# Patient Record
Sex: FEMALE | Race: BLACK OR AFRICAN AMERICAN | Employment: FULL TIME | URBAN - METROPOLITAN AREA
[De-identification: names, ages, dates, MRNs, and addresses within clinical notes are randomized per-mention and may not be internally consistent; named-entity substitution may affect disease eponyms.]

---

## 2017-01-03 ENCOUNTER — PATIENT MESSAGE (OUTPATIENT)
Dept: FAMILY MEDICINE CLINIC | Age: 37
End: 2017-01-03

## 2017-01-03 RX ORDER — ALBUTEROL SULFATE 90 UG/1
2 AEROSOL, METERED RESPIRATORY (INHALATION)
Qty: 2 INHALER | Refills: 2 | Status: SHIPPED | OUTPATIENT
Start: 2017-01-03 | End: 2018-09-28

## 2017-03-30 ENCOUNTER — OFFICE VISIT (OUTPATIENT)
Dept: FAMILY MEDICINE CLINIC | Age: 37
End: 2017-03-30

## 2017-03-30 VITALS
HEIGHT: 63 IN | HEART RATE: 70 BPM | BODY MASS INDEX: 35.9 KG/M2 | SYSTOLIC BLOOD PRESSURE: 118 MMHG | OXYGEN SATURATION: 97 % | TEMPERATURE: 97 F | DIASTOLIC BLOOD PRESSURE: 71 MMHG | WEIGHT: 202.6 LBS | RESPIRATION RATE: 18 BRPM

## 2017-03-30 DIAGNOSIS — M72.2 PLANTAR FASCIITIS OF LEFT FOOT: ICD-10-CM

## 2017-03-30 DIAGNOSIS — M79.672 PAIN OF LEFT HEEL: ICD-10-CM

## 2017-03-30 DIAGNOSIS — J45.30 MILD PERSISTENT ASTHMA WITHOUT COMPLICATION: Primary | ICD-10-CM

## 2017-03-30 RX ORDER — ALBUTEROL SULFATE 5 MG/ML
2.5 SOLUTION RESPIRATORY (INHALATION)
Qty: 90 ML | Refills: 3 | OUTPATIENT
Start: 2017-03-30 | End: 2019-12-22

## 2017-03-30 RX ORDER — NEBULIZER AND COMPRESSOR
EACH MISCELLANEOUS
Qty: 1 EACH | Refills: 0 | Status: SHIPPED | OUTPATIENT
Start: 2017-03-30 | End: 2021-12-01 | Stop reason: SDUPTHER

## 2017-03-30 RX ORDER — BUDESONIDE AND FORMOTEROL FUMARATE DIHYDRATE 160; 4.5 UG/1; UG/1
2 AEROSOL RESPIRATORY (INHALATION) 2 TIMES DAILY
Qty: 1 INHALER | Refills: 3 | Status: SHIPPED | OUTPATIENT
Start: 2017-03-30 | End: 2018-05-25 | Stop reason: SDUPTHER

## 2017-03-30 RX ORDER — IBUPROFEN 800 MG/1
800 TABLET ORAL
Qty: 180 TAB | Refills: 2 | Status: SHIPPED | OUTPATIENT
Start: 2017-03-30 | End: 2017-07-19 | Stop reason: SDUPTHER

## 2017-03-30 NOTE — PROGRESS NOTES
Yoselin Greenberg is a 39 y.o. female presents to office for asthma. Pt would like to get nebulizer for home. 1. Have you been to the ER, urgent care clinic or hospitalized since your last visit? yes  2. Have you seen any other providers outside of [facility] since your last visit? yes  3. Have you had a Flu shot this year?  yes       Health Maintenance items with a due date reviewed with patient:  Health Maintenance Due   Topic Date Due    Pneumococcal 19-64 Medium Risk (1 of 1 - PPSV23) 05/02/1999    DTaP/Tdap/Td series (1 - Tdap) 05/02/2001

## 2017-04-01 NOTE — PROGRESS NOTES
Sim Reed is a 39 y.o.  female and presents with need to try a nebulizer machine since she tends to go to ER a lot sometimes. She is not having an exacerbation at this time. She also needed asthma med refills and Motrin refill for Plantar fasciitis on left heel. Chief Complaint   Patient presents with    Asthma     Subjective: Additional Concerns: none    Patient Active Problem List    Diagnosis Date Noted    Palpitations 07/26/2016    Precordial pain 07/26/2016    Mild intermittent asthma 07/07/2014    Obesity 07/07/2014     Current Outpatient Prescriptions   Medication Sig Dispense Refill    ibuprofen (MOTRIN) 800 mg tablet Take 1 Tab by mouth every eight (8) hours as needed for Pain. Take with food. 180 Tab 2    Nebulizer & Compressor machine Use as directed 1 Each 0    Nebulizer Accessories kit Use as directed 1 Kit 1    albuterol (PROVENTIL) 5 mg/mL nebulizer solution 0.5 mL by Nebulization route every four (4) hours as needed for Wheezing or Shortness of Breath. Indications: Acute Asthma Attack, BRONCHOSPASM PREVENTION, EXERCISE-INDUCED BRONCHOSPASM PREVENTION 90 mL 3    budesonide-formoterol (SYMBICORT) 160-4.5 mcg/actuation HFA inhaler Take 2 Puffs by inhalation two (2) times a day. 1 Inhaler 3    albuterol (PROVENTIL HFA, VENTOLIN HFA, PROAIR HFA) 90 mcg/actuation inhaler Take 2 Puffs by inhalation every four (4) hours as needed for Wheezing or Shortness of Breath. 2 Inhaler 2    multivitamin (ONE A DAY) tablet Take 1 Tab by mouth daily.  omega-3 fatty acids-vitamin e 1,000 mg cap Take 1 Cap by mouth daily.  CYANOCOBALAMIN, VITAMIN B-12, (VITAMIN B-12 PO) Take 1 Tab by mouth daily.  BIOTIN PO Take 1 Tab by mouth daily.        Allergies   Allergen Reactions    Azo [Phenazopyridine] Hives     Past Medical History:   Diagnosis Date    Asthma     Chronic pain 6 months    painful menses    Fibroids      Past Surgical History:   Procedure Laterality Date  HX  SECTION  8380,4902    with BTL     Family History   Problem Relation Age of Onset    Hypertension Mother     Hypertension Father     Diabetes Father     Cancer Paternal Grandmother      Social History   Substance Use Topics    Smoking status: Never Smoker    Smokeless tobacco: Not on file    Alcohol use Yes      Comment: socially, 1 beer/weekend     ROS     General: negative for - chills, fatigue, fever, weight change  Psych: negative for - anxiety, depression, irritability or mood swings  ENT: negative for - headaches, hearing change, nasal congestion, oral lesions, sneezing or sore throat  Heme/ Lymph: negative for - bleeding problems, bruising, pallor or swollen lymph nodes  Endo: negative for - hot flashes, polydipsia/polyuria or temperature intolerance  Resp: negative for - cough, shortness of breath or wheezing  CV: negative for - chest pain, edema or palpitations  GI: negative for - abdominal pain, change in bowel habits, constipation, diarrhea or nausea/vomiting  : negative for - dysuria, hematuria, incontinence, pelvic pain or vulvar/vaginal symptoms  MSK: negative for - joint pain, joint swelling or muscle pain  Neuro: negative for - confusion, headaches, seizures or weakness  Derm: negative for - dry skin, hair changes, rash or skin lesion changes    Objective:  Vitals:    17 0909   BP: 118/71   Pulse: 70   Resp: 18   Temp: 97 °F (36.1 °C)   TempSrc: Oral   SpO2: 97%   Weight: 202 lb 9.6 oz (91.9 kg)   Height: 5' 3\" (1.6 m)   PainSc:   0 - No pain     PE    Alert, well appearing, and in no distress, oriented to person, place, and time and overweight  Mental status - alert, oriented to person, place, and time, normal mood, behavior, speech, dress, motor activity, and thought processes  Chest - clear to auscultation, no wheezes, rales or rhonchi, symmetric air entry  Heart - normal rate, regular rhythm, normal S1, S2, no murmurs, rubs, clicks or gallops  Neurological - alert, oriented, normal speech, no focal findings or movement disorder noted  Musculoskeletal - no joint tenderness, deformity or swelling  Extremities - peripheral pulses normal, no pedal edema, no clubbing or cyanosis    LABS   No visits with results within 6 Month(s) from this visit. Latest known visit with results is:    Hospital Outpatient Visit on 07/08/2016   Component Date Value Ref Range Status    WBC 07/08/2016 10.8  4.6 - 13.2 K/uL Final    RBC 07/08/2016 5.29  4. 20 - 5.30 M/uL Final    HGB 07/08/2016 14.4  12.0 - 16.0 g/dL Final    HCT 07/08/2016 43.2  35.0 - 45.0 % Final    MCV 07/08/2016 81.7  74.0 - 97.0 FL Final    MCH 07/08/2016 27.2  24.0 - 34.0 PG Final    MCHC 07/08/2016 33.3  31.0 - 37.0 g/dL Final    RDW 07/08/2016 14.1  11.6 - 14.5 % Final    PLATELET 16/69/3593 542  135 - 420 K/uL Final    MPV 07/08/2016 10.4  9.2 - 11.8 FL Final    NEUTROPHILS 07/08/2016 66  40 - 73 % Final    LYMPHOCYTES 07/08/2016 26  21 - 52 % Final    MONOCYTES 07/08/2016 5  3 - 10 % Final    EOSINOPHILS 07/08/2016 3  0 - 5 % Final    BASOPHILS 07/08/2016 0  0 - 2 % Final    ABS. NEUTROPHILS 07/08/2016 7.2  1.8 - 8.0 K/UL Final    ABS. LYMPHOCYTES 07/08/2016 2.8  0.9 - 3.6 K/UL Final    ABS. MONOCYTES 07/08/2016 0.5  0.05 - 1.2 K/UL Final    ABS. EOSINOPHILS 07/08/2016 0.3  0.0 - 0.4 K/UL Final    ABS.  BASOPHILS 07/08/2016 0.0  0.0 - 0.06 K/UL Final    DF 07/08/2016 AUTOMATED    Final    TSH 07/08/2016 2.04  0.36 - 3.74 uIU/mL Final    T4, Free 07/08/2016 0.9  0.7 - 1.5 NG/DL Final    Sodium 07/08/2016 139  136 - 145 mmol/L Final    Potassium 07/08/2016 4.1  3.5 - 5.5 mmol/L Final    Chloride 07/08/2016 105  100 - 108 mmol/L Final    CO2 07/08/2016 25  21 - 32 mmol/L Final    Anion gap 07/08/2016 9  3.0 - 18 mmol/L Final    Glucose 07/08/2016 75  74 - 99 mg/dL Final    BUN 07/08/2016 10  7.0 - 18 MG/DL Final    Creatinine 07/08/2016 0.81  0.6 - 1.3 MG/DL Final    BUN/Creatinine ratio 07/08/2016 12  12 - 20   Final    GFR est AA 07/08/2016 >60  >60 ml/min/1.73m2 Final    GFR est non-AA 07/08/2016 >60  >60 ml/min/1.73m2 Final    Comment: (NOTE)  Estimated GFR is calculated using the Modification of Diet in Renal   Disease (MDRD) Study equation, reported for both  Americans   (GFRAA) and non- Americans (GFRNA), and normalized to 1.73m2   body surface area. The physician must decide which value applies to   the patient. The MDRD study equation should only be used in   individuals age 25 or older. It has not been validated for the   following: pregnant women, patients with serious comorbid conditions,   or on certain medications, or persons with extremes of body size,   muscle mass, or nutritional status.  Calcium 07/08/2016 8.9  8.5 - 10.1 MG/DL Final    Bilirubin, total 07/08/2016 0.5  0.2 - 1.0 MG/DL Final    ALT (SGPT) 07/08/2016 25  13 - 56 U/L Final    AST (SGOT) 07/08/2016 19  15 - 37 U/L Final    Alk. phosphatase 07/08/2016 81  45 - 117 U/L Final    Protein, total 07/08/2016 7.9  6.4 - 8.2 g/dL Final    Albumin 07/08/2016 4.0  3.4 - 5.0 g/dL Final    Globulin 07/08/2016 3.9  2.0 - 4.0 g/dL Final    A-G Ratio 07/08/2016 1.0  0.8 - 1.7   Final    LIPID PROFILE 07/08/2016        Final    Cholesterol, total 07/08/2016 144  <200 MG/DL Final    Triglyceride 07/08/2016 47  <150 MG/DL Final    HDL Cholesterol 07/08/2016 61* 40 - 60 MG/DL Final    LDL, calculated 07/08/2016 73.6  0 - 100 MG/DL Final    VLDL, calculated 07/08/2016 9.4  MG/DL Final    CHOL/HDL Ratio 07/08/2016 2.4  0 - 5.0   Final    Source 07/08/2016 THIS ENVIRONMENTAL CULTURING METHOD IS BASED ON AN UNVALIDATED PROCESS DEVELOPED AND RECOMMENDED BY THE [facility] FOR DISEASE CONTROL. THE SENSITIVITY, SPECIFICITY AND LIMITS ON QUANTITATION OR DETECTION ARE NOT ESTABLISHED FOR ALL ORGANISMS WITH THE SPECIFIED PROCESSING METHODS.     Final    Atopobium vaginae 07/08/2016 SEE COMMENT  Score Final Comment: (NOTE)  RESULT:High - 2      BVAB 2 07/08/2016 SEE COMMENT  Score Final    Comment: (NOTE)  RESULT:High - 2      Megasphaera 1 07/08/2016 SEE COMMENT  Score Final    Comment: (NOTE)  RESULT:High - 2  Performed At: [facility]  [facility] [address], [place] [id number]  Juana Pena MD WK:[id number]      C. albicans, JACQUELINE 07/08/2016 NEGATIVE   NEGATIVE   Final    C. glabrata, JACQUELINE 07/08/2016 NEGATIVE   NEGATIVE   Final    Comment: (NOTE)  This test was developed and its performance characteristics  determined by [facility].  It has not been cleared or approved by the  Food and Drug Administration. The FDA has determined that such  clearance or approval is not necessary. Performed At: [address] [id number]  Juana Pena MD EK:[id number]      T. vaginalis, JACQUELINE 07/08/2016 NEGATIVE   NEGATIVE   Final    C. trachomatis, JACQUELINE 07/08/2016 NEGATIVE   NEGATIVE   Final    N. gonorrhoeae, JACQUELINE 07/08/2016 NEGATIVE   NEGATIVE   Final    Comment: (NOTE)  Performed At: [address] [id number]  Juana Pena MD [phone]:[id number]      Hemoglobin A1c 07/08/2016 5.6  4.2 - 5.6 % Final    Comment: ** NEW REFERENCE RANGE ESTABLISHED FOR THIS METHOD **  (NOTE)  HbA1C Interpretive Ranges  <5.7              Normal  5.7 - 6.4         Consider Prediabetes  >6.5              Consider Diabetes      Est. average glucose 07/08/2016 114  mg/dL Final    Comment: (NOTE)  The eAG should be interpreted with patient characteristics in mind   since ethnicity, interindividual differences, red cell lifespan,   variation in rates of glycation, etc. may affect the validity of the   calculation. TESTS  Results for orders placed or performed during the hospital encounter of 07/08/16   CBC WITH AUTOMATED DIFF   Result Value Ref Range    WBC 10.8 4.6 - 13.2 K/uL    RBC 5.29 4. 20 - 5.30 M/uL    HGB 14.4 12.0 - 16.0 g/dL    HCT 43.2 35.0 - 45.0 %    MCV 81.7 74.0 - 97.0 FL    MCH 27.2 24.0 - 34.0 PG    MCHC 33.3 31.0 - 37.0 g/dL    RDW 14.1 11.6 - 14.5 %    PLATELET 465 999 - 537 K/uL    MPV 10.4 9.2 - 11.8 FL    NEUTROPHILS 66 40 - 73 %    LYMPHOCYTES 26 21 - 52 %    MONOCYTES 5 3 - 10 %    EOSINOPHILS 3 0 - 5 %    BASOPHILS 0 0 - 2 %    ABS. NEUTROPHILS 7.2 1.8 - 8.0 K/UL    ABS. LYMPHOCYTES 2.8 0.9 - 3.6 K/UL    ABS. MONOCYTES 0.5 0.05 - 1.2 K/UL    ABS. EOSINOPHILS 0.3 0.0 - 0.4 K/UL    ABS. BASOPHILS 0.0 0.0 - 0.06 K/UL    DF AUTOMATED     TSH 3RD GENERATION   Result Value Ref Range    TSH 2.04 0.36 - 3.74 uIU/mL   T4, FREE   Result Value Ref Range    T4, Free 0.9 0.7 - 1.5 NG/DL   METABOLIC PANEL, COMPREHENSIVE   Result Value Ref Range    Sodium 139 136 - 145 mmol/L    Potassium 4.1 3.5 - 5.5 mmol/L    Chloride 105 100 - 108 mmol/L    CO2 25 21 - 32 mmol/L    Anion gap 9 3.0 - 18 mmol/L    Glucose 75 74 - 99 mg/dL    BUN 10 7.0 - 18 MG/DL    Creatinine 0.81 0.6 - 1.3 MG/DL    BUN/Creatinine ratio 12 12 - 20      GFR est AA >60 >60 ml/min/1.73m2    GFR est non-AA >60 >60 ml/min/1.73m2    Calcium 8.9 8.5 - 10.1 MG/DL    Bilirubin, total 0.5 0.2 - 1.0 MG/DL    ALT (SGPT) 25 13 - 56 U/L    AST (SGOT) 19 15 - 37 U/L    Alk. phosphatase 81 45 - 117 U/L    Protein, total 7.9 6.4 - 8.2 g/dL    Albumin 4.0 3.4 - 5.0 g/dL    Globulin 3.9 2.0 - 4.0 g/dL    A-G Ratio 1.0 0.8 - 1.7     LIPID PANEL   Result Value Ref Range    LIPID PROFILE          Cholesterol, total 144 <200 MG/DL    Triglyceride 47 <150 MG/DL    HDL Cholesterol 61 (H) 40 - 60 MG/DL    LDL, calculated 73.6 0 - 100 MG/DL    VLDL, calculated 9.4 MG/DL    CHOL/HDL Ratio 2.4 0 - 5.0     NUSWAB VAGINITIS PLUS   Result Value Ref Range    Source        THIS ENVIRONMENTAL CULTURING METHOD IS BASED ON AN UNVALIDATED PROCESS DEVELOPED AND RECOMMENDED BY THE [facility] FOR DISEASE CONTROL.  THE SENSITIVITY, SPECIFICITY AND LIMITS ON QUANTITATION OR DETECTION ARE NOT ESTABLISHED FOR ALL ORGANISMS WITH THE SPECIFIED PROCESSING METHODS. Atopobium vaginae SEE COMMENT Score    BVAB 2 SEE COMMENT Score    Megasphaera 1 SEE COMMENT Score    C. albicans, JACQUELINE NEGATIVE  NEGATIVE      C. glabrata, JACQUELINE NEGATIVE  NEGATIVE      T. vaginalis, JACQUELINE NEGATIVE  NEGATIVE      C. trachomatis, JACQUELINE NEGATIVE  NEGATIVE      N. gonorrhoeae, JACQUELINE NEGATIVE  NEGATIVE     HEMOGLOBIN A1C WITH EAG   Result Value Ref Range    Hemoglobin A1c 5.6 4.2 - 5.6 %    Est. average glucose 114 mg/dL     Assessment/Plan:      1. Plantar fasciitis of left foot   - ibuprofen (MOTRIN) 800 mg tablet; Take 1 Tab by mouth every eight (8) hours as needed for Pain. Take with food. Dispense: 180 Tab; Refill: 2    2. Mild persistent asthma without complication - stable   - Nebulizer & Compressor machine; Use as directed  Dispense: 1 Each; Refill: 0  - Nebulizer Accessories kit; Use as directed  Dispense: 1 Kit; Refill: 1  - albuterol (PROVENTIL) 5 mg/mL nebulizer solution; 0.5 mL by Nebulization route every four (4) hours as needed for Wheezing or Shortness of Breath. Indications: Acute Asthma Attack, BRONCHOSPASM PREVENTION, EXERCISE-INDUCED BRONCHOSPASM PREVENTION  Dispense: 90 mL; Refill: 3  - budesonide-formoterol (SYMBICORT) 160-4.5 mcg/actuation HFA inhaler; Take 2 Puffs by inhalation two (2) times a day. Dispense: 1 Inhaler; Refill: 3    Lab review: no lab studies available for review at time of visit    I have discussed the diagnosis with the patient and the intended plan as seen in the above orders. The patient has received an after-visit summary and questions were answered concerning future plans. I have discussed medication side effects and warnings with the patient as well. I have reviewed the plan of care with the patient, accepted their input and they are in agreement with the treatment goals.      F/U as needed    Bibi Orantes MD

## 2017-04-01 NOTE — PATIENT INSTRUCTIONS

## 2017-05-17 NOTE — TELEPHONE ENCOUNTER
Spoke to pharmacist regarding pt's symbicort. Per pharmacist, pt needs to try breo elipta/ advair/ [employer] first. Please advise.

## 2017-06-29 NOTE — TELEPHONE ENCOUNTER
From: Glorya Gaucher  To: Khoi Best MD  Sent: 6/29/2017 2:11 AM EDT  Subject: Medication Renewal Request    Original authorizing provider: Lorean Hence, MD Glorya Gaucher would like a refill of the following medications:  mometasone-formoterol (DULERA) 200-5 mcg/actuation HFA inhaler Khoi Best MD]    Preferred pharmacy: [address], [place]:

## 2017-07-19 ENCOUNTER — OFFICE VISIT (OUTPATIENT)
Dept: FAMILY MEDICINE CLINIC | Age: 37
End: 2017-07-19

## 2017-07-19 VITALS
SYSTOLIC BLOOD PRESSURE: 126 MMHG | HEIGHT: 63 IN | WEIGHT: 195 LBS | HEART RATE: 70 BPM | OXYGEN SATURATION: 98 % | TEMPERATURE: 98 F | RESPIRATION RATE: 18 BRPM | DIASTOLIC BLOOD PRESSURE: 86 MMHG | BODY MASS INDEX: 34.55 KG/M2

## 2017-07-19 DIAGNOSIS — M72.2 PLANTAR FASCIITIS OF LEFT FOOT: ICD-10-CM

## 2017-07-19 DIAGNOSIS — M79.672 PAIN OF LEFT HEEL: ICD-10-CM

## 2017-07-19 DIAGNOSIS — M25.561 ACUTE PAIN OF RIGHT KNEE: Primary | ICD-10-CM

## 2017-07-19 RX ORDER — IBUPROFEN 800 MG/1
800 TABLET ORAL
Qty: 180 TAB | Refills: 2 | Status: SHIPPED | OUTPATIENT
Start: 2017-07-19 | End: 2017-12-21 | Stop reason: SDUPTHER

## 2017-07-19 NOTE — PROGRESS NOTES
Skyla Hall is a 40 y.o. female presents to office for right knee swelling x 1 week. Pt states that she does not recall any injury. Pt states that she stands up at work a majority of her shift. 1. Have you been to the ER, urgent care clinic or hospitalized since your last visit? no  2. Have you seen any other providers outside of [facility] since your last visit? no  3. Have you had a Flu shot this year?  yes      Health Maintenance items with a due date reviewed with patient:  Health Maintenance Due   Topic Date Due    Pneumococcal 19-64 Medium Risk (1 of 1 - PPSV23) 05/02/1999

## 2017-07-19 NOTE — PATIENT INSTRUCTIONS
Knee Pain or Injury: Care Instructions  Your Care Instructions    Injuries are a common cause of knee problems. Sudden (acute) injuries may be caused by a direct blow to the knee. They can also be caused by abnormal twisting, bending, or falling on the knee. Pain, bruising, or swelling may be severe, and may start within minutes of the injury. Overuse is another cause of knee pain. Other causes are climbing stairs, kneeling, and other activities that use the knee. Everyday wear and tear, especially as you get older, also can cause knee pain. Rest, along with home treatment, often relieves pain and allows your knee to heal. If you have a serious knee injury, you may need tests and treatment. Follow-up care is a key part of your treatment and safety. Be sure to make and go to all appointments, and call your doctor if you are having problems. It's also a good idea to know your test results and keep a list of the medicines you take. How can you care for yourself at home? · Be safe with medicines. Read and follow all instructions on the label. ¨ If the doctor gave you a prescription medicine for pain, take it as prescribed. ¨ If you are not taking a prescription pain medicine, ask your doctor if you can take an over-the-counter medicine. · Rest and protect your knee. Take a break from any activity that may cause pain. · Put ice or a cold pack on your knee for 10 to 20 minutes at a time. Put a thin cloth between the ice and your skin. · Prop up a sore knee on a pillow when you ice it or anytime you sit or lie down for the next 3 days. Try to keep it above the level of your heart. This will help reduce swelling. · If your knee is not swollen, you can put moist heat, a heating pad, or a warm cloth on your knee. · If your doctor recommends an elastic bandage, sleeve, or other type of support for your knee, wear it as directed.   · Follow your doctor's instructions about how much weight you can put on your leg. Use a cane, crutches, or a walker as instructed. · Follow your doctor's instructions about activity during your healing process. If you can do mild exercise, slowly increase your activity. · Reach and stay at a healthy weight. Extra weight can strain the joints, especially the knees and hips, and make the pain worse. Losing even a few pounds may help. When should you call for help? Call 911 anytime you think you may need emergency care. For example, call if:  · You have symptoms of a blood clot in your lung (called a pulmonary embolism). These may include:  ¨ Sudden chest pain. ¨ Trouble breathing. ¨ Coughing up blood. Call your doctor now or seek immediate medical care if:  · You have severe or increasing pain. · Your leg or foot turns cold or changes color. · You cannot stand or put weight on your knee. · Your knee looks twisted or bent out of shape. · You cannot move your knee. · You have signs of infection, such as:  ¨ Increased pain, swelling, warmth, or redness. ¨ Red streaks leading from the knee. ¨ Pus draining from a place on your knee. ¨ A fever. · You have signs of a blood clot in your leg (called a deep vein thrombosis), such as:  ¨ Pain in your calf, back of the knee, thigh, or groin. ¨ Redness and swelling in your leg or groin. Watch closely for changes in your health, and be sure to contact your doctor if:  · You have tingling, weakness, or numbness in your knee. · You have any new symptoms, such as swelling. · You have bruises from a knee injury that last longer than 2 weeks. · You do not get better as expected. Where can you learn more? Go to http://lorie-ede.info/. Enter K195 in the search box to learn more about \"Knee Pain or Injury: Care Instructions. \"  Current as of: March 20, 2017  Content Version: 11.3  © 2363-3339 [employer].  Care instructions adapted under license by [employer] (which disclaims liability or warranty for this information). If you have questions about a medical condition or this instruction, always ask your healthcare professional. [place] [address] any warranty or liability for your use of this information.

## 2017-07-19 NOTE — LETTER
NOTIFICATION RETURN TO WORK  
 
7/19/2017 3:15 PM 
 
Ms. Brianna Cash [address] [id number] To Whom It May Concern: 
 
Brianna Cash is currently under the care of [address] [place] [address]. Please excuse Ms. Bernabe Harris from work 7/19/17-7/20/17. She may return to work on 7/23/17. If there are questions or concerns please have the patient contact our office. Sincerely, Thais Roth MD

## 2017-07-20 NOTE — PROGRESS NOTES
Suzanna Serna is a 40 y.o.  female and presents with acute right knee edema and pain. No trauma or injury but had some kneeling and   Standing at work that is prolonged. No shooting pain or numbness down her leg. Chief Complaint   Patient presents with    Knee Swelling     Subjective: Additional Concerns: none    Patient Active Problem List    Diagnosis Date Noted    Palpitations 07/26/2016    Precordial pain 07/26/2016    Mild intermittent asthma 07/07/2014    Obesity 07/07/2014     Current Outpatient Prescriptions   Medication Sig Dispense Refill    ibuprofen (MOTRIN) 800 mg tablet Take 1 Tab by mouth every eight (8) hours as needed for Pain. Take with food. 180 Tab 2    mometasone-formoterol (DULERA) 200-5 mcg/actuation HFA inhaler Take 2 Puffs by inhalation two (2) times a day. 1 Inhaler 2    Nebulizer & Compressor machine Use as directed 1 Each 0    Nebulizer Accessories kit Use as directed 1 Kit 1    albuterol (PROVENTIL) 5 mg/mL nebulizer solution 0.5 mL by Nebulization route every four (4) hours as needed for Wheezing or Shortness of Breath. Indications: Acute Asthma Attack, BRONCHOSPASM PREVENTION, EXERCISE-INDUCED BRONCHOSPASM PREVENTION 90 mL 3    budesonide-formoterol (SYMBICORT) 160-4.5 mcg/actuation HFA inhaler Take 2 Puffs by inhalation two (2) times a day. 1 Inhaler 3    albuterol (PROVENTIL HFA, VENTOLIN HFA, PROAIR HFA) 90 mcg/actuation inhaler Take 2 Puffs by inhalation every four (4) hours as needed for Wheezing or Shortness of Breath. 2 Inhaler 2    multivitamin (ONE A DAY) tablet Take 1 Tab by mouth daily.  omega-3 fatty acids-vitamin e 1,000 mg cap Take 1 Cap by mouth daily.  CYANOCOBALAMIN, VITAMIN B-12, (VITAMIN B-12 PO) Take 1 Tab by mouth daily.  BIOTIN PO Take 1 Tab by mouth daily.        Allergies   Allergen Reactions    Azo [Phenazopyridine] Hives     Past Medical History:   Diagnosis Date    Asthma     Chronic pain 6 months painful menses    Fibroids      Past Surgical History:   Procedure Laterality Date    HX  SECTION  4924,4481    with BTL     Family History   Problem Relation Age of Onset    Hypertension Mother     Hypertension Father     Diabetes Father     Cancer Paternal Grandmother      Social History   Substance Use Topics    Smoking status: Never Smoker    Smokeless tobacco: Not on file    Alcohol use Yes      Comment: socially, 1 beer/weekend     ROS     General: negative for - chills, fatigue, fever, weight change  Psych: negative for - anxiety, depression, irritability or mood swings  ENT: negative for - headaches, hearing change, nasal congestion, oral lesions, sneezing or sore throat  Heme/ Lymph: negative for - bleeding problems, bruising, pallor or swollen lymph nodes  Endo: negative for - hot flashes, polydipsia/polyuria or temperature intolerance  Resp: negative for - cough, shortness of breath or wheezing  CV: negative for - chest pain, edema or palpitations  GI: negative for - abdominal pain, change in bowel habits, constipation, diarrhea or nausea/vomiting  : negative for - dysuria, hematuria, incontinence, pelvic pain or vulvar/vaginal symptoms  MSK: negative for - joint pain, joint swelling or muscle pain  Neuro: negative for - confusion, headaches, seizures or weakness  Derm: negative for - dry skin, hair changes, rash or skin lesion changes    Objective:  Vitals:    17 1456   BP: 126/86   Pulse: 70   Resp: 18   Temp: 98 °F (36.7 °C)   TempSrc: Oral   SpO2: 98%   Weight: 195 lb (88.5 kg)   Height: 5' 3\" (1.6 m)   PainSc:   7   PainLoc: Knee     PE    alert, well appearing, and in no distress, oriented to person, place, and time and overweight  Mental status - alert, oriented to person, place, and time, normal mood, behavior, speech, dress, motor activity, and thought processes  Neurological - alert, oriented, normal speech, no focal findings or movement disorder noted  Musculoskeletal - abnormal exam of right knee tender on palpation, pain on weight bearing and flexion and extension limited by pain. Visible edema especially lateral lower quadriceps. No redness or warmth. Extremities - peripheral pulses normal, no pedal edema, no clubbing or cyanosis    LABS   No visits with results within 6 Month(s) from this visit. Latest known visit with results is:    Hospital Outpatient Visit on 07/08/2016   Component Date Value Ref Range Status    WBC 07/08/2016 10.8  4.6 - 13.2 K/uL Final    RBC 07/08/2016 5.29  4. 20 - 5.30 M/uL Final    HGB 07/08/2016 14.4  12.0 - 16.0 g/dL Final    HCT 07/08/2016 43.2  35.0 - 45.0 % Final    MCV 07/08/2016 81.7  74.0 - 97.0 FL Final    MCH 07/08/2016 27.2  24.0 - 34.0 PG Final    MCHC 07/08/2016 33.3  31.0 - 37.0 g/dL Final    RDW 07/08/2016 14.1  11.6 - 14.5 % Final    PLATELET 74/17/0495 482  135 - 420 K/uL Final    MPV 07/08/2016 10.4  9.2 - 11.8 FL Final    NEUTROPHILS 07/08/2016 66  40 - 73 % Final    LYMPHOCYTES 07/08/2016 26  21 - 52 % Final    MONOCYTES 07/08/2016 5  3 - 10 % Final    EOSINOPHILS 07/08/2016 3  0 - 5 % Final    BASOPHILS 07/08/2016 0  0 - 2 % Final    ABS. NEUTROPHILS 07/08/2016 7.2  1.8 - 8.0 K/UL Final    ABS. LYMPHOCYTES 07/08/2016 2.8  0.9 - 3.6 K/UL Final    ABS. MONOCYTES 07/08/2016 0.5  0.05 - 1.2 K/UL Final    ABS. EOSINOPHILS 07/08/2016 0.3  0.0 - 0.4 K/UL Final    ABS.  BASOPHILS 07/08/2016 0.0  0.0 - 0.06 K/UL Final    DF 07/08/2016 AUTOMATED    Final    TSH 07/08/2016 2.04  0.36 - 3.74 uIU/mL Final    T4, Free 07/08/2016 0.9  0.7 - 1.5 NG/DL Final    Sodium 07/08/2016 139  136 - 145 mmol/L Final    Potassium 07/08/2016 4.1  3.5 - 5.5 mmol/L Final    Chloride 07/08/2016 105  100 - 108 mmol/L Final    CO2 07/08/2016 25  21 - 32 mmol/L Final    Anion gap 07/08/2016 9  3.0 - 18 mmol/L Final    Glucose 07/08/2016 75  74 - 99 mg/dL Final    BUN 07/08/2016 10  7.0 - 18 MG/DL Final    Creatinine 07/08/2016 0.81 0.6 - 1.3 MG/DL Final    BUN/Creatinine ratio 07/08/2016 12  12 - 20   Final    GFR est AA 07/08/2016 >60  >60 ml/min/1.73m2 Final    GFR est non-AA 07/08/2016 >60  >60 ml/min/1.73m2 Final    Comment: (NOTE)  Estimated GFR is calculated using the Modification of Diet in Renal   Disease (MDRD) Study equation, reported for both  Americans   (GFRAA) and non- Americans (GFRNA), and normalized to 1.73m2   body surface area. The physician must decide which value applies to   the patient. The MDRD study equation should only be used in   individuals age 25 or older. It has not been validated for the   following: pregnant women, patients with serious comorbid conditions,   or on certain medications, or persons with extremes of body size,   muscle mass, or nutritional status.  Calcium 07/08/2016 8.9  8.5 - 10.1 MG/DL Final    Bilirubin, total 07/08/2016 0.5  0.2 - 1.0 MG/DL Final    ALT (SGPT) 07/08/2016 25  13 - 56 U/L Final    AST (SGOT) 07/08/2016 19  15 - 37 U/L Final    Alk. phosphatase 07/08/2016 81  45 - 117 U/L Final    Protein, total 07/08/2016 7.9  6.4 - 8.2 g/dL Final    Albumin 07/08/2016 4.0  3.4 - 5.0 g/dL Final    Globulin 07/08/2016 3.9  2.0 - 4.0 g/dL Final    A-G Ratio 07/08/2016 1.0  0.8 - 1.7   Final    LIPID PROFILE 07/08/2016        Final    Cholesterol, total 07/08/2016 144  <200 MG/DL Final    Triglyceride 07/08/2016 47  <150 MG/DL Final    HDL Cholesterol 07/08/2016 61* 40 - 60 MG/DL Final    LDL, calculated 07/08/2016 73.6  0 - 100 MG/DL Final    VLDL, calculated 07/08/2016 9.4  MG/DL Final    CHOL/HDL Ratio 07/08/2016 2.4  0 - 5.0   Final    Source 07/08/2016 THIS ENVIRONMENTAL CULTURING METHOD IS BASED ON AN UNVALIDATED PROCESS DEVELOPED AND RECOMMENDED BY THE [facility] FOR DISEASE CONTROL. THE SENSITIVITY, SPECIFICITY AND LIMITS ON QUANTITATION OR DETECTION ARE NOT ESTABLISHED FOR ALL ORGANISMS WITH THE SPECIFIED PROCESSING METHODS.     Final    Atopobium vaginae 07/08/2016 SEE COMMENT  Score Final    Comment: (NOTE)  RESULT:High - 2      BVAB 2 07/08/2016 SEE COMMENT  Score Final    Comment: (NOTE)  RESULT:High - 2      Megasphaera 1 07/08/2016 SEE COMMENT  Score Final    Comment: (NOTE)  RESULT:High - 2  Performed At: [address] [id number]  Lainey Gibson MD FQ:[id number]      C. albicans, JACQUELINE 07/08/2016 NEGATIVE   NEGATIVE   Final    C. glabrata, JACQUELINE 07/08/2016 NEGATIVE   NEGATIVE   Final    Comment: (NOTE)  This test was developed and its performance characteristics  determined by [facility].  It has not been cleared or approved by the  Food and Drug Administration. The FDA has determined that such  clearance or approval is not necessary. Performed At: [address] [id number]  Lainey Gibson MD PY:[id number]      T. vaginalis, JACQUELINE 07/08/2016 NEGATIVE   NEGATIVE   Final    C. trachomatis, JACQUELINE 07/08/2016 NEGATIVE   NEGATIVE   Final    N. gonorrhoeae, JACQUELINE 07/08/2016 NEGATIVE   NEGATIVE   Final    Comment: (NOTE)  Performed At: [address] [id number]  Lainey Gibson MD OL:[id number]      Hemoglobin A1c 07/08/2016 5.6  4.2 - 5.6 % Final    Comment: ** NEW REFERENCE RANGE ESTABLISHED FOR THIS METHOD **  (NOTE)  HbA1C Interpretive Ranges  <5.7              Normal  5.7 - 6.4         Consider Prediabetes  >6.5              Consider Diabetes      Est. average glucose 07/08/2016 114  mg/dL Final    Comment: (NOTE)  The eAG should be interpreted with patient characteristics in mind   since ethnicity, interindividual differences, red cell lifespan,   variation in rates of glycation, etc. may affect the validity of the   calculation. TESTS  Results for orders placed or performed during the hospital encounter of 07/08/16   CBC WITH AUTOMATED DIFF   Result Value Ref Range    WBC 10.8 4.6 - 13.2 K/uL    RBC 5.29 4. 20 - 5.30 M/uL    HGB 14.4 12.0 - 16.0 g/dL    HCT 43.2 35.0 - 45.0 %    MCV 81.7 74.0 - 97.0 FL    MCH 27.2 24.0 - 34.0 PG    MCHC 33.3 31.0 - 37.0 g/dL    RDW 14.1 11.6 - 14.5 %    PLATELET 548 413 - 084 K/uL    MPV 10.4 9.2 - 11.8 FL    NEUTROPHILS 66 40 - 73 %    LYMPHOCYTES 26 21 - 52 %    MONOCYTES 5 3 - 10 %    EOSINOPHILS 3 0 - 5 %    BASOPHILS 0 0 - 2 %    ABS. NEUTROPHILS 7.2 1.8 - 8.0 K/UL    ABS. LYMPHOCYTES 2.8 0.9 - 3.6 K/UL    ABS. MONOCYTES 0.5 0.05 - 1.2 K/UL    ABS. EOSINOPHILS 0.3 0.0 - 0.4 K/UL    ABS. BASOPHILS 0.0 0.0 - 0.06 K/UL    DF AUTOMATED     TSH 3RD GENERATION   Result Value Ref Range    TSH 2.04 0.36 - 3.74 uIU/mL   T4, FREE   Result Value Ref Range    T4, Free 0.9 0.7 - 1.5 NG/DL   METABOLIC PANEL, COMPREHENSIVE   Result Value Ref Range    Sodium 139 136 - 145 mmol/L    Potassium 4.1 3.5 - 5.5 mmol/L    Chloride 105 100 - 108 mmol/L    CO2 25 21 - 32 mmol/L    Anion gap 9 3.0 - 18 mmol/L    Glucose 75 74 - 99 mg/dL    BUN 10 7.0 - 18 MG/DL    Creatinine 0.81 0.6 - 1.3 MG/DL    BUN/Creatinine ratio 12 12 - 20      GFR est AA >60 >60 ml/min/1.73m2    GFR est non-AA >60 >60 ml/min/1.73m2    Calcium 8.9 8.5 - 10.1 MG/DL    Bilirubin, total 0.5 0.2 - 1.0 MG/DL    ALT (SGPT) 25 13 - 56 U/L    AST (SGOT) 19 15 - 37 U/L    Alk. phosphatase 81 45 - 117 U/L    Protein, total 7.9 6.4 - 8.2 g/dL    Albumin 4.0 3.4 - 5.0 g/dL    Globulin 3.9 2.0 - 4.0 g/dL    A-G Ratio 1.0 0.8 - 1.7     LIPID PANEL   Result Value Ref Range    LIPID PROFILE          Cholesterol, total 144 <200 MG/DL    Triglyceride 47 <150 MG/DL    HDL Cholesterol 61 (H) 40 - 60 MG/DL    LDL, calculated 73.6 0 - 100 MG/DL    VLDL, calculated 9.4 MG/DL    CHOL/HDL Ratio 2.4 0 - 5.0     NUSWAB VAGINITIS PLUS   Result Value Ref Range    Source        THIS ENVIRONMENTAL CULTURING METHOD IS BASED ON AN UNVALIDATED PROCESS DEVELOPED AND RECOMMENDED BY THE [facility] FOR DISEASE CONTROL.  THE SENSITIVITY, SPECIFICITY AND LIMITS ON QUANTITATION OR DETECTION ARE NOT ESTABLISHED FOR ALL ORGANISMS WITH THE SPECIFIED PROCESSING METHODS. Atopobium vaginae SEE COMMENT Score    BVAB 2 SEE COMMENT Score    Megasphaera 1 SEE COMMENT Score    C. albicans, JACQUELINE NEGATIVE  NEGATIVE      C. glabrata, JACQUELINE NEGATIVE  NEGATIVE      T. vaginalis, JACQUELINE NEGATIVE  NEGATIVE      C. trachomatis, JACQUELINE NEGATIVE  NEGATIVE      N. gonorrhoeae, JACQUELINE NEGATIVE  NEGATIVE     HEMOGLOBIN A1C WITH EAG   Result Value Ref Range    Hemoglobin A1c 5.6 4.2 - 5.6 %    Est. average glucose 114 mg/dL     Assessment/Plan:      1. Pain of left heel  - ibuprofen (MOTRIN) 800 mg tablet; Take 1 Tab by mouth every eight (8) hours as needed for Pain. Take with food. Dispense: 180 Tab; Refill: 2    2. Plantar fasciitis of left foot  - ibuprofen (MOTRIN) 800 mg tablet; Take 1 Tab by mouth every eight (8) hours as needed for Pain. Take with food. Dispense: 180 Tab; Refill: 2    3. Acute pain of right knee  - XR KNEE RT MAX 2 VWS; Future    Lab review: no lab studies available for review at time of visit. We will call patient for results and further plan. I have discussed the diagnosis with the patient and the intended plan as seen in the above orders. The patient has received an after-visit summary and questions were answered concerning future plans. I have discussed medication side effects and warnings with the patient as well. I have reviewed the plan of care with the patient, accepted their input and they are in agreement with the treatment goals. Follow-up Disposition:  Return if symptoms worsen or fail to improve.     Sukhjinder Escalante MD

## 2017-07-25 ENCOUNTER — TELEPHONE (OUTPATIENT)
Dept: FAMILY MEDICINE CLINIC | Age: 37
End: 2017-07-25

## 2017-07-25 NOTE — TELEPHONE ENCOUNTER
----- Message from Madelin Case MD sent at 7/21/2017  9:30 AM EDT -----  Pls report normal knee XR. Rest with no weight bearing for one to two weeks. Referral to PT if not getting better then. . Pls pend if patient agrees. If still too painful for PT then referral to Ortho.

## 2017-10-25 RX ORDER — MOMETASONE FUROATE AND FORMOTEROL FUMARATE DIHYDRATE 200; 5 UG/1; UG/1
AEROSOL RESPIRATORY (INHALATION)
Qty: 13 INHALER | Refills: 0 | Status: SHIPPED | OUTPATIENT
Start: 2017-10-25 | End: 2017-11-26 | Stop reason: SDUPTHER

## 2017-11-28 RX ORDER — MOMETASONE FUROATE AND FORMOTEROL FUMARATE DIHYDRATE 200; 5 UG/1; UG/1
AEROSOL RESPIRATORY (INHALATION)
Qty: 13 INHALER | Refills: 0 | Status: SHIPPED | OUTPATIENT
Start: 2017-11-28 | End: 2017-12-27 | Stop reason: SDUPTHER

## 2017-12-21 ENCOUNTER — OFFICE VISIT (OUTPATIENT)
Dept: FAMILY MEDICINE CLINIC | Age: 37
End: 2017-12-21

## 2017-12-21 VITALS
SYSTOLIC BLOOD PRESSURE: 137 MMHG | HEIGHT: 63 IN | TEMPERATURE: 98.2 F | BODY MASS INDEX: 34.45 KG/M2 | DIASTOLIC BLOOD PRESSURE: 83 MMHG | HEART RATE: 73 BPM | WEIGHT: 194.4 LBS | RESPIRATION RATE: 16 BRPM | OXYGEN SATURATION: 98 %

## 2017-12-21 DIAGNOSIS — R19.7 DIARRHEA, UNSPECIFIED TYPE: Primary | ICD-10-CM

## 2017-12-21 DIAGNOSIS — G89.29 CHRONIC PAIN OF RIGHT KNEE: ICD-10-CM

## 2017-12-21 DIAGNOSIS — M79.672 PAIN OF LEFT HEEL: ICD-10-CM

## 2017-12-21 DIAGNOSIS — M72.2 PLANTAR FASCIITIS OF LEFT FOOT: ICD-10-CM

## 2017-12-21 DIAGNOSIS — M25.561 CHRONIC PAIN OF RIGHT KNEE: ICD-10-CM

## 2017-12-21 RX ORDER — IBUPROFEN 800 MG/1
800 TABLET ORAL
Qty: 180 TAB | Refills: 2 | Status: SHIPPED | OUTPATIENT
Start: 2017-12-21 | End: 2018-04-09 | Stop reason: SDUPTHER

## 2017-12-21 RX ORDER — DICYCLOMINE HYDROCHLORIDE 10 MG/1
10 CAPSULE ORAL 4 TIMES DAILY
Qty: 30 CAP | Refills: 1 | Status: SHIPPED | OUTPATIENT
Start: 2017-12-21 | End: 2018-09-28

## 2017-12-21 NOTE — PROGRESS NOTES
menses    Fibroids      Past Surgical History:   Procedure Laterality Date    HX  SECTION  8798,4346    with BTL     Family History   Problem Relation Age of Onset    Hypertension Mother     Hypertension Father     Diabetes Father     Cancer Paternal Grandmother      Social History   Substance Use Topics    Smoking status: Never Smoker    Smokeless tobacco: Never Used    Alcohol use Yes      Comment: socially, 1 beer/weekend     ROS     General: negative for - chills, fatigue, fever, weight change  ENT: negative for - headaches, hearing change, nasal congestion, oral lesions, sneezing or sore throat  Endo: negative for - hot flashes, polydipsia/polyuria or temperature intolerance  Resp: negative for - cough, shortness of breath or wheezing  CV: negative for - chest pain, edema or palpitations  GI: positive for - abdominal pain with cramps, change in bowel habits,no  Constipation, positive diarrhea, no nausea/vomiting    Objective:  Vitals:    17 0859   BP: 137/83   Pulse: 73   Resp: 16   Temp: 98.2 °F (36.8 °C)   TempSrc: Oral   SpO2: 98%   Weight: 194 lb 6.4 oz (88.2 kg)   Height: 5' 3\" (1.6 m)   PainSc:   7     PE    Alert, well appearing, and in no distress, oriented to person, place, and time and overweight  General appearance - alert, well appearing, and in no distress and oriented to person, place, and time  Mental status - alert, oriented to person, place, and time, normal mood, behavior, speech, dress, motor activity, and thought processes  Chest - clear to auscultation, no wheezes, rales or rhonchi, symmetric air entry  Heart - normal rate, regular rhythm, normal S1, S2, no murmurs, rubs, clicks or gallops  Extremities -  Left foot, knee pain on palpation, flexion and extension, peripheral pulses normal, no pedal edema, no clubbing or cyanosis    LABS   No visits with results within 6 Month(s) from this visit.   Latest known visit with results is:    Hospital Outpatient Visit on 07/08/2016   Component Date Value Ref Range Status    WBC 07/08/2016 10.8  4.6 - 13.2 K/uL Final    RBC 07/08/2016 5.29  4. 20 - 5.30 M/uL Final    HGB 07/08/2016 14.4  12.0 - 16.0 g/dL Final    HCT 07/08/2016 43.2  35.0 - 45.0 % Final    MCV 07/08/2016 81.7  74.0 - 97.0 FL Final    MCH 07/08/2016 27.2  24.0 - 34.0 PG Final    MCHC 07/08/2016 33.3  31.0 - 37.0 g/dL Final    RDW 07/08/2016 14.1  11.6 - 14.5 % Final    PLATELET 72/91/6633 707  135 - 420 K/uL Final    MPV 07/08/2016 10.4  9.2 - 11.8 FL Final    NEUTROPHILS 07/08/2016 66  40 - 73 % Final    LYMPHOCYTES 07/08/2016 26  21 - 52 % Final    MONOCYTES 07/08/2016 5  3 - 10 % Final    EOSINOPHILS 07/08/2016 3  0 - 5 % Final    BASOPHILS 07/08/2016 0  0 - 2 % Final    ABS. NEUTROPHILS 07/08/2016 7.2  1.8 - 8.0 K/UL Final    ABS. LYMPHOCYTES 07/08/2016 2.8  0.9 - 3.6 K/UL Final    ABS. MONOCYTES 07/08/2016 0.5  0.05 - 1.2 K/UL Final    ABS. EOSINOPHILS 07/08/2016 0.3  0.0 - 0.4 K/UL Final    ABS.  BASOPHILS 07/08/2016 0.0  0.0 - 0.06 K/UL Final    DF 07/08/2016 AUTOMATED    Final    TSH 07/08/2016 2.04  0.36 - 3.74 uIU/mL Final    T4, Free 07/08/2016 0.9  0.7 - 1.5 NG/DL Final    Sodium 07/08/2016 139  136 - 145 mmol/L Final    Potassium 07/08/2016 4.1  3.5 - 5.5 mmol/L Final    Chloride 07/08/2016 105  100 - 108 mmol/L Final    CO2 07/08/2016 25  21 - 32 mmol/L Final    Anion gap 07/08/2016 9  3.0 - 18 mmol/L Final    Glucose 07/08/2016 75  74 - 99 mg/dL Final    BUN 07/08/2016 10  7.0 - 18 MG/DL Final    Creatinine 07/08/2016 0.81  0.6 - 1.3 MG/DL Final    BUN/Creatinine ratio 07/08/2016 12  12 - 20   Final    GFR est AA 07/08/2016 >60  >60 ml/min/1.73m2 Final    GFR est non-AA 07/08/2016 >60  >60 ml/min/1.73m2 Final    Comment: (NOTE)  Estimated GFR is calculated using the Modification of Diet in Renal   Disease (MDRD) Study equation, reported for both  Americans   (GFRAA) and non- Americans (GFRNA), and normalized to 1.73m2   body surface area. The physician must decide which value applies to   the patient. The MDRD study equation should only be used in   individuals age 25 or older. It has not been validated for the   following: pregnant women, patients with serious comorbid conditions,   or on certain medications, or persons with extremes of body size,   muscle mass, or nutritional status.  Calcium 07/08/2016 8.9  8.5 - 10.1 MG/DL Final    Bilirubin, total 07/08/2016 0.5  0.2 - 1.0 MG/DL Final    ALT (SGPT) 07/08/2016 25  13 - 56 U/L Final    AST (SGOT) 07/08/2016 19  15 - 37 U/L Final    Alk. phosphatase 07/08/2016 81  45 - 117 U/L Final    Protein, total 07/08/2016 7.9  6.4 - 8.2 g/dL Final    Albumin 07/08/2016 4.0  3.4 - 5.0 g/dL Final    Globulin 07/08/2016 3.9  2.0 - 4.0 g/dL Final    A-G Ratio 07/08/2016 1.0  0.8 - 1.7   Final    LIPID PROFILE 07/08/2016        Final    Cholesterol, total 07/08/2016 144  <200 MG/DL Final    Triglyceride 07/08/2016 47  <150 MG/DL Final    HDL Cholesterol 07/08/2016 61* 40 - 60 MG/DL Final    LDL, calculated 07/08/2016 73.6  0 - 100 MG/DL Final    VLDL, calculated 07/08/2016 9.4  MG/DL Final    CHOL/HDL Ratio 07/08/2016 2.4  0 - 5.0   Final    Source 07/08/2016 THIS ENVIRONMENTAL CULTURING METHOD IS BASED ON AN UNVALIDATED PROCESS DEVELOPED AND RECOMMENDED BY THE [facility] FOR DISEASE CONTROL. THE SENSITIVITY, SPECIFICITY AND LIMITS ON QUANTITATION OR DETECTION ARE NOT ESTABLISHED FOR ALL ORGANISMS WITH THE SPECIFIED PROCESSING METHODS.     Final    Atopobium vaginae 07/08/2016 SEE COMMENT  Score Final    Comment: (NOTE)  RESULT:High - 2      BVAB 2 07/08/2016 SEE COMMENT  Score Final    Comment: (NOTE)  RESULT:High - 2      Megasphaera 1 07/08/2016 SEE COMMENT  Score Final    Comment: (NOTE)  RESULT:High - 2  Performed At: [address] [id number]  Cha Araujo MD [id number]      C. albicans, JACQUELINE 07/08/2016 NEGATIVE   NEGATIVE   Final    C. glabrata, JACQUELINE 07/08/2016 NEGATIVE   NEGATIVE   Final    Comment: (NOTE)  This test was developed and its performance characteristics  determined by [facility].  It has not been cleared or approved by the  Food and Drug Administration. The FDA has determined that such  clearance or approval is not necessary. Performed At: [facility]  [employer] [address] [id number]  Rhoda Herrera MD YA:[id number]      T. vaginalis, JACQUELINE 07/08/2016 NEGATIVE   NEGATIVE   Final    C. trachomatis, JACQUELINE 07/08/2016 NEGATIVE   NEGATIVE   Final    N. gonorrhoeae, JACQUELINE 07/08/2016 NEGATIVE   NEGATIVE   Final    Comment: (NOTE)  Performed At: [facility]  [employer] [address] [id number]  Rhoda Herrera MD [id number]      Hemoglobin A1c 07/08/2016 5.6  4.2 - 5.6 % Final    Comment: ** NEW REFERENCE RANGE ESTABLISHED FOR THIS METHOD **  (NOTE)  HbA1C Interpretive Ranges  <5.7              Normal  5.7 - 6.4         Consider Prediabetes  >6.5              Consider Diabetes      Est. average glucose 07/08/2016 114  mg/dL Final    Comment: (NOTE)  The eAG should be interpreted with patient characteristics in mind   since ethnicity, interindividual differences, red cell lifespan,   variation in rates of glycation, etc. may affect the validity of the   calculation. TESTS  Results for orders placed or performed during the hospital encounter of 07/08/16   CBC WITH AUTOMATED DIFF   Result Value Ref Range    WBC 10.8 4.6 - 13.2 K/uL    RBC 5.29 4. 20 - 5.30 M/uL    HGB 14.4 12.0 - 16.0 g/dL    HCT 43.2 35.0 - 45.0 %    MCV 81.7 74.0 - 97.0 FL    MCH 27.2 24.0 - 34.0 PG    MCHC 33.3 31.0 - 37.0 g/dL    RDW 14.1 11.6 - 14.5 %    PLATELET 257 948 - 767 K/uL    MPV 10.4 9.2 - 11.8 FL    NEUTROPHILS 66 40 - 73 %    LYMPHOCYTES 26 21 - 52 %    MONOCYTES 5 3 - 10 %    EOSINOPHILS 3 0 - 5 %    BASOPHILS 0 0 - 2 %    ABS. NEUTROPHILS 7.2 1.8 - 8.0 K/UL    ABS. LYMPHOCYTES 2.8 0.9 - 3.6 K/UL    ABS. MONOCYTES 0.5 0.05 - 1.2 K/UL    ABS. EOSINOPHILS 0.3 0.0 - 0.4 K/UL    ABS. BASOPHILS 0.0 0.0 - 0.06 K/UL    DF AUTOMATED     TSH 3RD GENERATION   Result Value Ref Range    TSH 2.04 0.36 - 3.74 uIU/mL   T4, FREE   Result Value Ref Range    T4, Free 0.9 0.7 - 1.5 NG/DL   METABOLIC PANEL, COMPREHENSIVE   Result Value Ref Range    Sodium 139 136 - 145 mmol/L    Potassium 4.1 3.5 - 5.5 mmol/L    Chloride 105 100 - 108 mmol/L    CO2 25 21 - 32 mmol/L    Anion gap 9 3.0 - 18 mmol/L    Glucose 75 74 - 99 mg/dL    BUN 10 7.0 - 18 MG/DL    Creatinine 0.81 0.6 - 1.3 MG/DL    BUN/Creatinine ratio 12 12 - 20      GFR est AA >60 >60 ml/min/1.73m2    GFR est non-AA >60 >60 ml/min/1.73m2    Calcium 8.9 8.5 - 10.1 MG/DL    Bilirubin, total 0.5 0.2 - 1.0 MG/DL    ALT (SGPT) 25 13 - 56 U/L    AST (SGOT) 19 15 - 37 U/L    Alk. phosphatase 81 45 - 117 U/L    Protein, total 7.9 6.4 - 8.2 g/dL    Albumin 4.0 3.4 - 5.0 g/dL    Globulin 3.9 2.0 - 4.0 g/dL    A-G Ratio 1.0 0.8 - 1.7     LIPID PANEL   Result Value Ref Range    LIPID PROFILE          Cholesterol, total 144 <200 MG/DL    Triglyceride 47 <150 MG/DL    HDL Cholesterol 61 (H) 40 - 60 MG/DL    LDL, calculated 73.6 0 - 100 MG/DL    VLDL, calculated 9.4 MG/DL    CHOL/HDL Ratio 2.4 0 - 5.0     NUSWAB VAGINITIS PLUS   Result Value Ref Range    Source        THIS ENVIRONMENTAL CULTURING METHOD IS BASED ON AN UNVALIDATED PROCESS DEVELOPED AND RECOMMENDED BY THE [facility] FOR DISEASE CONTROL. THE SENSITIVITY, SPECIFICITY AND LIMITS ON QUANTITATION OR DETECTION ARE NOT ESTABLISHED FOR ALL ORGANISMS WITH THE SPECIFIED PROCESSING METHODS.     Atopobium vaginae SEE COMMENT Score    BVAB 2 SEE COMMENT Score    Megasphaera 1 SEE COMMENT Score    C. albicans, JACQUELINE NEGATIVE  NEGATIVE      C. glabrata, JACQUELINE NEGATIVE  NEGATIVE      T. vaginalis, JACQUELINE NEGATIVE  NEGATIVE      C. trachomatis, JACQUELINE NEGATIVE  NEGATIVE      N. gonorrhoeae, JACQUELINE NEGATIVE NEGATIVE     HEMOGLOBIN A1C WITH EAG   Result Value Ref Range    Hemoglobin A1c 5.6 4.2 - 5.6 %    Est. average glucose 114 mg/dL     Assessment/Plan:      1. Pain of left heel  - ibuprofen (MOTRIN) 800 mg tablet; Take 1 Tab by mouth every eight (8) hours as needed for Pain. Take with food. Dispense: 180 Tab; Refill: 2    2. Plantar fasciitis of left foot  - ibuprofen (MOTRIN) 800 mg tablet; Take 1 Tab by mouth every eight (8) hours as needed for Pain. Take with food. Dispense: 180 Tab; Refill: 2    3. Diarrhea, unspecified type  - C DIFFICILE TOXIN GENE, JACQUELINE ([facility] Only); Future  - dicyclomine (BENTYL) 10 mg capsule; Take 1 Cap by mouth four (4) times daily. Dispense: 30 Cap; Refill: 1    4. Chronic pain of right knee  - REFERRAL TO PHYSICAL THERAPY    Lab review: no lab studies available for review at time of visit. We will call patient for results and further plan. I have discussed the diagnosis with the patient and the intended plan as seen in the above orders. The patient has received an after-visit summary and questions were answered concerning future plans. I have discussed medication side effects and warnings with the patient as well. I have reviewed the plan of care with the patient, accepted their input and they are in agreement with the treatment goals. Follow-up Disposition:  Return in about 1 month (around 1/21/2018), or if symptoms worsen or fail to improve.     Elina Butler MD

## 2017-12-21 NOTE — LETTER
NOTIFICATION RETURN TO WORK 
 
12/21/2017 9:55 AM 
 
Ms. eKtan Malcolm [address] [address] To Whom It May Concern: 
 
Ketan Malcolm is currently under the care of [address] [place] [address]. Please excuse Ms. Melisa Zacarias from work Thursday 12/21/2017. She will return to workat her next scheduled work day. If there are questions or concerns please have the patient contact our office. Sincerely, Viraj Caldwell MD

## 2017-12-21 NOTE — PROGRESS NOTES
Rafale Starr is a 40 y.o. female presents to office for pain and swelling in right knee. Diarrhea for weeks  for 2 weeks and mucous.       1. Have you been to the ER, urgent care clinic or hospitalized since your last visit? no          Health Maintenance items with a due date reviewed with patient:  Health Maintenance Due   Topic Date Due    Pneumococcal 19-64 Medium Risk (1 of 1 - PPSV23) 05/02/1999    Influenza Age 9 to Adult  08/01/2017

## 2017-12-21 NOTE — PATIENT INSTRUCTIONS
Diarrhea: Care Instructions  Your Care Instructions    Diarrhea is loose, watery stools (bowel movements). The exact cause is often hard to find. Sometimes diarrhea is your body's way of getting rid of what caused an upset stomach. Viruses, food poisoning, and many medicines can cause diarrhea. Some people get diarrhea in response to emotional stress, anxiety, or certain foods. Almost everyone has diarrhea now and then. It usually isn't serious, and your stools will return to normal soon. The important thing to do is replace the fluids you have lost, so you can prevent dehydration. The doctor has checked you carefully, but problems can develop later. If you notice any problems or new symptoms, get medical treatment right away. Follow-up care is a key part of your treatment and safety. Be sure to make and go to all appointments, and call your doctor if you are having problems. It's also a good idea to know your test results and keep a list of the medicines you take. How can you care for yourself at home? · Watch for signs of dehydration, which means your body has lost too much water. Dehydration is a serious condition and should be treated right away. Signs of dehydration are:  ¨ Increasing thirst and dry eyes and mouth. ¨ Feeling faint or lightheaded. ¨ Darker urine, and a smaller amount of urine than normal.  · To prevent dehydration, drink plenty of fluids, enough so that your urine is light yellow or clear like water. Choose water and other caffeine-free clear liquids until you feel better. If you have kidney, heart, or liver disease and have to limit fluids, talk with your doctor before you increase the amount of fluids you drink. · Begin eating small amounts of mild foods the next day, if you feel like it. ¨ Try yogurt that has live cultures of Lactobacillus. (Check the label.)  ¨ Avoid spicy foods, fruits, alcohol, and caffeine until 48 hours after all symptoms are gone.   ¨ Avoid chewing gum that contains sorbitol. ¨ Avoid dairy products (except for yogurt with Lactobacillus) while you have diarrhea and for 3 days after symptoms are gone. · The doctor may recommend that you take over-the-counter medicine, such as loperamide (Imodium), if you still have diarrhea after 6 hours. Read and follow all instructions on the label. Do not use this medicine if you have bloody diarrhea, a high fever, or other signs of serious illness. Call your doctor if you think you are having a problem with your medicine. When should you call for help? Call 911 anytime you think you may need emergency care. For example, call if:  ? · You passed out (lost consciousness). ? · Your stools are maroon or very bloody. ?Call your doctor now or seek immediate medical care if:  ? · You are dizzy or lightheaded, or you feel like you may faint. ? · Your stools are black and look like tar, or they have streaks of blood. ? · You have new or worse belly pain. ? · You have symptoms of dehydration, such as:  ¨ Dry eyes and a dry mouth. ¨ Passing only a little dark urine. ¨ Feeling thirstier than usual.   ? · You have a new or higher fever. ? Watch closely for changes in your health, and be sure to contact your doctor if:  ? · Your diarrhea is getting worse. ? · You see pus in the diarrhea. ? · You are not getting better after 2 days (48 hours). Where can you learn more? Go to http://lorie-ede.info/. Enter K603 in the search box to learn more about \"Diarrhea: Care Instructions. \"  Current as of: March 20, 2017  Content Version: 11.4  © 1813-4422 [employer]. Care instructions adapted under license by [employer] (which disclaims liability or warranty for this information). If you have questions about a medical condition or this instruction, always ask your healthcare professional. [address] any warranty or liability for your use of this information. Knee Pain or Injury: Care Instructions  Your Care Instructions    Injuries are a common cause of knee problems. Sudden (acute) injuries may be caused by a direct blow to the knee. They can also be caused by abnormal twisting, bending, or falling on the knee. Pain, bruising, or swelling may be severe, and may start within minutes of the injury. Overuse is another cause of knee pain. Other causes are climbing stairs, kneeling, and other activities that use the knee. Everyday wear and tear, especially as you get older, also can cause knee pain. Rest, along with home treatment, often relieves pain and allows your knee to heal. If you have a serious knee injury, you may need tests and treatment. Follow-up care is a key part of your treatment and safety. Be sure to make and go to all appointments, and call your doctor if you are having problems. It's also a good idea to know your test results and keep a list of the medicines you take. How can you care for yourself at home? · Be safe with medicines. Read and follow all instructions on the label. ¨ If the doctor gave you a prescription medicine for pain, take it as prescribed. ¨ If you are not taking a prescription pain medicine, ask your doctor if you can take an over-the-counter medicine. · Rest and protect your knee. Take a break from any activity that may cause pain. · Put ice or a cold pack on your knee for 10 to 20 minutes at a time. Put a thin cloth between the ice and your skin. · Prop up a sore knee on a pillow when you ice it or anytime you sit or lie down for the next 3 days. Try to keep it above the level of your heart. This will help reduce swelling. · If your knee is not swollen, you can put moist heat, a heating pad, or a warm cloth on your knee. · If your doctor recommends an elastic bandage, sleeve, or other type of support for your knee, wear it as directed. · Follow your doctor's instructions about how much weight you can put on your leg. Use a cane, crutches, or a walker as instructed. · Follow your doctor's instructions about activity during your healing process. If you can do mild exercise, slowly increase your activity. · Reach and stay at a healthy weight. Extra weight can strain the joints, especially the knees and hips, and make the pain worse. Losing even a few pounds may help. When should you call for help? Call 911 anytime you think you may need emergency care. For example, call if:  ? · You have symptoms of a blood clot in your lung (called a pulmonary embolism). These may include:  ¨ Sudden chest pain. ¨ Trouble breathing. ¨ Coughing up blood. ?Call your doctor now or seek immediate medical care if:  ? · You have severe or increasing pain. ? · Your leg or foot turns cold or changes color. ? · You cannot stand or put weight on your knee. ? · Your knee looks twisted or bent out of shape. ? · You cannot move your knee. ? · You have signs of infection, such as:  ¨ Increased pain, swelling, warmth, or redness. ¨ Red streaks leading from the knee. ¨ Pus draining from a place on your knee. ¨ A fever. ? · You have signs of a blood clot in your leg (called a deep vein thrombosis), such as:  ¨ Pain in your calf, back of the knee, thigh, or groin. ¨ Redness and swelling in your leg or groin. ? Watch closely for changes in your health, and be sure to contact your doctor if:  ? · You have tingling, weakness, or numbness in your knee. ? · You have any new symptoms, such as swelling. ? · You have bruises from a knee injury that last longer than 2 weeks. ? · You do not get better as expected. Where can you learn more? Go to http://lorie-ede.info/. Enter K195 in the search box to learn more about \"Knee Pain or Injury: Care Instructions. \"  Current as of: March 20, 2017  Content Version: 11.4  © 1159-9650 [employer].  Care instructions adapted under license by [employer] (which disclaims liability or warranty for this information). If you have questions about a medical condition or this instruction, always ask your healthcare professional. [address] any warranty or liability for your use of this information.

## 2017-12-21 NOTE — MR AVS SNAPSHOT
Visit Information Date & Time Provider Department Dept. Phone Encounter #  
 12/21/2017  9:00 AM Jeni Rodriguez MD [facility] [phone] [id number] Follow-up Instructions Return in about 1 month (around 1/21/2018), or if symptoms worsen or fail to improve. Your Appointments 1/19/2018  9:00 AM  
Follow Up with Jeni Rodriguez MD  
[facility]) Appt Note: 1 month f/u  
 [address] [place] [address]  
[phone]  
  
   
 [address] [address] Upcoming Health Maintenance Date Due Pneumococcal 19-64 Medium Risk (1 of 1 - PPSV23) 5/2/1999 Influenza Age 5 to Adult 8/1/2017 PAP AKA CERVICAL CYTOLOGY 11/25/2018 DTaP/Tdap/Td series (2 - Td) 4/29/2025 Allergies as of 12/21/2017  Review Complete On: 7/19/2017 By: Neda Husbands, LPN Severity Noted Reaction Type Reactions Azo [Phenazopyridine] Medium 04/07/2014   Intolerance Hives Current Immunizations  Never Reviewed Name Date Influenza Vaccine 10/15/2014 Influenza Vaccine (Quad) PF 9/23/2016 Tdap 4/29/2015 12:00 AM  
  
 Not reviewed this visit You Were Diagnosed With   
  
 Codes Comments Diarrhea, unspecified type    -  Primary ICD-10-CM: R19.7 ICD-9-CM: 787.91 Pain of left heel     ICD-10-CM: S50.884 ICD-9-CM: 729.5 Plantar fasciitis of left foot     ICD-10-CM: M72.2 ICD-9-CM: 728.71 Chronic pain of right knee     ICD-10-CM: M25.561, G89.29 ICD-9-CM: 719.46, 338.29 Vitals BP Pulse Temp Resp Height(growth percentile) Weight(growth percentile) 137/83 73 98.2 °F (36.8 °C) (Oral) 16 5' 3\" (1.6 m) 194 lb 6.4 oz (88.2 kg) SpO2 BMI OB Status Smoking Status 98% 34.44 kg/m2 Ablation Never Smoker Vitals History BMI and BSA Data Body Mass Index Body Surface Area 34.44 kg/m 2 1.98 m 2 Preferred Pharmacy Pharmacy Name Phone [id number]  [phone] Your Updated Medication List  
  
   
This list is accurate as of: 12/21/17  9:55 AM.  Always use your most recent med list.  
  
  
  
  
 * albuterol 90 mcg/actuation inhaler Commonly known as:  PROVENTIL HFA, VENTOLIN HFA, PROAIR HFA Take 2 Puffs by inhalation every four (4) hours as needed for Wheezing or Shortness of Breath. * albuterol 5 mg/mL nebulizer solution Commonly known as:  PROVENTIL  
0.5 mL by Nebulization route every four (4) hours as needed for Wheezing or Shortness of Breath. Indications: Acute Asthma Attack, BRONCHOSPASM PREVENTION, EXERCISE-INDUCED BRONCHOSPASM PREVENTION  
  
 BIOTIN PO Take 1 Tab by mouth daily. budesonide-formoterol 160-4.5 mcg/actuation Hfaa Commonly known as:  SYMBICORT Take 2 Puffs by inhalation two (2) times a day. dicyclomine 10 mg capsule Commonly known as:  BENTYL Take 1 Cap by mouth four (4) times daily. DULERA 200-5 mcg/actuation HFA inhaler Generic drug:  mometasone-formoterol INHALE 2 PUFFS BY MOUTH 2 TIMES A DAY  
  
 ibuprofen 800 mg tablet Commonly known as:  MOTRIN Take 1 Tab by mouth every eight (8) hours as needed for Pain. Take with food. multivitamin tablet Commonly known as:  ONE A DAY Take 1 Tab by mouth daily. Nebulizer & Compressor machine Use as directed Nebulizer Accessories Kit Use as directed  
  
 omega-3 fatty acids-vitamin e 1,000 mg Cap Take 1 Cap by mouth daily. VITAMIN B-12 PO Take 1 Tab by mouth daily. * Notice: This list has 2 medication(s) that are the same as other medications prescribed for you. Read the directions carefully, and ask your doctor or other care provider to review them with you. Prescriptions Sent to Pharmacy  Refills  
 ibuprofen (MOTRIN) 800 mg tablet 2  
 Sig: Take 1 Tab by mouth every eight (8) hours as needed for Pain. Take with food. Class: Normal  
 Pharmacy: [address] [place] [address] #: [phone] Route: Oral  
 dicyclomine (BENTYL) 10 mg capsule 1 Sig: Take 1 Cap by mouth four (4) times daily. Class: Normal  
 Pharmacy: [address] [place] [address] Ph #: [phone] Route: Oral  
  
We Performed the Following C DIFFICILE TOXIN GENE, JACQUELINE [[id number] Custom] REFERRAL TO PHYSICAL THERAPY [WIC35 Custom] Comments:  
 Please evaluate patient for chronic right knee, leg and foot pain. XR of knee normal 6 mo ago. No new injury or trauma. Follow-up Instructions Return in about 1 month (around 1/21/2018), or if symptoms worsen or fail to improve. To-Do List   
 12/21/2017 Lab:  C DIFFICILE TOXIN GENE, JACQUELINE Referral Information Referral ID Referred By Referred To  
  
 [id number] [facility] Not Available Visits Status Start Date End Date 1 New Request 12/21/17 12/21/18 If your referral has a status of pending review or denied, additional information will be sent to support the outcome of this decision. Patient Instructions Diarrhea: Care Instructions Your Care Instructions Diarrhea is loose, watery stools (bowel movements). The exact cause is often hard to find. Sometimes diarrhea is your body's way of getting rid of what caused an upset stomach. Viruses, food poisoning, and many medicines can cause diarrhea. Some people get diarrhea in response to emotional stress, anxiety, or certain foods. Almost everyone has diarrhea now and then. It usually isn't serious, and your stools will return to normal soon. The important thing to do is replace the fluids you have lost, so you can prevent dehydration. The doctor has checked you carefully, but problems can develop later.  If you notice any problems or new symptoms, get medical treatment right away. Follow-up care is a key part of your treatment and safety. Be sure to make and go to all appointments, and call your doctor if you are having problems. It's also a good idea to know your test results and keep a list of the medicines you take. How can you care for yourself at home? · Watch for signs of dehydration, which means your body has lost too much water. Dehydration is a serious condition and should be treated right away. Signs of dehydration are: 
¨ Increasing thirst and dry eyes and mouth. ¨ Feeling faint or lightheaded. ¨ Darker urine, and a smaller amount of urine than normal. 
· To prevent dehydration, drink plenty of fluids, enough so that your urine is light yellow or clear like water. Choose water and other caffeine-free clear liquids until you feel better. If you have kidney, heart, or liver disease and have to limit fluids, talk with your doctor before you increase the amount of fluids you drink. · Begin eating small amounts of mild foods the next day, if you feel like it. ¨ Try yogurt that has live cultures of Lactobacillus. (Check the label.) ¨ Avoid spicy foods, fruits, alcohol, and caffeine until 48 hours after all symptoms are gone. ¨ Avoid chewing gum that contains sorbitol. ¨ Avoid dairy products (except for yogurt with Lactobacillus) while you have diarrhea and for 3 days after symptoms are gone. · The doctor may recommend that you take over-the-counter medicine, such as loperamide (Imodium), if you still have diarrhea after 6 hours. Read and follow all instructions on the label. Do not use this medicine if you have bloody diarrhea, a high fever, or other signs of serious illness. Call your doctor if you think you are having a problem with your medicine. When should you call for help? Call 911 anytime you think you may need emergency care. For example, call if: 
? · You passed out (lost consciousness). ? · Your stools are maroon or very bloody. ?Call your doctor now or seek immediate medical care if: 
? · You are dizzy or lightheaded, or you feel like you may faint. ? · Your stools are black and look like tar, or they have streaks of blood. ? · You have new or worse belly pain. ? · You have symptoms of dehydration, such as: ¨ Dry eyes and a dry mouth. ¨ Passing only a little dark urine. ¨ Feeling thirstier than usual.  
? · You have a new or higher fever. ? Watch closely for changes in your health, and be sure to contact your doctor if: 
? · Your diarrhea is getting worse. ? · You see pus in the diarrhea. ? · You are not getting better after 2 days (48 hours). Where can you learn more? Go to http://lorie-ede.info/. Enter O689 in the search box to learn more about \"Diarrhea: Care Instructions. \" Current as of: March 20, 2017 Content Version: 11.4 © 0233-9359 [employer]. Care instructions adapted under license by [employer] (which disclaims liability or warranty for this information). If you have questions about a medical condition or this instruction, always ask your healthcare professional. [place] [address] any warranty or liability for your use of this information. Knee Pain or Injury: Care Instructions Your Care Instructions Injuries are a common cause of knee problems. Sudden (acute) injuries may be caused by a direct blow to the knee. They can also be caused by abnormal twisting, bending, or falling on the knee. Pain, bruising, or swelling may be severe, and may start within minutes of the injury. Overuse is another cause of knee pain. Other causes are climbing stairs, kneeling, and other activities that use the knee. Everyday wear and tear, especially as you get older, also can cause knee pain.  
Rest, along with home treatment, often relieves pain and allows your knee to heal. If you have a serious knee injury, you may need tests and treatment. Follow-up care is a key part of your treatment and safety. Be sure to make and go to all appointments, and call your doctor if you are having problems. It's also a good idea to know your test results and keep a list of the medicines you take. How can you care for yourself at home? · Be safe with medicines. Read and follow all instructions on the label. ¨ If the doctor gave you a prescription medicine for pain, take it as prescribed. ¨ If you are not taking a prescription pain medicine, ask your doctor if you can take an over-the-counter medicine. · Rest and protect your knee. Take a break from any activity that may cause pain. · Put ice or a cold pack on your knee for 10 to 20 minutes at a time. Put a thin cloth between the ice and your skin. · Prop up a sore knee on a pillow when you ice it or anytime you sit or lie down for the next 3 days. Try to keep it above the level of your heart. This will help reduce swelling. · If your knee is not swollen, you can put moist heat, a heating pad, or a warm cloth on your knee. · If your doctor recommends an elastic bandage, sleeve, or other type of support for your knee, wear it as directed. · Follow your doctor's instructions about how much weight you can put on your leg. Use a cane, crutches, or a walker as instructed. · Follow your doctor's instructions about activity during your healing process. If you can do mild exercise, slowly increase your activity. · Reach and stay at a healthy weight. Extra weight can strain the joints, especially the knees and hips, and make the pain worse. Losing even a few pounds may help. When should you call for help? Call 911 anytime you think you may need emergency care. For example, call if: 
? · You have symptoms of a blood clot in your lung (called a pulmonary embolism). These may include: 
¨ Sudden chest pain. ¨ Trouble breathing. ¨ Coughing up blood. ?Call your doctor now or seek immediate medical care if: 
? · You have severe or increasing pain. ? · Your leg or foot turns cold or changes color. ? · You cannot stand or put weight on your knee. ? · Your knee looks twisted or bent out of shape. ? · You cannot move your knee. ? · You have signs of infection, such as: 
¨ Increased pain, swelling, warmth, or redness. ¨ Red streaks leading from the knee. ¨ Pus draining from a place on your knee. ¨ A fever. ? · You have signs of a blood clot in your leg (called a deep vein thrombosis), such as: 
¨ Pain in your calf, back of the knee, thigh, or groin. ¨ Redness and swelling in your leg or groin. ? Watch closely for changes in your health, and be sure to contact your doctor if: 
? · You have tingling, weakness, or numbness in your knee. ? · You have any new symptoms, such as swelling. ? · You have bruises from a knee injury that last longer than 2 weeks. ? · You do not get better as expected. Where can you learn more? Go to http://lorie-ede.info/. Enter K195 in the search box to learn more about \"Knee Pain or Injury: Care Instructions. \" Current as of: March 20, 2017 Content Version: 11.4 © 6484-3934 [employer]. Care instructions adapted under license by [employer] (which disclaims liability or warranty for this information). If you have questions about a medical condition or this instruction, always ask your healthcare professional. [place] [address] any warranty or liability for your use of this information. Introducing [facility]! Dear Ryder Nguyen: Thank you for requesting a [employer] account. Our records indicate that you already have an active [employer] account. You can access your account anytime at [email or web address] Did you know that you can access your hospital and ER discharge instructions at any time in [employer]? You can also review all of your test results from your hospital stay or ER visit. Additional Information If you have questions, please visit the Frequently Asked Questions section of the [employer] website at [email or web address]. [employer]/The Filtert/. Remember, [employer] is NOT to be used for urgent needs. For medical emergencies, dial 911. Now available from your iPhone and Android! Please provide this summary of care documentation to your next provider. Your primary care clinician is listed as Lalitha Krishnan. If you have any questions after today's visit, please call [phone].

## 2017-12-27 RX ORDER — MOMETASONE FUROATE AND FORMOTEROL FUMARATE DIHYDRATE 200; 5 UG/1; UG/1
AEROSOL RESPIRATORY (INHALATION)
Qty: 13 INHALER | Refills: 0 | Status: SHIPPED | OUTPATIENT
Start: 2017-12-27 | End: 2018-02-06 | Stop reason: SDUPTHER

## 2018-01-21 LAB
C DIFF TOX GENS STL QL NAA+PROBE: NEGATIVE
PLEASE NOTE:, 188601: NORMAL

## 2018-01-23 ENCOUNTER — TELEPHONE (OUTPATIENT)
Dept: FAMILY MEDICINE CLINIC | Age: 38
End: 2018-01-23

## 2018-01-23 NOTE — TELEPHONE ENCOUNTER
----- Message from Jada Ta MD sent at 1/23/2018  7:07 AM EST -----  Pls report negative c diff testing

## 2018-01-23 NOTE — LETTER
2/7/2018 5:48 PM 
 
Ms. Rajesh Cheung [address] [facility] [address] Dear Ms. Díaz Kathi: We have been trying to reach you by telephone regarding your results. Please call the office back at [phone] at your earliest convenience. Thank you. Sincerely, Rita Klein MD

## 2018-02-06 RX ORDER — MOMETASONE FUROATE AND FORMOTEROL FUMARATE DIHYDRATE 200; 5 UG/1; UG/1
AEROSOL RESPIRATORY (INHALATION)
Qty: 13 INHALER | Refills: 0 | Status: SHIPPED | OUTPATIENT
Start: 2018-02-06 | End: 2018-09-28

## 2018-02-13 ENCOUNTER — TELEPHONE (OUTPATIENT)
Dept: FAMILY MEDICINE CLINIC | Age: 38
End: 2018-02-13

## 2018-02-13 NOTE — TELEPHONE ENCOUNTER
Pt is calling to let us know that she ran out of her inhaler and her insurance requires Prior Auth on [place]. Pt is asking if another inhaler can be sent to the pharmacy while waiting on the PA. Please advise.

## 2018-02-16 NOTE — TELEPHONE ENCOUNTER
Per  [facility], patient does not have insurance coverage through the medical plan. Called and spoke to pharmacist Yolis at [facility]. She states that the medication was run through patient's [facility] [employer] rx plan. [phone] Member ID [id number]. Resubmitted prior auth through [employer].

## 2018-02-20 RX ORDER — FLUTICASONE FUROATE AND VILANTEROL 100; 25 UG/1; UG/1
1 POWDER RESPIRATORY (INHALATION) DAILY
Qty: 1 INHALER | Refills: 5 | Status: SHIPPED | OUTPATIENT
Start: 2018-02-20 | End: 2018-03-14 | Stop reason: SDUPTHER

## 2018-02-20 NOTE — TELEPHONE ENCOUNTER
Received denial for [facility] citing that the patient has never tried [employer] American. Spoke with Dr. Debbi Simpson and will change to [employer]. Patient made aware and verbalized understanding.

## 2018-02-20 NOTE — TELEPHONE ENCOUNTER
Called and processed over the phone prior auth. Patient has not tried [employer] American. Request forwarded to the plan pharmacists for final determination on coverage or denial of drug.

## 2018-02-21 ENCOUNTER — TELEPHONE (OUTPATIENT)
Dept: FAMILY MEDICINE CLINIC | Age: 38
End: 2018-02-21

## 2018-02-21 NOTE — TELEPHONE ENCOUNTER
Patient calling in response to letter about results. I told her the results as stated by provider. Patient states she had seen the results on My Chart. She understood the neg result. Thanks.

## 2018-03-14 RX ORDER — FLUTICASONE FUROATE AND VILANTEROL 100; 25 UG/1; UG/1
1 POWDER RESPIRATORY (INHALATION) DAILY
Qty: 1 INHALER | Refills: 5 | Status: SHIPPED | OUTPATIENT
Start: 2018-03-14 | End: 2018-04-09 | Stop reason: SDUPTHER

## 2018-03-14 NOTE — TELEPHONE ENCOUNTER
From: Ciro Francis  To: Nichol Vences MD  Sent: 3/14/2018 2:58 PM EDT  Subject: Medication Renewal Request    Original authorizing provider: MD Ciro Szymanski would like a refill of the following medications:  fluticasone-vilanterol (BREO ELLIPTA) 100-25 mcg/dose inhaler Nichol Vences MD]    Preferred pharmacy: [address] [place] [address],[address], [address] [place] [address] RD    Comment:

## 2018-03-14 NOTE — TELEPHONE ENCOUNTER
Dr. Jameel Oden, please see refill request for patient. Patients last appointment 02/21/18 and I do not see scheduled follow up. Please advise, thank you! Requested Prescriptions     Pending Prescriptions Disp Refills    fluticasone-vilanterol (BREO ELLIPTA) 100-25 mcg/dose inhaler 1 Inhaler 5     Sig: Take 1 Puff by inhalation daily.

## 2018-04-06 ENCOUNTER — OFFICE VISIT (OUTPATIENT)
Dept: OBGYN CLINIC | Age: 38
End: 2018-04-06

## 2018-04-06 ENCOUNTER — HOSPITAL ENCOUNTER (OUTPATIENT)
Dept: LAB | Age: 38
Discharge: HOME | End: 2018-04-06
Payer: COMMERCIAL

## 2018-04-06 VITALS
HEIGHT: 63 IN | WEIGHT: 189.4 LBS | SYSTOLIC BLOOD PRESSURE: 125 MMHG | OXYGEN SATURATION: 98 % | BODY MASS INDEX: 33.56 KG/M2 | TEMPERATURE: 98.5 F | HEART RATE: 65 BPM | DIASTOLIC BLOOD PRESSURE: 85 MMHG | RESPIRATION RATE: 16 BRPM

## 2018-04-06 DIAGNOSIS — Z01.419 WELL WOMAN EXAM WITH ROUTINE GYNECOLOGICAL EXAM: Primary | ICD-10-CM

## 2018-04-06 DIAGNOSIS — Z01.419 WELL WOMAN EXAM WITH ROUTINE GYNECOLOGICAL EXAM: ICD-10-CM

## 2018-04-06 PROCEDURE — 87624 HPV HI-RISK TYP POOLED RSLT: CPT | Performed by: OBSTETRICS & GYNECOLOGY

## 2018-04-06 PROCEDURE — 87491 CHLMYD TRACH DNA AMP PROBE: CPT | Performed by: OBSTETRICS & GYNECOLOGY

## 2018-04-06 PROCEDURE — 88175 CYTOPATH C/V AUTO FLUID REDO: CPT | Performed by: OBSTETRICS & GYNECOLOGY

## 2018-04-06 NOTE — PROGRESS NOTES
Subjective:   40 y.o. female for Well Woman Check. No LMP recorded. Patient has had an ablation. Social History: single partner, contraception - tubal ligation. Pertinent past medical hstory: no history of HTN, DVT, CAD, DM, liver disease, migraines or smoking. ROS:  Feeling well. No dyspnea or chest pain on exertion. No abdominal pain, change in bowel habits, black or bloody stools. No urinary tract symptoms. GYN ROS: no breast pain or new or enlarging lumps on self exam, no vaginal bleeding, no discharge or pelvic pain, no hot flashes. No neurological complaints. Objective:     Visit Vitals    /85 (BP 1 Location: Left arm, BP Patient Position: Sitting)    Pulse 65    Temp 98.5 °F (36.9 °C) (Oral)    Resp 16    Ht 5' 3\" (1.6 m)    Wt 189 lb 6.4 oz (85.9 kg)    SpO2 98%    BMI 33.55 kg/m2     The patient appears well, alert, oriented x 3, in no distress. ENT normal.  Neck supple. No adenopathy or thyromegaly. DAYDAY. Lungs are clear, good air entry, no wheezes, rhonchi or rales. S1 and S2 normal, no murmurs, regular rate and rhythm. Abdomen soft without tenderness, guarding, mass or organomegaly. Extremities show no edema, normal peripheral pulses. Neurological is normal, no focal findings. BREAST EXAM: breasts appear normal, no suspicious masses, no skin or nipple changes or axillary nodes    PELVIC EXAM: normal external genitalia, vulva, vagina, cervix, uterus and adnexa    Assessment/Plan:   well woman  pap smear  return annually or prn    ICD-10-CM ICD-9-CM    1. Well woman exam with routine gynecological exam Z01.419 V72.31 PAP IG, CT-NG-TV, APTIMA HPV AND [address] 09/20,94([id number])   2. Well woman exam with routine gynecological exam Z01.419 V72.31 PAP IG, CT-NG-TV, APTIMA HPV AND [address] 22/61,46([id number])    [V72.31]   . Subjective:   40 y.o. female for Well Woman Check. No LMP recorded. Patient has had an ablation.     Social History: single partner, contraception - tubal ligation. Pertinent past medical history: hypertension, no history of  DVT, CAD, DM, liver disease, migraines or smoking. Patient Active Problem List   Diagnosis Code    Mild intermittent asthma J45.20    Obesity E66.9    Palpitations R00.2    Precordial pain R07.2        ROS:  Feeling well. No dyspnea or chest pain on exertion. No abdominal pain, change in bowel habits, black or bloody stools. No urinary tract symptoms. GYN ROS: no breast pain or new or enlarging lumps on self exam. No neurological complaints. Objective:     Visit Vitals    /85 (BP 1 Location: Left arm, BP Patient Position: Sitting)    Pulse 65    Temp 98.5 °F (36.9 °C) (Oral)    Resp 16    Ht 5' 3\" (1.6 m)    Wt 189 lb 6.4 oz (85.9 kg)    SpO2 98%    BMI 33.55 kg/m2     The patient appears well, alert, oriented x 3, in no distress. ENT normal.  Neck supple. No adenopathy or thyromegaly. DAYDAY. Lungs are clear, good air entry, no wheezes, rhonchi or rales. S1 and S2 normal, no murmurs, regular rate and rhythm. Abdomen soft without tenderness, guarding, mass or organomegaly. Extremities show no edema, normal peripheral pulses. Neurological is normal, no focal findings. BREAST EXAM: breasts appear normal, no suspicious masses, no skin or nipple changes or axillary nodes    PELVIC EXAM: normal external genitalia, vulva, vagina, cervix, uterus and adnexa    Assessment/Plan:   well woman  pap smear  return annually or prn    ICD-10-CM ICD-9-CM    1. Well woman exam with routine gynecological exam Z01.419 V72.31 PAP IG, CT-NG-TV, APTIMA HPV AND [address] 68/34,42([id number])   2. Well woman exam with routine gynecological exam Z01.419 V72.31 PAP IG, CT-NG-TV, APTIMA HPV AND [address] 41/00,73([id number])    [V72.31]   .

## 2018-04-06 NOTE — PATIENT INSTRUCTIONS

## 2018-04-06 NOTE — MR AVS SNAPSHOT
[facility] [address], [address] [address] [place] [id number] [phone] Patient: Dora Armstrong MRN: [id number] [facility] [id number] Visit Information Date & Time Provider Department Dept. Phone Encounter #  
 2018  3:00 PM Perry Youngblood [phone] [id number] Follow-up Instructions Return in about 1 year (around 2019). Upcoming Health Maintenance Date Due Influenza Age 5 to Adult 2017 PAP AKA CERVICAL CYTOLOGY 2018 Allergies as of 2018  Review Complete On: 2018 By: Ronnie Butler MD  
  
 Severity Noted Reaction Type Reactions Azo [Phenazopyridine] Medium 2014   Intolerance Hives Current Immunizations  Never Reviewed Name Date Influenza Vaccine 10/15/2014 Influenza Vaccine (Quad) PF 2016 Tdap 2015 12:00 AM  
  
 Not reviewed this visit You Were Diagnosed With   
  
 Codes Comments Well woman exam with routine gynecological exam    -  Primary ICD-10-CM: L04.016 ICD-9-CM: V72.31 Well woman exam with routine gynecological exam     ICD-10-CM: U11.035 ICD-9-CM: V72.31 [V72.31] Vitals BP Pulse Temp Resp Height(growth percentile) Weight(growth percentile) 125/85 (BP 1 Location: Left arm, BP Patient Position: Sitting) 65 98.5 °F (36.9 °C) (Oral) 16 5' 3\" (1.6 m) 189 lb 6.4 oz (85.9 kg) SpO2 BMI OB Status Smoking Status 98% 33.55 kg/m2 Ablation Never Smoker Vitals History BMI and BSA Data Body Mass Index Body Surface Area  
 33.55 kg/m 2 1.95 m 2 Preferred Pharmacy Pharmacy Name Phone [id number] [place] [address] [phone] Your Updated Medication List  
  
   
This list is accurate as of 18  4:02 PM.  Always use your most recent med list.  
  
  
  
  
 * albuterol 90 mcg/actuation inhaler Commonly known as:  PROVENTIL HFA, VENTOLIN HFA, PROAIR HFA Take 2 Puffs by inhalation every four (4) hours as needed for Wheezing or Shortness of Breath. * albuterol 5 mg/mL nebulizer solution Commonly known as:  PROVENTIL  
0.5 mL by Nebulization route every four (4) hours as needed for Wheezing or Shortness of Breath. Indications: Acute Asthma Attack, BRONCHOSPASM PREVENTION, EXERCISE-INDUCED BRONCHOSPASM PREVENTION  
  
 BIOTIN PO Take 1 Tab by mouth daily. budesonide-formoterol 160-4.5 mcg/actuation Hfaa Commonly known as:  SYMBICORT Take 2 Puffs by inhalation two (2) times a day. dicyclomine 10 mg capsule Commonly known as:  BENTYL Take 1 Cap by mouth four (4) times daily. DULERA 200-5 mcg/actuation HFA inhaler Generic drug:  mometasone-formoterol INHALE 2 PUFFS BY MOUTH 2 TIMES A DAY  
  
 fluticasone-vilanterol 100-25 mcg/dose inhaler Commonly known as:  BREO ELLIPTA Take 1 Puff by inhalation daily. ibuprofen 800 mg tablet Commonly known as:  MOTRIN Take 1 Tab by mouth every eight (8) hours as needed for Pain. Take with food. multivitamin tablet Commonly known as:  ONE A DAY Take 1 Tab by mouth daily. Nebulizer & Compressor machine Use as directed Nebulizer Accessories Kit Use as directed  
  
 omega-3 fatty acids-vitamin e 1,000 mg Cap Take 1 Cap by mouth daily. VITAMIN B-12 PO Take 1 Tab by mouth daily. * Notice: This list has 2 medication(s) that are the same as other medications prescribed for you. Read the directions carefully, and ask your doctor or other care provider to review them with you. Follow-up Instructions Return in about 1 year (around 4/6/2019). Patient Instructions Well Visit, Ages 25 to 48: Care Instructions Your Care Instructions Physical exams can help you stay healthy.  Your doctor has checked your overall health and may have suggested ways to take good care of yourself. He or she also may have recommended tests. At home, you can help prevent illness with healthy eating, regular exercise, and other steps. Follow-up care is a key part of your treatment and safety. Be sure to make and go to all appointments, and call your doctor if you are having problems. It's also a good idea to know your test results and keep a list of the medicines you take. How can you care for yourself at home? · Reach and stay at a healthy weight. This will lower your risk for many problems, such as obesity, diabetes, heart disease, and high blood pressure. · Get at least 30 minutes of physical activity on most days of the week. Walking is a good choice. You also may want to do other activities, such as running, swimming, cycling, or playing tennis or team sports. Discuss any changes in your exercise program with your doctor. · Do not smoke or allow others to smoke around you. If you need help quitting, talk to your doctor about stop-smoking programs and medicines. These can increase your chances of quitting for good. · Talk to your doctor about whether you have any risk factors for sexually transmitted infections (STIs). Having one sex partner (who does not have STIs and does not have sex with anyone else) is a good way to avoid these infections. · Use birth control if you do not want to have children at this time. Talk with your doctor about the choices available and what might be best for you. · Protect your skin from too much sun. When you're outdoors from 10 a.m. to 4 p.m., stay in the shade or cover up with clothing and a hat with a wide brim. Wear sunglasses that block UV rays. Even when it's cloudy, put broad-spectrum sunscreen (SPF 30 or higher) on any exposed skin. · See a dentist one or two times a year for checkups and to have your teeth cleaned. · Wear a seat belt in the car. · Drink alcohol in moderation, if at all. That means no more than 2 drinks a day for men and 1 drink a day for women. Follow your doctor's advice about when to have certain tests. These tests can spot problems early. For everyone · Cholesterol. Have the fat (cholesterol) in your blood tested after age 21. Your doctor will tell you how often to have this done based on your age, family history, or other things that can increase your risk for heart disease. · Blood pressure. Have your blood pressure checked during a routine doctor visit. Your doctor will tell you how often to check your blood pressure based on your age, your blood pressure results, and other factors. · Vision. Talk with your doctor about how often to have a glaucoma test. 
· Diabetes. Ask your doctor whether you should have tests for diabetes. · Colon cancer. Have a test for colon cancer at age 48. You may have one of several tests. If you are younger than 48, you may need a test earlier if you have any risk factors. Risk factors include whether you already had a precancerous polyp removed from your colon or whether your parent, brother, sister, or child has had colon cancer. For women · Breast exam and mammogram. Talk to your doctor about when you should have a clinical breast exam and a mammogram. Medical experts differ on whether and how often women under 50 should have these tests. Your doctor can help you decide what is right for you. · Pap test and pelvic exam. Begin Pap tests at age 24. A Pap test is the best way to find cervical cancer. The test often is part of a pelvic exam. Ask how often to have this test. 
· Tests for sexually transmitted infections (STIs). Ask whether you should have tests for STIs. You may be at risk if you have sex with more than one person, especially if your partners do not wear condoms. For men · Tests for sexually transmitted infections (STIs).  Ask whether you should have tests for STIs. You may be at risk if you have sex with more than one person, especially if you do not wear a condom. · Testicular cancer exam. Ask your doctor whether you should check your testicles regularly. · Prostate exam. Talk to your doctor about whether you should have a blood test (called a PSA test) for prostate cancer. Experts differ on whether and when men should have this test. Some experts suggest it if you are older than 39 and are -American or have a father or brother who got prostate cancer when he was younger than 72. When should you call for help? Watch closely for changes in your health, and be sure to contact your doctor if you have any problems or symptoms that concern you. Where can you learn more? Go to http://lorie-ede.info/. Enter P072 in the search box to learn more about \"Well Visit, Ages 25 to 48: Care Instructions. \" Current as of: May 12, 2017 Content Version: 11.4 © 2088-0322 [employer]. Care instructions adapted under license by [employer] (which disclaims liability or warranty for this information). If you have questions about a medical condition or this instruction, always ask your healthcare professional. [place] [address] any warranty or liability for your use of this information. Introducing \[facility]\"" & HEALTH SERVICES! Dear Terence Hung: Thank you for requesting a Draker account. Our records indicate that you already have an active Draker account. You can access your account anytime at [email or web address] Did you know that you can access your hospital and ER discharge instructions at any time in Draker? You can also review all of your test results from your hospital stay or ER visit. Additional Information If you have questions, please visit the Frequently Asked Questions section of the Draker website at [email or web address] Remember, MyChart is NOT to be used for urgent needs. For medical emergencies, dial 911. Now available from your iPhone and Android! Please provide this summary of care documentation to your next provider. Your primary care clinician is listed as Salma Raza. If you have any questions after today's visit, please call [phone].

## 2018-04-09 DIAGNOSIS — M79.672 PAIN OF LEFT HEEL: ICD-10-CM

## 2018-04-09 DIAGNOSIS — M72.2 PLANTAR FASCIITIS OF LEFT FOOT: ICD-10-CM

## 2018-04-09 LAB
C TRACH RRNA SPEC QL NAA+PROBE: NEGATIVE
N GONORRHOEA RRNA SPEC QL NAA+PROBE: NEGATIVE
SPECIMEN SOURCE: NORMAL
T VAGINALIS RRNA SPEC QL NAA+PROBE: NEGATIVE

## 2018-04-09 NOTE — TELEPHONE ENCOUNTER
From: Fabian Arce  To: Alize Riley MD  Sent: 4/9/2018 1:59 AM EDT  Subject: Medication Renewal Request    Original authorizing provider: MD Fabian Ruiz would like a refill of the following medications:  ibuprofen (MOTRIN) 800 mg tablet Alize Riley MD]  fluticasone-vilanterol (BREO ELLIPTA) 100-25 mcg/dose inhaler Alize Riley MD]    Preferred pharmacy: [address] [place] [address],[address], [address] [facility] [address] [facility] [address]    Comment:

## 2018-04-11 RX ORDER — IBUPROFEN 800 MG/1
800 TABLET ORAL
Qty: 180 TAB | Refills: 2 | Status: SHIPPED | OUTPATIENT
Start: 2018-04-11 | End: 2018-11-12 | Stop reason: SDUPTHER

## 2018-04-11 RX ORDER — FLUTICASONE FUROATE AND VILANTEROL 100; 25 UG/1; UG/1
1 POWDER RESPIRATORY (INHALATION) DAILY
Qty: 1 INHALER | Refills: 5 | Status: SHIPPED | OUTPATIENT
Start: 2018-04-11 | End: 2018-05-13 | Stop reason: SDUPTHER

## 2018-04-12 DIAGNOSIS — B96.89 BV (BACTERIAL VAGINOSIS): Primary | ICD-10-CM

## 2018-04-12 DIAGNOSIS — N76.0 BV (BACTERIAL VAGINOSIS): Primary | ICD-10-CM

## 2018-04-12 RX ORDER — METRONIDAZOLE 7.5 MG/G
1 GEL VAGINAL 2 TIMES DAILY
Qty: 1 TUBE | Refills: 5 | Status: SHIPPED | OUTPATIENT
Start: 2018-04-12 | End: 2018-08-13 | Stop reason: SDUPTHER

## 2018-04-17 ENCOUNTER — TELEPHONE (OUTPATIENT)
Dept: OBGYN CLINIC | Age: 38
End: 2018-04-17

## 2018-04-17 NOTE — TELEPHONE ENCOUNTER
----- Message from Jazzy Nguyen MD sent at 4/12/2018 11:18 AM EDT -----  Normal pap but with BV. Rx for metrogel sent to the pharmacy.

## 2018-04-17 NOTE — TELEPHONE ENCOUNTER
Call made to patient in regards to her Pap results. Patient was not available to answer my call at the time. Left a detailed message requesting a return call.

## 2018-05-14 ENCOUNTER — TELEPHONE (OUTPATIENT)
Dept: FAMILY MEDICINE CLINIC | Age: 38
End: 2018-05-14

## 2018-05-14 NOTE — TELEPHONE ENCOUNTER
From: Maribeth Ibanez  To: Troy Cooper MD  Sent: 5/13/2018 3:42 AM EDT  Subject: Medication Renewal Request    Original authorizing provider: MD Maribeth Rey would like a refill of the following medications:  fluticasone-vilanterol (BREO ELLIPTA) 100-25 mcg/dose inhaler Troy Cooper MD]    Preferred pharmacy: [address] [place] [address],[address], [address] [place] [address]    Comment:

## 2018-05-14 NOTE — TELEPHONE ENCOUNTER
Patient is requesting referral for counseling. I gave patient information for Ranjeet Amado and Dr. Alejandra Gooden. Patient will call back with appointment info so we can address the referral properly. Patient denies considering harm to herself.

## 2018-05-15 NOTE — TELEPHONE ENCOUNTER
Patient has not been in 6 months, and there is no diagnosis of depression for her in the file. Patient need to come to the office for a visit and evaluation for depression and then she can be safely referred for psychology or psychiatry.

## 2018-05-18 RX ORDER — FLUTICASONE FUROATE AND VILANTEROL 100; 25 UG/1; UG/1
1 POWDER RESPIRATORY (INHALATION) DAILY
Qty: 1 INHALER | Refills: 5 | Status: SHIPPED | OUTPATIENT
Start: 2018-05-18 | End: 2018-09-28

## 2018-05-25 ENCOUNTER — OFFICE VISIT (OUTPATIENT)
Dept: FAMILY MEDICINE CLINIC | Age: 38
End: 2018-05-25

## 2018-05-25 VITALS
HEART RATE: 70 BPM | OXYGEN SATURATION: 98 % | WEIGHT: 187.4 LBS | RESPIRATION RATE: 17 BRPM | HEIGHT: 63 IN | BODY MASS INDEX: 33.2 KG/M2 | TEMPERATURE: 96.8 F | DIASTOLIC BLOOD PRESSURE: 76 MMHG | SYSTOLIC BLOOD PRESSURE: 107 MMHG

## 2018-05-25 DIAGNOSIS — J45.30 MILD PERSISTENT ASTHMA WITHOUT COMPLICATION: ICD-10-CM

## 2018-05-25 RX ORDER — OMEPRAZOLE 20 MG/1
CAPSULE, DELAYED RELEASE ORAL
Qty: 30 CAP | Refills: 2 | Status: SHIPPED | OUTPATIENT
Start: 2018-05-25 | End: 2019-09-17 | Stop reason: SDUPTHER

## 2018-05-25 RX ORDER — BUDESONIDE AND FORMOTEROL FUMARATE DIHYDRATE 160; 4.5 UG/1; UG/1
2 AEROSOL RESPIRATORY (INHALATION) 2 TIMES DAILY
Qty: 1 INHALER | Refills: 3 | Status: SHIPPED | OUTPATIENT
Start: 2018-05-25 | End: 2018-10-20 | Stop reason: SDUPTHER

## 2018-05-25 RX ORDER — SERTRALINE HYDROCHLORIDE 50 MG/1
50 TABLET, FILM COATED ORAL DAILY
Qty: 30 TAB | Refills: 2 | Status: SHIPPED | OUTPATIENT
Start: 2018-05-25 | End: 2018-09-28

## 2018-05-25 NOTE — PATIENT INSTRUCTIONS
Learning About Depression Screening  What is depression screening? Depression screening is a way to see if you have depression symptoms. It may be done by a doctor or counselor. This screening is often part of a routine checkup. That's because your mental health is just as important as your physical health. Depression is a medical illness that affects how you feel, think, and act. You may:  · Have less energy. · Lose interest in your daily activities. · Feel sad and grouchy for a long time. Depression is very common. It affects men and women of all ages. Many things can trigger depression. Some people become depressed after they have a stroke or find out they have a major illness like cancer or heart disease. The death of a loved one, a breakup, or changes in the natural brain chemicals may lead to depression. It can run in families. Most experts believe that a combination of family history (a person's genes) and stressful life events can cause depression. What happens during screening? Your doctor may ask about your feelings, any changes in eating habits, your energy level, and your interest in your daily tasks. He or she may ask other questions, such as how well you are sleeping and if you can focus on the things you do. This may be an informal talk between the two of you. Or your doctor may ask you to fill out a quick form and then talk about your answers. Some diseases or changes in your body can cause symptoms that look like depression. So your doctor may do blood tests to help rule out other problems, such as hormone changes, a low thyroid level, or anemia. What happens after screening? If you have signs of depression, your doctor will talk to you about your options. Doctors usually treat depression with medicines or counseling. Often, combining the two works best. Many people don't get help because they think that they'll get over the depression on their own.  But people with depression may not get better unless they get treatment. Many people feel embarrassed or ashamed about having depression. But it isn't a sign of personal weakness. It's not a character flaw. A person who is depressed is not \"crazy. \" Depression is caused by changes in the brain. A serious symptom of depression is thinking about death or suicide. If you or someone you care about talks about this or about feeling hopeless, get help right away. It's important to know that depression can be treated. The first step toward feeling better is often just seeing that the problem exists. Where can you learn more? Go to http://lorie-ede.info/. Enter T185 in the search box to learn more about \"Learning About Depression Screening. \"  Current as of: May 12, 2017  Content Version: 11.4  © 6249-2954 [employer]. Care instructions adapted under license by [employer] (which disclaims liability or warranty for this information). If you have questions about a medical condition or this instruction, always ask your healthcare professional. [address] any warranty or liability for your use of this information.

## 2018-05-25 NOTE — PROGRESS NOTES
Skyla Hall is a 45 y.o. female presents to office for depression        Health Maintenance items with a due date reviewed with patient:  Health Maintenance Due   Topic Date Due    Pneumococcal 19-64 Medium Risk (1 of 1 - PPSV23) 05/02/1999

## 2018-05-28 NOTE — PROGRESS NOTES
Alma Delia Chang is a 45 y.o.  female and presents with F/U for GERD, asthma and depression which has not been   Treated yet. Chief Complaint   Patient presents with    Depression    GERD     Subjective: Additional Concerns: none     Patient Active Problem List    Diagnosis Date Noted    Palpitations 07/26/2016    Precordial pain 07/26/2016    Mild intermittent asthma 07/07/2014    Obesity 07/07/2014     Current Outpatient Prescriptions   Medication Sig Dispense Refill    sertraline (ZOLOFT) 50 mg tablet Take 1 Tab by mouth daily. Indications: ANXIETY WITH DEPRESSION 30 Tab 2    omeprazole (PRILOSEC) 20 mg capsule Take one tab po daily  Indications: gastroesophageal reflux disease 30 Cap 2    budesonide-formoterol (SYMBICORT) 160-4.5 mcg/actuation HFAA Take 2 Puffs by inhalation two (2) times a day. Indications: MAINTENANCE THERAPY FOR ASTHMA 1 Inhaler 3    fluticasone-vilanterol (BREO ELLIPTA) 100-25 mcg/dose inhaler Take 1 Puff by inhalation daily. 1 Inhaler 5    metroNIDAZOLE (METROGEL) 0.75 % vaginal gel Insert 1 Applicator into vagina two (2) times a day. 1 Tube 5    ibuprofen (MOTRIN) 800 mg tablet Take 1 Tab by mouth every eight (8) hours as needed for Pain. Take with food. 180 Tab 2    Nebulizer & Compressor machine Use as directed 1 Each 0    Nebulizer Accessories kit Use as directed 1 Kit 1    albuterol (PROVENTIL) 5 mg/mL nebulizer solution 0.5 mL by Nebulization route every four (4) hours as needed for Wheezing or Shortness of Breath. Indications: Acute Asthma Attack, BRONCHOSPASM PREVENTION, EXERCISE-INDUCED BRONCHOSPASM PREVENTION 90 mL 3    DULERA 200-5 mcg/actuation HFA inhaler INHALE 2 PUFFS BY MOUTH 2 TIMES A DAY 13 Inhaler 0    dicyclomine (BENTYL) 10 mg capsule Take 1 Cap by mouth four (4) times daily.  30 Cap 1    albuterol (PROVENTIL HFA, VENTOLIN HFA, PROAIR HFA) 90 mcg/actuation inhaler Take 2 Puffs by inhalation every four (4) hours as needed for Wheezing or Shortness of Breath. 2 Inhaler 2    multivitamin (ONE A DAY) tablet Take 1 Tab by mouth daily.  omega-3 fatty acids-vitamin e 1,000 mg cap Take 1 Cap by mouth daily.  CYANOCOBALAMIN, VITAMIN B-12, (VITAMIN B-12 PO) Take 1 Tab by mouth daily.  BIOTIN PO Take 1 Tab by mouth daily.        Allergies   Allergen Reactions    Azo [Phenazopyridine] Hives     Past Medical History:   Diagnosis Date    Asthma     Chronic pain 6 months    painful menses    Fibroids      Past Surgical History:   Procedure Laterality Date    HX  SECTION  [id number]    with BTL     Family History   Problem Relation Age of Onset    Hypertension Mother     Hypertension Father     Diabetes Father     Cancer Paternal Grandmother      Social History   Substance Use Topics    Smoking status: Never Smoker    Smokeless tobacco: Never Used    Alcohol use Yes      Comment: socially, 1 beer/weekend     ROS     General: negative for - chills, fatigue, fever, weight change  Psych: negative for - anxiety, positive depression, no irritability or mood swings  Resp: negative for - cough, shortness of breath or wheezing  CV: negative for - chest pain, edema or palpitations  GI: negative for - abdominal pain, change in bowel habits, constipation, diarrhea or nausea/vomiting    Objective:  Vitals:    18 1048   BP: 107/76   Pulse: 70   Resp: 17   Temp: 96.8 °F (36 °C)   TempSrc: Oral   SpO2: 98%   Weight: 187 lb 6.4 oz (85 kg)   Height: 5' 3\" (1.6 m)   PainSc:   0 - No pain     PE    Alert, well appearing, and in no distress, oriented to person, place, and time and overweight  General appearance - alert, well appearing, and in no distress, oriented to person, place, and time and overweight  Mental status - alert, oriented to person, place, and time, normal mood, behavior, speech, dress, motor activity, and thought processes  Chest - clear to auscultation, no wheezes, rales or rhonchi, symmetric air entry  Heart - normal rate, regular rhythm, normal S1, S2, no murmurs, rubs, clicks or gallops  Extremities - peripheral pulses normal, no pedal edema, no clubbing or cyanosis    [facility] Outpatient Visit on 04/06/2018   Component Date Value Ref Range Status    Chlamydia amplification 04/06/2018 NEGATIVE   NEG   Final    N. gonorrhoeae amplification 04/06/2018 NEGATIVE   NEG   Final    Trichomonas amplification 04/06/2018 NEGATIVE   NEG   Final    Specimen Source 04/06/2018 CERVICAL    Final   Office Visit on 12/21/2017   Component Date Value Ref Range Status    C. difficile toxin gene, JACQUELINE 01/19/2018 Negative  Negative Final    Please note 01/19/2018 Comment   Final    Comment: The date and/or time of collection was not indicated on the  requisition as required by state and federal law. The date of  receipt of the specimen was used as the collection date if not  supplied. TESTS  Results for orders placed or performed during the hospital encounter of 04/06/18   CHLAMYDIA/NEISSERIA/TRICHOMONAS AMP   Result Value Ref Range    Chlamydia amplification NEGATIVE  NEG      N. gonorrhoeae amplification NEGATIVE  NEG      Trichomonas amplification NEGATIVE  NEG      Specimen Source CERVICAL       Assessment/Plan:      1. Mild persistent asthma without complication  - budesonide-formoterol (SYMBICORT) 160-4.5 mcg/actuation HFAA; Take 2 Puffs by inhalation two (2) times a day. Indications: MAINTENANCE THERAPY FOR ASTHMA  Dispense: 1 Inhaler; Refill: 3    2. GERD stable - refilled Prilosec. 3. Depression - Trial of Zoloft 50 mg po daily with option to increase in 1-2 weeks if needed. F/U in one month. Lab review: orders written for new lab studies as appropriate; see orders. I have discussed the diagnosis with the patient and the intended plan as seen in the above orders. The patient has received an after-visit summary and questions were answered concerning future plans.   I have discussed medication side effects and warnings with the patient as well. I have reviewed the plan of care with the patient, accepted their input and they are in agreement with the treatment goals. Follow-up Disposition:  Return in about 1 month (around 6/25/2018), or if symptoms worsen or fail to improve.     Abbe Garsia MD

## 2018-07-03 ENCOUNTER — OFFICE VISIT (OUTPATIENT)
Dept: FAMILY MEDICINE CLINIC | Age: 38
End: 2018-07-03

## 2018-07-03 VITALS
RESPIRATION RATE: 16 BRPM | SYSTOLIC BLOOD PRESSURE: 125 MMHG | BODY MASS INDEX: 33.66 KG/M2 | OXYGEN SATURATION: 98 % | HEART RATE: 63 BPM | HEIGHT: 63 IN | DIASTOLIC BLOOD PRESSURE: 80 MMHG | TEMPERATURE: 98.6 F | WEIGHT: 190 LBS

## 2018-07-03 DIAGNOSIS — R52 ACUTE PAIN: Primary | ICD-10-CM

## 2018-07-03 NOTE — PROGRESS NOTES
Pdamini Vazquez is a 45 y.o. female presents in office to be seen for hand swelling right side. Health Maintenance Due   Topic Date Due    Pneumococcal 19-64 Medium Risk (1 of 1 - PPSV23) 05/02/1999       1. Have you been to the ER, urgent care clinic since your last visit? Hospitalized since your last visit?no    2. Have you seen or consulted any other health care providers outside of the [facility] since your last visit? Include any pap smears or colon screening.  no

## 2018-07-04 NOTE — PATIENT INSTRUCTIONS
Hand Pain: Care Instructions  Your Care Instructions    Common causes of hand pain are overuse and injuries, such as might happen during sports or home repair projects. Everyday wear and tear, especially as you get older, also can cause hand pain. Most minor hand injuries will heal on their own, and home treatment is usually all you need to do. If you have sudden and severe pain, you may need tests and treatment. Follow-up care is a key part of your treatment and safety. Be sure to make and go to all appointments, and call your doctor if you are having problems. It's also a good idea to know your test results and keep a list of the medicines you take. How can you care for yourself at home? · Take pain medicines exactly as directed. ¨ If the doctor gave you a prescription medicine for pain, take it as prescribed. ¨ If you are not taking a prescription pain medicine, ask your doctor if you can take an over-the-counter medicine. · Rest and protect your hand. Take a break from any activity that may cause pain. · Put ice or a cold pack on your hand for 10 to 20 minutes at a time. Put a thin cloth between the ice and your skin. · Prop up the sore hand on a pillow when you ice it or anytime you sit or lie down during the next 3 days. Try to keep it above the level of your heart. This will help reduce swelling. · If your doctor recommends a sling, splint, or elastic bandage to support your hand, wear it as directed. When should you call for help? Call 911 anytime you think you may need emergency care. For example, call if:  ? · Your hand turns cool or pale or changes color. ?Call your doctor now or seek immediate medical care if:  ? · You cannot move your hand. ? · Your hand pops, moves out of its normal position, and then returns to its normal position. ? · You have signs of infection, such as:  ¨ Increased pain, swelling, warmth, or redness. ¨ Red streaks leading from the sore area.   ¨ Pus draining from a place on your hand. ¨ A fever. ? · Your hand feels numb or tingly. ? Watch closely for changes in your health, and be sure to contact your doctor if:  ? · Your hand feels unstable when you try to use it. ? · You do not get better as expected. ? · You have any new symptoms, such as swelling. ? · Bruises from an injury to your hand last longer than 2 weeks. Where can you learn more? Go to http://lorie-ede.info/. Enter R273 in the search box to learn more about \"Hand Pain: Care Instructions. \"  Current as of: March 20, 2017  Content Version: 11.4  © 4175-0834 [employer]. Care instructions adapted under license by [employer] (which disclaims liability or warranty for this information). If you have questions about a medical condition or this instruction, always ask your healthcare professional. [address] any warranty or liability for your use of this information.

## 2018-07-04 NOTE — PROGRESS NOTES
Adrian Rollins is a 45 y.o.  female and presents with acute right hand edema and pain with minor trauma. Patient not getting better over one week. No neuropathy or radiculopathy. Chief Complaint   Patient presents with    Hand Swelling     right     Subjective: Additional Concerns: none     Patient Active Problem List    Diagnosis Date Noted    Palpitations 07/26/2016    Precordial pain 07/26/2016    Mild intermittent asthma 07/07/2014    Obesity 07/07/2014     Current Outpatient Prescriptions   Medication Sig Dispense Refill    sertraline (ZOLOFT) 50 mg tablet Take 1 Tab by mouth daily. Indications: ANXIETY WITH DEPRESSION 30 Tab 2    omeprazole (PRILOSEC) 20 mg capsule Take one tab po daily  Indications: gastroesophageal reflux disease 30 Cap 2    budesonide-formoterol (SYMBICORT) 160-4.5 mcg/actuation HFAA Take 2 Puffs by inhalation two (2) times a day. Indications: MAINTENANCE THERAPY FOR ASTHMA 1 Inhaler 3    ibuprofen (MOTRIN) 800 mg tablet Take 1 Tab by mouth every eight (8) hours as needed for Pain. Take with food. 180 Tab 2    dicyclomine (BENTYL) 10 mg capsule Take 1 Cap by mouth four (4) times daily. 30 Cap 1    Nebulizer & Compressor machine Use as directed 1 Each 0    Nebulizer Accessories kit Use as directed 1 Kit 1    albuterol (PROVENTIL) 5 mg/mL nebulizer solution 0.5 mL by Nebulization route every four (4) hours as needed for Wheezing or Shortness of Breath. Indications: Acute Asthma Attack, BRONCHOSPASM PREVENTION, EXERCISE-INDUCED BRONCHOSPASM PREVENTION 90 mL 3    fluticasone-vilanterol (BREO ELLIPTA) 100-25 mcg/dose inhaler Take 1 Puff by inhalation daily. 1 Inhaler 5    metroNIDAZOLE (METROGEL) 0.75 % vaginal gel Insert 1 Applicator into vagina two (2) times a day.  1 Tube 5    DULERA 200-5 mcg/actuation HFA inhaler INHALE 2 PUFFS BY MOUTH 2 TIMES A DAY 13 Inhaler 0    albuterol (PROVENTIL HFA, VENTOLIN HFA, PROAIR HFA) 90 mcg/actuation inhaler Take 2 Puffs by inhalation every four (4) hours as needed for Wheezing or Shortness of Breath. 2 Inhaler 2    multivitamin (ONE A DAY) tablet Take 1 Tab by mouth daily.  omega-3 fatty acids-vitamin e 1,000 mg cap Take 1 Cap by mouth daily.  CYANOCOBALAMIN, VITAMIN B-12, (VITAMIN B-12 PO) Take 1 Tab by mouth daily.  BIOTIN PO Take 1 Tab by mouth daily.        Allergies   Allergen Reactions    Azo [Phenazopyridine] Hives     Past Medical History:   Diagnosis Date    Asthma     Chronic pain 6 months    painful menses    Fibroids      Past Surgical History:   Procedure Laterality Date    HX  SECTION  0142,6240    with BTL     Family History   Problem Relation Age of Onset    Hypertension Mother     Hypertension Father     Diabetes Father     Cancer Paternal Grandmother      Social History   Substance Use Topics    Smoking status: Never Smoker    Smokeless tobacco: Never Used    Alcohol use Yes      Comment: socially, 1 beer/weekend     ROS     General: negative for - chills, fatigue, fever, weight change  MSK: positive for - right hand joint pain, joint swelling and muscle pain  Neuro: negative for - confusion, headaches, seizures or weakness, numbness     Objective:  Vitals:    18 1006   BP: 125/80   Pulse: 63   Resp: 16   Temp: 98.6 °F (37 °C)   TempSrc: Oral   SpO2: 98%   Weight: 190 lb (86.2 kg)   Height: 5' 2.99\" (1.6 m)   PainSc:   7   PainLoc: Hand     PE    alert, well appearing, and in no distress, oriented to person, place, and time and normal appearing weight  General appearance - alert, well appearing, and in no distress, oriented to person, place, and time and overweight  Mental status - alert, oriented to person, place, and time, normal mood, behavior, speech, dress, motor activity, and thought processes  Chest - clear to auscultation, no wheezes, rales or rhonchi, symmetric air entry  Heart - normal rate, regular rhythm, normal S1, S2, no murmurs, rubs, clicks or gallops  Extremities - peripheral pulses normal, no pedal edema, no clubbing or cyanosis    [facility] Outpatient Visit on 04/06/2018   Component Date Value Ref Range Status    Chlamydia amplification 04/06/2018 NEGATIVE   NEG   Final    N. gonorrhoeae amplification 04/06/2018 NEGATIVE   NEG   Final    Trichomonas amplification 04/06/2018 NEGATIVE   NEG   Final    Specimen Source 04/06/2018 CERVICAL    Final       TESTS  Results for orders placed or performed during the hospital encounter of 04/06/18   CHLAMYDIA/NEISSERIA/TRICHOMONAS AMP   Result Value Ref Range    Chlamydia amplification NEGATIVE  NEG      N. gonorrhoeae amplification NEGATIVE  NEG      Trichomonas amplification NEGATIVE  NEG      Specimen Source CERVICAL         Assessment/Plan:       Acute pain right hand - OTC NSAIDs for stanley for pain control PRN. ACE wrap given and hand wrapped   - XR HAND RT AP/LAT; Future    Lab review: orders written for new lab studies as appropriate; see orders. We will call for results and further plan. I have discussed the diagnosis with the patient and the intended plan as seen in the above orders. The patient has received an after-visit summary and questions were answered concerning future plans. I have discussed medication side effects and warnings with the patient as well. I have reviewed the plan of care with the patient, accepted their input and they are in agreement with the treatment goals. F/U as needed.      Jose Goemz MD

## 2018-08-13 ENCOUNTER — OFFICE VISIT (OUTPATIENT)
Dept: FAMILY MEDICINE CLINIC | Age: 38
End: 2018-08-13

## 2018-08-13 VITALS
HEIGHT: 63 IN | WEIGHT: 190 LBS | SYSTOLIC BLOOD PRESSURE: 110 MMHG | BODY MASS INDEX: 33.66 KG/M2 | DIASTOLIC BLOOD PRESSURE: 88 MMHG | TEMPERATURE: 97 F | HEART RATE: 65 BPM | RESPIRATION RATE: 18 BRPM

## 2018-08-13 DIAGNOSIS — M54.2 NECK PAIN: Primary | ICD-10-CM

## 2018-08-13 DIAGNOSIS — N89.8 VAGINAL DISCHARGE: ICD-10-CM

## 2018-08-13 DIAGNOSIS — B96.89 BV (BACTERIAL VAGINOSIS): ICD-10-CM

## 2018-08-13 DIAGNOSIS — M62.838 MUSCLE SPASM: ICD-10-CM

## 2018-08-13 DIAGNOSIS — N76.0 BV (BACTERIAL VAGINOSIS): ICD-10-CM

## 2018-08-13 RX ORDER — METRONIDAZOLE 7.5 MG/G
1 GEL VAGINAL 2 TIMES DAILY
Qty: 1 TUBE | Refills: 2 | Status: SHIPPED | OUTPATIENT
Start: 2018-08-13 | End: 2018-09-28

## 2018-08-13 RX ORDER — CYCLOBENZAPRINE HCL 5 MG
5 TABLET ORAL 2 TIMES DAILY
Qty: 20 TAB | Refills: 0 | Status: SHIPPED | OUTPATIENT
Start: 2018-08-13 | End: 2018-09-28

## 2018-08-13 NOTE — PROGRESS NOTES
Precious Downs     Chief Complaint   Patient presents with    Neck Pain    Vaginal Discharge     Vitals:    18 1022   BP: 110/88   Pulse: 65   Resp: 18   Temp: 97 °F (36.1 °C)   Weight: 190 lb (86.2 kg)   Height: 5' 2.99\" (1.6 m)   PainSc:   8   PainLoc: Neck         HPI:she is here for left sided pain and spasm for days, it happened after lifting  Weight for exercising . she took ibuprofen and it helped slightly,pain increases with movement. She also complaining about vaginal discharge with odor it feels like her recurrent bacterial vaginosis. and she requesting refill for metronidazole gel. Past Medical History:   Diagnosis Date    Asthma     Chronic pain 6 months    painful menses    Fibroids      Past Surgical History:   Procedure Laterality Date    HX  SECTION  6999,633    with BTL     Social History   Substance Use Topics    Smoking status: Never Smoker    Smokeless tobacco: Never Used    Alcohol use Yes      Comment: socially, 1 beer/weekend       Family History   Problem Relation Age of Onset    Hypertension Mother     Hypertension Father     Diabetes Father     Cancer Paternal Grandmother        Review of Systems   Constitutional: Negative for chills, fever, malaise/fatigue and weight loss. HENT: Negative for congestion, ear discharge, ear pain, hearing loss, nosebleeds, sinus pain and sore throat. Eyes: Negative for blurred vision, double vision and discharge. Respiratory: Negative for cough, hemoptysis, sputum production, shortness of breath and wheezing. Cardiovascular: Negative for chest pain, palpitations, claudication and leg swelling. Gastrointestinal: Negative for abdominal pain, constipation, diarrhea, nausea and vomiting. Genitourinary: Negative for dysuria, frequency and urgency. Musculoskeletal: Positive for myalgias and neck pain. Negative for back pain and joint pain. Skin: Negative for itching and rash.    Neurological: Negative for dizziness, tingling, sensory change, speech change, focal weakness, seizures, weakness and headaches. Psychiatric/Behavioral: Negative for depression, hallucinations, substance abuse and suicidal ideas. The patient is not nervous/anxious and does not have insomnia. Physical Exam   Constitutional: She is oriented to person, place, and time. She appears well-developed and well-nourished. No distress. HENT:   Head: Normocephalic and atraumatic. Mouth/Throat: Oropharynx is clear and moist. No oropharyngeal exudate. Eyes: Conjunctivae are normal. Pupils are equal, round, and reactive to light. Right eye exhibits no discharge. Left eye exhibits no discharge. No scleral icterus. Neck: Normal range of motion. Neck supple. No thyromegaly present. Cardiovascular: Normal rate, regular rhythm and normal heart sounds. No murmur heard. Pulmonary/Chest: Effort normal and breath sounds normal. No respiratory distress. She has no wheezes. She has no rales. She exhibits no tenderness. Abdominal: Soft. Bowel sounds are normal. She exhibits no distension. There is no tenderness. There is no rebound. Musculoskeletal: Normal range of motion. She exhibits tenderness. She exhibits no edema or deformity. Left sided neck tenderness and pain    Lymphadenopathy:     She has no cervical adenopathy. Neurological: She is alert and oriented to person, place, and time. No cranial nerve deficit. Coordination normal.   Skin: Skin is warm and dry. No rash noted. She is not diaphoretic. No erythema. No pallor. Psychiatric: She has a normal mood and affect. Her behavior is normal. Judgment and thought content normal.        Assessment and plan     1. Vaginal discharge  Patient has a history of recurrent bacterial vaginosis documented on her Pap smear last April, will treat empirically with diclofenac gel, and if there is no improvement she will come back in the vagina culture. - metroNIDAZOLE (METROGEL) 0.75 % vaginal gel;  Insert 1 Applicator into vagina two (2) times a day. Dispense: 1 Tube; Refill: 2    2. Neck pain  Muscle spasm continue with ibuprofen as needed for pain and Flexeril for the muscle spasm take it at night, cause drowsiness and patient should not be driving while taking the medication  - cyclobenzaprine (FLEXERIL) 5 mg tablet; Take 1 Tab by mouth two (2) times a day. Dispense: 20 Tab; Refill: 0    3. Muscle spasm    - cyclobenzaprine (FLEXERIL) 5 mg tablet; Take 1 Tab by mouth two (2) times a day. Dispense: 20 Tab; Refill: 0    4. BV (bacterial vaginosis)    - metroNIDAZOLE (METROGEL) 0.75 % vaginal gel; Insert 1 Applicator into vagina two (2) times a day. Dispense: 1 Tube; Refill: 2    Current Outpatient Prescriptions   Medication Sig Dispense Refill    metroNIDAZOLE (METROGEL) 0.75 % vaginal gel Insert 1 Applicator into vagina two (2) times a day. 1 Tube 2    cyclobenzaprine (FLEXERIL) 5 mg tablet Take 1 Tab by mouth two (2) times a day. 20 Tab 0    sertraline (ZOLOFT) 50 mg tablet Take 1 Tab by mouth daily. Indications: ANXIETY WITH DEPRESSION 30 Tab 2    omeprazole (PRILOSEC) 20 mg capsule Take one tab po daily  Indications: gastroesophageal reflux disease 30 Cap 2    budesonide-formoterol (SYMBICORT) 160-4.5 mcg/actuation HFAA Take 2 Puffs by inhalation two (2) times a day. Indications: MAINTENANCE THERAPY FOR ASTHMA 1 Inhaler 3    fluticasone-vilanterol (BREO ELLIPTA) 100-25 mcg/dose inhaler Take 1 Puff by inhalation daily. 1 Inhaler 5    ibuprofen (MOTRIN) 800 mg tablet Take 1 Tab by mouth every eight (8) hours as needed for Pain. Take with food. 180 Tab 2    DULERA 200-5 mcg/actuation HFA inhaler INHALE 2 PUFFS BY MOUTH 2 TIMES A DAY 13 Inhaler 0    dicyclomine (BENTYL) 10 mg capsule Take 1 Cap by mouth four (4) times daily.  30 Cap 1    Nebulizer & Compressor machine Use as directed 1 Each 0    Nebulizer Accessories kit Use as directed 1 Kit 1    albuterol (PROVENTIL) 5 mg/mL nebulizer solution 0.5 mL by Nebulization route every four (4) hours as needed for Wheezing or Shortness of Breath. Indications: Acute Asthma Attack, BRONCHOSPASM PREVENTION, EXERCISE-INDUCED BRONCHOSPASM PREVENTION 90 mL 3    albuterol (PROVENTIL HFA, VENTOLIN HFA, PROAIR HFA) 90 mcg/actuation inhaler Take 2 Puffs by inhalation every four (4) hours as needed for Wheezing or Shortness of Breath. 2 Inhaler 2    multivitamin (ONE A DAY) tablet Take 1 Tab by mouth daily.  omega-3 fatty acids-vitamin e 1,000 mg cap Take 1 Cap by mouth daily.  CYANOCOBALAMIN, VITAMIN B-12, (VITAMIN B-12 PO) Take 1 Tab by mouth daily.  BIOTIN PO Take 1 Tab by mouth daily. Patient Active Problem List    Diagnosis Date Noted    Palpitations 07/26/2016    Precordial pain 07/26/2016    Mild intermittent asthma 07/07/2014    Obesity 07/07/2014     Results for orders placed or performed during the hospital encounter of 04/06/18   CHLAMYDIA/NEISSERIA/TRICHOMONAS AMP   Result Value Ref Range    Chlamydia amplification NEGATIVE  NEG      N. gonorrhoeae amplification NEGATIVE  NEG      Trichomonas amplification NEGATIVE  NEG      Specimen Source CERVICAL       No visits with results within 3 Month(s) from this visit. Latest known visit with results is:    Hospital Outpatient Visit on 04/06/2018   Component Date Value Ref Range Status    Chlamydia amplification 04/06/2018 NEGATIVE   NEG   Final    N. gonorrhoeae amplification 04/06/2018 NEGATIVE   NEG   Final    Trichomonas amplification 04/06/2018 NEGATIVE   NEG   Final    Specimen Source 04/06/2018 CERVICAL    Final          Follow-up Disposition:  Return if symptoms worsen or fail to improve.

## 2018-08-13 NOTE — PROGRESS NOTES
Sabino Heller is a 45 y.o. female (: 1980) presenting to address:    Chief Complaint   Patient presents with    Neck Pain    Vaginal Discharge       Vitals:    18 1022   BP: 110/88   Pulse: 65   Resp: 18   Temp: 97 °F (36.1 °C)   Weight: 190 lb (86.2 kg)   Height: 5' 2.99\" (1.6 m)   PainSc:   8   PainLoc: Neck       Hearing/Vision:   No exam data present    Learning Assessment:     Learning Assessment 2015   PRIMARY LEARNER Patient   HIGHEST LEVEL OF EDUCATION - PRIMARY LEARNER  2 YEARS OF COLLEGE   BARRIERS PRIMARY LEARNER NONE   PRIMARY LANGUAGE ENGLISH   LEARNER PREFERENCE PRIMARY READING     VIDEOS     -   ANSWERED BY Patient   RELATIONSHIP SELF     Depression Screening:     PHQ over the last two weeks 2018   Little interest or pleasure in doing things Not at all   Feeling down, depressed, irritable, or hopeless Not at all   Total Score PHQ 2 0     Fall Risk Assessment:     Fall Risk Assessment, last 12 mths 2018   Able to walk? Yes   Fall in past 12 months? No     Abuse Screening:     Abuse Screening Questionnaire 2018   Do you ever feel afraid of your partner? N   Are you in a relationship with someone who physically or mentally threatens you? N   Is it safe for you to go home? Y     Coordination of Care Questionaire:   1. Have you been to the ER, urgent care clinic since your last visit? Hospitalized since your last visit? NO    2. Have you seen or consulted any other health care providers outside of the [address] [place] [address] since your last visit? Include any pap smears or colon screening.  NO

## 2018-09-24 ENCOUNTER — HOSPITAL ENCOUNTER (EMERGENCY)
Age: 38
Discharge: HOME | End: 2018-09-24
Attending: EMERGENCY MEDICINE
Payer: COMMERCIAL

## 2018-09-24 ENCOUNTER — APPOINTMENT (OUTPATIENT)
Dept: CT IMAGING | Age: 38
End: 2018-09-24
Attending: PHYSICIAN ASSISTANT
Payer: COMMERCIAL

## 2018-09-24 ENCOUNTER — APPOINTMENT (OUTPATIENT)
Dept: GENERAL RADIOLOGY | Age: 38
End: 2018-09-24
Attending: PHYSICIAN ASSISTANT
Payer: COMMERCIAL

## 2018-09-24 VITALS
WEIGHT: 190 LBS | RESPIRATION RATE: 24 BRPM | HEIGHT: 63 IN | SYSTOLIC BLOOD PRESSURE: 110 MMHG | OXYGEN SATURATION: 100 % | DIASTOLIC BLOOD PRESSURE: 64 MMHG | TEMPERATURE: 97.4 F | HEART RATE: 69 BPM | BODY MASS INDEX: 33.66 KG/M2

## 2018-09-24 DIAGNOSIS — T50.995A ALLERGIC REACTION TO DYE, INITIAL ENCOUNTER: ICD-10-CM

## 2018-09-24 DIAGNOSIS — R00.2 PALPITATIONS: ICD-10-CM

## 2018-09-24 DIAGNOSIS — R07.9 CHEST PAIN, UNSPECIFIED TYPE: Primary | ICD-10-CM

## 2018-09-24 LAB
ALBUMIN SERPL-MCNC: 3.3 G/DL (ref 3.4–5)
ALBUMIN/GLOB SERPL: 0.8 {RATIO} (ref 0.8–1.7)
ALP SERPL-CCNC: 92 U/L (ref 45–117)
ALT SERPL-CCNC: 22 U/L (ref 13–56)
ANION GAP SERPL CALC-SCNC: 4 MMOL/L (ref 3–18)
APPEARANCE UR: CLEAR
AST SERPL-CCNC: 20 U/L (ref 15–37)
ATRIAL RATE: 68 BPM
BASOPHILS # BLD: 0 K/UL (ref 0–0.1)
BASOPHILS NFR BLD: 0 % (ref 0–2)
BILIRUB SERPL-MCNC: 0.2 MG/DL (ref 0.2–1)
BILIRUB UR QL: NEGATIVE
BUN SERPL-MCNC: 12 MG/DL (ref 7–18)
BUN/CREAT SERPL: 13 (ref 12–20)
CALCIUM SERPL-MCNC: 8.5 MG/DL (ref 8.5–10.1)
CALCULATED P AXIS, ECG09: 71 DEGREES
CALCULATED R AXIS, ECG10: 53 DEGREES
CALCULATED T AXIS, ECG11: 51 DEGREES
CHLORIDE SERPL-SCNC: 104 MMOL/L (ref 100–108)
CK MB CFR SERPL CALC: 0.8 % (ref 0–4)
CK MB CFR SERPL CALC: 0.8 % (ref 0–4)
CK MB SERPL-MCNC: 1 NG/ML (ref 5–25)
CK MB SERPL-MCNC: 1 NG/ML (ref 5–25)
CK SERPL-CCNC: 119 U/L (ref 26–192)
CK SERPL-CCNC: 125 U/L (ref 26–192)
CO2 SERPL-SCNC: 32 MMOL/L (ref 21–32)
COLOR UR: YELLOW
CREAT SERPL-MCNC: 0.94 MG/DL (ref 0.6–1.3)
D DIMER PPP FEU-MCNC: 0.38 UG/ML(FEU)
DIAGNOSIS, 93000: NORMAL
DIFFERENTIAL METHOD BLD: NORMAL
EOSINOPHIL # BLD: 0.3 K/UL (ref 0–0.4)
EOSINOPHIL NFR BLD: 4 % (ref 0–5)
EPITH CASTS URNS QL MICRO: NORMAL /LPF (ref 0–5)
ERYTHROCYTE [DISTWIDTH] IN BLOOD BY AUTOMATED COUNT: 14.4 % (ref 11.6–14.5)
GLOBULIN SER CALC-MCNC: 3.9 G/DL (ref 2–4)
GLUCOSE SERPL-MCNC: 77 MG/DL (ref 74–99)
GLUCOSE UR STRIP.AUTO-MCNC: NEGATIVE MG/DL
HCG UR QL: NEGATIVE
HCT VFR BLD AUTO: 41.3 % (ref 35–45)
HGB BLD-MCNC: 13.4 G/DL (ref 12–16)
HGB UR QL STRIP: NEGATIVE
KETONES UR QL STRIP.AUTO: NEGATIVE MG/DL
LEUKOCYTE ESTERASE UR QL STRIP.AUTO: ABNORMAL
LYMPHOCYTES # BLD: 2.8 K/UL (ref 0.9–3.6)
LYMPHOCYTES NFR BLD: 31 % (ref 21–52)
MAGNESIUM SERPL-MCNC: 1.7 MG/DL (ref 1.6–2.6)
MCH RBC QN AUTO: 27.1 PG (ref 24–34)
MCHC RBC AUTO-ENTMCNC: 32.4 G/DL (ref 31–37)
MCV RBC AUTO: 83.4 FL (ref 74–97)
MONOCYTES # BLD: 0.6 K/UL (ref 0.05–1.2)
MONOCYTES NFR BLD: 7 % (ref 3–10)
NEUTS SEG # BLD: 5.2 K/UL (ref 1.8–8)
NEUTS SEG NFR BLD: 58 % (ref 40–73)
NITRITE UR QL STRIP.AUTO: NEGATIVE
P-R INTERVAL, ECG05: 146 MS
PH UR STRIP: 6.5 [PH] (ref 5–8)
PLATELET # BLD AUTO: 256 K/UL (ref 135–420)
PMV BLD AUTO: 10.2 FL (ref 9.2–11.8)
POTASSIUM SERPL-SCNC: 3.6 MMOL/L (ref 3.5–5.5)
PROT SERPL-MCNC: 7.2 G/DL (ref 6.4–8.2)
PROT UR STRIP-MCNC: NEGATIVE MG/DL
Q-T INTERVAL, ECG07: 360 MS
QRS DURATION, ECG06: 78 MS
QTC CALCULATION (BEZET), ECG08: 382 MS
RBC # BLD AUTO: 4.95 M/UL (ref 4.2–5.3)
RBC #/AREA URNS HPF: NORMAL /HPF (ref 0–5)
SODIUM SERPL-SCNC: 140 MMOL/L (ref 136–145)
SP GR UR REFRACTOMETRY: 1.02 (ref 1–1.03)
TROPONIN I SERPL-MCNC: <0.02 NG/ML (ref 0–0.06)
TROPONIN I SERPL-MCNC: <0.02 NG/ML (ref 0–0.06)
TSH SERPL DL<=0.05 MIU/L-ACNC: 1.24 UIU/ML (ref 0.36–3.74)
UROBILINOGEN UR QL STRIP.AUTO: 0.2 EU/DL (ref 0.2–1)
VENTRICULAR RATE, ECG03: 68 BPM
WBC # BLD AUTO: 9 K/UL (ref 4.6–13.2)
WBC URNS QL MICRO: NORMAL /HPF (ref 0–4)

## 2018-09-24 PROCEDURE — 71260 CT THORAX DX C+: CPT

## 2018-09-24 PROCEDURE — 74011250636 HC RX REV CODE- 250/636

## 2018-09-24 PROCEDURE — 80053 COMPREHEN METABOLIC PANEL: CPT | Performed by: PHYSICIAN ASSISTANT

## 2018-09-24 PROCEDURE — 85025 COMPLETE CBC W/AUTO DIFF WBC: CPT | Performed by: PHYSICIAN ASSISTANT

## 2018-09-24 PROCEDURE — 82550 ASSAY OF CK (CPK): CPT | Performed by: PHYSICIAN ASSISTANT

## 2018-09-24 PROCEDURE — 81025 URINE PREGNANCY TEST: CPT | Performed by: PHYSICIAN ASSISTANT

## 2018-09-24 PROCEDURE — 94640 AIRWAY INHALATION TREATMENT: CPT

## 2018-09-24 PROCEDURE — 74011636320 HC RX REV CODE- 636/320: Performed by: EMERGENCY MEDICINE

## 2018-09-24 PROCEDURE — 83735 ASSAY OF MAGNESIUM: CPT | Performed by: PHYSICIAN ASSISTANT

## 2018-09-24 PROCEDURE — 85379 FIBRIN DEGRADATION QUANT: CPT | Performed by: PHYSICIAN ASSISTANT

## 2018-09-24 PROCEDURE — 74011000250 HC RX REV CODE- 250: Performed by: EMERGENCY MEDICINE

## 2018-09-24 PROCEDURE — 74011250636 HC RX REV CODE- 250/636: Performed by: PHYSICIAN ASSISTANT

## 2018-09-24 PROCEDURE — 77030029684 HC NEB SM VOL KT MONA -A

## 2018-09-24 PROCEDURE — 93005 ELECTROCARDIOGRAM TRACING: CPT

## 2018-09-24 PROCEDURE — 96374 THER/PROPH/DIAG INJ IV PUSH: CPT

## 2018-09-24 PROCEDURE — 71046 X-RAY EXAM CHEST 2 VIEWS: CPT

## 2018-09-24 PROCEDURE — 81001 URINALYSIS AUTO W/SCOPE: CPT | Performed by: PHYSICIAN ASSISTANT

## 2018-09-24 PROCEDURE — 84443 ASSAY THYROID STIM HORMONE: CPT | Performed by: PHYSICIAN ASSISTANT

## 2018-09-24 PROCEDURE — 99285 EMERGENCY DEPT VISIT HI MDM: CPT

## 2018-09-24 PROCEDURE — 96361 HYDRATE IV INFUSION ADD-ON: CPT

## 2018-09-24 PROCEDURE — 74011250637 HC RX REV CODE- 250/637: Performed by: PHYSICIAN ASSISTANT

## 2018-09-24 RX ORDER — ASPIRIN 325 MG
325 TABLET ORAL
Status: COMPLETED | OUTPATIENT
Start: 2018-09-24 | End: 2018-09-24

## 2018-09-24 RX ORDER — ALBUTEROL SULFATE 0.83 MG/ML
2.5 SOLUTION RESPIRATORY (INHALATION)
Status: COMPLETED | OUTPATIENT
Start: 2018-09-24 | End: 2018-09-24

## 2018-09-24 RX ORDER — DIPHENHYDRAMINE HYDROCHLORIDE 50 MG/ML
INJECTION, SOLUTION INTRAMUSCULAR; INTRAVENOUS
Status: COMPLETED
Start: 2018-09-24 | End: 2018-09-24

## 2018-09-24 RX ORDER — HYDROCODONE BITARTRATE AND ACETAMINOPHEN 5; 325 MG/1; MG/1
1 TABLET ORAL
Status: COMPLETED | OUTPATIENT
Start: 2018-09-24 | End: 2018-09-24

## 2018-09-24 RX ORDER — DIPHENHYDRAMINE HYDROCHLORIDE 50 MG/ML
25 INJECTION, SOLUTION INTRAMUSCULAR; INTRAVENOUS ONCE
Status: COMPLETED | OUTPATIENT
Start: 2018-09-24 | End: 2018-09-24

## 2018-09-24 RX ORDER — METHYLPREDNISOLONE 4 MG/1
TABLET ORAL
Qty: 1 DOSE PACK | Refills: 0 | Status: SHIPPED | OUTPATIENT
Start: 2018-09-24 | End: 2018-10-10 | Stop reason: ALTCHOICE

## 2018-09-24 RX ADMIN — ALBUTEROL SULFATE 2.5 MG: 2.5 SOLUTION RESPIRATORY (INHALATION) at 14:47

## 2018-09-24 RX ADMIN — DIPHENHYDRAMINE HYDROCHLORIDE 25 MG: 50 INJECTION, SOLUTION INTRAMUSCULAR; INTRAVENOUS at 14:47

## 2018-09-24 RX ADMIN — HYDROCODONE BITARTRATE AND ACETAMINOPHEN 1 TABLET: 5; 325 TABLET ORAL at 13:24

## 2018-09-24 RX ADMIN — ASPIRIN 325 MG ORAL TABLET 325 MG: 325 PILL ORAL at 11:56

## 2018-09-24 RX ADMIN — IOPAMIDOL 80 ML: 612 INJECTION, SOLUTION INTRAVENOUS at 13:53

## 2018-09-24 RX ADMIN — SODIUM CHLORIDE 1000 ML: 900 INJECTION, SOLUTION INTRAVENOUS at 13:25

## 2018-09-24 NOTE — ED NOTES
Patient armband removed and shreddedI have reviewed discharge instructions with the patient and spouse. The patient and spouse verbalized understanding. Work note given

## 2018-09-24 NOTE — ED NOTES
Pt c/o shortness of breath, wheezing after CT scan on way back c/o sob to CT tech and nausea during study; orders received by MD for benadryl and neb tx, see MAR.  Pt in nad, able to speak but voice slightly hoarse, vss.

## 2018-09-24 NOTE — DISCHARGE INSTRUCTIONS
Chest Pain: Care Instructions  Your Care Instructions    There are many things that can cause chest pain. Some are not serious and will get better on their own in a few days. But some kinds of chest pain need more testing and treatment. Your doctor may have recommended a follow-up visit in the next 8 to 12 hours. If you are not getting better, you may need more tests or treatment. Even though your doctor has released you, you still need to watch for any problems. The doctor carefully checked you, but sometimes problems can develop later. If you have new symptoms or if your symptoms do not get better, get medical care right away. If you have worse or different chest pain or pressure that lasts more than 5 minutes or you passed out (lost consciousness), call 911 or seek other emergency help right away. A medical visit is only one step in your treatment. Even if you feel better, you still need to do what your doctor recommends, such as going to all suggested follow-up appointments and taking medicines exactly as directed. This will help you recover and help prevent future problems. How can you care for yourself at home? · Rest until you feel better. · Take your medicine exactly as prescribed. Call your doctor if you think you are having a problem with your medicine. · Do not drive after taking a prescription pain medicine. When should you call for help? Call 911 if:    · You passed out (lost consciousness).     · You have severe difficulty breathing.     · You have symptoms of a heart attack. These may include:  ¨ Chest pain or pressure, or a strange feeling in your chest.  ¨ Sweating. ¨ Shortness of breath. ¨ Nausea or vomiting. ¨ Pain, pressure, or a strange feeling in your back, neck, jaw, or upper belly or in one or both shoulders or arms. ¨ Lightheadedness or sudden weakness. ¨ A fast or irregular heartbeat.   After you call 911, the  may tell you to chew 1 adult-strength or 2 to 4 low-dose aspirin. Wait for an ambulance. Do not try to drive yourself.    Call your doctor today if:    · You have any trouble breathing.     · Your chest pain gets worse.     · You are dizzy or lightheaded, or you feel like you may faint.     · You are not getting better as expected.     · You are having new or different chest pain. Where can you learn more? Go to http://lorie-ede.info/. Enter A120 in the search box to learn more about \"Chest Pain: Care Instructions. \"  Current as of: November 20, 2017  Content Version: 11.7  © 4337-1790 [employer]. Care instructions adapted under license by [employer] (which disclaims liability or warranty for this information). If you have questions about a medical condition or this instruction, always ask your healthcare professional. [address] any warranty or liability for your use of this information. Palpitations: Care Instructions  Your Care Instructions    Heart palpitations are the uncomfortable sensation that your heart is beating fast or irregularly. You might feel pounding or fluttering in your chest. It might feel like your heart is skipping a beat. Although palpitations may be caused by a heart problem, they also occur because of stress, fatigue, or use of alcohol, caffeine, or nicotine. Many medicines, including diet pills, antihistamines, decongestants, and some herbal products, can cause heart palpitations. Nearly everyone has palpitations from time to time. Depending on your symptoms, your doctor may need to do more tests to try to find the cause of your palpitations. Follow-up care is a key part of your treatment and safety. Be sure to make and go to all appointments, and call your doctor if you are having problems. It's also a good idea to know your test results and keep a list of the medicines you take. How can you care for yourself at home?   · Avoid caffeine, nicotine, and excess alcohol. · Do not take illegal drugs, such as methamphetamines and cocaine. · Do not take weight loss or diet medicines unless you talk with your doctor first.  · Get plenty of sleep. · Do not overeat. · If you have palpitations again, take deep breaths and try to relax. This may slow a racing heart. · If you start to feel lightheaded, lie down to avoid injuries that might result if you pass out and fall down. · Keep a record of your palpitations and bring it to your next doctor's appointment. Write down:  ¨ The date and time. ¨ Your pulse. (If your heart is beating fast, it may be hard to count your pulse.)  ¨ What you were doing when the palpitations started. ¨ How long the palpitations lasted. ¨ Any other symptoms. · If an activity causes palpitations, slow down or stop. Talk to your doctor before you do that activity again. · Take your medicines exactly as prescribed. Call your doctor if you think you are having a problem with your medicine. When should you call for help? Call 911 anytime you think you may need emergency care. For example, call if:    · You passed out (lost consciousness).     · You have symptoms of a heart attack. These may include:  ¨ Chest pain or pressure, or a strange feeling in the chest.  ¨ Sweating. ¨ Shortness of breath. ¨ Pain, pressure, or a strange feeling in the back, neck, jaw, or upper belly or in one or both shoulders or arms. ¨ Lightheadedness or sudden weakness. ¨ A fast or irregular heartbeat. After you call 911, the  may tell you to chew 1 adult-strength or 2 to 4 low-dose aspirin. Wait for an ambulance. Do not try to drive yourself.     · You have symptoms of a stroke. These may include:  ¨ Sudden numbness, tingling, weakness, or loss of movement in your face, arm, or leg, especially on only one side of your body. ¨ Sudden vision changes. ¨ Sudden trouble speaking. ¨ Sudden confusion or trouble understanding simple statements.   ¨ Sudden problems with walking or balance. ¨ A sudden, severe headache that is different from past headaches.    Call your doctor now or seek immediate medical care if:    · You have heart palpitations and:  ¨ Are dizzy or lightheaded, or you feel like you may faint. ¨ Have new or increased shortness of breath.    Watch closely for changes in your health, and be sure to contact your doctor if:    · You continue to have heart palpitations. Where can you learn more? Go to http://lorie-ede.info/. Enter R508 in the search box to learn more about \"Palpitations: Care Instructions. \"  Current as of: December 6, 2017  Content Version: 11.7  © 0252-8287 [employer]. Care instructions adapted under license by [employer] (which disclaims liability or warranty for this information). If you have questions about a medical condition or this instruction, always ask your healthcare professional. [address] any warranty or liability for your use of this information.

## 2018-09-24 NOTE — ED PROVIDER NOTES
EMERGENCY DEPARTMENT HISTORY AND PHYSICAL EXAM 
 
Date: 9/24/2018 Patient Name: Sylwia Dunn History of Presenting Illness Chief Complaint Patient presents with  Chest Pain History Provided By: Patient Chief Complaint: chest pain Duration: off and on for the past two days -- started again this morning just PTA Timing:  Acute Location: midsternal 
Quality: Aching Severity: Moderate Modifying Factors: has not taken anything for the pain,  Has had palpitations before and had a monitor but never had further work up Associated Symptoms: + associated palpitations and slight SOB, no fevers, no chills, no NVD, no diaphoresis, no arm pain, no jaw pain, Additional History (Context): Sylwia Dunn is a 45 y.o. female with asthma, chronic pain, fibroids who presents with C/O mid sternal non radiating chest pain since yesterday. The pain comes and goes and this morning it got worse. It does not seem to get worse with movement, lasts for a few minutes and then will stop. This morning it never went away. Also reports associated palpitations -- where she feels like her heart is racing and skipping a beat. Has had palpitations before and worn a Holter for 48 hours but nothing was found. Denies hx of DM, HTN, smoking, recent travel, OCP use, leg swelling, leg pain, HLD. No FHX of MI.  
 
PCP: Jeanine Blount MD 
 
Current Outpatient Prescriptions Medication Sig Dispense Refill  methylPREDNISolone (MEDROL, JER,) 4 mg tablet Take as directed 1 Dose Pack 0  
 metroNIDAZOLE (METROGEL) 0.75 % vaginal gel Insert 1 Applicator into vagina two (2) times a day. 1 Tube 2  
 budesonide-formoterol (SYMBICORT) 160-4.5 mcg/actuation HFAA Take 2 Puffs by inhalation two (2) times a day. Indications: MAINTENANCE THERAPY FOR ASTHMA 1 Inhaler 3  cyclobenzaprine (FLEXERIL) 5 mg tablet Take 1 Tab by mouth two (2) times a day.  20 Tab 0  
  sertraline (ZOLOFT) 50 mg tablet Take 1 Tab by mouth daily. Indications: ANXIETY WITH DEPRESSION 30 Tab 2  
 omeprazole (PRILOSEC) 20 mg capsule Take one tab po daily  Indications: gastroesophageal reflux disease 30 Cap 2  
 fluticasone-vilanterol (BREO ELLIPTA) 100-25 mcg/dose inhaler Take 1 Puff by inhalation daily. 1 Inhaler 5  ibuprofen (MOTRIN) 800 mg tablet Take 1 Tab by mouth every eight (8) hours as needed for Pain. Take with food. 180 Tab 2  
 DULERA 200-5 mcg/actuation HFA inhaler INHALE 2 PUFFS BY MOUTH 2 TIMES A DAY 13 Inhaler 0  
 dicyclomine (BENTYL) 10 mg capsule Take 1 Cap by mouth four (4) times daily. 30 Cap 1  
 Nebulizer & Compressor machine Use as directed 1 Each 0  
 Nebulizer Accessories kit Use as directed 1 Kit 1  
 albuterol (PROVENTIL) 5 mg/mL nebulizer solution 0.5 mL by Nebulization route every four (4) hours as needed for Wheezing or Shortness of Breath. Indications: Acute Asthma Attack, BRONCHOSPASM PREVENTION, EXERCISE-INDUCED BRONCHOSPASM PREVENTION 90 mL 3  
 albuterol (PROVENTIL HFA, VENTOLIN HFA, PROAIR HFA) 90 mcg/actuation inhaler Take 2 Puffs by inhalation every four (4) hours as needed for Wheezing or Shortness of Breath. 2 Inhaler 2  
 multivitamin (ONE A DAY) tablet Take 1 Tab by mouth daily.  omega-3 fatty acids-vitamin e 1,000 mg cap Take 1 Cap by mouth daily.  CYANOCOBALAMIN, VITAMIN B-12, (VITAMIN B-12 PO) Take 1 Tab by mouth daily.  BIOTIN PO Take 1 Tab by mouth daily. Past History Past Medical History: 
Past Medical History:  
Diagnosis Date  Asthma  Chronic pain 6 months  
 painful menses  Fibroids Past Surgical History: 
Past Surgical History:  
Procedure Laterality Date  HX  SECTION  [id number]  
 with BTL Family History: 
Family History Problem Relation Age of Onset  Hypertension Mother  Hypertension Father  Diabetes Father  Cancer Paternal Grandmother Social History: Social History Substance Use Topics  Smoking status: Never Smoker  Smokeless tobacco: Never Used  Alcohol use Yes Comment: socially, 1 beer/weekend Allergies: Allergies Allergen Reactions  Azo [Phenazopyridine] Hives  Iodinated Contrast- Oral And Iv Dye Shortness of Breath Review of Systems Review of Systems Constitutional: Negative for chills and fever. Respiratory: Negative for cough, chest tightness, shortness of breath and wheezing. Cardiovascular: Positive for chest pain and palpitations. Negative for leg swelling. Gastrointestinal: Negative for abdominal pain, diarrhea, nausea and vomiting. Genitourinary: Negative. Musculoskeletal: Negative. Neurological: Negative. All other systems reviewed and are negative. Physical Exam  
 
Vitals:  
 09/24/18 1513 09/24/18 1526 09/24/18 1530 09/24/18 1600 BP:   108/71 110/64 Pulse: 76 77 70 69 Resp: 25 25 15 24 Temp:      
SpO2: 96% 99% 100% 100% Weight:      
Height:      
 
Physical Exam  
Constitutional: She is oriented to person, place, and time. She appears well-developed and well-nourished. No distress. HENT:  
Head: Normocephalic and atraumatic. Eyes: EOM are normal. Pupils are equal, round, and reactive to light. Neck: Neck supple. Cardiovascular: Normal rate and regular rhythm. Exam reveals no gallop and no friction rub. No murmur heard. No pedal edema Pulmonary/Chest: Effort normal and breath sounds normal. No respiratory distress. She has no wheezes. She has no rales. She exhibits no tenderness. Abdominal: Soft. Bowel sounds are normal. She exhibits no distension and no mass. There is no tenderness. There is no rebound and no guarding. Musculoskeletal: Normal range of motion. She exhibits no tenderness or deformity. Lymphadenopathy:  
  She has no cervical adenopathy. Neurological: She is alert and oriented to person, place, and time. No cranial nerve deficit. Skin: Skin is warm and dry. No rash noted. She is not diaphoretic. Psychiatric: She has a normal mood and affect. Her behavior is normal.  
Nursing note and vitals reviewed. Diagnostic Study Results Labs - Recent Results (from the past 12 hour(s)) EKG, 12 LEAD, INITIAL Collection Time: 09/24/18 11:02 AM  
Result Value Ref Range Ventricular Rate 68 BPM  
 Atrial Rate 68 BPM  
 P-R Interval 146 ms  
 QRS Duration 78 ms Q-T Interval 360 ms QTC Calculation (Bezet) 382 ms Calculated P Axis 71 degrees Calculated R Axis 53 degrees Calculated T Axis 51 degrees Diagnosis Normal sinus rhythm Possible Anterior infarct , age undetermined Abnormal ECG No previous ECGs available Confirmed by Elvira Carrel MD, --- ([id number]) on 9/24/2018 2:26:48 PM 
  
CBC WITH AUTOMATED DIFF Collection Time: 09/24/18 11:13 AM  
Result Value Ref Range WBC 9.0 4.6 - 13.2 K/uL  
 RBC 4.95 4.20 - 5.30 M/uL  
 HGB 13.4 12.0 - 16.0 g/dL HCT 41.3 35.0 - 45.0 % MCV 83.4 74.0 - 97.0 FL  
 MCH 27.1 24.0 - 34.0 PG  
 MCHC 32.4 31.0 - 37.0 g/dL  
 RDW 14.4 11.6 - 14.5 % PLATELET 688 185 - 199 K/uL MPV 10.2 9.2 - 11.8 FL  
 NEUTROPHILS 58 40 - 73 % LYMPHOCYTES 31 21 - 52 % MONOCYTES 7 3 - 10 % EOSINOPHILS 4 0 - 5 % BASOPHILS 0 0 - 2 %  
 ABS. NEUTROPHILS 5.2 1.8 - 8.0 K/UL  
 ABS. LYMPHOCYTES 2.8 0.9 - 3.6 K/UL  
 ABS. MONOCYTES 0.6 0.05 - 1.2 K/UL  
 ABS. EOSINOPHILS 0.3 0.0 - 0.4 K/UL  
 ABS. BASOPHILS 0.0 0.0 - 0.1 K/UL  
 DF AUTOMATED METABOLIC PANEL, COMPREHENSIVE Collection Time: 09/24/18 11:13 AM  
Result Value Ref Range Sodium 140 136 - 145 mmol/L Potassium 3.6 3.5 - 5.5 mmol/L Chloride 104 100 - 108 mmol/L  
 CO2 32 21 - 32 mmol/L Anion gap 4 3.0 - 18 mmol/L Glucose 77 74 - 99 mg/dL BUN 12 7.0 - 18 MG/DL Creatinine 0.94 0.6 - 1.3 MG/DL  
 BUN/Creatinine ratio 13 12 - 20 GFR est AA >60 >60 ml/min/1.73m2 GFR est non-AA >60 >60 ml/min/1.73m2 Calcium 8.5 8.5 - 10.1 MG/DL Bilirubin, total 0.2 0.2 - 1.0 MG/DL  
 ALT (SGPT) 22 13 - 56 U/L  
 AST (SGOT) 20 15 - 37 U/L Alk. phosphatase 92 45 - 117 U/L Protein, total 7.2 6.4 - 8.2 g/dL Albumin 3.3 (L) 3.4 - 5.0 g/dL Globulin 3.9 2.0 - 4.0 g/dL A-G Ratio 0.8 0.8 - 1.7 TSH 3RD GENERATION Collection Time: 09/24/18 11:13 AM  
Result Value Ref Range TSH 1.24 0.36 - 3.74 uIU/mL CARDIAC PANEL,(CK, CKMB & TROPONIN) Collection Time: 09/24/18 11:13 AM  
Result Value Ref Range  26 - 192 U/L  
 CK - MB 1.0 <3.6 ng/ml CK-MB Index 0.8 0.0 - 4.0 % Troponin-I, Qt. <0.02 0.00 - 0.06 NG/ML  
D DIMER Collection Time: 09/24/18 11:13 AM  
Result Value Ref Range D DIMER 0.38 <0.46 ug/ml(FEU) MAGNESIUM Collection Time: 09/24/18 11:13 AM  
Result Value Ref Range Magnesium 1.7 1.6 - 2.6 mg/dL URINALYSIS W/ RFLX MICROSCOPIC Collection Time: 09/24/18 11:45 AM  
Result Value Ref Range Color YELLOW Appearance CLEAR Specific gravity 1.016 1.005 - 1.030    
 pH (UA) 6.5 5.0 - 8.0 Protein NEGATIVE  NEG mg/dL Glucose NEGATIVE  NEG mg/dL Ketone NEGATIVE  NEG mg/dL Bilirubin NEGATIVE  NEG Blood NEGATIVE  NEG Urobilinogen 0.2 0.2 - 1.0 EU/dL Nitrites NEGATIVE  NEG Leukocyte Esterase MODERATE (A) NEG    
HCG URINE, QL Collection Time: 09/24/18 11:45 AM  
Result Value Ref Range HCG urine, QL NEGATIVE  NEG    
URINE MICROSCOPIC ONLY Collection Time: 09/24/18 11:45 AM  
Result Value Ref Range WBC 4 to 10 0 - 4 /hpf  
 RBC NONE 0 - 5 /hpf Epithelial cells 3+ 0 - 5 /lpf CARDIAC PANEL,(CK, CKMB & TROPONIN) Collection Time: 09/24/18  2:30 PM  
Result Value Ref Range  26 - 192 U/L  
 CK - MB 1.0 <3.6 ng/ml CK-MB Index 0.8 0.0 - 4.0 % Troponin-I, Qt. <0.02 0.00 - 0.06 NG/ML Radiologic Studies -  
CT CHEST W CONT Final Result XR CHEST PA LAT Final Result CT Results  (Last 48 hours) 09/24/18 1417  CT CHEST W CONT Final result Impression:  IMPRESSION:  
   
1. No pneumothorax, pneumonia or pleural effusions. No enhancing mass lesion  
or pathologic adenopathy. 2.   Nonobstructing nephrolithiasis left posterior interpolar calyx. Limited  
evaluation. Routine follow-up with retroperitoneal ultrasound is recommended. Narrative:     
INDICATION: Left upper lobe airspace opacity follow-up. Chest pain. EXAM: CT thorax. TECHNIQUE: Spiral images of the chest were performed according to routine  
protocol after administration of  80 cc of Isovue contrast media intravenously. Coronal and sagittal reconstructions were also provided for evaluation. COMPARISON: 9/24/2018. All CT scans at this facility are performed using dose optimization technique as  
appropriate to a performed exam, to include automated exposure control,  
adjustment of the mA and/or kV according to patient size (including appropriate  
matching for site-specific examinations), or use of iterative reconstruction  
technique. CHEST FINDINGS:  
   
Lymph nodes: No pathologic adenopathy. Nonspecific lymph nodes are noted in  
bilateral axillary regions. [place] Mcdonald Thyroid: Visualized portions are normal.  
   
Mediastinum: No pericardial effusion. Heart and great vessels enhance normally  
without filling defects. The esophagus is normal. Small hiatal hernia is noted. Lungs:   
   
Right Lung: No mass or infiltrate. Minimal scarring medial segment of the right  
middle lobe. Left Lung: No mass or infiltrate. No pneumothorax pneumonia or pleural effusions. No enhancing mass lesion or  
pathologic adenopathy. Bones: No lytic or blastic lesions. Nonobstructing left posterior calyceal interpolar region renal stone is seen on  
series 3 image 58 measuring approximately 3 mm. CXR Results  (Last 48 hours) 09/24/18 1123  XR CHEST PA LAT Final result Impression:  IMPRESSION:  
   
Left upper lung zone focal ill-defined opacity. Recommend CT for further  
delineation or followup chest x-ray after appropriate empirical treatment in 1-2  
months. Narrative:  EXAM:  Chest Frontal and Lateral.  
   
INDICATION:  Chest pain, heart flutter. COMPARISON:  None. TECHNIQUE:  Frontal (PA) and lateral chest study obtained in upright position. FINDINGS:   
   
- Unexplained subtle new focal left upper lung zone 1.3 cm opacity, overlying  
the left 5th rib posterior segment. - The remainder of the lungs is clear.  
- No pleural effusion or pneumothorax is detected. - The cardiac silhouette, mediastinum and hilar regions are unremarkable. Medical Decision Making I am the first provider for this patient. I reviewed the vital signs, available nursing notes, past medical history, past surgical history, family history and social history. Vital Signs-Reviewed the patient's vital signs. EKG: Interpreted by the EP and PA [facility] Time Interpreted:  11:02 Rate:  68 Rhythm: normal sinus rhythm Interpretation: no STEMI, normal axis Records Reviewed: Nursing Notes and Old Medical Records Procedures: 
Procedures Provider Notes (Medical Decision Making):  
Progress:  Noted CXR -- will obtain CT of the chest for better evaluation. Progress:  After patient returned from getting IV Dye Chest CT, patient complained of nausea, SOB. Dr. Zo Ferrell went and saw patient, found her to have a slight wheeze. Gave her 25 mg of Benadryl and breathing treatment. Patient improved shortly thereafter. No chest pain Progress: Patient ambulated about the Er without difficulty. She feels better. No current chest pain. No SOB, not wheezing on re-exam.  Trending cardiac panel is negative. Will discharge home.   Farhat Mandujano 
 
 Impression:  Chest pain with two negative sets of troponins, CXR concerning for opacity, which on further CT exam was negative. Pt did have a presumed reaction to the NIKKIE dye -- was given benadryl and breath treatment and her symptoms resolved. Will have her follow with cards for further evaluation of palpitations. Encouraged to return if worse. Discussed patient with Dr. Adenike Dai, will send home with [facility]. She agrees with the plan. MISSY Hernandez 
 
MED RECONCILIATION: 
No current facility-administered medications for this encounter. Current Outpatient Prescriptions Medication Sig  
 methylPREDNISolone (MEDROL, JER,) 4 mg tablet Take as directed  metroNIDAZOLE (METROGEL) 0.75 % vaginal gel Insert 1 Applicator into vagina two (2) times a day.  budesonide-formoterol (SYMBICORT) 160-4.5 mcg/actuation HFAA Take 2 Puffs by inhalation two (2) times a day. Indications: MAINTENANCE THERAPY FOR ASTHMA  cyclobenzaprine (FLEXERIL) 5 mg tablet Take 1 Tab by mouth two (2) times a day.  sertraline (ZOLOFT) 50 mg tablet Take 1 Tab by mouth daily. Indications: ANXIETY WITH DEPRESSION  
 omeprazole (PRILOSEC) 20 mg capsule Take one tab po daily  Indications: gastroesophageal reflux disease  fluticasone-vilanterol (BREO ELLIPTA) 100-25 mcg/dose inhaler Take 1 Puff by inhalation daily.  ibuprofen (MOTRIN) 800 mg tablet Take 1 Tab by mouth every eight (8) hours as needed for Pain. Take with food.  DULERA 200-5 mcg/actuation HFA inhaler INHALE 2 PUFFS BY MOUTH 2 TIMES A DAY  dicyclomine (BENTYL) 10 mg capsule Take 1 Cap by mouth four (4) times daily.  Nebulizer & Compressor machine Use as directed  Nebulizer Accessories kit Use as directed  albuterol (PROVENTIL) 5 mg/mL nebulizer solution 0.5 mL by Nebulization route every four (4) hours as needed for Wheezing or Shortness of Breath.  Indications: Acute Asthma Attack, BRONCHOSPASM PREVENTION, EXERCISE-INDUCED BRONCHOSPASM PREVENTION  
 albuterol (PROVENTIL HFA, VENTOLIN HFA, PROAIR HFA) 90 mcg/actuation inhaler Take 2 Puffs by inhalation every four (4) hours as needed for Wheezing or Shortness of Breath.  multivitamin (ONE A DAY) tablet Take 1 Tab by mouth daily.  omega-3 fatty acids-vitamin e 1,000 mg cap Take 1 Cap by mouth daily.  CYANOCOBALAMIN, VITAMIN B-12, (VITAMIN B-12 PO) Take 1 Tab by mouth daily.  BIOTIN PO Take 1 Tab by mouth daily. Disposition: 
discharged DISCHARGE NOTE:  
 
Pt has been reexamined. Patient feeling better, not SOB and no CP. Patient has no new complaints, changes, or physical findings. Care plan outlined and precautions discussed. Results of labs and CT were reviewed with the patient. All medications were reviewed with the patient; will d/c home. All of pt's questions and concerns were addressed. Patient was instructed and agrees to follow up with cardiology, as well as to return to the ED upon further deterioration. Patient is ready to go home. Follow-up Information Follow up With Details Comments Contact Info [id number] [facility] EMERGENCY DEPT  If symptoms worsen [address] [address] [phone] Gemma Vaz MD In 1 week  [place] [address] [address] [facility] [address] [place] [address] 
[phone] Diagnosis Clinical Impression: 1. Chest pain, unspecified type 2. Palpitations 3. Allergic reaction to dye, initial encounter

## 2018-09-24 NOTE — LETTER
[address] [place] [address] Dr [facility] EMERGENCY DEPT 
[address] [facility] [id number] [phone] Work/School Note Date: 9/24/2018 To Whom It May concern: 
 
Sharee Shi was seen and treated today in the emergency room by the following provider(s): 
Attending Provider: Jamel Banerjee MD 
Physician Assistant: MISSY Hartley. Sharee Shi may return to work 9/26/18. Sincerely, Farhat Hartley

## 2018-09-26 ENCOUNTER — PATIENT OUTREACH (OUTPATIENT)
Dept: OTHER | Age: 38
End: 2018-09-26

## 2018-09-26 NOTE — PROGRESS NOTES
Patient eligible for ECM follow up. Left discreet message on voicemail with this CM contact information. Will attempt to contact again to offer [address] [place] [address] services and perform post ED assessment to evaluate needs.

## 2018-09-28 ENCOUNTER — OFFICE VISIT (OUTPATIENT)
Dept: OBGYN CLINIC | Age: 38
End: 2018-09-28

## 2018-09-28 VITALS
SYSTOLIC BLOOD PRESSURE: 126 MMHG | HEIGHT: 63 IN | WEIGHT: 196.6 LBS | DIASTOLIC BLOOD PRESSURE: 72 MMHG | OXYGEN SATURATION: 100 % | TEMPERATURE: 97.4 F | HEART RATE: 68 BPM | BODY MASS INDEX: 34.84 KG/M2

## 2018-09-28 DIAGNOSIS — N89.8 VAGINAL DISCHARGE: Primary | ICD-10-CM

## 2018-09-28 DIAGNOSIS — R00.2 PALPITATIONS: ICD-10-CM

## 2018-09-28 NOTE — PROGRESS NOTES
Name: Jaylin Cerda MRN: [id number] No obstetric history on file. YOB: 1980  Age: 45 y.o. Sex: female        Chief Complaint   Patient presents with    Vaginal Discharge     white with odor       HPI:     45yo  with a two week history of a white discharge with some malodorous smell, started after having intercourse with her . No itching, no vaginal bleeding. LMP was , before her endometrial ablation for heavy menses. She had a BTL for birth control. She has frequent vaginal infections, last one was was one month ago, she was given Metrogel by her PCM for it. No fevers/chills/nausea/vomiting. No dysuria/frequency/urgency/hematuria. OB History                PGyn    History   Sexual Activity    Sexual activity: Yes    Partners: Male    Birth control/ protection: Surgical         Past Medical History:   Diagnosis Date    Asthma     Chronic pain 6 months    painful menses    Fibroids        Past Surgical History:   Procedure Laterality Date    HX  SECTION  0606,7855    with BTL    HX HYSTEROSCOPY WITH ENDOMETRIAL ABLATION      No period since       Allergies   Allergen Reactions    Azo [Phenazopyridine] Hives    Iodinated Contrast- Oral And Iv Dye Shortness of Breath       Current Outpatient Prescriptions on File Prior to Visit   Medication Sig Dispense Refill    omeprazole (PRILOSEC) 20 mg capsule Take one tab po daily  Indications: gastroesophageal reflux disease 30 Cap 2    budesonide-formoterol (SYMBICORT) 160-4.5 mcg/actuation HFAA Take 2 Puffs by inhalation two (2) times a day. Indications: MAINTENANCE THERAPY FOR ASTHMA 1 Inhaler 3    ibuprofen (MOTRIN) 800 mg tablet Take 1 Tab by mouth every eight (8) hours as needed for Pain. Take with food.  180 Tab 2    Nebulizer & Compressor machine Use as directed 1 Each 0    Nebulizer Accessories kit Use as directed 1 Kit 1    albuterol (PROVENTIL) 5 mg/mL nebulizer solution 0.5 mL by Nebulization route every four (4) hours as needed for Wheezing or Shortness of Breath. Indications: Acute Asthma Attack, BRONCHOSPASM PREVENTION, EXERCISE-INDUCED BRONCHOSPASM PREVENTION 90 mL 3    methylPREDNISolone (MEDROL, JER,) 4 mg tablet Take as directed 1 Dose Pack 0     No current facility-administered medications on file prior to visit. Review of Systems   Constitutional: Negative for chills, fever, malaise/fatigue and weight loss. Respiratory: Negative for cough, shortness of breath and wheezing. Cardiovascular: Positive for palpitations. Negative for chest pain and leg swelling. Gastrointestinal: Negative for abdominal pain, diarrhea, nausea and vomiting. Genitourinary: Negative for dysuria, flank pain, frequency, hematuria and urgency. Skin: Negative for itching and rash. Neurological: Negative for dizziness and headaches. Psychiatric/Behavioral: Negative for depression, substance abuse and suicidal ideas. Visit Vitals    /72    Pulse 68    Temp 97.4 °F (36.3 °C) (Oral)    Ht 5' 3\" (1.6 m)    Wt 89.2 kg (196 lb 9.6 oz)    SpO2 100%    BMI 34.83 kg/m2       GENERAL:  Well developed, well nourished, in no distress  PELVIC EXAM:  LABIA MAJORA: no masses, tenderness or lesions  LABIA MINORA: no masses, tenderness or lesions  CLITORIS: no masses, tenderness or lesions  URETHRA: normal appearing, no masses or tenderness  BLADDER: no fullness or tenderness  VAGINA: pink appearing vagina with physiologic discharge, no lesions   PERINEUM: no masses, tenderness or lesions  CERVIX: No CMT or lesions  UTERUS: small, mobile, nontender  ADNEXA: nontender and no masses    Wet prep neg    1. Vaginal discharge  - Normal Exam, no malodorous smell  - No new sex partners or new partners for her , no indication for STI testing at this time.   - Negative KOH and Wet Prep  - Normal amount and consistency of discharge.  No indication of bacterial vaginosis at this time.   - Education provided on normal vaginal discharge and cyclical changes. 2. Palpitations  - Following up with cardiology next week        F/U as needed. Gladys Rojas D.O. [facility], PGY-1    MsBoo Adrian  was seen and examined with Dr. Azul Meneses and I agree with the assessment and plan.

## 2018-10-01 ENCOUNTER — PATIENT OUTREACH (OUTPATIENT)
Dept: OTHER | Age: 38
End: 2018-10-01

## 2018-10-01 NOTE — PROGRESS NOTES
Patient identified as eligible for [address] [place] [address] services. Second telephone outreach attempted for post hospital assessment. Letter sent with [facility] benefits and contact information. Left discreet voicemail with this  confidential contact information.

## 2018-10-01 NOTE — LETTER
10/1/2018 1:19 PM 
 
Ms. Isamar Tamez [address] [address] Dear Leon Swanson, My name is Darin Wong , Employee Care Manager for [facility], and I have been trying to reach you. The Employee Care  is a free-of-charge, confidential service provided to our employees and their family members covered by the [employer]. Part of my job is to follow up with members who have recently been in the hospital or emergency room, to help them coordinate their care and answer questions they may have about their visit. I am able to provide assistance with medication questions, scheduling needed follow-up appointments, and arranging services like home health or home medical equipment. I can also provide education regarding your hospital or ER visit as well as your medical conditions. As healthcare providers, we know that patients do better when they have close follow up with a primary care provider (PCP), especially after a hospital or emergency department visit. If you do not have a PCP, I can help you find one that is convenient to you and covered by your insurance. I can also help you understand any after visit instructions, such as what symptoms to watch out for, or any new or changed medications. Remember that you can access your After Visit Summary by logging into your [employer] account. If you do not have a [employer] account, I can help you request access. Our program is designed to provide you with the opportunity to have a [facility] care manager partner with you for your healthcare needs. Please contact me at the below number if I can provide you with assistance for any of the above services. Sincerely, 
 
 
 
Darin Wong RN, MSN, [employer] Employee Care Manager [address] [place] [address], [address] [place] [address] [facility] Cell [phone]  Fax [email or web address]

## 2018-10-10 ENCOUNTER — OFFICE VISIT (OUTPATIENT)
Dept: CARDIOLOGY CLINIC | Age: 38
End: 2018-10-10

## 2018-10-10 VITALS
HEART RATE: 69 BPM | OXYGEN SATURATION: 97 % | WEIGHT: 198 LBS | BODY MASS INDEX: 35.08 KG/M2 | DIASTOLIC BLOOD PRESSURE: 90 MMHG | HEIGHT: 63 IN | SYSTOLIC BLOOD PRESSURE: 130 MMHG

## 2018-10-10 DIAGNOSIS — R07.9 CHEST PAIN, UNSPECIFIED TYPE: ICD-10-CM

## 2018-10-10 DIAGNOSIS — R00.2 PALPITATIONS: Primary | ICD-10-CM

## 2018-10-10 PROBLEM — E66.01 SEVERE OBESITY (HCC): Status: ACTIVE | Noted: 2018-10-10

## 2018-10-10 RX ORDER — DIETHYLPROPION HYDROCHLORIDE 25 MG/1
25 TABLET ORAL
COMMUNITY
End: 2019-02-21

## 2018-10-10 NOTE — PROGRESS NOTES
Solo Patton    Chief Complaint   Patient presents with    Chest Pain     follow up     Chest Pain (Angina)     intermittent tightness, middle, non-radiating, about 2 weeks    Shortness of Breath     with palps    Palpitations     fluttering and racing        HPI    Solo Patton is a 45 y.o.  female who presents with recurrent chest pain and palpitations after ER visit. As you know, Faheem Saldaña is a very pleasant patient who has no known cardiac disease. She first presented to our practice in 2016 c/o CP and palpitations and had a negative exam and holter monitor. These records were independently obtained and reviewed with patient today. She did well, really for a couple years, until 9/24/18 when she says she was laying in bed and noticed her heart racing and \"fluttering. \" She felt discomfort in the center left of her chest so decided to seek further evaluation. She describes the pain as a tightness but denies associated symptoms such lightheadness, edema, headache. She admits to some SOB during the episodes but not regularly with exertion. Her ER records were reviewed and show pt had blood work, an ekg and CT chest that was negative for PE etc (but unfortunately patient had an allergic reaction to the IV contrast dye). She was ultimately sent home and presents today for follow up. Though her symptoms have not returned but she does tell me she recently joined a weight loss clinic and started taking medications to help. She plans to start a regular exercise program and is worried if these symptoms will recur. She history was reviewed below, she had been taking steriods for asthma but not currently. She has no cardiac risk factors except for former tobacco, and has no known FH of premature CAD or SCD.     Past Medical History:   Diagnosis Date    Asthma     Chronic pain 6 months    painful menses    Fibroids     Heart palpitations      Patient Active Problem List   Diagnosis Code    Mild intermittent asthma J45.20    Obesity E66.9    Palpitations R00.2    Precordial pain R07.2    Severe obesity (HCC) E66.01     Past Surgical History:   Procedure Laterality Date    HX  SECTION  8997,4138    with BTL    HX HYSTEROSCOPY WITH ENDOMETRIAL ABLATION      No period since     Current Outpatient Prescriptions   Medication Sig Dispense Refill    diethylpropion 25 mg tab Take 25 mg by mouth.  OTHER Topamax- unknown dose      omeprazole (PRILOSEC) 20 mg capsule Take one tab po daily  Indications: gastroesophageal reflux disease 30 Cap 2    budesonide-formoterol (SYMBICORT) 160-4.5 mcg/actuation HFAA Take 2 Puffs by inhalation two (2) times a day. Indications: MAINTENANCE THERAPY FOR ASTHMA 1 Inhaler 3    ibuprofen (MOTRIN) 800 mg tablet Take 1 Tab by mouth every eight (8) hours as needed for Pain. Take with food. 180 Tab 2    Nebulizer & Compressor machine Use as directed 1 Each 0    Nebulizer Accessories kit Use as directed 1 Kit 1    albuterol (PROVENTIL) 5 mg/mL nebulizer solution 0.5 mL by Nebulization route every four (4) hours as needed for Wheezing or Shortness of Breath.  Indications: Acute Asthma Attack, BRONCHOSPASM PREVENTION, EXERCISE-INDUCED BRONCHOSPASM PREVENTION 90 mL 3     Allergies   Allergen Reactions    Azo [Phenazopyridine] Hives    Iodinated Contrast- Oral And Iv Dye Shortness of Breath     Social History     Social History    Marital status:      Spouse name: N/A    Number of children: N/A    Years of education: N/A     Occupational History     [employer]     Social History Main Topics    Smoking status: Former Smoker     Types: Cigarettes    Smokeless tobacco: Never Used    Alcohol use Yes      Comment: socially, 1 beer/weekend    Drug use: No    Sexual activity: Yes     Partners: Male     Birth control/ protection: Surgical     Other Topics Concern     Service Yes      [employer]    Blood Transfusions No    Caffeine Concern No    Occupational Exposure No    Hobby Hazards No    Sleep Concern No    Stress Concern No    Weight Concern Yes     PT IS OVER WEIGHT    Special Diet No    Back Care No    Exercise Yes     4 DAYS A WEEK    Bike Helmet No    Seat Belt Yes    Self-Exams Yes     Social History Narrative      Family History   Problem Relation Age of Onset    Hypertension Mother     Hypertension Father     Diabetes Father     Parkinson's Disease Father     Cancer Paternal Grandmother        Review of Systems    Review of Systems - Negative unless otherwise mentioned in the HPI. Physical Exam:    Visit Vitals    /90    Pulse 69    Ht 5' 3\" (1.6 m)    Wt 89.8 kg (198 lb)    SpO2 97%    BMI 35.07 kg/m2      Physical Exam   Constitutional: She is oriented to person, place, and time. She appears well-developed and well-nourished. No distress. HENT:   Head: Normocephalic and atraumatic. Eyes: EOM are normal. Pupils are equal, round, and reactive to light. No scleral icterus. Neck: No JVD present. Cardiovascular: Normal rate, regular rhythm, normal heart sounds and intact distal pulses. Exam reveals no gallop and no friction rub. No murmur heard. Pulmonary/Chest: Effort normal and breath sounds normal. No respiratory distress. She has no wheezes. She has no rales. She exhibits no tenderness. Musculoskeletal: Normal range of motion. Neurological: She is alert and oriented to person, place, and time. Skin: Skin is dry. No rash noted. She is not diaphoretic. No erythema. No pallor. Psychiatric: She has a normal mood and affect. Her behavior is normal. Judgment and thought content normal.     EKG today shows NSR 70 normal axis and intervals, no ST abnormalities. Somewhat lower voltage in precordial leads- that's likely related to body habitus.     Impression and Plan:  Deepti Serrato is a 45 y.o. female with:    1.) Chest pain, atypical  2.) Palpitations, persistent  3.) SOB, nonexertional  4.) Former tobacco  5.) Obesity by BMI 35    1.) Stress echocardiogram to r/o structural heart disease  2.) Encouraged lifestyle modification with diet and exercise    I took the liberty of setting patient up for further noninvasive testing. Though her symptoms are atypical in nature and she doesn't have a lot of ASCVD risk factors, I feel she has enough symptoms to warrant further investigation to give everyone reassurance before continuing with a rigorous weight loss program.  I anticipate her study will come back normal and then noncardiac causes can be more comfortably entertained. She agreed and was comfortable with this plan. We will call her with results and see her as needed. Thank you for allowing me to participate in the care of your patient, please do not hesitate to call with questions or concerns. We reviewed the plan with the patient and the patient understands. We also gave the patient appropriate instructions on their disease process and when to call back. Follow-up Disposition:  Return if symptoms worsen or fail to improve.     Donya Davis, DO

## 2018-10-10 NOTE — PROGRESS NOTES
Kali Fu presents today for   Chief Complaint   Patient presents with    Chest Pain     follow up     Chest Pain (Angina)     intermittent tightness, middle, non-radiating, about 2 weeks    Shortness of Breath     with palps    Palpitations     fluttering and racing        Kali Fu preferred language for health care discussion is english/other. Is someone accompanying this pt? No     Is the patient using any DME equipment during OV? No     Depression Screening:  PHQ over the last two weeks 8/13/2018   Little interest or pleasure in doing things Not at all   Feeling down, depressed, irritable, or hopeless Not at all   Total Score PHQ 2 0       Learning Assessment:  Learning Assessment 11/25/2015   PRIMARY LEARNER Patient   HIGHEST LEVEL OF EDUCATION - PRIMARY LEARNER  2 YEARS OF COLLEGE   BARRIERS PRIMARY LEARNER NONE   PRIMARY LANGUAGE ENGLISH   LEARNER PREFERENCE PRIMARY READING     VIDEOS     -   ANSWERED BY Patient   RELATIONSHIP SELF       Abuse Screening:  Abuse Screening Questionnaire 5/25/2018   Do you ever feel afraid of your partner? N   Are you in a relationship with someone who physically or mentally threatens you? N   Is it safe for you to go home? Y       Fall Risk  Fall Risk Assessment, last 12 mths 5/25/2018   Able to walk? Yes   Fall in past 12 months? No       Pt currently taking Anticoagulant therapy? No     Coordination of Care:  1. Have you been to the ER, urgent care clinic since your last visit? Hospitalized since your last visit? 9/24/18 for chest pain     2. Have you seen or consulted any other health care providers outside of the [address] [place] [address] since your last visit? Include any pap smears or colon screening.  No

## 2018-10-10 NOTE — MR AVS SNAPSHOT
[address] [place] [address] Donal Greenfield [id number] [phone] Patient: Mariel Olivera MRN: [id number] SNL:[birth year] Visit Information Date & Time Provider Department Dept. Phone Encounter #  
 10/10/2018  9:00 AM Donya Davis DO [facility] [phone] [id number] Follow-up Instructions Return if symptoms worsen or fail to improve. Upcoming Health Maintenance Date Due Pneumococcal 19-64 Medium Risk (1 of 1 - PPSV23) 5/2/1999 Influenza Age 5 to Adult 8/1/2018 PAP AKA CERVICAL CYTOLOGY 4/6/2021 DTaP/Tdap/Td series (2 - Td) 4/29/2025 Allergies as of 10/10/2018  Review Complete On: 10/10/2018 By: Donya Davis DO Severity Noted Reaction Type Reactions Azo [Phenazopyridine] Medium 04/07/2014   Intolerance Hives Iodinated Contrast- Oral And Iv Dye  09/24/2018    Shortness of Breath Current Immunizations  Never Reviewed Name Date Influenza Vaccine 10/15/2014 Influenza Vaccine (Quad) PF 9/23/2016 Tdap 4/29/2015 12:00 AM  
  
 Not reviewed this visit You Were Diagnosed With   
  
 Codes Comments Palpitations    -  Primary ICD-10-CM: R00.2 ICD-9-CM: 785.1 Chest pain, unspecified type     ICD-10-CM: R07.9 ICD-9-CM: 786.50 Vitals BP Pulse Height(growth percentile) Weight(growth percentile) SpO2 BMI  
 130/90 69 5' 3\" (1.6 m) 198 lb (89.8 kg) 97% 35.07 kg/m2 OB Status Smoking Status Ablation Former Smoker Vitals History BMI and BSA Data Body Mass Index Body Surface Area 35.07 kg/m 2 2 m 2 Preferred Pharmacy Pharmacy Name Phone [id number] [place] [phone] Your Updated Medication List  
  
   
This list is accurate as of 10/10/18  9:34 AM.  Always use your most recent med list.  
  
  
  
  
 albuterol 5 mg/mL nebulizer solution Commonly known as:  PROVENTIL  
0.5 mL by Nebulization route every four (4) hours as needed for Wheezing or Shortness of Breath. Indications: Acute Asthma Attack, BRONCHOSPASM PREVENTION, EXERCISE-INDUCED BRONCHOSPASM PREVENTION  
  
 budesonide-formoterol 160-4.5 mcg/actuation Hfaa Commonly known as:  SYMBICORT Take 2 Puffs by inhalation two (2) times a day. Indications: MAINTENANCE THERAPY FOR ASTHMA  
  
 diethylpropion 25 mg Tab Take 25 mg by mouth. ibuprofen 800 mg tablet Commonly known as:  MOTRIN Take 1 Tab by mouth every eight (8) hours as needed for Pain. Take with food. Nebulizer & Compressor machine Use as directed Nebulizer Accessories Kit Use as directed  
  
 omeprazole 20 mg capsule Commonly known as:  PRILOSEC Take one tab po daily  Indications: gastroesophageal reflux disease OTHER Topamax- unknown dose We Performed the Following AMB POC EKG ROUTINE W/ 12 LEADS, INTER & REP [36929 CPT(R)] Follow-up Instructions Return if symptoms worsen or fail to improve. To-Do List   
 10/10/2018 Echocardiography:  ECHO STRESS Introducing [facility]! Dear Diana Coughlin: Thank you for requesting a [employer] account. Our records indicate that you already have an active [employer] account. You can access your account anytime at [email or web address]. [employer] [email or web address] Did you know that you can access your hospital and ER discharge instructions at any time in [employer]? You can also review all of your test results from your hospital stay or ER visit. Additional Information If you have questions, please visit the Frequently Asked Questions section of the [employer] website at [email or web address]. [email or web address] Remember, [employer] is NOT to be used for urgent needs. For medical emergencies, dial 911. Now available from your iPhone and Android! Please provide this summary of care documentation to your next provider. Your primary care clinician is listed as Salma Raza. If you have any questions after today's visit, please call [phone].

## 2018-10-15 ENCOUNTER — PATIENT OUTREACH (OUTPATIENT)
Dept: OTHER | Age: 38
End: 2018-10-15

## 2018-10-15 NOTE — PROGRESS NOTES
Patient identified as eligible for [address] [place] [address] services. Third telephone outreach attempted for post hospital assessment. Letter previously sent with [facility] benefits and contact information. Left discreet voicemail with this  confidential contact information.

## 2018-10-16 ENCOUNTER — HOSPITAL ENCOUNTER (OUTPATIENT)
Dept: NON INVASIVE DIAGNOSTICS | Age: 38
Discharge: HOME | End: 2018-10-16
Attending: INTERNAL MEDICINE
Payer: COMMERCIAL

## 2018-10-16 VITALS
SYSTOLIC BLOOD PRESSURE: 100 MMHG | BODY MASS INDEX: 35.08 KG/M2 | HEIGHT: 63 IN | DIASTOLIC BLOOD PRESSURE: 68 MMHG | WEIGHT: 198 LBS

## 2018-10-16 DIAGNOSIS — R00.2 PALPITATIONS: ICD-10-CM

## 2018-10-16 DIAGNOSIS — R07.9 CHEST PAIN, UNSPECIFIED TYPE: ICD-10-CM

## 2018-10-16 LAB
STRESS ANGINA INDEX: 0
STRESS BASELINE DIAS BP: 68 MMHG
STRESS BASELINE HR: 81 BPM
STRESS BASELINE SYS BP: 100 MMHG
STRESS ESTIMATED WORKLOAD: 12.5 METS
STRESS EXERCISE DUR MIN: NORMAL
STRESS PEAK DIAS BP: 60 MMHG
STRESS PEAK SYS BP: 168 MMHG
STRESS PERCENT HR ACHIEVED: 112 %
STRESS POST PEAK HR: 173 BPM
STRESS RATE PRESSURE PRODUCT: NORMAL BPM*MMHG
STRESS ST DEPRESSION: 0 MM
STRESS ST ELEVATION: 0 MM
STRESS STAGE 1 BP: NORMAL MMHG
STRESS STAGE 1 DURATION: 3 MIN:SEC
STRESS STAGE 1 HR: 129 BPM
STRESS STAGE 2 BP: NORMAL MMHG
STRESS STAGE 2 DURATION: 3 MIN:SEC
STRESS STAGE 2 HR: 137 BPM
STRESS STAGE 3 BP: NORMAL MMHG
STRESS STAGE 3 DURATION: 3 MIN:SEC
STRESS STAGE 3 HR: 157 BPM
STRESS STAGE 4 DURATION: NORMAL MIN:SEC
STRESS STAGE 4 HR: 173 BPM
STRESS STAGE RECOVERY 1 BP: NORMAL MMHG
STRESS STAGE RECOVERY 1 DURATION: 2 MIN:SEC
STRESS STAGE RECOVERY 1 HR: 100 BPM
STRESS STAGE RECOVERY 2 BP: NORMAL MMHG
STRESS STAGE RECOVERY 2 DURATION: 4 MIN:SEC
STRESS STAGE RECOVERY 2 HR: 93 BPM
STRESS TARGET HR: 155 BPM

## 2018-10-16 PROCEDURE — 93351 STRESS TTE COMPLETE: CPT

## 2018-10-17 NOTE — PROGRESS NOTES
Per your last note\"Bessy Madison is a 45 y.o. female with: 
  
1.) Chest pain, atypical 
2.) Palpitations, persistent 3.) SOB, nonexertional 
4.) Former tobacco 
5.) Obesity by BMI 35 
  
1.) Stress echocardiogram to r/o structural heart disease 2.) Encouraged lifestyle modification with diet and exercise 
  
I took the liberty of setting patient up for further noninvasive testing. Though her symptoms are atypical in nature and she doesn't have a lot of ASCVD risk factors, I feel she has enough symptoms to warrant further investigation to give everyone reassurance before continuing with a rigorous weight loss program.  I anticipate her study will come back normal and then noncardiac causes can be more comfortably entertained. She agreed and was comfortable with this plan. We will call her with results and see her as needed.  
  
Thank you for allowing me to participate in the care of your patient, please do not hesitate to call with questions or concerns. 
  
We reviewed the plan with the patient and the patient understands. We also gave the patient appropriate instructions on their disease process and when to call back. 
  
Follow-up Disposition: 
Return if symptoms worsen or fail to improve.

## 2018-10-19 ENCOUNTER — TELEPHONE (OUTPATIENT)
Dept: CARDIOLOGY CLINIC | Age: 38
End: 2018-10-19

## 2018-10-19 NOTE — LETTER
10/19/2018 11:18 AM 
 
Ms. Ignacio Greek [address] [facility] [address] Dear Ms. Tee Krishnamurthy, We have been unable to reach you by phone to notify you of your test results. Please call our office at [phone] and ask to speak with my nurse in order to explain these results to you and advise you of any recommendations. Sincerely, Stan Bean, DO

## 2018-10-19 NOTE — TELEPHONE ENCOUNTER
----- Message from Alyson Zamudio DO sent at 10/19/2018  9:14 AM EDT -----  Lucy Kirkpatrick calling patient several times to let her know stress test was normal.  We were able to observe that she is having extra heart beats (which explains palpitations she has been feeling). Good news is these are a nuisance but benign. If palpitations really bother her we can discuss some medication options- but they do not explain her chest pain or shortness of breath. She can make another appointment with me if want to discuss further. Thank you.      ----- Message -----  From: Nina Garces LPN  Sent: 28/46/0236   8:37 AM  To: Alyson Zamudio DO    Per your last note\"Bessy Rivera is a 45 y.o. female with:     1.) Chest pain, atypical  2.) Palpitations, persistent  3.) SOB, nonexertional  4.) Former tobacco  5.) Obesity by BMI 35     1.) Stress echocardiogram to r/o structural heart disease  2.) Encouraged lifestyle modification with diet and exercise     I took the liberty of setting patient up for further noninvasive testing. Though her symptoms are atypical in nature and she doesn't have a lot of ASCVD risk factors, I feel she has enough symptoms to warrant further investigation to give everyone reassurance before continuing with a rigorous weight loss program.  I anticipate her study will come back normal and then noncardiac causes can be more comfortably entertained. She agreed and was comfortable with this plan. We will call her with results and see her as needed.      Thank you for allowing me to participate in the care of your patient, please do not hesitate to call with questions or concerns.     We reviewed the plan with the patient and the patient understands. We also gave the patient appropriate instructions on their disease process and when to call back.     Follow-up Disposition:  Return if symptoms worsen or fail to improve.

## 2018-10-20 DIAGNOSIS — J45.30 MILD PERSISTENT ASTHMA WITHOUT COMPLICATION: ICD-10-CM

## 2018-10-20 RX ORDER — BUDESONIDE AND FORMOTEROL FUMARATE DIHYDRATE 160; 4.5 UG/1; UG/1
AEROSOL RESPIRATORY (INHALATION)
Qty: 10.2 INHALER | Refills: 3 | Status: SHIPPED | OUTPATIENT
Start: 2018-10-20 | End: 2018-12-21

## 2018-10-29 ENCOUNTER — PATIENT OUTREACH (OUTPATIENT)
Dept: OTHER | Age: 38
End: 2018-10-29

## 2018-10-29 NOTE — PROGRESS NOTES
Resolving current episode for DORIS follow up due to  unable to reach patient. Patient has not been reached after repeated calls and letters. Letter sent to patient notifying completion of services due to unable to reach. This writer's contact information and information regarding program services included in materials sent.

## 2018-10-29 NOTE — LETTER
10/29/2018 5:07 PM 
 
Ms. Adeline Kwan [address] [facility] [address] Dear Delphine Pizano, My name is Roberto Craig RN, Employee Care Manager for [employer], and I have been trying to reach you. The Employee Care Management [facility]) program is a free-of-charge, confidential service provided to our employees and their family members covered by the [facility]. I can help you with care transitions such as when you come home from the hospital, when help is needed to manage your disease, or when you need assistance coordinating services or appointments. [facility] now partners with [facility]. If you are a qualifying employee, you may receive an additional 10 wellness incentive points for every month of active participation with an Employee Care Manager. As healthcare providers, we know that patients do better when they have close follow up with a primary care provider (PCP). I can help you find one that is convenient to you and covered by your insurance. I can also help you understand any after visit instructions, such as what symptoms to watch out for, or any new or changed medications. We can work together using your preferred communication -- telephone, email, Abyzhart. If you do not have a ChoiceMap account, I can help you request access. Our program is designed to provide you with the opportunity to have a [facility] care manager partner with you for your healthcare needs. Due to not being able to reach you, I am closing out the current program, but will remain available to you should you have any questions. Please contact me at the below number if I can provide you with assistance for any of the above services. Roberto Craig RN, MSN, COS-[employer] Employee Care Manager [address] [place] [address], [address] [place] [address] [facility] Cell [phone]  Fax [email or web address] Sincerely, 
 
 
 
 
 Ruben Everett RN

## 2018-11-12 DIAGNOSIS — M79.672 PAIN OF LEFT HEEL: ICD-10-CM

## 2018-11-12 DIAGNOSIS — M72.2 PLANTAR FASCIITIS OF LEFT FOOT: ICD-10-CM

## 2018-11-14 NOTE — TELEPHONE ENCOUNTER
From: Rafael Starr  Sent: 1/3/2017 3:05 AM EST  Subject: Prescription Question    Hi I would like to know if I could get a refill on my albuterol sent to my pharmacy. I have about 30 puffs left on my current inhaler and I don't want to run out and not be able to get my refill. If there is any questions, please feel free to contact me at [phone]. NURSING ADMISSION NOTE      Patient admitted via Wheelchair holding infant. Infant placed into crib. Id bands verified with 2 nurses. Oriented to room. Safety precautions initiated. Bed in low position. Call light in reach. Reviewed admit folder.

## 2018-11-16 NOTE — TELEPHONE ENCOUNTER
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 Requested Prescriptions     Pending Prescriptions Disp Refills    ibuprofen (MOTRIN) 800 mg tablet 180 Tab 2     Sig: Take 1 Tab by mouth every eight (8) hours as needed for Pain. Take with food.

## 2018-11-17 RX ORDER — IBUPROFEN 800 MG/1
800 TABLET ORAL
Qty: 90 TAB | Refills: 0 | Status: SHIPPED | OUTPATIENT
Start: 2018-11-17 | End: 2019-02-21

## 2018-12-21 ENCOUNTER — OFFICE VISIT (OUTPATIENT)
Dept: INTERNAL MEDICINE CLINIC | Age: 38
End: 2018-12-21

## 2018-12-21 VITALS
SYSTOLIC BLOOD PRESSURE: 110 MMHG | HEIGHT: 63 IN | WEIGHT: 194.9 LBS | DIASTOLIC BLOOD PRESSURE: 80 MMHG | TEMPERATURE: 98.7 F | RESPIRATION RATE: 16 BRPM | BODY MASS INDEX: 34.54 KG/M2 | OXYGEN SATURATION: 98 % | HEART RATE: 88 BPM

## 2018-12-21 DIAGNOSIS — R19.7 DIARRHEA, UNSPECIFIED TYPE: ICD-10-CM

## 2018-12-21 DIAGNOSIS — K92.1 HEMATOCHEZIA: ICD-10-CM

## 2018-12-21 DIAGNOSIS — R10.9 ABDOMINAL CRAMPING: Primary | ICD-10-CM

## 2018-12-21 RX ORDER — TOPIRAMATE 50 MG/1
TABLET, FILM COATED ORAL
Refills: 0 | COMMUNITY
Start: 2018-11-17 | End: 2019-05-31

## 2018-12-21 NOTE — PROGRESS NOTES
Florentino Leger is a 45 y.o. female presenting today for New Patient; Abdominal Pain; and Diarrhea  . HPI:  Florentino Leger presents to the office today to establish care with the practice. Patient has a past medical history of intermittent asthma and GERD. She notes that she takes her Proventil inhaler as needed and does not take any maintenance inhalers at this time. She also was given a prescription for omeprazole which she takes as needed. She notes she has about 2-3 episodes of reflux per week she presents today for complaints of abdominal cramping and diarrhea. She reports that her  was diagnosed with H pylori and she is requesting a referral to gastroenterologist.  Patient also notes she had one episode of hematochezia times 1 week ago. She notes is difficult to eat without having severe abdominal cramping and she rates her pain a 6 out of 10. He is negative for chest pain, palpitation, or lower extremity edema. She denies any headache or dizziness. She is negative for dysuria, frequency or urgency. Review of Systems   Respiratory: Negative for cough and sputum production. Cardiovascular: Negative for chest pain and palpitations. Gastrointestinal: Positive for abdominal pain, diarrhea and heartburn. Allergies   Allergen Reactions    Azo [Phenazopyridine] Hives    Iodinated Contrast- Oral And Iv Dye Shortness of Breath       Current Outpatient Medications   Medication Sig Dispense Refill    topiramate (TOPAMAX) 50 mg tablet TAKE 1 TABLET BY MOUTH EVERY DAY  0    ibuprofen (MOTRIN) 800 mg tablet Take 1 Tab by mouth every eight (8) hours as needed for Pain. Take with food. 90 Tab 0    diethylpropion 25 mg tab Take 25 mg by mouth.       omeprazole (PRILOSEC) 20 mg capsule Take one tab po daily  Indications: gastroesophageal reflux disease 30 Cap 2    Nebulizer & Compressor machine Use as directed 1 Each 0    Nebulizer Accessories kit Use as directed 1 Kit 1    albuterol (PROVENTIL) 5 mg/mL nebulizer solution 0.5 mL by Nebulization route every four (4) hours as needed for Wheezing or Shortness of Breath.  Indications: Acute Asthma Attack, BRONCHOSPASM PREVENTION, EXERCISE-INDUCED BRONCHOSPASM PREVENTION 90 mL 3    OTHER Topamax- unknown dose         Past Medical History:   Diagnosis Date    Asthma     Chronic pain 6 months    painful menses    Fibroids     Heart palpitations        Past Surgical History:   Procedure Laterality Date    HX  SECTION  [id number],8987    with BTL    HX HYSTEROSCOPY WITH ENDOMETRIAL ABLATION      No period since       Social History     Socioeconomic History    Marital status:      Spouse name: Not on file    Number of children: Not on file    Years of education: Not on file    Highest education level: Not on file   Social Needs    Financial resource strain: Not on file    Food insecurity - worry: Not on file    Food insecurity - inability: Not on file   [employer] needs - medical: Not on file   [employer] needs - non-medical: Not on file   Occupational History     Employer: [employer]   Tobacco Use    Smoking status: Former Smoker     Types: Cigarettes    Smokeless tobacco: Never Used   Substance and Sexual Activity    Alcohol use: Yes     Comment: socially, 1 beer/weekend   [facility] Drug use: No    Sexual activity: Yes     Partners: Male     Birth control/protection: Surgical   Other Topics Concern     Service Yes     Comment:  [email or web address]    Blood Transfusions No    Caffeine Concern No    Occupational Exposure No    Hobby Hazards No    Sleep Concern No    Stress Concern No    Weight Concern Yes     Comment: PT IS OVER WEIGHT    Special Diet No    Back Care No    Exercise Yes     Comment: 4 DAYS A WEEK    Bike Helmet No    Seat Belt Yes    Self-Exams Yes   Social History Narrative    Not on file       Patient does not have an advanced directive on file    Vitals:    18 0828   BP: 110/80 Pulse: 88   Resp: 16   Temp: 98.7 °F (37.1 °C)   TempSrc: Tympanic   SpO2: 98%   Weight: 194 lb 14.4 oz (88.4 kg)   Height: 5' 3\" (1.6 m)   PainSc:   6   PainLoc: Abdomen       Physical Exam   Constitutional: No distress. HENT:   Right Ear: External ear normal.   Left Ear: External ear normal.   Cardiovascular: Normal rate, regular rhythm and normal heart sounds. Pulmonary/Chest: Effort normal and breath sounds normal.   Abdominal: Soft. Bowel sounds are normal. She exhibits no distension. There is no tenderness. Musculoskeletal: She exhibits no edema or tenderness. Lymphadenopathy:     She has no cervical adenopathy. Neurological: She is alert. Skin: Skin is warm and dry. Nursing note and vitals reviewed.       Hospital Outpatient Visit on 10/16/2018   Component Date Value Ref Range Status    Target HR 10/16/2018 155  bpm Final    ST Elevation (mm) 10/16/2018 0  mm Final    ST Depression (mm) 10/16/2018 0  mm Final    Angina Index 10/16/2018 0   Final    Baseline HR 10/16/2018 81  BPM Preliminary    Baseline BP 10/16/2018 100  mmHg Final    Percent HR 10/16/2018 112  % Final    Post peak HR 10/16/2018 173  BPM Preliminary    Stress Base Diastolic BP 23/69/4708 68  mmHg Final    Stress Base Systolic BP 37/98/5495 174  mmHg Preliminary    Stress Base Diastolic BP 53/31/8088 60  mmHg Preliminary    Stress Rate Pressure Product 10/16/2018 29,064  BPM*mmHg Final    Stress Stage 1 Duration 10/16/2018 3  min:sec Final    Stress Stage 1 HR 10/16/2018 129  bpm Final    Stress Stage 1 BP 10/16/2018 1,330/70  mmHg Final    Stress Stage 2 Duration 10/16/2018 3  min:sec Final    Stress Stage 2 HR 10/16/2018 137  bpm Final    Stress Stage 2 BP 10/16/2018 140/70  mmHg Final    Stress Stage 3 Duration 10/16/2018 3  min:sec Final    Stress Stage 3 HR 10/16/2018 157  bpm Final    Stress Stage 3 BP 10/16/2018 168/60  mmHg Final    Stress Stage 4 Duration 10/16/2018 :44  min:sec Final    Stress Stage 4 HR 10/16/2018 173  bpm Final    Recovery Stage 1 Duration 10/16/2018 2  min:sec Final    Recovery Stage 1 HR 10/16/2018 100  bpm Final    Recovery Stage 1 BP 10/16/2018 140/70  mmHg Final    Recovery Stage 2 Duration 10/16/2018 4  min:sec Final    Recovery Stage 2 HR 10/16/2018 93  bpm Final    Recovery Stage 2 BP 10/16/2018 112/60  mmHg Final    Exercise duration time 10/16/2018 00:09:44   Preliminary    Estimated workload 10/16/2018 12.5  METS Preliminary   Admission on 09/24/2018, Discharged on 09/24/2018   Component Date Value Ref Range Status    Ventricular Rate 09/24/2018 68  BPM Final    Atrial Rate 09/24/2018 68  BPM Final    P-R Interval 09/24/2018 146  ms Final    QRS Duration 09/24/2018 78  ms Final    Q-T Interval 09/24/2018 360  ms Final    QTC Calculation (Bezet) 09/24/2018 382  ms Final    Calculated P Axis 09/24/2018 71  degrees Final    Calculated R Axis 09/24/2018 53  degrees Final    Calculated T Axis 09/24/2018 51  degrees Final    Diagnosis 09/24/2018    Final                    Value:Normal sinus rhythm  Possible Anterior infarct , age undetermined  Abnormal ECG  No previous ECGs available  Confirmed by Leonel Marcelino MD, --- ([id number]) on 9/24/2018 2:26:48 PM      Color 09/24/2018 YELLOW    Final    Appearance 09/24/2018 CLEAR    Final    Specific gravity 09/24/2018 1.016  1.005 - 1.030   Final    pH (UA) 09/24/2018 6.5  5.0 - 8.0   Final    Protein 09/24/2018 NEGATIVE   NEG mg/dL Final    Glucose 09/24/2018 NEGATIVE   NEG mg/dL Final    Ketone 09/24/2018 NEGATIVE   NEG mg/dL Final    Bilirubin 09/24/2018 NEGATIVE   NEG   Final    Blood 09/24/2018 NEGATIVE   NEG   Final    Urobilinogen 09/24/2018 0.2  0.2 - 1.0 EU/dL Final    Nitrites 09/24/2018 NEGATIVE   NEG   Final    Leukocyte Esterase 09/24/2018 MODERATE* NEG   Final    HCG urine, QL 09/24/2018 NEGATIVE   NEG   Final    Test results should be confirmed using serum quantitative hCG when detection of pregnancy is critical and before performing any critical medical procedure.  WBC 09/24/2018 9.0  4.6 - 13.2 K/uL Final    RBC 09/24/2018 4.95  4.20 - 5.30 M/uL Final    HGB 09/24/2018 13.4  12.0 - 16.0 g/dL Final    HCT 09/24/2018 41.3  35.0 - 45.0 % Final    MCV 09/24/2018 83.4  74.0 - 97.0 FL Final    MCH 09/24/2018 27.1  24.0 - 34.0 PG Final    MCHC 09/24/2018 32.4  31.0 - 37.0 g/dL Final    RDW 09/24/2018 14.4  11.6 - 14.5 % Final    PLATELET 54/70/4763 960  135 - 420 K/uL Final    MPV 09/24/2018 10.2  9.2 - 11.8 FL Final    NEUTROPHILS 09/24/2018 58  40 - 73 % Final    LYMPHOCYTES 09/24/2018 31  21 - 52 % Final    MONOCYTES 09/24/2018 7  3 - 10 % Final    EOSINOPHILS 09/24/2018 4  0 - 5 % Final    BASOPHILS 09/24/2018 0  0 - 2 % Final    ABS. NEUTROPHILS 09/24/2018 5.2  1.8 - 8.0 K/UL Final    ABS. LYMPHOCYTES 09/24/2018 2.8  0.9 - 3.6 K/UL Final    ABS. MONOCYTES 09/24/2018 0.6  0.05 - 1.2 K/UL Final    ABS. EOSINOPHILS 09/24/2018 0.3  0.0 - 0.4 K/UL Final    ABS. BASOPHILS 09/24/2018 0.0  0.0 - 0.1 K/UL Final    DF 09/24/2018 AUTOMATED    Final    Sodium 09/24/2018 140  136 - 145 mmol/L Final    Potassium 09/24/2018 3.6  3.5 - 5.5 mmol/L Final    Chloride 09/24/2018 104  100 - 108 mmol/L Final    CO2 09/24/2018 32  21 - 32 mmol/L Final    Anion gap 09/24/2018 4  3.0 - 18 mmol/L Final    Glucose 09/24/2018 77  74 - 99 mg/dL Final    BUN 09/24/2018 12  7.0 - 18 MG/DL Final    Creatinine 09/24/2018 0.94  0.6 - 1.3 MG/DL Final    BUN/Creatinine ratio 09/24/2018 13  12 - 20   Final    GFR est AA 09/24/2018 >60  >60 ml/min/1.73m2 Final    GFR est non-AA 09/24/2018 >60  >60 ml/min/1.73m2 Final    Comment: (NOTE)  Estimated GFR is calculated using the Modification of Diet in Renal   Disease (MDRD) Study equation, reported for both  Americans   (GFRAA) and non- Americans (GFRNA), and normalized to 1.73m2   body surface area.  The physician must decide which value applies to   the patient. The MDRD study equation should only be used in   individuals age 25 or older. It has not been validated for the   following: pregnant women, patients with serious comorbid conditions,   or on certain medications, or persons with extremes of body size,   muscle mass, or nutritional status.  Calcium 09/24/2018 8.5  8.5 - 10.1 MG/DL Final    Bilirubin, total 09/24/2018 0.2  0.2 - 1.0 MG/DL Final    ALT (SGPT) 09/24/2018 22  13 - 56 U/L Final    AST (SGOT) 09/24/2018 20  15 - 37 U/L Final    Alk. phosphatase 09/24/2018 92  45 - 117 U/L Final    Protein, total 09/24/2018 7.2  6.4 - 8.2 g/dL Final    Albumin 09/24/2018 3.3* 3.4 - 5.0 g/dL Final    Globulin 09/24/2018 3.9  2.0 - 4.0 g/dL Final    A-G Ratio 09/24/2018 0.8  0.8 - 1.7   Final    TSH 09/24/2018 1.24  0.36 - 3.74 uIU/mL Final    CK 09/24/2018 119  26 - 192 U/L Final    CK - MB 09/24/2018 1.0  <3.6 ng/ml Final    CK-MB Index 09/24/2018 0.8  0.0 - 4.0 % Final    Troponin-I, QT 09/24/2018 <0.02  0.00 - 0.06 NG/ML Final    Comment: The presence of detectable troponin above the reference range indicates myocardial injury which may be due to ischemia, myocarditis, trauma, etc.  Clinical correlation is necessary to establish the significance of this finding. Sequential testing is recommended to determine if the typical rise and fall of cTnI is demonstrated. Note:  Cardiac troponin I has a relatively long half life and may be present well after the CK MB has returned to baseline. The reference range is based on the 99th percentile of the referent population.  D DIMER 09/24/2018 0.38  <0.46 ug/ml(FEU) Final    Comment: (NOTE)  A D-Dimer result less than 0.5 ug/mL FEU combined with a low clinical   pretest probability of DVT and/or PE has a negative predictive value   of %. The positive predictive value is 50% or less.       Magnesium 09/24/2018 1.7  1.6 - 2.6 mg/dL Final    WBC 09/24/2018 4 to 10  0 - 4 /hpf Final    RBC 09/24/2018 NONE  0 - 5 /hpf Final    Epithelial cells 09/24/2018 3+  0 - 5 /lpf Final    CK 09/24/2018 125  26 - 192 U/L Final    CK - MB 09/24/2018 1.0  <3.6 ng/ml Final    CK-MB Index 09/24/2018 0.8  0.0 - 4.0 % Final    Troponin-I, QT 09/24/2018 <0.02  0.00 - 0.06 NG/ML Final    Comment: The presence of detectable troponin above the reference range indicates myocardial injury which may be due to ischemia, myocarditis, trauma, etc.  Clinical correlation is necessary to establish the significance of this finding. Sequential testing is recommended to determine if the typical rise and fall of cTnI is demonstrated. Note:  Cardiac troponin I has a relatively long half life and may be present well after the CK MB has returned to baseline. The reference range is based on the 99th percentile of the referent population. .No results found for any visits on 12/21/18. Assessment / Plan:      ICD-10-CM ICD-9-CM    1. Abdominal cramping R10.9 789.00 REFERRAL TO GASTROENTEROLOGY      LIPID PANEL      CBC WITH AUTOMATED DIFF      METABOLIC PANEL, COMPREHENSIVE   2. Diarrhea, unspecified type R19.7 787.91 REFERRAL TO GASTROENTEROLOGY      LIPID PANEL      CBC WITH AUTOMATED DIFF      METABOLIC PANEL, COMPREHENSIVE   3. Hematochezia K92.1 578.1 REFERRAL TO GASTROENTEROLOGY     Abdominal cramping, pain and diarrhea-patient given referral to  as requested. Fasting labs ordered today CBC, CMP and lipid panel  Blood pressure is controlled at 110/80  Follow-up as needed    Follow-up Disposition:  Return if symptoms worsen or fail to improve. I asked the patient if she  had any questions and answered her  questions.   The patient stated that she understands the treatment plan and agrees with the treatment plan

## 2018-12-22 LAB
ABSOLUTE BANDS, 67058: ABNORMAL
ABSOLUTE BLASTS: ABNORMAL
ABSOLUTE METAMYELOCYTES, 900360: ABNORMAL
ABSOLUTE MYELOCYTES: ABNORMAL
ABSOLUTE NRBC,ANRBC: ABNORMAL
ABSOLUTE PROMYELOCYTES: ABNORMAL
ALB/GLOBRATIO, 58C: 1.3 (CALC) (ref 1–2.5)
ALBUMIN SERPL-MCNC: 4.1 G/DL (ref 3.6–5.1)
ALP SERPL-CCNC: 75 U/L (ref 33–115)
ALT SERPL-CCNC: 17 U/L (ref 6–29)
AST SERPL W P-5'-P-CCNC: 22 U/L (ref 10–30)
BANDS,BANDS: ABNORMAL
BASOPHILS # BLD: 56 CELLS/UL (ref 0–200)
BASOPHILS NFR BLD: 0.6 %
BILIRUB SERPL-MCNC: 0.5 MG/DL (ref 0.2–1.2)
BLASTS,BLAST: ABNORMAL
BUN SERPL-MCNC: 11 MG/DL (ref 7–25)
BUN/CREATININE RATIO,BUCR: ABNORMAL (CALC) (ref 6–22)
CALCIUM SERPL-MCNC: 9.2 MG/DL (ref 8.6–10.2)
CHLORIDE SERPL-SCNC: 108 MMOL/L (ref 98–110)
CHOL/HDL RATIO,CHHDX: 2.6 (CALC)
CHOLEST SERPL-MCNC: 149 MG/DL
CO2 SERPL-SCNC: 26 MMOL/L (ref 20–32)
COMMENT(S): ABNORMAL
CREAT SERPL-MCNC: 0.87 MG/DL (ref 0.5–1.1)
EOSINOPHIL # BLD: 197 CELLS/UL (ref 15–500)
EOSINOPHIL NFR BLD: 2.1 %
ERYTHROCYTE [DISTWIDTH] IN BLOOD BY AUTOMATED COUNT: 14 % (ref 11–15)
GLOBULIN,GLOB: 3.1 G/DL (CALC) (ref 1.9–3.7)
GLUCOSE SERPL-MCNC: 74 MG/DL (ref 65–99)
HCT VFR BLD AUTO: 43.1 % (ref 35–45)
HDLC SERPL-MCNC: 57 MG/DL
HGB BLD-MCNC: 14.1 G/DL (ref 11.7–15.5)
LDL-CHOLESTEROL: 79 MG/DL (CALC)
LYMPHOCYTES # BLD: 2576 CELLS/UL (ref 850–3900)
LYMPHOCYTES NFR BLD: 27.4 %
MCH RBC QN AUTO: 27 PG (ref 27–33)
MCHC RBC AUTO-ENTMCNC: 32.7 G/DL (ref 32–36)
MCV RBC AUTO: 82.4 FL (ref 80–100)
METAMYELOCYTES,METAS: ABNORMAL
MONOCYTES # BLD: 536 CELLS/UL (ref 200–950)
MONOCYTES NFR BLD: 5.7 %
MYELOCYTES,MYELO: ABNORMAL
NEUTROPHILS # BLD AUTO: 6035 CELLS/UL (ref 1500–7800)
NEUTROPHILS # BLD: 64.2 %
NON-HDL CHOLESTEROL, 011976: 92 MG/DL (CALC)
NRBC: ABNORMAL
PLATELET # BLD AUTO: 282 THOUSAND/UL (ref 140–400)
PMV BLD AUTO: 10.8 FL (ref 7.5–12.5)
POTASSIUM SERPL-SCNC: 5.6 MMOL/L (ref 3.5–5.3)
PROMYELOCYTES,PRO: ABNORMAL
PROT SERPL-MCNC: 7.2 G/DL (ref 6.1–8.1)
RBC # BLD AUTO: 5.23 MILLION/UL (ref 3.8–5.1)
REACTIVE LYMPHS: ABNORMAL
SODIUM SERPL-SCNC: 139 MMOL/L (ref 135–146)
TRIGL SERPL-MCNC: 51 MG/DL (ref ?–150)
WBC # BLD AUTO: 9.4 THOUSAND/UL (ref 3.8–10.8)

## 2019-02-21 ENCOUNTER — HOSPITAL ENCOUNTER (EMERGENCY)
Age: 39
Discharge: HOME | End: 2019-02-21
Attending: EMERGENCY MEDICINE
Payer: COMMERCIAL

## 2019-02-21 VITALS
OXYGEN SATURATION: 100 % | SYSTOLIC BLOOD PRESSURE: 149 MMHG | HEART RATE: 79 BPM | RESPIRATION RATE: 18 BRPM | BODY MASS INDEX: 32.78 KG/M2 | WEIGHT: 185 LBS | HEIGHT: 63 IN | TEMPERATURE: 98.7 F | DIASTOLIC BLOOD PRESSURE: 91 MMHG

## 2019-02-21 DIAGNOSIS — S76.211A GROIN STRAIN, RIGHT, INITIAL ENCOUNTER: Primary | ICD-10-CM

## 2019-02-21 DIAGNOSIS — M54.31 RIGHT SIDED SCIATICA: ICD-10-CM

## 2019-02-21 PROCEDURE — 74011250636 HC RX REV CODE- 250/636: Performed by: EMERGENCY MEDICINE

## 2019-02-21 PROCEDURE — 96372 THER/PROPH/DIAG INJ SC/IM: CPT

## 2019-02-21 PROCEDURE — 99282 EMERGENCY DEPT VISIT SF MDM: CPT

## 2019-02-21 RX ORDER — CYCLOBENZAPRINE HCL 10 MG
10 TABLET ORAL
Qty: 15 TAB | Refills: 0 | Status: SHIPPED | OUTPATIENT
Start: 2019-02-21 | End: 2019-04-25

## 2019-02-21 RX ORDER — IBUPROFEN 800 MG/1
TABLET ORAL
Qty: 15 TAB | Refills: 0 | Status: SHIPPED | OUTPATIENT
Start: 2019-02-21 | End: 2019-02-21

## 2019-02-21 RX ORDER — KETOROLAC TROMETHAMINE 30 MG/ML
15 INJECTION, SOLUTION INTRAMUSCULAR; INTRAVENOUS
Status: COMPLETED | OUTPATIENT
Start: 2019-02-21 | End: 2019-02-21

## 2019-02-21 RX ORDER — ERYTHROMYCIN 500 MG/1
TABLET, COATED ORAL
COMMUNITY
End: 2019-05-31

## 2019-02-21 RX ORDER — IBUPROFEN 800 MG/1
TABLET ORAL
Qty: 15 TAB | Refills: 0 | Status: SHIPPED | OUTPATIENT
Start: 2019-02-21 | End: 2019-10-18 | Stop reason: SDUPTHER

## 2019-02-21 RX ORDER — CYCLOBENZAPRINE HCL 10 MG
10 TABLET ORAL
Qty: 15 TAB | Refills: 0 | Status: SHIPPED | OUTPATIENT
Start: 2019-02-21 | End: 2019-02-21

## 2019-02-21 RX ADMIN — KETOROLAC TROMETHAMINE 15 MG: 30 INJECTION, SOLUTION INTRAMUSCULAR; INTRAVENOUS at 08:04

## 2019-02-21 NOTE — LETTER
NOTIFICATION RETURN TO WORK / SCHOOL 
 
2/21/2019 7:53 AM 
 
Ms. Gomez Rivero [address] [facility] [address] To Whom It May Concern: 
 
Gomez Rivero is currently under the care of [id number] [facility] EMERGENCY DEPT. She will return to work on 2/23/19 If there are questions or concerns please have the patient contact our office.  
 
 
 
Sincerely, 
 
 
David Lopez MD

## 2019-02-21 NOTE — ED PROVIDER NOTES
EMERGENCY DEPARTMENT HISTORY AND PHYSICAL EXAM 
 
7:42 AM 
 
 
Date: 2/21/2019 Patient Name: Dora Armstrong History of Presenting Illness No chief complaint on file. History Provided By: Patient Chief Complaint: Myalgias Duration:  3 days Timing:  Constant Location: Right gluteal area and right leg Quality: Aching Severity: moderate Modifying Factors: Worse with movement and unimproved by Ibuprofen and Tylenol Associated Symptoms: numbness. No urinary or bowel Sx Additional History (Context): 7:42 AM Dora Armstrong is a 45 y.o. female with h/o chronically painful menses and fibroids who presents to ED complaining of constant moderate aching myalgias in her right gluteal area and right leg s/p doing squats during exercise 3 days ago. Sx Worse with movement and unimproved by Ibuprofen and Tylenol. Associated Sx include numbness. Denies urinary and bowel Sx. Denies any further complaints or symptoms at the moment. PCP: Juan Pablo Hernández NP Current Outpatient Medications Medication Sig Dispense Refill  topiramate (TOPAMAX) 50 mg tablet TAKE 1 TABLET BY MOUTH EVERY DAY  0  
 ibuprofen (MOTRIN) 800 mg tablet Take 1 Tab by mouth every eight (8) hours as needed for Pain. Take with food. 90 Tab 0  
 diethylpropion 25 mg tab Take 25 mg by mouth.  OTHER Topamax- unknown dose  omeprazole (PRILOSEC) 20 mg capsule Take one tab po daily  Indications: gastroesophageal reflux disease 30 Cap 2  
 Nebulizer & Compressor machine Use as directed 1 Each 0  
 Nebulizer Accessories kit Use as directed 1 Kit 1  
 albuterol (PROVENTIL) 5 mg/mL nebulizer solution 0.5 mL by Nebulization route every four (4) hours as needed for Wheezing or Shortness of Breath. Indications: Acute Asthma Attack, BRONCHOSPASM PREVENTION, EXERCISE-INDUCED BRONCHOSPASM PREVENTION 90 mL 3 Past History Past Medical History: 
Past Medical History:  
Diagnosis Date  Asthma  Chronic pain 6 months  
 painful menses  Fibroids  Heart palpitations Past Surgical History: 
Past Surgical History:  
Procedure Laterality Date  HX  SECTION  [id number]  
 with BTL  HX HYSTEROSCOPY WITH ENDOMETRIAL ABLATION   No period since Family History: 
Family History Problem Relation Age of Onset  Hypertension Mother  Hypertension Father  Diabetes Father  Parkinson's Disease Father  Cancer Paternal Grandmother Social History: 
Social History Tobacco Use  Smoking status: Former Smoker Types: Cigarettes  Smokeless tobacco: Never Used Substance Use Topics  Alcohol use: Yes Comment: socially, 1 beer/weekend  Drug use: No  
 
 
Allergies: Allergies Allergen Reactions  Azo [Phenazopyridine] Hives  Iodinated Contrast- Oral And Iv Dye Shortness of Breath Review of Systems Review of Systems Constitutional: Negative for activity change, fatigue and fever. HENT: Negative for congestion and rhinorrhea. Eyes: Negative for visual disturbance. Respiratory: Negative for shortness of breath. Cardiovascular: Negative for chest pain and palpitations. Gastrointestinal: Negative for abdominal pain, blood in stool, constipation, diarrhea, nausea and vomiting. Genitourinary: Negative for dysuria, flank pain, frequency and hematuria. Musculoskeletal: Positive for myalgias. Negative for back pain. Skin: Negative for rash. Neurological: Positive for numbness. Negative for dizziness, weakness and light-headedness. All other systems reviewed and are negative. Physical Exam  
There were no vitals taken for this visit. Physical Exam  
Constitutional: She is oriented to person, place, and time. She appears well-developed and well-nourished. No distress. HENT:  
Head: Normocephalic and atraumatic.   
Right Ear: External ear normal.  
Left Ear: External ear normal.  
Nose: Nose normal.  
 Mouth/Throat: Oropharynx is clear and moist.  
Eyes: Conjunctivae and EOM are normal. Pupils are equal, round, and reactive to light. No scleral icterus. Neck: Normal range of motion. Neck supple. No JVD present. No tracheal deviation present. No thyromegaly present. Cardiovascular: Normal rate, regular rhythm, normal heart sounds and intact distal pulses. Exam reveals no gallop and no friction rub. No murmur heard. Pulmonary/Chest: Effort normal and breath sounds normal. She exhibits no tenderness. Abdominal: Soft. Bowel sounds are normal. She exhibits no distension. There is no tenderness. There is no rebound and no guarding. Musculoskeletal: Normal range of motion. She exhibits tenderness. She exhibits no edema. right paralumbar TTP radiating to right buttock and right hip. No bony pain. Lymphadenopathy:  
  She has no cervical adenopathy. Neurological: She is alert and oriented to person, place, and time. No cranial nerve deficit. Coordination normal.  
No sensory loss, Gait normal, Motor 5/5 Skin: Skin is warm and dry. Psychiatric: She has a normal mood and affect. Her behavior is normal. Judgment and thought content normal.  
Nursing note and vitals reviewed. Diagnostic Study Results Labs - No results found for this or any previous visit (from the past 12 hour(s)). Radiologic Studies - No orders to display No results found. Medical Decision Making I am the first provider for this patient. I reviewed the vital signs, available nursing notes, past medical history, past surgical history, family history and social history. Vital Signs-Reviewed the patient's vital signs. Pulse Oximetry Analysis -  Patient is 100% O2 on RA, indicating adequate oxygenation. Cardiac Monitor: 
Rate: 79 bpm 
 
Records Reviewed: Old medical records and nursing notes (Time of Review: 7:42 AM) 
 
ED Course: Progress Notes, Reevaluation, and Consults: Provider Notes (Medical Decision Making): PT agrees with dispo and F/U plan. Clari Brown MD 
 
 
 
Diagnosis Clinical Impression: No diagnosis found. Disposition: DC Follow-up Information None Medication List  
  
ASK your doctor about these medications   
albuterol 5 mg/mL nebulizer solution Commonly known as:  PROVENTIL 
0.5 mL by Nebulization route every four (4) hours as needed for Wheezing or Shortness of Breath. Indications: Acute Asthma Attack, BRONCHOSPASM PREVENTION, EXERCISE-INDUCED BRONCHOSPASM PREVENTION 
  
diethylpropion 25 mg Tab 
  
ibuprofen 800 mg tablet Commonly known as:  MOTRIN Take 1 Tab by mouth every eight (8) hours as needed for Pain. Take with food. Nebulizer & Compressor machine Use as directed Nebulizer Accessories Kit Use as directed 
  
omeprazole 20 mg capsule Commonly known as:  PRILOSEC Take one tab po daily  Indications: gastroesophageal reflux disease OTHER 
  
topiramate 50 mg tablet Commonly known as:  TOPAMAX 
  
  
 
_______________________________ Attestations: 
Scribe Attestation Candy Harden acting as a scribe for and in the presence of Marian Swenson MD     
February 21, 2019 at 7:42 AM 
    
Provider Attestation:     
I personally performed the services described in the documentation, reviewed the documentation, as recorded by the scribe in my presence, and it accurately and completely records my words and actions. February 21, 2019 at 7:42 AM - Marian Swenson MD   
_______________________________

## 2019-02-21 NOTE — DISCHARGE INSTRUCTIONS
Patient Education        Groin Strain: Care Instructions  Your Care Instructions  A groin strain is an injury that happens when you tear or overstretch (pull) a groin muscle. The groin muscles are in the area on either side of the body in the folds where the belly joins the legs. You can strain a groin muscle during exercise, such as running, skating, kicking in soccer, or playing basketball. It can happen when you lift, push, or pull heavy objects. You might pull a groin muscle when you fall. The injury can range from a minor pull to a more serious tear of the muscle. You may feel pain and tenderness that's worse when you squeeze your legs together. You may also have pain when you raise the knee of the injured side. There may be swelling or bruising in the groin area or inner thigh. If you have a bad strain, you may walk with a limp while it heals. Rest and other home care can help the muscle heal. Healing can take up to 3 weeks or more. Your doctor may want to see you again in 2 to 3 weeks. Follow-up care is a key part of your treatment and safety. Be sure to make and go to all appointments, and call your doctor if you are having problems. It's also a good idea to know your test results and keep a list of the medicines you take. How can you care for yourself at home? · Be safe with medicines. Read and follow all instructions on the label. ? If the doctor gave you a prescription medicine for pain, take it as prescribed. ? If you are not taking a prescription pain medicine, ask your doctor if you can take an over-the-counter medicine. · Rest and protect your injured or sore groin area for 1 to 2 weeks. Stop, change, or take a break from any activity that may be causing your pain or soreness. Do not do intense activities while you still have pain. · Put ice or a cold pack on your groin area for 10 to 20 minutes at a time.  Try to do this every 1 to 2 hours for the next 3 days (when you are awake) or until the swelling goes down. Put a thin cloth between the ice and your skin. · After 2 or 3 days, if your swelling is gone, apply heat. Put a warm water bottle, a heating pad set on low, or a warm cloth on your groin area. Do not go to sleep with a heating pad on your skin. · If your doctor gave you crutches, make sure you use them as directed. · Wear snug shorts or underwear that support the injured area. When should you call for help? Call your doctor now or seek immediate medical care if:    · You have new or severe pain or swelling in the groin area.     · Your groin or upper thigh is cool or pale or changes color.     · You have tingling, weakness, or numbness in your groin or leg.     · You cannot move your leg.     · You cannot put weight on your leg.    Watch closely for changes in your health, and be sure to contact your doctor if:    · You do not get better as expected. Where can you learn more? Go to http://lorie-ede.info/. Enter Q209 in the search box to learn more about \"Groin Strain: Care Instructions. \"  Current as of: September 20, 2018  Content Version: 11.9  © 9438-2971 [email or web address]. Care instructions adapted under license by [employer] (which disclaims liability or warranty for this information). If you have questions about a medical condition or this instruction, always ask your healthcare professional. [place] [address] any warranty or liability for your use of this information. Patient Education        Back Care and Preventing Injuries: Care Instructions  Your Care Instructions    You can hurt your back doing many everyday activities: lifting a heavy box, bending down to garden, exercising at the gym, and even getting out of bed. But you can keep your back strong and healthy by doing some exercises. You also can follow a few tips for sitting, sleeping, and lifting to avoid hurting your back again.   Talk to your doctor before you start an exercise program. Ask for help if you want to learn more about keeping your back healthy. Follow-up care is a key part of your treatment and safety. Be sure to make and go to all appointments, and call your doctor if you are having problems. It's also a good idea to know your test results and keep a list of the medicines you take. How can you care for yourself at home? · Stay at a healthy weight to avoid strain on your lower back. · Do not smoke. Smoking increases the risk of osteoporosis, which weakens the spine. If you need help quitting, talk to your doctor about stop-smoking programs and medicines. These can increase your chances of quitting for good. · Make sure you sleep in a position that maintains your back's normal curves and on a mattress that feels comfortable. Sleep on your side with a pillow between your knees, or sleep on your back with a pillow under your knees. These positions can reduce strain on your back. · When you get out of bed, lie on your side and bend both knees. Drop your feet over the edge of the bed as you push up with both arms. Scoot to the edge of the bed. Make sure your feet are in line with your rear end (buttocks), and then stand up. · If you must stand for a long time, put one foot on a stool, ledge, or box. Exercise to strengthen your back and other muscles  · Get at least 30 minutes of exercise on most days of the week. Walking is a good choice. You also may want to do other activities, such as running, swimming, cycling, or playing tennis or team sports. · Stretch your back muscles. Here are few exercises to try:  ? Lie on your back with your knees bent and your feet flat on the floor. Gently pull one bent knee to your chest. Put that foot back on the floor, and then pull the other knee to your chest. Hold for 15 to 30 seconds. Repeat 2 to 4 times. ? Do pelvic tilts. Lie on your back with your knees bent. Tighten your stomach muscles.  Pull your belly button (navel) in and up toward your ribs. You should feel like your back is pressing to the floor and your hips and pelvis are slightly lifting off the floor. Hold for 6 seconds while breathing smoothly. · Keep your core muscles strong. The muscles of your back, belly (abdomen), and buttocks support your spine. ? Pull in your belly, and imagine pulling your navel toward your spine. Hold this for 6 seconds, then relax. Remember to keep breathing normally as you tense your muscles. ? Do curl-ups. Always do them with your knees bent. Keep your low back on the floor, and curl your shoulders toward your knees using a smooth, slow motion. Keep your arms folded across your chest. If this bothers your neck, try putting your hands behind your neck (not your head), with your elbows spread apart. ? Lie on your back with your knees bent and your feet flat on the floor. Tighten your belly muscles, and then push with your feet and raise your buttocks up a few inches. Hold this position 6 seconds as you continue to breathe normally, then lower yourself slowly to the floor. Repeat 8 to 12 times. ? If you like group exercise, try Pilates or yoga. These classes have poses that strengthen the core muscles. Protect your back when you sit  · Place a small pillow, a rolled-up towel, or a lumbar roll in the curve of your back if you need extra support. · Sit in a chair that is low enough to let you place both feet flat on the floor with both knees nearly level with your hips. If your chair or desk is too high, use a foot rest to raise your knees. · When driving, keep your knees nearly level with your hips. Sit straight, and drive with both hands on the steering wheel. Your arms should be in a slightly bent position. · Try a kneeling chair, which helps tilt your hips forward. This takes pressure off your lower back. · Try sitting on an exercise ball. It can rock from side to side, which helps keep your back loose.   Lift properly  · Squat down, bending at the hips and knees only. If you need to, put one knee to the floor and extend your other knee in front of you, bent at a right angle (half kneeling). · Press your chest straight forward. This helps keep your upper back straight while keeping a slight arch in your low back. · Hold the load as close to your body as possible, at the level of your navel. · Use your feet to change direction, taking small steps. · Lead with your hips as you change direction. Keep your shoulders in line with your hips as you move. Do not twist your body. · Set down your load carefully, squatting with your knees and hips only. When should you call for help? Watch closely for changes in your health, and be sure to contact your doctor if you have any problems. Where can you learn more? Go to http://lorie-ede.info/. Enter S810 in the search box to learn more about \"Back Care and Preventing Injuries: Care Instructions. \"  Current as of: September 20, 2018  Content Version: 11.9  © 9329-1830 [employer]. Care instructions adapted under license by [employer] (which disclaims liability or warranty for this information). If you have questions about a medical condition or this instruction, always ask your healthcare professional. [address] any warranty or liability for your use of this information.

## 2019-03-25 DIAGNOSIS — J45.30 MILD PERSISTENT ASTHMA WITHOUT COMPLICATION: ICD-10-CM

## 2019-03-28 RX ORDER — BUDESONIDE AND FORMOTEROL FUMARATE DIHYDRATE 160; 4.5 UG/1; UG/1
AEROSOL RESPIRATORY (INHALATION)
Qty: 10.2 INHALER | Refills: 3 | Status: SHIPPED | OUTPATIENT
Start: 2019-03-28 | End: 2019-10-14 | Stop reason: SDUPTHER

## 2019-04-25 ENCOUNTER — HOSPITAL ENCOUNTER (EMERGENCY)
Age: 39
Discharge: HOME | End: 2019-04-25
Attending: EMERGENCY MEDICINE | Admitting: EMERGENCY MEDICINE
Payer: COMMERCIAL

## 2019-04-25 VITALS
SYSTOLIC BLOOD PRESSURE: 142 MMHG | HEART RATE: 72 BPM | BODY MASS INDEX: 31.89 KG/M2 | RESPIRATION RATE: 16 BRPM | DIASTOLIC BLOOD PRESSURE: 97 MMHG | TEMPERATURE: 98.1 F | OXYGEN SATURATION: 98 % | WEIGHT: 180 LBS | HEIGHT: 63 IN

## 2019-04-25 DIAGNOSIS — M54.31 SCIATICA OF RIGHT SIDE: Primary | ICD-10-CM

## 2019-04-25 PROCEDURE — 74011250637 HC RX REV CODE- 250/637: Performed by: EMERGENCY MEDICINE

## 2019-04-25 PROCEDURE — 99283 EMERGENCY DEPT VISIT LOW MDM: CPT

## 2019-04-25 RX ORDER — CYCLOBENZAPRINE HCL 10 MG
10 TABLET ORAL
Status: COMPLETED | OUTPATIENT
Start: 2019-04-25 | End: 2019-04-25

## 2019-04-25 RX ORDER — OXYCODONE AND ACETAMINOPHEN 5; 325 MG/1; MG/1
1 TABLET ORAL
Status: COMPLETED | OUTPATIENT
Start: 2019-04-25 | End: 2019-04-25

## 2019-04-25 RX ORDER — CYCLOBENZAPRINE HCL 5 MG
5-10 TABLET ORAL
Qty: 22 TAB | Refills: 0 | Status: SHIPPED | OUTPATIENT
Start: 2019-04-25 | End: 2019-05-02

## 2019-04-25 RX ORDER — OXYCODONE AND ACETAMINOPHEN 5; 325 MG/1; MG/1
1 TABLET ORAL
Qty: 14 TAB | Refills: 0 | Status: SHIPPED | OUTPATIENT
Start: 2019-04-25 | End: 2019-05-02

## 2019-04-25 RX ADMIN — CYCLOBENZAPRINE HYDROCHLORIDE 10 MG: 10 TABLET, FILM COATED ORAL at 05:15

## 2019-04-25 RX ADMIN — OXYCODONE AND ACETAMINOPHEN 1 TABLET: 5; 325 TABLET ORAL at 05:15

## 2019-04-25 NOTE — DISCHARGE INSTRUCTIONS
Return for any new or worsening pain, fever not resolving with motrin or tylenol, shortness of breath, any urinary or bowel changes, vomiting, decreased fluid intake, weakness, numbness, dizziness, or any change or concerns. Patient Education        Back Pain: Care Instructions  Your Care Instructions    Back pain has many possible causes. It is often related to problems with muscles and ligaments of the back. It may also be related to problems with the nerves, discs, or bones of the back. Moving, lifting, standing, sitting, or sleeping in an awkward way can strain the back. Sometimes you don't notice the injury until later. Arthritis is another common cause of back pain. Although it may hurt a lot, back pain usually improves on its own within several weeks. Most people recover in 12 weeks or less. Using good home treatment and being careful not to stress your back can help you feel better sooner. Follow-up care is a key part of your treatment and safety. Be sure to make and go to all appointments, and call your doctor if you are having problems. It's also a good idea to know your test results and keep a list of the medicines you take. How can you care for yourself at home? · Sit or lie in positions that are most comfortable and reduce your pain. Try one of these positions when you lie down:  ? Lie on your back with your knees bent and supported by large pillows. ? Lie on the floor with your legs on the seat of a sofa or chair. ? Lie on your side with your knees and hips bent and a pillow between your legs. ? Lie on your stomach if it does not make pain worse. · Do not sit up in bed, and avoid soft couches and twisted positions. Bed rest can help relieve pain at first, but it delays healing. Avoid bed rest after the first day of back pain. · Change positions every 30 minutes. If you must sit for long periods of time, take breaks from sitting.  Get up and walk around, or lie in a comfortable position. · Try using a heating pad on a low or medium setting for 15 to 20 minutes every 2 or 3 hours. Try a warm shower in place of one session with the heating pad. · You can also try an ice pack for 10 to 15 minutes every 2 to 3 hours. Put a thin cloth between the ice pack and your skin. · Take pain medicines exactly as directed. ? If the doctor gave you a prescription medicine for pain, take it as prescribed. ? If you are not taking a prescription pain medicine, ask your doctor if you can take an over-the-counter medicine. · Take short walks several times a day. You can start with 5 to 10 minutes, 3 or 4 times a day, and work up to longer walks. Walk on level surfaces and avoid hills and stairs until your back is better. · Return to work and other activities as soon as you can. Continued rest without activity is usually not good for your back. · To prevent future back pain, do exercises to stretch and strengthen your back and stomach. Learn how to use good posture, safe lifting techniques, and proper body mechanics. When should you call for help? Call your doctor now or seek immediate medical care if:    · You have new or worsening numbness in your legs.     · You have new or worsening weakness in your legs. (This could make it hard to stand up.)     · You lose control of your bladder or bowels.    Watch closely for changes in your health, and be sure to contact your doctor if:    · You have a fever, lose weight, or don't feel well.     · You do not get better as expected. Where can you learn more? Go to http://lorie-ede.info/. Enter M931 in the search box to learn more about \"Back Pain: Care Instructions. \"  Current as of: September 20, 2018  Content Version: 11.9  © 0114-6106 [employer]. Care instructions adapted under license by [employer] (which disclaims liability or warranty for this information).  If you have questions about a medical condition or this instruction, always ask your healthcare professional. [place] [address] any warranty or liability for your use of this information. Patient Education        Sciatica: Care Instructions  Your Care Instructions    Sciatica (say \"fra-FF-kx-kuh\") is an irritation of one of the sciatic nerves, which come from the spinal cord in the lower back. The sciatic nerves and their branches extend down through the buttock to the foot. Sciatica can develop when an injured disc in the back presses against a spinal nerve root. Its main symptom is pain, numbness, or weakness that is often worse in the leg or foot than in the back. Sciatica often will improve and go away with time. Early treatment usually includes medicines and exercises to relieve pain. Follow-up care is a key part of your treatment and safety. Be sure to make and go to all appointments, and call your doctor if you are having problems. It's also a good idea to know your test results and keep a list of the medicines you take. How can you care for yourself at home? · Take pain medicines exactly as directed. ? If the doctor gave you a prescription medicine for pain, take it as prescribed. ? If you are not taking a prescription pain medicine, ask your doctor if you can take an over-the-counter medicine. · Use heat or ice to relieve pain. ? To apply heat, put a warm water bottle, heating pad set on low, or warm cloth on your back. Do not go to sleep with a heating pad on your skin. ? To use ice, put ice or a cold pack on the area for 10 to 20 minutes at a time. Put a thin cloth between the ice and your skin. · Avoid sitting if possible, unless it feels better than standing. · Alternate lying down with short walks. Increase your walking distance as you are able to without making your symptoms worse. · Do not do anything that makes your symptoms worse. When should you call for help?   Call 911 anytime you think you may need emergency care. For example, call if:    · You are unable to move a leg at all.   [facility] your doctor now or seek immediate medical care if:    · You have new or worse symptoms in your legs or buttocks. Symptoms may include:  ? Numbness or tingling. ? Weakness. ? Pain.     · You lose bladder or bowel control.    Watch closely for changes in your health, and be sure to contact your doctor if:    · You are not getting better as expected. Where can you learn more? Go to http://lorie-ede.info/. Enter [id number] in the search box to learn more about \"Sciatica: Care Instructions. \"  Current as of: September 20, 2018  Content Version: 11.9  © 3005-5890 [employer]. Care instructions adapted under license by [employer] (which disclaims liability or warranty for this information). If you have questions about a medical condition or this instruction, always ask your healthcare professional. [place] [address] any warranty or liability for your use of this information.

## 2019-04-25 NOTE — LETTER
NOTIFICATION RETURN TO WORK / SCHOOL 
 
4/25/2019 5:05 AM 
 
Ms. Cliff Jose [address] [place] [id number] To Whom It May Concern: 
 
Clfif Jose is currently under the care of [id number] [facility] EMERGENCY DEPT. She will return to work/school on: 4/27/19 If there are questions or concerns please have the patient contact our office.  
 
 
 
Sincerely, 
 
 
 
 
Reagan Robin MD

## 2019-04-25 NOTE — ED PROVIDER NOTES
Pt c/o rt low back pain, radiating down rt leg and rt groin. Mild at rest, severe w movement. X 2-3 days. No injury. Says was trying to lift weights, thinks it caused this. Says h/o same pain 2 months ago. No urinary or bowel changes/incontinence. No rash. No cp or sob. No fever or chills. No leg sided back pain. Tried advil, mild relief only. Past Medical History:  
Diagnosis Date  Asthma  Chronic pain 6 months  
 painful menses  Fibroids  Heart palpitations Past Surgical History:  
Procedure Laterality Date  HX  SECTION  [id number]  
 with BTL  HX HYSTEROSCOPY WITH ENDOMETRIAL ABLATION   No period since Family History:  
Problem Relation Age of Onset  Hypertension Mother  Hypertension Father  Diabetes Father  Parkinson's Disease Father  Cancer Paternal Grandmother Social History Socioeconomic History  Marital status:  Spouse name: Not on file  Number of children: Not on file  Years of education: Not on file  Highest education level: Not on file Occupational History Employer: [employer] Social Needs  Financial resource strain: Not on file  Food insecurity:  
  Worry: Not on file Inability: Not on file  Transportation needs:  
  Medical: Not on file Non-medical: Not on file Tobacco Use  Smoking status: Former Smoker Types: Cigarettes  Smokeless tobacco: Current User Substance and Sexual Activity  Alcohol use: Yes Comment: socially, 1 beer/weekend  Drug use: No  
 Sexual activity: Yes  
  Partners: Male Birth control/protection: Surgical  
Lifestyle  Physical activity:  
  Days per week: Not on file Minutes per session: Not on file  Stress: Not on file Relationships  Social connections:  
  Talks on phone: Not on file Gets together: Not on file Attends Advent service: Not on file Active member of club or organization: Not on file Attends meetings of clubs or organizations: Not on file Relationship status: Not on file  Intimate partner violence:  
  Fear of current or ex partner: Not on file Emotionally abused: Not on file Physically abused: Not on file Forced sexual activity: Not on file Other Topics Concern [address] Yes Comment: [employer]  Blood Transfusions No  
 Caffeine Concern No  
 Occupational Exposure No  
 Hobby Hazards No  
 Sleep Concern No  
 Stress Concern No  
 Weight Concern Yes Comment: PT IS OVER WEIGHT  Special Diet No  
 Back Care No  
 Exercise Yes Comment: 4 DAYS A WEEK  Bike Helmet No  
 Seat Belt Yes  Self-Exams Yes Social History Narrative  Not on file ALLERGIES: Azo [phenazopyridine] and Iodinated contrast- oral and iv dye Review of Systems Constitutional: Negative for fever. HENT: Negative for congestion. Respiratory: Negative for cough and shortness of breath. Cardiovascular: Negative for chest pain. Gastrointestinal: Negative for abdominal pain and vomiting. Musculoskeletal: Positive for back pain. Skin: Negative for rash. Neurological: Negative for light-headedness. All other systems reviewed and are negative. Vitals:  
 04/25/19 0454 BP: (!) 142/97 Pulse: 72 Resp: 16 Temp: 98.1 °F (36.7 °C) SpO2: 98% Weight: 81.6 kg (180 lb) Height: 5' 3\" (1.6 m) Physical Exam  
Constitutional: She is oriented to person, place, and time. She appears well-developed. HENT:  
Head: Normocephalic and atraumatic. Eyes: Pupils are equal, round, and reactive to light. Neck: Normal range of motion. Cardiovascular: Normal rate and regular rhythm. No murmur heard. Pulmonary/Chest: Effort normal. She has no wheezes. Abdominal: Soft. There is no tenderness. Musculoskeletal: She exhibits tenderness (+ rt paralumbar ttp/spasm.  no leg ttp.  no swelling. nvi.  + SLR on rt at 45 degrees. ). Neurological: She is alert and oriented to person, place, and time. Skin: Skin is dry. Capillary refill takes less than 2 seconds. No rash noted. She is not diaphoretic. Psychiatric: She has a normal mood and affect. Nursing note and vitals reviewed. MDM Procedures Vitals: 
Patient Vitals for the past 12 hrs: 
 Temp Pulse Resp BP SpO2  
04/25/19 0454 98.1 °F (36.7 °C) 72 16 (!) 142/97 98 % Medications ordered:  
Medications  
oxyCODONE-acetaminophen (PERCOCET) 5-325 mg per tablet 1 Tab (has no administration in time range) cyclobenzaprine (FLEXERIL) tablet 10 mg (has no administration in time range) Lab findings: 
No results found for this or any previous visit (from the past 12 hour(s)). X-Ray, CT or other radiology findings or impressions: No orders to display Progress notes, Consult notes or additional Procedure notes: No emc. Not c/w cord compression/cauda equina/fracture/epidural abscess/acute abdomen/dvt/aaa. Pain radiates to rt groin, offered ct or pelvic/urine testing, pt declines says is typical for her sciatica like last time, no urinary sx;s, no abd pain x groin when rad from back. H/o btl. Declines ct or further eval of groin pain. To dc per pt request.  Patient agrees w discharge plan and verbalizes understanding of detailed return inst given. No indication for urgent mri or other imaging. Diagnosis:  
1. Sciatica of right side Disposition: home Follow-up Information Follow up With Specialties Details Why Contact [facility] Schedule an appointment as soon as possible for a visit in 2 days  [address] [place] [address] [address] [place] [address] 
[phone] [id number] [facility] EMERGENCY DEPT Emergency Medicine Go to As needed Joe Uriarte [phone] Patient's Medications Start Taking CYCLOBENZAPRINE (FLEXERIL) 5 MG TABLET    Take 1-2 Tabs by mouth three (3) times daily as needed for Muscle Spasm(s) for up to 7 days. OXYCODONE-ACETAMINOPHEN (PERCOCET) 5-325 MG PER TABLET    Take 1 Tab by mouth every six (6) hours as needed for Pain for up to 7 days. Max Daily Amount: 4 Tabs. Continue Taking ALBUTEROL (PROVENTIL) 5 MG/ML NEBULIZER SOLUTION    0.5 mL by Nebulization route every four (4) hours as needed for Wheezing or Shortness of Breath. Indications: Acute Asthma Attack, BRONCHOSPASM PREVENTION, EXERCISE-INDUCED BRONCHOSPASM PREVENTION  
 BUDESONIDE-FORMOTEROL (SYMBICORT) 160-4.5 MCG/ACTUATION HFAA    TAKE 2 PUFFS BY INHALATION TWO (2) TIMES A DAY. INDICATIONS: MAINTENANCE THERAPY FOR ASTHMA CLARITHROMYCIN PO    Take  by mouth. ERYTHROMYCIN 500 MG TABLET    Take  by mouth four (4) times daily (with meals). IBUPROFEN (MOTRIN) 800 MG TABLET    1 tab q 6-8 hours PRN pain NEBULIZER & COMPRESSOR MACHINE    Use as directed NEBULIZER ACCESSORIES KIT    Use as directed OMEPRAZOLE (PRILOSEC) 20 MG CAPSULE    Take one tab po daily  Indications: gastroesophageal reflux disease TOPIRAMATE (TOPAMAX) 50 MG TABLET    TAKE 1 TABLET BY MOUTH EVERY DAY These Medications have changed No medications on file Stop Taking CYCLOBENZAPRINE (FLEXERIL) 10 MG TABLET    Take 1 Tab by mouth three (3) times daily as needed for Muscle Spasm(s).

## 2019-04-29 ENCOUNTER — PATIENT OUTREACH (OUTPATIENT)
Dept: OTHER | Age: 39
End: 2019-04-29

## 2019-04-29 NOTE — PROGRESS NOTES
Initial HPRP:  
Patient on report as discharged from [facility] ED Visit 4/25/19 for Sciatica of Right Side. Initial attempt to contact patient for transitions of care.  Left discreet message on voicemail with this Care Coordinator's contact information.  Will attempt outreach on 4/30/19. 
   
CYCLOBENZAPRINE (FLEXERIL) 5 MG TABLET 
OXYCODONE-ACETAMINOPHEN (PERCOCET) 5-325 MG PER TABLET Call your doctor now or seek immediate medical care if: 
You have new or worsening numbness in your legs.  You have new or worsening weakness in your legs. (This could make it hard to stand up.)  You lose control of your bladder or bowels.  You have a fever, lose weight, or don't feel well.  You do not get better as expected.

## 2019-04-30 ENCOUNTER — PATIENT OUTREACH (OUTPATIENT)
Dept: OTHER | Age: 39
End: 2019-04-30

## 2019-04-30 NOTE — PROGRESS NOTES
Patient identified as eligible for [address] [place] [address] services. Second telephone outreach attempted. Left discreet voicemail with this CM confidential contact information. Will send UTR letter via [employer]. Next Outreach 5/14/19 f/u - [facility] [facility].

## 2019-04-30 NOTE — LETTER
4/30/2019 11:28 AM 
 
Ms. Alethea Nielsen [address] [facility] [address] Dear Alethea Nielsen My name is Letha Lunsford, Employee Care Coordinator for [employer] and I have been trying to reach you. The Employee Care Management [facility]) program is a free-of-charge confidential service provided to our employees and their family members covered by the [employer]. The program will provide an employee and his/her family with the [employer] expertise to assist in navigating the health care delivery system, provider services, and their overall care needsso as to assure and improve health care interactions and enhance the quality of life. This program is designed to provide you with the opportunity to have a [employer] care manager partner with you for the following services: 
 
 1) when you come home from the hospital or emergency room 2) when help is needed to manage your disease 3) when you need assistance coordinating services or appointments [facility] now partners with [facility]. If you are a qualifying employee, you may receive an additional 10 wellness incentive points for every month of active participation with an Employee Care Manager. [employer] is dedicated to empowering the good health of its community and improving the quality of care and care experiences for employees and their families. We are committed to safeguarding patient confidentiality and privacy, assuring that every employee has the respect he or she deserves in managing their health. The information shared with your care manager will not be shared with anyone else aside from those you identify as part of your care team, and will only be used to assist you with any identified care needs. Please contact me if you would like this service provided to you. Sincerely, 
 
Rissa Sy LPN  [facility] Care Coordinator [address] [place] [address], [address] [place], [place] [address] [facility] C [phone]  F [phone]  [email or web address]

## 2019-05-14 ENCOUNTER — PATIENT OUTREACH (OUTPATIENT)
Dept: OTHER | Age: 39
End: 2019-05-14

## 2019-05-14 NOTE — PROGRESS NOTES
HPRP f/u: 
Telephone attempt to contact patient for Health Promotion and Risk Prevention. Left discreet message on voicemail with this CC contact information. Will follow for one month for transitions of care needs. Next outreach is 6/4/19 for discussion f/u - [facility] Right Side  and Res[facility] Episode.

## 2019-05-31 ENCOUNTER — HOSPITAL ENCOUNTER (EMERGENCY)
Age: 39
Discharge: HOME | End: 2019-05-31
Attending: EMERGENCY MEDICINE
Payer: COMMERCIAL

## 2019-05-31 ENCOUNTER — APPOINTMENT (OUTPATIENT)
Dept: GENERAL RADIOLOGY | Age: 39
End: 2019-05-31
Attending: EMERGENCY MEDICINE
Payer: COMMERCIAL

## 2019-05-31 VITALS
TEMPERATURE: 98.6 F | SYSTOLIC BLOOD PRESSURE: 129 MMHG | HEIGHT: 63 IN | BODY MASS INDEX: 32.78 KG/M2 | RESPIRATION RATE: 16 BRPM | HEART RATE: 65 BPM | WEIGHT: 185 LBS | DIASTOLIC BLOOD PRESSURE: 85 MMHG | OXYGEN SATURATION: 100 %

## 2019-05-31 DIAGNOSIS — M25.512 PAIN IN JOINT OF LEFT SHOULDER: Primary | ICD-10-CM

## 2019-05-31 LAB
ANION GAP SERPL CALC-SCNC: 4 MMOL/L (ref 3–18)
BASOPHILS # BLD: 0 K/UL (ref 0–0.1)
BASOPHILS NFR BLD: 0 % (ref 0–2)
BUN SERPL-MCNC: 8 MG/DL (ref 7–18)
BUN/CREAT SERPL: 9 (ref 12–20)
CALCIUM SERPL-MCNC: 9.3 MG/DL (ref 8.5–10.1)
CHLORIDE SERPL-SCNC: 105 MMOL/L (ref 100–108)
CO2 SERPL-SCNC: 32 MMOL/L (ref 21–32)
CREAT SERPL-MCNC: 0.88 MG/DL (ref 0.6–1.3)
DIFFERENTIAL METHOD BLD: NORMAL
EOSINOPHIL # BLD: 0.2 K/UL (ref 0–0.4)
EOSINOPHIL NFR BLD: 2 % (ref 0–5)
ERYTHROCYTE [DISTWIDTH] IN BLOOD BY AUTOMATED COUNT: 14.2 % (ref 11.6–14.5)
ERYTHROCYTE [SEDIMENTATION RATE] IN BLOOD: 14 MM/HR (ref 0–20)
GLUCOSE SERPL-MCNC: 89 MG/DL (ref 74–99)
HCT VFR BLD AUTO: 41.3 % (ref 35–45)
HGB BLD-MCNC: 13.5 G/DL (ref 12–16)
LYMPHOCYTES # BLD: 2.6 K/UL (ref 0.9–3.6)
LYMPHOCYTES NFR BLD: 23 % (ref 21–52)
MCH RBC QN AUTO: 26.9 PG (ref 24–34)
MCHC RBC AUTO-ENTMCNC: 32.7 G/DL (ref 31–37)
MCV RBC AUTO: 82.3 FL (ref 74–97)
MONOCYTES # BLD: 0.6 K/UL (ref 0.05–1.2)
MONOCYTES NFR BLD: 6 % (ref 3–10)
NEUTS SEG # BLD: 7.8 K/UL (ref 1.8–8)
NEUTS SEG NFR BLD: 69 % (ref 40–73)
PLATELET # BLD AUTO: 245 K/UL (ref 135–420)
PMV BLD AUTO: 10.1 FL (ref 9.2–11.8)
POTASSIUM SERPL-SCNC: 3.5 MMOL/L (ref 3.5–5.5)
RBC # BLD AUTO: 5.02 M/UL (ref 4.2–5.3)
SODIUM SERPL-SCNC: 141 MMOL/L (ref 136–145)
WBC # BLD AUTO: 11.3 K/UL (ref 4.6–13.2)

## 2019-05-31 PROCEDURE — 73030 X-RAY EXAM OF SHOULDER: CPT

## 2019-05-31 PROCEDURE — A4565 SLINGS: HCPCS

## 2019-05-31 PROCEDURE — 80048 BASIC METABOLIC PNL TOTAL CA: CPT

## 2019-05-31 PROCEDURE — 99282 EMERGENCY DEPT VISIT SF MDM: CPT

## 2019-05-31 PROCEDURE — 85652 RBC SED RATE AUTOMATED: CPT

## 2019-05-31 PROCEDURE — 85025 COMPLETE CBC W/AUTO DIFF WBC: CPT

## 2019-05-31 RX ORDER — HYDROCODONE BITARTRATE AND ACETAMINOPHEN 5; 325 MG/1; MG/1
1 TABLET ORAL
Qty: 10 TAB | Refills: 0 | Status: SHIPPED | OUTPATIENT
Start: 2019-05-31 | End: 2019-06-03

## 2019-05-31 NOTE — LETTER
NOTIFICATION RETURN TO WORK / SCHOOL 
 
5/31/2019 11:49 PM 
 
Ms. Bogdan Florian [address] [facility] [address] To Whom It May Concern: 
 
Bogdan Florian is currently under the care of [address] [facility] EMERGENCY DEPT. She will return to work on light duty with accommodations made until she can be seen and cleared by her physicians. If there are questions or concerns please have the patient contact our office. Sincerely, Wade Wolfe MD

## 2019-06-01 NOTE — ED TRIAGE NOTES
Patient presents to ER for C/O left shoulder pain. States she received a tattoo about 3 weeks ago however shoulder has been painful since receiving tattoo. Denies any injury or trauma.

## 2019-06-01 NOTE — DISCHARGE INSTRUCTIONS
Your evaluation today showed reassuring shoulder x ray and inflammatory tests. Please follow up with a doctor as discussed. The evaluation and treatment done today requires that you follow up with a physician for re-evaluation. Not following up could result in chronic pain/disability/injury. Medical problems can change over time and symptoms can get worse or new symptoms can develop over time, therefore, it is important that you follow up as we discussed or return immedediately to the ER. Diseases don't read textbooks and there are limitations to our evaluation and testing, so please immediately return to the ER if you have any concerns. Call the ER if you have any questions about what we discussed. Please visit [employer] for discounts on any prescriptions you may have. At the [facility] Emergency Department we are genuinely concerned about your health and comfort. You may be selected to participate in a patient satisfaction survey mailed to your home. We are excited about the opportunity to learn from your experience so we may continue to improve. In striving for the very best we believe good is not good enough, but if you rate us as EXCELLENT in all boxes, we have succeeded. We strive to provide EXCELLENT care to you and your family. We appreciate the opportunity to take care of you, and hope you do well.

## 2019-06-04 ENCOUNTER — PATIENT OUTREACH (OUTPATIENT)
Dept: OTHER | Age: 39
End: 2019-06-04

## 2019-06-04 NOTE — LETTER
6/4/2019 12:46 PM 
 
Ms. Sabino Heller [address] [facility] [address] Dear June Pina My name is Thea Smith,  Employee Care Coordinator for [employer], and I have been trying to reach you. The Employee Care Management [facility]) program is a free-of-charge, confidential service provided to our employees and their family members covered by the [facility]. I can help you with care transitions such as when you come home from the hospital, when help is needed to manage your disease, or when you need assistance coordinating services or appointments. [facility] now partners with [facility]. If you are a qualifying employee, you may receive an additional 10 wellness incentive points for every month of active participation with an Employee Care Manager. As healthcare providers, we know that patients do better when they have close follow up with a primary care provider (PCP). I can help you find one that is convenient to you and covered by your insurance. I can also help you understand any after visit instructions, such as what symptoms to watch out for, or any new or changed medications. We can work together using your preferred communication -- telephone, email, [employer]. If you do not have a "[employer] account, I can help you request access. Our program is designed to provide you with the opportunity to have a [employer] care manager partner with you for your healthcare needs. Due to not being able to reach you, I am closing out the current program, but will remain available to you should you have any questions. Please contact me at the below number if I can provide you with assistance for any of the above services. Sincerely, 
 
Raffi Perkins LPN  [facility] Care Coordinator [address] [place] [address] [address] [facility] [phone]  F [phone]  [email or web address]

## 2019-06-11 ENCOUNTER — OFFICE VISIT (OUTPATIENT)
Dept: ORTHOPEDIC SURGERY | Age: 39
End: 2019-06-11

## 2019-06-11 ENCOUNTER — TELEPHONE (OUTPATIENT)
Dept: ORTHOPEDIC SURGERY | Age: 39
End: 2019-06-11

## 2019-06-11 VITALS
BODY MASS INDEX: 35.54 KG/M2 | SYSTOLIC BLOOD PRESSURE: 132 MMHG | TEMPERATURE: 97.4 F | OXYGEN SATURATION: 100 % | DIASTOLIC BLOOD PRESSURE: 91 MMHG | HEART RATE: 72 BPM | WEIGHT: 200.6 LBS | RESPIRATION RATE: 14 BRPM | HEIGHT: 63 IN

## 2019-06-11 DIAGNOSIS — M65.812 SYNOVITIS OF LEFT SHOULDER: Primary | ICD-10-CM

## 2019-06-11 RX ORDER — METHYLPREDNISOLONE 4 MG/1
TABLET ORAL
Qty: 1 DOSE PACK | Refills: 0 | Status: SHIPPED | OUTPATIENT
Start: 2019-06-11 | End: 2019-08-03 | Stop reason: ALTCHOICE

## 2019-06-11 RX ORDER — MELOXICAM 15 MG/1
15 TABLET ORAL
Qty: 30 TAB | Refills: 1 | Status: SHIPPED | OUTPATIENT
Start: 2019-06-11 | End: 2019-07-06

## 2019-06-11 NOTE — TELEPHONE ENCOUNTER
Patient was given a not for light duty but she needs specific restrictions listed for the L Shoulder.     Please call when ready [phone]    F/U 6/18/19  Last Appt 6/11/19

## 2019-06-11 NOTE — PROGRESS NOTES
Padmini Vazquez  1980   Chief Complaint   Patient presents with    Shoulder Pain     left shoulder pain        HISTORY OF PRESENT ILLNESS  Padmini Vazquez is a 44 y.o. female who presents today for evaluation of left shoulder pain. She rates her pain 8/10 today. Pain has been present for 1 week after getting a tatoo. She was seen at the [facility] on 5/31/19 and was given hydrocodone. Patient describes the pain as aching and burning that is Intermittent in nature. Symptoms are worse with certain movements of the arm, driving , Activity and is better with  Rest, Ice. Associated symptoms include stiffness. Since problem started, it: is unchanged. Pain does not wake patient up at night. Has taken no meds for the problem. Has tried following treatments: Injections:NO; Brace:NO;  Therapy:NO; Cane/Crutch:NO       Allergies   Allergen Reactions    Azo [Phenazopyridine] Hives    Iodinated Contrast- Oral And Iv Dye Shortness of Breath        Past Medical History:   Diagnosis Date    Asthma     Chronic pain 6 months    painful menses    Fibroids     Heart palpitations       Social History     Socioeconomic History    Marital status:      Spouse name: Not on file    Number of children: Not on file    Years of education: Not on file    Highest education level: Not on file   Occupational History     Employer: [address] [place] [address] Financial resource strain: Not on file    Food insecurity:     Worry: Not on file     Inability: Not on file    Transportation needs:     Medical: Not on file     Non-medical: Not on file   Tobacco Use    Smoking status: Former Smoker     Types: Cigarettes    Smokeless tobacco: Current User   Substance and Sexual Activity    Alcohol use: Yes     Comment: socially, 1 beer/weekend    Drug use: No    Sexual activity: Yes     Partners: Male     Birth control/protection: Surgical   Lifestyle    Physical activity:     Days per week: Not on file     Minutes per session: Not on file    Stress: Not on file   Relationships    Social connections:     Talks on phone: Not on file     Gets together: Not on file     Attends Denominational service: Not on file     Active member of club or organization: Not on file     Attends meetings of clubs or organizations: Not on file     Relationship status: Not on file    Intimate partner violence:     Fear of current or ex partner: Not on file     Emotionally abused: Not on file     Physically abused: Not on file     Forced sexual activity: Not on file   Other Topics Concern     Service Yes     Comment:  [employer]    Blood Transfusions No    Caffeine Concern No    Occupational Exposure No    Hobby Hazards No    Sleep Concern No    Stress Concern No    Weight Concern Yes     Comment: PT IS OVER WEIGHT    Special Diet No    Back Care No    Exercise Yes     Comment: 4 DAYS A WEEK    Bike Helmet No    Seat Belt Yes    Self-Exams Yes   Social History Narrative    Not on file      Past Surgical History:   Procedure Laterality Date    HX  SECTION  9001,3846    with BTL    HX HYSTEROSCOPY WITH ENDOMETRIAL ABLATION      No period since      Family History   Problem Relation Age of Onset    Hypertension Mother     Hypertension Father     Diabetes Father     Parkinson's Disease Father     Cancer Paternal Grandmother       Current Outpatient Medications   Medication Sig    budesonide-formoterol (SYMBICORT) 160-4.5 mcg/actuation HFAA TAKE 2 PUFFS BY INHALATION TWO (2) TIMES A DAY.  INDICATIONS: MAINTENANCE THERAPY FOR ASTHMA    ibuprofen (MOTRIN) 800 mg tablet 1 tab q 6-8 hours PRN pain    omeprazole (PRILOSEC) 20 mg capsule Take one tab po daily  Indications: gastroesophageal reflux disease    Nebulizer & Compressor machine Use as directed    Nebulizer Accessories kit Use as directed    albuterol (PROVENTIL) 5 mg/mL nebulizer solution 0.5 mL by Nebulization route every four (4) hours as needed for Wheezing or Shortness of Breath. Indications: Acute Asthma Attack, BRONCHOSPASM PREVENTION, EXERCISE-INDUCED BRONCHOSPASM PREVENTION     No current facility-administered medications for this visit. REVIEW OF SYSTEM   Patient denies: Weight loss, Fever/Chills, HA, Visual changes, Fatigue, Chest pain, SOB, Abdominal pain, N/V/D/C, Blood in stool or urine, Edema. Pertinent positive as above in HPI. All others were negative    PHYSICAL EXAM:   Visit Vitals  BP (!) 132/91   Pulse 72   Temp 97.4 °F (36.3 °C) (Oral)   Resp 14   Ht 5' 3\" (1.6 m)   Wt 200 lb 9.6 oz (91 kg)   SpO2 100%   BMI 35.53 kg/m²     The patient is a well-developed, well-nourished female   in no acute distress. The patient is alert and oriented times three. The patient is alert and oriented times three. Mood and affect are normal.  LYMPHATIC: lymph nodes are not enlarged and are within normal limits  SKIN: normal in color and non tender to palpation. There are no bruises or abrasions noted. NEUROLOGICAL: Motor sensory exam is within normal limits. Reflexes are equal bilaterally. There is normal sensation to pinprick and light touch  MUSCULOSKELETAL:  Examination Left shoulder   Skin Intact   AC joint tenderness -   Biceps tenderness -   Forward flexion/Elevation ROM 60   Active abduction    Glenohumeral abduction 90   External rotation ROM 45   Internal rotation ROM 30   Apprehension -   Danitas Relocation -   Jerk -   Load and Shift -   Obriens -   Speeds -   Impingement sign -   Supraspinatus/Empty Can -, 5/5   External Rotation Strength -, 5/5   Lift Off/Belly Press -, 5/5   Neurovascular Intact      IMAGING: XR of left shoulder dated 5/31/19 was reviewed and read:  IMPRESSION:  Negative shoulder. IMPRESSION:      ICD-10-CM ICD-9-CM    1. Synovitis of left shoulder M65.812 726.10 methylPREDNISolone (MEDROL DOSEPACK) 4 mg tablet      meloxicam (MOBIC) 15 mg tablet        PLAN:  1.  The patient presents today with left shoulder pain and I would like her to take a medrol dose pack for 1 week. Given work note for light duty. Risk factors include: n/a  2. No ultrasound exam indicated today  3. No cortisone injection indicated today   4. No Physical/Occupational Therapy indicated today  5. No diagnostic test indicated today:   6. No durable medical equipment indicated today  7. No referral indicated today   8. Yes medications indicated today: MEDROL DOSE PACK, MOBIC  9. No Narcotic indicated today     RTC 1 week    Scribed by Dotty Randhawa [address] [place] [address] [address]) as dictated by Shakeel Casillas MD    I, Dr. Shakeel Casillas, confirm that all documentation is accurate.     Shakeel Casillas M.D.   [facility]

## 2019-06-11 NOTE — TELEPHONE ENCOUNTER
Patient notified a new work note is provided for her with restrictions of no over head work or lifting above 15 lb.

## 2019-06-11 NOTE — PROGRESS NOTES
1. Have you been to the ER, urgent care clinic since your last visit? Hospitalized since your last visit? No    2. Have you seen or consulted any other health care providers outside of the [address] [place] [address] since your last visit? Include any pap smears or colon screening.  No

## 2019-06-11 NOTE — LETTER
NOTIFICATION RETURN TO WORK / SCHOOL 
 
6/11/2019 2:33 PM 
 
Ms. Sylwia Dunn [address] [facility] [address] To Whom It May Concern: 
 
Sylwia Dunn is currently under the care of [address] [place] [address] Paul Samuels. She was seen today, 6/11/19, at the office. Please excuse. If there are questions or concerns please have the patient contact our office. Sincerely, Carlitos Shelton MD

## 2019-06-11 NOTE — LETTER
NOTIFICATION RETURN TO WORK / SCHOOL 
 
6/11/2019 2:18 PM 
 
Ms. Chanda Monte [address] [facility] [address] To Whom It May Concern: 
 
Chanda Monte is currently under the care of [address] [place] [address] Paul Samuels. She will be on light duty at work x 1 week. If there are questions or concerns please have the patient contact our office. Sincerely, Gail Hoff MD

## 2019-06-11 NOTE — LETTER
NOTIFICATION RETURN TO WORK / SCHOOL 
 
6/11/2019 4:29 PM 
 
Ms. Dora Patel [address] [facility] [address] To Whom It May Concern: 
 
Dora Patel is currently under the care of [address] [place] [address] Paul Samuels. She will return to work. She will be on light duty for one week with the following restrictions: 
   
  
No overhead work or lifting greater than 15lbs If there are questions or concerns please have the patient contact our office. Sincerely, Val Barajas MD

## 2019-06-13 ENCOUNTER — OFFICE VISIT (OUTPATIENT)
Dept: FAMILY MEDICINE CLINIC | Age: 39
End: 2019-06-13

## 2019-06-13 ENCOUNTER — HOSPITAL ENCOUNTER (OUTPATIENT)
Dept: LAB | Age: 39
Discharge: HOME | End: 2019-06-13
Payer: COMMERCIAL

## 2019-06-13 VITALS
DIASTOLIC BLOOD PRESSURE: 85 MMHG | WEIGHT: 202 LBS | BODY MASS INDEX: 35.79 KG/M2 | HEART RATE: 65 BPM | OXYGEN SATURATION: 100 % | HEIGHT: 63 IN | RESPIRATION RATE: 17 BRPM | SYSTOLIC BLOOD PRESSURE: 130 MMHG | TEMPERATURE: 97.9 F

## 2019-06-13 DIAGNOSIS — Z01.84 IMMUNITY STATUS TESTING: ICD-10-CM

## 2019-06-13 DIAGNOSIS — Z11.1 ENCOUNTER FOR PPD TEST: ICD-10-CM

## 2019-06-13 DIAGNOSIS — Z00.00 ANNUAL PHYSICAL EXAM: Primary | ICD-10-CM

## 2019-06-13 DIAGNOSIS — J45.20 MILD INTERMITTENT ASTHMA WITHOUT COMPLICATION: ICD-10-CM

## 2019-06-13 DIAGNOSIS — E66.01 SEVERE OBESITY (HCC): ICD-10-CM

## 2019-06-13 PROBLEM — J45.909 ASTHMA: Status: ACTIVE | Noted: 2019-06-13

## 2019-06-13 PROCEDURE — 36415 COLL VENOUS BLD VENIPUNCTURE: CPT

## 2019-06-13 PROCEDURE — 86706 HEP B SURFACE ANTIBODY: CPT

## 2019-06-13 NOTE — PROGRESS NOTES
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 Patient has no complaint except from left shoulder tingling and pain after she had a tattoo about 1 month ago, and currently she is seeing orthopedics and she is taking meloxicam and prednisone pack. Patient brought a copy of her proof of immunity for MMR titer and varicella titer    Also she did have a Tdap vaccination. She will need PPD and hepatitis titer    Care everywhere has been reviewed patient had a lipid profile which was normal also last month she had a comprehensive metabolic panel and CBC and they were both within normal limits    Past Medical History:   Diagnosis Date    Asthma     Chronic pain 6 months    painful menses    Fibroids     Heart palpitations      Past Surgical History:   Procedure Laterality Date    HX  SECTION  [id number]    with BTL    HX HYSTEROSCOPY WITH ENDOMETRIAL ABLATION      No period since     Social History     Tobacco Use    Smoking status: Former Smoker     Types: Cigarettes    Smokeless tobacco: Current User   Substance Use Topics    Alcohol use: Yes     Comment: socially, 1 beer/weekend       Family History   Problem Relation Age of Onset    Hypertension Mother     Hypertension Father     Diabetes Father     Parkinson's Disease Father     Cancer Paternal Grandmother        Review of Systems   Constitutional: Negative for chills, fever, malaise/fatigue and weight loss. HENT: Negative for congestion, ear discharge, ear pain, hearing loss, nosebleeds, sinus pain and sore throat. Eyes: Negative for blurred vision, double vision and discharge.    Respiratory: Negative for cough, hemoptysis, sputum production, shortness of breath and wheezing. Cardiovascular: Negative for chest pain, palpitations, claudication and leg swelling. Gastrointestinal: Negative for abdominal pain, constipation, diarrhea, nausea and vomiting. Genitourinary: Negative for dysuria, frequency, hematuria and urgency. Musculoskeletal: Positive for myalgias. Negative for back pain, joint pain and neck pain. Skin: Negative for itching and rash. Neurological: Positive for tingling. Negative for dizziness, sensory change, speech change, focal weakness, seizures, weakness and headaches. Psychiatric/Behavioral: Negative for depression, hallucinations, substance abuse and suicidal ideas. The patient has insomnia. The patient is not nervous/anxious. Physical Exam   Constitutional: She is oriented to person, place, and time. She appears well-developed and well-nourished. No distress. HENT:   Head: Normocephalic and atraumatic. Mouth/Throat: Oropharynx is clear and moist. No oropharyngeal exudate. Eyes: Pupils are equal, round, and reactive to light. Conjunctivae are normal. Right eye exhibits no discharge. Left eye exhibits no discharge. No scleral icterus. Neck: Normal range of motion. Neck supple. No thyromegaly present. Cardiovascular: Normal rate, regular rhythm and normal heart sounds. No murmur heard. Pulmonary/Chest: Effort normal and breath sounds normal. No respiratory distress. She has no wheezes. She has no rales. She exhibits no tenderness. Abdominal: Soft. She exhibits no distension. There is no tenderness. There is no rebound. Musculoskeletal: Normal range of motion. She exhibits no edema, tenderness or deformity. Lymphadenopathy:     She has no cervical adenopathy. Neurological: She is alert and oriented to person, place, and time. No cranial nerve deficit. Coordination normal.   Skin: Skin is warm and dry. No rash noted. She is not diaphoretic.  No erythema. No pallor. Psychiatric: She has a normal mood and affect. Her behavior is normal. Judgment and thought content normal.   Nursing note and vitals reviewed. Assessment and plan     Plan of care has been discussed with the patient, he agrees to the plan and verbalized understanding. All his questions were answered  More than 50% of the time spent in this visit was counseling the patient about  illness and treatment options         1. Mild intermittent asthma without complication  Stable patient only on albuterol as needed    2. Severe obesity ([address]  Healthy lifestyle modification has been discussed with patient, she need to eliminate sugar intake and significant reduce carbohydrate and increase physical activity    3. Encounter for PPD test    - AMB POC TUBERCULOSIS, INTRADERMAL (SKIN TEST)    4. Immunity status testing    - HEP B SURFACE AB; Future    5. Annual physical exam      Current Outpatient Medications   Medication Sig Dispense Refill    methylPREDNISolone (MEDROL DOSEPACK) 4 mg tablet Per dose pack instructions 1 Dose Pack 0    meloxicam (MOBIC) 15 mg tablet Take 1 Tab by mouth daily (with breakfast). 30 Tab 1    budesonide-formoterol (SYMBICORT) 160-4.5 mcg/actuation HFAA TAKE 2 PUFFS BY INHALATION TWO (2) TIMES A DAY. INDICATIONS: MAINTENANCE THERAPY FOR ASTHMA 10.2 Inhaler 3    omeprazole (PRILOSEC) 20 mg capsule Take one tab po daily  Indications: gastroesophageal reflux disease 30 Cap 2    Nebulizer & Compressor machine Use as directed 1 Each 0    Nebulizer Accessories kit Use as directed 1 Kit 1    albuterol (PROVENTIL) 5 mg/mL nebulizer solution 0.5 mL by Nebulization route every four (4) hours as needed for Wheezing or Shortness of Breath.  Indications: Acute Asthma Attack, BRONCHOSPASM PREVENTION, EXERCISE-INDUCED BRONCHOSPASM PREVENTION 90 mL 3    ibuprofen (MOTRIN) 800 mg tablet 1 tab q 6-8 hours PRN pain 15 Tab 0       Patient Active Problem List    Diagnosis Date Noted  Asthma 06/13/2019    Severe obesity ([address] 10/10/2018    Palpitations 07/26/2016    Precordial pain 07/26/2016    Mild intermittent asthma 07/07/2014    Obesity 07/07/2014     Results for orders placed or performed during the hospital encounter of 05/31/19   CBC WITH AUTOMATED DIFF   Result Value Ref Range    WBC 11.3 4.6 - 13.2 K/uL    RBC 5.02 4.20 - 5.30 M/uL    HGB 13.5 12.0 - 16.0 g/dL    HCT 41.3 35.0 - 45.0 %    MCV 82.3 74.0 - 97.0 FL    MCH 26.9 24.0 - 34.0 PG    MCHC 32.7 31.0 - 37.0 g/dL    RDW 14.2 11.6 - 14.5 %    PLATELET 913 621 - 317 K/uL    MPV 10.1 9.2 - 11.8 FL    NEUTROPHILS 69 40 - 73 %    LYMPHOCYTES 23 21 - 52 %    MONOCYTES 6 3 - 10 %    EOSINOPHILS 2 0 - 5 %    BASOPHILS 0 0 - 2 %    ABS. NEUTROPHILS 7.8 1.8 - 8.0 K/UL    ABS. LYMPHOCYTES 2.6 0.9 - 3.6 K/UL    ABS. MONOCYTES 0.6 0.05 - 1.2 K/UL    ABS. EOSINOPHILS 0.2 0.0 - 0.4 K/UL    ABS.  BASOPHILS 0.0 0.0 - 0.1 K/UL    DF AUTOMATED     METABOLIC PANEL, BASIC   Result Value Ref Range    Sodium 141 136 - 145 mmol/L    Potassium 3.5 3.5 - 5.5 mmol/L    Chloride 105 100 - 108 mmol/L    CO2 32 21 - 32 mmol/L    Anion gap 4 3.0 - 18 mmol/L    Glucose 89 74 - 99 mg/dL    BUN 8 7.0 - 18 MG/DL    Creatinine 0.88 0.6 - 1.3 MG/DL    BUN/Creatinine ratio 9 (L) 12 - 20      GFR est AA >60 >60 ml/min/1.73m2    GFR est non-AA >60 >60 ml/min/1.73m2    Calcium 9.3 8.5 - 10.1 MG/DL   SED RATE (ESR)   Result Value Ref Range    Sed rate, automated 14 0 - 20 mm/hr     Admission on 05/31/2019, Discharged on 05/31/2019   Component Date Value Ref Range Status    WBC 05/31/2019 11.3  4.6 - 13.2 K/uL Final    RBC 05/31/2019 5.02  4.20 - 5.30 M/uL Final    HGB 05/31/2019 13.5  12.0 - 16.0 g/dL Final    HCT 05/31/2019 41.3  35.0 - 45.0 % Final    MCV 05/31/2019 82.3  74.0 - 97.0 FL Final    MCH 05/31/2019 26.9  24.0 - 34.0 PG Final    MCHC 05/31/2019 32.7  31.0 - 37.0 g/dL Final    RDW 05/31/2019 14.2  11.6 - 14.5 % Final    PLATELET 75/91/9756 926 135 - 420 K/uL Final    MPV 05/31/2019 10.1  9.2 - 11.8 FL Final    NEUTROPHILS 05/31/2019 69  40 - 73 % Final    LYMPHOCYTES 05/31/2019 23  21 - 52 % Final    MONOCYTES 05/31/2019 6  3 - 10 % Final    EOSINOPHILS 05/31/2019 2  0 - 5 % Final    BASOPHILS 05/31/2019 0  0 - 2 % Final    ABS. NEUTROPHILS 05/31/2019 7.8  1.8 - 8.0 K/UL Final    ABS. LYMPHOCYTES 05/31/2019 2.6  0.9 - 3.6 K/UL Final    ABS. MONOCYTES 05/31/2019 0.6  0.05 - 1.2 K/UL Final    ABS. EOSINOPHILS 05/31/2019 0.2  0.0 - 0.4 K/UL Final    ABS. BASOPHILS 05/31/2019 0.0  0.0 - 0.1 K/UL Final    DF 05/31/2019 AUTOMATED    Final    Sodium 05/31/2019 141  136 - 145 mmol/L Final    Potassium 05/31/2019 3.5  3.5 - 5.5 mmol/L Final    Chloride 05/31/2019 105  100 - 108 mmol/L Final    CO2 05/31/2019 32  21 - 32 mmol/L Final    Anion gap 05/31/2019 4  3.0 - 18 mmol/L Final    Glucose 05/31/2019 89  74 - 99 mg/dL Final    BUN 05/31/2019 8  7.0 - 18 MG/DL Final    Creatinine 05/31/2019 0.88  0.6 - 1.3 MG/DL Final    BUN/Creatinine ratio 05/31/2019 9* 12 - 20   Final    GFR est AA 05/31/2019 >60  >60 ml/min/1.73m2 Final    GFR est non-AA 05/31/2019 >60  >60 ml/min/1.73m2 Final    Comment: (NOTE)  Estimated GFR is calculated using the Modification of Diet in Renal   Disease (MDRD) Study equation, reported for both  Americans   (GFRAA) and non- Americans (GFRNA), and normalized to 1.73m2   body surface area. The physician must decide which value applies to   the patient. The MDRD study equation should only be used in   individuals age 25 or older. It has not been validated for the   following: pregnant women, patients with serious comorbid conditions,   or on certain medications, or persons with extremes of body size,   muscle mass, or nutritional status.       Calcium 05/31/2019 9.3  8.5 - 10.1 MG/DL Final    Sed rate, automated 05/31/2019 14  0 - 20 mm/hr Final

## 2019-06-14 LAB
HBV SURFACE AB SER QL IA: POSITIVE
HBV SURFACE AB SERPL IA-ACNC: 252.3 MIU/ML
HEP BS AB COMMENT,HBSAC: NORMAL

## 2019-06-15 ENCOUNTER — CLINICAL SUPPORT (OUTPATIENT)
Dept: FAMILY MEDICINE CLINIC | Age: 39
End: 2019-06-15

## 2019-06-15 DIAGNOSIS — Z11.1 ENCOUNTER FOR PPD SKIN TEST READING: Primary | ICD-10-CM

## 2019-06-15 LAB
MM INDURATION POC: 0 MM (ref 0–5)
PPD POC: NEGATIVE NEGATIVE

## 2019-06-15 NOTE — PROGRESS NOTES
PPD Reading Note  PPD read and results entered in [facility]. Result: 0mm mm induration.   Interpretation: negative  If test not read within 48-72 hours of initial placement, patient advised to repeat in other arm 1-3 weeks after this test.  Allergic reaction: no

## 2019-06-24 ENCOUNTER — CLINICAL SUPPORT (OUTPATIENT)
Dept: FAMILY MEDICINE CLINIC | Age: 39
End: 2019-06-24

## 2019-06-24 VITALS
RESPIRATION RATE: 18 BRPM | SYSTOLIC BLOOD PRESSURE: 134 MMHG | TEMPERATURE: 98.5 F | OXYGEN SATURATION: 98 % | DIASTOLIC BLOOD PRESSURE: 80 MMHG | HEART RATE: 70 BPM

## 2019-06-24 DIAGNOSIS — Z11.1 ENCOUNTER FOR PPD TEST: Primary | ICD-10-CM

## 2019-06-24 NOTE — PROGRESS NOTES
PPD Placement note  Adrian Rollins, 44 y.o. 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 Yes  Is patient taking any oral or IV steroid medication now or have they taken it in the last month? no  Has the patient ever received the BCG vaccine?: no  Has the patient been in recent contact with anyone known or suspected of having active TB disease?: no       Date of exposure (if applicable): n/a       Name of person they were exposed to (if applicable): n/a  Patient's Country of origin?: [place]  O: Alert and oriented in NAD. P:  PPD placed on 6/24/2019. Patient advised to return for reading within 48-72 hours.

## 2019-06-26 ENCOUNTER — CLINICAL SUPPORT (OUTPATIENT)
Dept: FAMILY MEDICINE CLINIC | Age: 39
End: 2019-06-26

## 2019-06-26 DIAGNOSIS — Z11.1 ENCOUNTER FOR PPD SKIN TEST READING: Primary | ICD-10-CM

## 2019-06-26 LAB
MM INDURATION POC: 0 MM (ref 0–5)
PPD POC: NEGATIVE NEGATIVE

## 2019-06-26 NOTE — PROGRESS NOTES
PPD Reading Note  PPD read and results entered in [facility]. Result: 0 mm induration.   Interpretation: negative  If test not read within 48-72 hours of initial placement, patient advised to repeat in other arm 1-3 weeks after this test.  Allergic reaction: no

## 2019-07-06 ENCOUNTER — APPOINTMENT (OUTPATIENT)
Dept: GENERAL RADIOLOGY | Age: 39
End: 2019-07-06
Attending: EMERGENCY MEDICINE
Payer: COMMERCIAL

## 2019-07-06 ENCOUNTER — HOSPITAL ENCOUNTER (EMERGENCY)
Age: 39
Discharge: HOME | End: 2019-07-06
Attending: EMERGENCY MEDICINE
Payer: COMMERCIAL

## 2019-07-06 VITALS
HEART RATE: 55 BPM | DIASTOLIC BLOOD PRESSURE: 77 MMHG | OXYGEN SATURATION: 100 % | RESPIRATION RATE: 18 BRPM | WEIGHT: 190 LBS | HEIGHT: 63 IN | BODY MASS INDEX: 33.66 KG/M2 | SYSTOLIC BLOOD PRESSURE: 120 MMHG | TEMPERATURE: 98.7 F

## 2019-07-06 DIAGNOSIS — M79.604 PAIN OF RIGHT LOWER EXTREMITY: ICD-10-CM

## 2019-07-06 DIAGNOSIS — M65.812 SYNOVITIS OF LEFT SHOULDER: ICD-10-CM

## 2019-07-06 DIAGNOSIS — M79.631 PAIN OF RIGHT FOREARM: Primary | ICD-10-CM

## 2019-07-06 DIAGNOSIS — R07.89 ATYPICAL CHEST PAIN: ICD-10-CM

## 2019-07-06 LAB
ALBUMIN SERPL-MCNC: 3.4 G/DL (ref 3.4–5)
ALBUMIN/GLOB SERPL: 0.9 {RATIO} (ref 0.8–1.7)
ALP SERPL-CCNC: 81 U/L (ref 45–117)
ALT SERPL-CCNC: 25 U/L (ref 13–56)
ANION GAP SERPL CALC-SCNC: 7 MMOL/L (ref 3–18)
AST SERPL-CCNC: 16 U/L (ref 15–37)
BASOPHILS # BLD: 0.1 K/UL (ref 0–0.1)
BASOPHILS NFR BLD: 1 % (ref 0–2)
BILIRUB SERPL-MCNC: 0.4 MG/DL (ref 0.2–1)
BUN SERPL-MCNC: 9 MG/DL (ref 7–18)
BUN/CREAT SERPL: 10 (ref 12–20)
CALCIUM SERPL-MCNC: 8.7 MG/DL (ref 8.5–10.1)
CHLORIDE SERPL-SCNC: 107 MMOL/L (ref 100–108)
CK MB CFR SERPL CALC: 0.9 % (ref 0–4)
CK MB SERPL-MCNC: 1.1 NG/ML (ref 5–25)
CK SERPL-CCNC: 120 U/L (ref 26–192)
CO2 SERPL-SCNC: 29 MMOL/L (ref 21–32)
CREAT SERPL-MCNC: 0.86 MG/DL (ref 0.6–1.3)
D DIMER PPP FEU-MCNC: 0.33 UG/ML(FEU)
DIFFERENTIAL METHOD BLD: NORMAL
EOSINOPHIL # BLD: 0.2 K/UL (ref 0–0.4)
EOSINOPHIL NFR BLD: 2 % (ref 0–5)
ERYTHROCYTE [DISTWIDTH] IN BLOOD BY AUTOMATED COUNT: 14.3 % (ref 11.6–14.5)
GLOBULIN SER CALC-MCNC: 3.8 G/DL (ref 2–4)
GLUCOSE SERPL-MCNC: 75 MG/DL (ref 74–99)
HCT VFR BLD AUTO: 39.6 % (ref 35–45)
HGB BLD-MCNC: 12.9 G/DL (ref 12–16)
LYMPHOCYTES # BLD: 3.2 K/UL (ref 0.9–3.6)
LYMPHOCYTES NFR BLD: 30 % (ref 21–52)
MCH RBC QN AUTO: 27 PG (ref 24–34)
MCHC RBC AUTO-ENTMCNC: 32.6 G/DL (ref 31–37)
MCV RBC AUTO: 82.8 FL (ref 74–97)
MONOCYTES # BLD: 0.6 K/UL (ref 0.05–1.2)
MONOCYTES NFR BLD: 6 % (ref 3–10)
NEUTS SEG # BLD: 6.6 K/UL (ref 1.8–8)
NEUTS SEG NFR BLD: 61 % (ref 40–73)
PLATELET # BLD AUTO: 252 K/UL (ref 135–420)
PMV BLD AUTO: 9.8 FL (ref 9.2–11.8)
POTASSIUM SERPL-SCNC: 3.4 MMOL/L (ref 3.5–5.5)
PROT SERPL-MCNC: 7.2 G/DL (ref 6.4–8.2)
RBC # BLD AUTO: 4.78 M/UL (ref 4.2–5.3)
SODIUM SERPL-SCNC: 143 MMOL/L (ref 136–145)
TROPONIN I SERPL-MCNC: <0.02 NG/ML (ref 0–0.04)
WBC # BLD AUTO: 10.7 K/UL (ref 4.6–13.2)

## 2019-07-06 PROCEDURE — 80053 COMPREHEN METABOLIC PANEL: CPT

## 2019-07-06 PROCEDURE — 99283 EMERGENCY DEPT VISIT LOW MDM: CPT

## 2019-07-06 PROCEDURE — 74011250636 HC RX REV CODE- 250/636: Performed by: EMERGENCY MEDICINE

## 2019-07-06 PROCEDURE — 85025 COMPLETE CBC W/AUTO DIFF WBC: CPT

## 2019-07-06 PROCEDURE — 93005 ELECTROCARDIOGRAM TRACING: CPT

## 2019-07-06 PROCEDURE — 96374 THER/PROPH/DIAG INJ IV PUSH: CPT

## 2019-07-06 PROCEDURE — 71046 X-RAY EXAM CHEST 2 VIEWS: CPT

## 2019-07-06 PROCEDURE — 85379 FIBRIN DEGRADATION QUANT: CPT

## 2019-07-06 PROCEDURE — 82550 ASSAY OF CK (CPK): CPT

## 2019-07-06 RX ORDER — MELOXICAM 15 MG/1
15 TABLET ORAL
Qty: 20 TAB | Refills: 1 | Status: SHIPPED | OUTPATIENT
Start: 2019-07-06 | End: 2019-10-18

## 2019-07-06 RX ORDER — KETOROLAC TROMETHAMINE 30 MG/ML
15 INJECTION, SOLUTION INTRAMUSCULAR; INTRAVENOUS
Status: COMPLETED | OUTPATIENT
Start: 2019-07-06 | End: 2019-07-06

## 2019-07-06 RX ADMIN — KETOROLAC TROMETHAMINE 15 MG: 30 INJECTION INTRAMUSCULAR; INTRAVENOUS at 07:36

## 2019-07-06 NOTE — ED NOTES
Assumed care of patient. No ss distress. Resting comfortably on stretcher with lights dimmed and call bell within reach. Will continue to monitor closely while awaiting further orders.

## 2019-07-06 NOTE — DISCHARGE INSTRUCTIONS
1.  Chest x-ray, EKG, heart muscle enzyme test (troponin), and screening test for blood clot (D dimer) are all normal.  2.  Your pain is likely musculoskeletal in nature. .  This is best addressed with anti-inflammatory medicines. Resume meloxicam as prescribed. 3.  Follow-up with your primary care physician for recheck of the symptoms next week. 4.  Return to the emergency department for sudden shortness of breath, acutely worsening chest pain, high fever, productive cough, coughing up blood.

## 2019-07-06 NOTE — ED PROVIDER NOTES
28-year-old female history of asthma and GERD presents for evaluation of multiple complaints. Patient notes atraumatic right calf pain for a week. She denies any recent travel, immobility, or past history of DVT. The calf is not been swollen. She describes an achy pain that awoke her from sleep about a week ago and is been uncomfortable ever since. She had some diffuse headache yesterday which is subsequently resolved. Last night she again awoke but this time with pain in the right forearm and right side of the chest.  Chest pain is worse with inspiration. She denies any cough. She is not short of breath at rest.  She has had borderline hypertension readings in the past but otherwise has no cardiac risk factors other than occasional cigarette smoking. She denies abdominal pain. The discomfort in the forearm and chest is described as an \"ache\".   She has recent history of left shoulder pain which is now better and other various musculoskeletal complaints in the past.           Past Medical History:   Diagnosis Date    Asthma     Chronic pain 6 months    painful menses    Fibroids     GERD (gastroesophageal reflux disease)     Heart palpitations        Past Surgical History:   Procedure Laterality Date    HX  SECTION  ,    with BTL    HX HYSTEROSCOPY WITH ENDOMETRIAL ABLATION      No period since         Family History:   Problem Relation Age of Onset    Hypertension Mother     Hypertension Father     Diabetes Father     Parkinson's Disease Father     Cancer Paternal Grandmother        Social History     Socioeconomic History    Marital status:      Spouse name: Not on file    Number of children: Not on file    Years of education: Not on file    Highest education level: Not on file   Occupational History     Employer: [employer]   Social Needs    Financial resource strain: Not on file    Food insecurity:     Worry: Not on file     Inability: Not on file   [facility] Transportation needs:     Medical: Not on file     Non-medical: Not on file   Tobacco Use    Smoking status: Former Smoker     Types: Cigarettes    Smokeless tobacco: Current User   Substance and Sexual Activity    Alcohol use: Yes     Comment: socially, 1 beer/weekend    Drug use: No    Sexual activity: Yes     Partners: Male     Birth control/protection: Surgical   Lifestyle    Physical activity:     Days per week: Not on file     Minutes per session: Not on file    Stress: Not on file   Relationships    Social connections:     Talks on phone: Not on file     Gets together: Not on file     Attends Zoroastrianism service: Not on file     Active member of club or organization: Not on file     Attends meetings of clubs or organizations: Not on file     Relationship status: Not on file    Intimate partner violence:     Fear of current or ex partner: Not on file     Emotionally abused: Not on file     Physically abused: Not on file     Forced sexual activity: Not on file   Other Topics Concern     Service Yes     Comment:  Work in Field    Blood Transfusions No    Caffeine Concern No    Occupational Exposure No    Hobby Hazards No    Sleep Concern No    Stress Concern No    Weight Concern Yes     Comment: PT IS OVER WEIGHT    Special Diet No    Back Care No    Exercise Yes     Comment: 4 DAYS A WEEK    Bike Helmet No    Seat Belt Yes    Self-Exams Yes   Social History Narrative    Not on file         ALLERGIES: Azo [phenazopyridine] and Iodinated contrast- oral and iv dye    Review of Systems   Respiratory: Negative for shortness of breath. Cardiovascular: Positive for chest pain. Negative for leg swelling. Gastrointestinal: Negative for abdominal pain. Musculoskeletal: Positive for myalgias. All other systems reviewed and are negative.       Vitals:    07/06/19 0642   BP: 135/83   Pulse: 62   Resp: 22   Temp: 98.7 °F (37.1 °C)   SpO2: 100%   Weight: 86.2 kg (190 lb)   Height: 5' 3\" (1.6 m) Physical Exam   Constitutional: She is oriented to person, place, and time. She appears well-developed and well-nourished. No distress. HENT:   Head: Normocephalic and atraumatic. Nose: Nose normal.   Eyes: Right eye exhibits no discharge. Left eye exhibits no discharge. No scleral icterus. Neck: Neck supple. No JVD present. Cardiovascular: Normal rate, regular rhythm and normal heart sounds. Exam reveals no gallop and no friction rub. No murmur heard. Pulmonary/Chest: Effort normal and breath sounds normal. No respiratory distress. She has no wheezes. She has no rales. She exhibits no tenderness. Abdominal: Soft. There is no tenderness. There is no rebound and no guarding. Musculoskeletal: She exhibits no edema or tenderness. The right calf is tender to palpation without palpable cord. There is no associated swelling. Distal pulses are 2+ and symmetric bilaterally. Right forearm has mild tenderness over the proximal muscles without any tenderness to either wrist or elbow. Normal range of motion and pain is not as not exacerbated with movement of the wrist or forearm. No findings of cellulitis. Neurological: She is alert and oriented to person, place, and time. She exhibits normal muscle tone. Coordination normal.   Skin: Skin is warm and dry. No rash noted. She is not diaphoretic. Psychiatric: She has a normal mood and affect. Nursing note and vitals reviewed. EKG interpreted per myself at 07:05 reveals a normal sinus rhythm, rate of 65. Intervals and axis are within normal limits. There are no ST segment elevations. Interpretation: Within normal limits. Chest x-ray interpreted per myself is negative for acute pathology.     Recent Results (from the past 12 hour(s))   EKG, 12 LEAD, INITIAL    Collection Time: 07/06/19  6:42 AM   Result Value Ref Range    Ventricular Rate 65 BPM    Atrial Rate 65 BPM    P-R Interval 136 ms    QRS Duration 58 ms    Q-T Interval 380 ms QTC Calculation (Bezet) 395 ms    Calculated P Axis 75 degrees    Calculated R Axis 41 degrees    Calculated T Axis 43 degrees    Diagnosis       Sinus rhythm with fusion complexes  Otherwise normal ECG  When compared with ECG of 16-OCT-2018 15:15,  fusion complexes are now present  premature ventricular complexes are no longer present     CBC WITH AUTOMATED DIFF    Collection Time: 07/06/19  6:43 AM   Result Value Ref Range    WBC 10.7 4.6 - 13.2 K/uL    RBC 4.78 4.20 - 5.30 M/uL    HGB 12.9 12.0 - 16.0 g/dL    HCT 39.6 35.0 - 45.0 %    MCV 82.8 74.0 - 97.0 FL    MCH 27.0 24.0 - 34.0 PG    MCHC 32.6 31.0 - 37.0 g/dL    RDW 14.3 11.6 - 14.5 %    PLATELET 274 488 - 765 K/uL    MPV 9.8 9.2 - 11.8 FL    NEUTROPHILS 61 40 - 73 %    LYMPHOCYTES 30 21 - 52 %    MONOCYTES 6 3 - 10 %    EOSINOPHILS 2 0 - 5 %    BASOPHILS 1 0 - 2 %    ABS. NEUTROPHILS 6.6 1.8 - 8.0 K/UL    ABS. LYMPHOCYTES 3.2 0.9 - 3.6 K/UL    ABS. MONOCYTES 0.6 0.05 - 1.2 K/UL    ABS. EOSINOPHILS 0.2 0.0 - 0.4 K/UL    ABS. BASOPHILS 0.1 0.0 - 0.1 K/UL    DF AUTOMATED     METABOLIC PANEL, COMPREHENSIVE    Collection Time: 07/06/19  6:43 AM   Result Value Ref Range    Sodium 143 136 - 145 mmol/L    Potassium 3.4 (L) 3.5 - 5.5 mmol/L    Chloride 107 100 - 108 mmol/L    CO2 29 21 - 32 mmol/L    Anion gap 7 3.0 - 18 mmol/L    Glucose 75 74 - 99 mg/dL    BUN 9 7.0 - 18 MG/DL    Creatinine 0.86 0.6 - 1.3 MG/DL    BUN/Creatinine ratio 10 (L) 12 - 20      GFR est AA >60 >60 ml/min/1.73m2    GFR est non-AA >60 >60 ml/min/1.73m2    Calcium 8.7 8.5 - 10.1 MG/DL    Bilirubin, total 0.4 0.2 - 1.0 MG/DL    ALT (SGPT) 25 13 - 56 U/L    AST (SGOT) 16 15 - 37 U/L    Alk.  phosphatase 81 45 - 117 U/L    Protein, total 7.2 6.4 - 8.2 g/dL    Albumin 3.4 3.4 - 5.0 g/dL    Globulin 3.8 2.0 - 4.0 g/dL    A-G Ratio 0.9 0.8 - 1.7     CARDIAC PANEL,(CK, CKMB & TROPONIN)    Collection Time: 07/06/19  6:43 AM   Result Value Ref Range     26 - 192 U/L    CK - MB 1.1 <3.6 ng/ml CK-MB Index 0.9 0.0 - 4.0 %    Troponin-I, QT <0.02 0.0 - 0.045 NG/ML   D DIMER    Collection Time: 07/06/19  6:43 AM   Result Value Ref Range    D DIMER 0.33 <0.46 ug/ml(FEU)       MDM  Number of Diagnoses or Management Options  Diagnosis management comments: Impression: Various aches and pains across the extremities and right side of the chest.  By history and exam this is likely musculoskeletal in nature. Given the calf tenderness, however, need to consider possibility of DVT/PE. Pt is PERC negative with normal d-dimer. Screening EKG is normal.  Chest x-ray and labs, including troponin are normal. Symptomatic treatment. Procedures    Diagnosis:   1. Pain of right forearm    2. Pain of right lower extremity    3. Atypical chest pain    4. Synovitis of left shoulder      1. Chest x-ray, EKG, heart muscle enzyme test (troponin), and screening test for blood clot (D dimer) are all normal.  2.  Your pain is likely musculoskeletal in nature. .  This is best addressed with anti-inflammatory medicines. Resume meloxicam as prescribed. 3.  Follow-up with your primary care physician for recheck of the symptoms next week. 4.  Return to the emergency department for sudden shortness of breath, acutely worsening chest pain, high fever, productive cough, coughing up blood.     Disposition: home      Follow-up Information     Follow up With Specialties Details Why [facility]    [address]  [address] Jonathan Stella Valencia NP Nurse Practitioner Schedule an appointment as soon as possible for a visit in 1 week As needed, If symptoms persist [address] [place] [address]  [facility] [address] [place]      [address] [facility] EMERGENCY DEPT Emergency Medicine  If symptoms worsen [address] [facility]  [phone]          Patient's Medications   Start Taking    No medications on file   Continue Taking ALBUTEROL (PROVENTIL) 5 MG/ML NEBULIZER SOLUTION    0.5 mL by Nebulization route every four (4) hours as needed for Wheezing or Shortness of Breath. Indications: Acute Asthma Attack, BRONCHOSPASM PREVENTION, EXERCISE-INDUCED BRONCHOSPASM PREVENTION    BUDESONIDE-FORMOTEROL (SYMBICORT) 160-4.5 MCG/ACTUATION HFAA    TAKE 2 PUFFS BY INHALATION TWO (2) TIMES A DAY. INDICATIONS: MAINTENANCE THERAPY FOR ASTHMA    IBUPROFEN (MOTRIN) 800 MG TABLET    1 tab q 6-8 hours PRN pain    METHYLPREDNISOLONE (MEDROL DOSEPACK) 4 MG TABLET    Per dose pack instructions    NEBULIZER & COMPRESSOR MACHINE    Use as directed    NEBULIZER ACCESSORIES KIT    Use as directed    OMEPRAZOLE (PRILOSEC) 20 MG CAPSULE    Take one tab po daily  Indications: gastroesophageal reflux disease   These Medications have changed    Modified Medication Previous Medication    MELOXICAM (MOBIC) 15 MG TABLET meloxicam (MOBIC) 15 mg tablet       Take 1 Tab by mouth daily (with breakfast). Take 1 Tab by mouth daily (with breakfast).    Stop Taking    No medications on file

## 2019-07-07 LAB
ATRIAL RATE: 65 BPM
CALCULATED P AXIS, ECG09: 75 DEGREES
CALCULATED R AXIS, ECG10: 41 DEGREES
CALCULATED T AXIS, ECG11: 43 DEGREES
DIAGNOSIS, 93000: NORMAL
P-R INTERVAL, ECG05: 136 MS
Q-T INTERVAL, ECG07: 380 MS
QRS DURATION, ECG06: 58 MS
QTC CALCULATION (BEZET), ECG08: 395 MS
VENTRICULAR RATE, ECG03: 65 BPM

## 2019-07-08 ENCOUNTER — PATIENT OUTREACH (OUTPATIENT)
Dept: OTHER | Age: 39
End: 2019-07-08

## 2019-07-08 NOTE — PROGRESS NOTES
Initial HPRP:   Patient on report as discharged from [address] [facility] ED Visit 7/6/19 for Pain of Right Forearm. Initial attempt to contact patient for transitions of care.  Left discreet message on voicemail with this Care Coordinator's contact information.  Will attempt outreach on 7/9/19.      Changed medications:  meloxicam 15 mg tablet Take 1 Tab by mouth daily (with breakfast)    Call 911 anytime you think you may need emergency care. For example, call if:   You have chest pain or pressure. This may occur with:  ? Sweating. ? Shortness of breath. ? Nausea or vomiting. ? Pain that spreads from the chest to the neck, jaw, or one or both shoulders or arms. ? Dizziness or lightheadedness. ? A fast or uneven pulse. After calling 911, chew 1 adult-strength aspirin. Wait for an ambulance. Do not try to drive  yourself.  You have sudden chest pain and shortness of breath, or you cough up blood. Call your doctor now or seek immediate medical care if:  You have any trouble breathing.  Your chest pain gets worse.  Your chest pain occurs consistently with exercise and is relieved by rest.   Your chest pain does not get better after 1 week.

## 2019-07-09 ENCOUNTER — PATIENT OUTREACH (OUTPATIENT)
Dept: OTHER | Age: 39
End: 2019-07-09

## 2019-07-09 NOTE — LETTER
7/9/2019 11:38 AM 
 
Ms. Waleska Rivero [address] [facility] [address] Dear Waleska Rivero My name is Jimbo Hernandez, Employee Care Coordinator for [address] [place] [address] and I have been trying to reach you. The Employee Care Management [facility]) program is a free-of-charge confidential service provided to our employees and their family members covered by the [facility]. The program will provide an employee and his/her family with the [facility] expertise to assist in navigating the health care delivery system, provider services, and their overall care needsso as to assure and improve health care interactions and enhance the quality of life. This program is designed to provide you with the opportunity to have a [address] [place] [address] care manager partner with you for the following services: 
 
 1) when you come home from the hospital or emergency room 2) when help is needed to manage your disease 3) when you need assistance coordinating services or appointments [facility] now partners with [facility]. If you are a qualifying employee, you may receive an additional 10 wellness incentive points for every month of active participation with an Employee Care Manager. [address] [place] [address] is dedicated to empowering the good health of its community and improving the quality of care and care experiences for employees and their families. We are committed to safeguarding patient confidentiality and privacy, assuring that every employee has the respect he or she deserves in managing their health. The information shared with your care manager will not be shared with anyone else aside from those you identify as part of your care team, and will only be used to assist you with any identified care needs. Please contact me if you would like this service provided to you. Sincerely, 
 
Kami Theodore LPN  [facility] Care Coordinator [address] [place] [address], [address] [place] [address] [address] [facility] C [phone]  F [phone]  [email or web address]

## 2019-07-09 NOTE — PROGRESS NOTES
Patient identified as eligible for [address] [place] [address] services. Second telephone outreach attempted. Left discreet voicemail with this CM confidential contact information. Will send UTR letter via MyChart/Mail. Next Outreach 7/25/19 f/u - Pain of Right Forearm, ? PCP.

## 2019-07-25 ENCOUNTER — PATIENT OUTREACH (OUTPATIENT)
Dept: OTHER | Age: 39
End: 2019-07-25

## 2019-07-25 NOTE — PROGRESS NOTES
HPRP f/u:  Telephone attempt to contact patient for Health Promotion and Risk Prevention. Left discreet message on voicemail with this CC contact information. Will follow for one month for transitions of care needs. Next outreach is 8/9/19 for discussion f/u - Pain of Right Forearm and Resolve Episode.

## 2019-08-03 ENCOUNTER — HOSPITAL ENCOUNTER (OUTPATIENT)
Dept: LAB | Age: 39
Discharge: HOME | End: 2019-08-03
Payer: COMMERCIAL

## 2019-08-03 ENCOUNTER — OFFICE VISIT (OUTPATIENT)
Dept: FAMILY MEDICINE CLINIC | Age: 39
End: 2019-08-03

## 2019-08-03 VITALS
HEART RATE: 78 BPM | DIASTOLIC BLOOD PRESSURE: 86 MMHG | RESPIRATION RATE: 20 BRPM | TEMPERATURE: 98.1 F | WEIGHT: 198 LBS | OXYGEN SATURATION: 97 % | SYSTOLIC BLOOD PRESSURE: 139 MMHG | BODY MASS INDEX: 35.08 KG/M2 | HEIGHT: 63 IN

## 2019-08-03 DIAGNOSIS — R06.2 WHEEZING: ICD-10-CM

## 2019-08-03 DIAGNOSIS — R39.9 UTI SYMPTOMS: Primary | ICD-10-CM

## 2019-08-03 DIAGNOSIS — R30.0 DYSURIA: ICD-10-CM

## 2019-08-03 DIAGNOSIS — J45.20 MILD INTERMITTENT ASTHMA WITHOUT COMPLICATION: ICD-10-CM

## 2019-08-03 DIAGNOSIS — R39.9 UTI SYMPTOMS: ICD-10-CM

## 2019-08-03 LAB
BILIRUB UR QL STRIP: NEGATIVE
GLUCOSE UR-MCNC: NEGATIVE MG/DL
KETONES P FAST UR STRIP-MCNC: NEGATIVE MG/DL
PH UR STRIP: 6 [PH] (ref 4.6–8)
PROT UR QL STRIP: NEGATIVE
SP GR UR STRIP: 1.03 (ref 1–1.03)
UA UROBILINOGEN AMB POC: NORMAL (ref 0.2–1)
URINALYSIS CLARITY POC: CLEAR
URINALYSIS COLOR POC: YELLOW
URINE BLOOD POC: NORMAL
URINE LEUKOCYTES POC: NEGATIVE
URINE NITRITES POC: NEGATIVE

## 2019-08-03 PROCEDURE — 87086 URINE CULTURE/COLONY COUNT: CPT

## 2019-08-03 RX ORDER — METHYLPREDNISOLONE 4 MG/1
TABLET ORAL
Qty: 1 DOSE PACK | Refills: 0 | Status: SHIPPED | OUTPATIENT
Start: 2019-08-03 | End: 2019-10-18

## 2019-08-03 RX ORDER — BISMUTH SUBSALICYLATE 262 MG
1 TABLET,CHEWABLE ORAL DAILY
COMMUNITY
End: 2019-12-22

## 2019-08-03 RX ORDER — NITROFURANTOIN 25; 75 MG/1; MG/1
100 CAPSULE ORAL 2 TIMES DAILY
Qty: 14 CAP | Refills: 0 | Status: SHIPPED | OUTPATIENT
Start: 2019-08-03 | End: 2019-08-10

## 2019-08-03 RX ORDER — FLUCONAZOLE 150 MG/1
150 TABLET ORAL DAILY
Qty: 1 TAB | Refills: 0 | Status: SHIPPED | OUTPATIENT
Start: 2019-08-03 | End: 2019-08-04

## 2019-08-03 RX ORDER — ALBUTEROL SULFATE 90 UG/1
2 AEROSOL, METERED RESPIRATORY (INHALATION)
Qty: 1 INHALER | Refills: 1 | Status: SHIPPED | OUTPATIENT
Start: 2019-08-03 | End: 2019-09-30 | Stop reason: SDUPTHER

## 2019-08-03 NOTE — PATIENT INSTRUCTIONS
Painful Urination (Dysuria): Care Instructions  Your Care Instructions  Burning pain with urination (dysuria) is a common symptom of a urinary tract infection or other urinary problems. The bladder may become inflamed. This can cause pain when the bladder fills and empties. You may also feel pain if the tube that carries urine from the bladder to the outside of the body (urethra) gets irritated or infected. Sexually transmitted infections (STIs) also may cause pain when you urinate. Sometimes the pain can be caused by things other than an infection. The urethra can be irritated by soaps, perfumes, or foreign objects in the urethra. Kidney stones can cause pain when they pass through the urethra. The cause may be hard to find. You may need tests. Treatment for painful urination depends on the cause. Follow-up care is a key part of your treatment and safety. Be sure to make and go to all appointments, and call your doctor if you are having problems. It's also a good idea to know your test results and keep a list of the medicines you take. How can you care for yourself at home? · Drink extra water for the next day or two. This will help make the urine less concentrated. (If you have kidney, heart, or liver disease and have to limit fluids, talk with your doctor before you increase the amount of fluids you drink.)  · Avoid drinks that are carbonated or have caffeine. They can irritate the bladder. · Urinate often. Try to empty your bladder each time. For women:  · Urinate right after you have sex. · After going to the bathroom, wipe from front to back. · Avoid douches, bubble baths, and feminine hygiene sprays. And avoid other feminine hygiene products that have deodorants. When should you call for help? Call your doctor now or seek immediate medical care if:    · You have new symptoms, such as fever, nausea, or vomiting.     · You have new or worse symptoms of a urinary problem. For example:  ?  You have blood or pus in your urine. ? You have chills or body aches. ? It hurts worse to urinate. ? You have groin or belly pain. ? You have pain in your back just below your rib cage (the flank area).    Watch closely for changes in your health, and be sure to contact your doctor if you have any problems. Where can you learn more? Go to http://lorie-ede.info/. Enter Z889 in the search box to learn more about \"Painful Urination (Dysuria): Care Instructions. \"  Current as of: December 19, 2018  Content Version: 12.1  © 7730-2579 [employer]. Care instructions adapted under license by [employer] (which disclaims liability or warranty for this information). If you have questions about a medical condition or this instruction, always ask your healthcare professional. [place] [address] any warranty or liability for your use of this information. Wheezing or Bronchoconstriction: Care Instructions  Your Care Instructions  Wheezing is a whistling noise made during breathing. It occurs when the small airways, or bronchial tubes, that lead to your lungs swell or contract (spasm) and become narrow. This narrowing is called bronchoconstriction. When your airways constrict, it is hard for air to pass through and this makes it hard for you to breathe. Wheezing and bronchoconstriction can be caused by many problems, including:  · An infection such as the flu or a cold. · Allergies such as hay fever. · Diseases such as asthma or chronic obstructive pulmonary disease. · Smoking. Treatment for your wheezing depends on what is causing the problem. Your wheezing may get better without treatment. But you may need to pay attention to things that cause your wheezing and avoid them. Or you may need medicine to help treat the wheezing and to reduce the swelling or to relieve spasms in your lungs. Follow-up care is a key part of your treatment and safety.  Be sure to make and go to all appointments, and call your doctor if you are having problems. It is also a good idea to know your test results and keep a list of the medicines you take. How can you care for yourself at home? · Take your medicine exactly as prescribed. Call your doctor if you think you are having a problem with your medicine. You will get more details on the specific medicine your doctor prescribes. · If your doctor prescribed antibiotics, take them as directed. Do not stop taking them just because you feel better. You need to take the full course of antibiotics. · Breathe moist air from a humidifier, hot shower, or sink filled with hot water. This may help ease your symptoms and make it easier for you to breathe. · If you have congestion in your nose and throat, drinking plenty of fluids, especially hot fluids, may help relieve your symptoms. If you have kidney, heart, or liver disease and have to limit fluids, talk with your doctor before you increase the amount of fluids you drink. · If you have mucus in your airways, it may help to breathe deeply and cough. · Do not smoke or allow others to smoke around you. Smoking can make your wheezing worse. If you need help quitting, talk to your doctor about stop-smoking programs and medicines. These can increase your chances of quitting for good. · Avoid things that may cause your wheezing. These may include colds, smoke, air pollution, dust, pollen, pets, cockroaches, stress, and cold air. When should you call for help? Call 911 anytime you think you may need emergency care.  For example, call if:    · You have severe trouble breathing.     · You passed out (lost consciousness).    Call your doctor now or seek immediate medical care if:    · You cough up yellow, dark brown, or bloody mucus (sputum).     · You have new or worse shortness of breath.     · Your wheezing is not getting better or it gets worse after you start taking your medicine.   Payam Jarquin closely for changes in your health, and be sure to contact your doctor if:    · You do not get better as expected. Where can you learn more? Go to http://lorie-ede.info/. Enter 454 [id number] in the search box to learn more about \"Wheezing or Bronchoconstriction: Care Instructions. \"  Current as of: September 5, 2018  Content Version: 12.1  © 6984-7760 [employer]. Care instructions adapted under license by [employer] (which disclaims liability or warranty for this information). If you have questions about a medical condition or this instruction, always ask your healthcare professional. [place] [address] any warranty or liability for your use of this information.

## 2019-08-03 NOTE — PROGRESS NOTES
HPI  Yenifer Park is a 44 y.o. female who presents today for uti symptoms. Pt has been having urinary frequency and burning with urination for about 2 weeks. Pt states she drinks water frequently and also notes she drinks a lot of coffee daily. Pt notes she been waiting extended period of time to urinate. Pt denies lower abdominal pressure and lower back pain. Pt also denies abnormal vaginal discharge and odor. Pt has also been having coughing with wheezing and congestion for about a week after sleeping under a fan. Pt notes she has been taking OTC cold medication and using Symbicort inhaler as needed for coughing and wheezing. Current Outpatient Medications   Medication Sig Dispense Refill    multivitamin (ONE A DAY) tablet Take 1 Tab by mouth daily.  budesonide-formoterol (SYMBICORT) 160-4.5 mcg/actuation HFAA TAKE 2 PUFFS BY INHALATION TWO (2) TIMES A DAY. INDICATIONS: MAINTENANCE THERAPY FOR ASTHMA 10.2 Inhaler 3    omeprazole (PRILOSEC) 20 mg capsule Take one tab po daily  Indications: gastroesophageal reflux disease 30 Cap 2    Nebulizer & Compressor machine Use as directed 1 Each 0    Nebulizer Accessories kit Use as directed 1 Kit 1    albuterol (PROVENTIL) 5 mg/mL nebulizer solution 0.5 mL by Nebulization route every four (4) hours as needed for Wheezing or Shortness of Breath. Indications: Acute Asthma Attack, BRONCHOSPASM PREVENTION, EXERCISE-INDUCED BRONCHOSPASM PREVENTION 90 mL 3    meloxicam (MOBIC) 15 mg tablet Take 1 Tab by mouth daily (with breakfast). 20 Tab 1    ibuprofen (MOTRIN) 800 mg tablet 1 tab q 6-8 hours PRN pain 15 Tab 0      Allergies   Allergen Reactions    Azo [Phenazopyridine] Hives    Iodinated Contrast Media Shortness of Breath       SUBJECTIVE:  Review of Systems   Constitutional: Negative for chills, fever and malaise/fatigue. HENT: Positive for congestion. Eyes: Negative for blurred vision. Respiratory: Positive for cough and wheezing.  Negative for sputum production and shortness of breath. Cardiovascular: Negative for chest pain, palpitations and leg swelling. Gastrointestinal: Negative for abdominal pain, diarrhea, nausea and vomiting. Genitourinary: Positive for dysuria, frequency and urgency. Negative for flank pain. Musculoskeletal: Negative for myalgias. Neurological: Negative for dizziness, tingling, sensory change and headaches. OBJECTIVE:  Visit Vitals  /86 (BP 1 Location: Left arm, BP Patient Position: Sitting)   Pulse 78   Temp 98.1 °F (36.7 °C) (Oral)   Resp 20   Ht 5' 3\" (1.6 m)   Wt 198 lb (89.8 kg)   SpO2 97%   BMI 35.07 kg/m²        Physical Exam   Constitutional: She is oriented to person, place, and time and well-developed, well-nourished, and in no distress. No distress. HENT:   Head: Normocephalic. Neck: No thyromegaly present. Cardiovascular: Normal rate, regular rhythm and normal heart sounds. Pulmonary/Chest: Effort normal. She has wheezes. She has no rales. She exhibits no tenderness. Coughing present   Musculoskeletal: She exhibits no tenderness (neg CVA tenderness). Neurological: She is alert and oriented to person, place, and time. Gait normal.   Skin: Skin is warm and dry. No erythema. Psychiatric: Mood and affect normal.   Nursing note and vitals reviewed. ASSESSMENT:  Diagnoses and all orders for this visit:    1. UTI symptoms  -     AMB POC URINALYSIS DIP STICK AUTO W/O MICRO  -     CULTURE, URINE; Future  -     AMB POC URINALYSIS DIP STICK AUTO W/ MICRO    2. Dysuria  -     nitrofurantoin, macrocrystal-monohydrate, (MACROBID) 100 mg capsule; Take 1 Cap by mouth two (2) times a day for 7 days. -     fluconazole (DIFLUCAN) 150 mg tablet; Take 1 Tab by mouth daily for 1 day. FDA advises cautious prescribing of oral fluconazole in pregnancy. 3. Wheezing  -     methylPREDNISolone (MEDROL DOSEPACK) 4 mg tablet; Take as directed on pack    4.  Mild intermittent asthma without complication  -     albuterol (PROVENTIL HFA, VENTOLIN HFA, PROAIR HFA) 90 mcg/actuation inhaler; Take 2 Puffs by inhalation every four (4) hours as needed for Wheezing or Shortness of Breath. PLAN:  UA today  Urine culture ordered  Start Macrobid BID for 7 days  Start Diflucan for post antibiotic yeast infection  Start Medrol dose pack for cough and congestion, Rx given for rescue inhaler  Patient education given on rescue inhaler vs. Symbicort (maintenance) and usage    I have discussed the diagnosis with the patient and the intended plan as seen in the above orders. The patient has received an after-visit summary and questions were answered concerning future plans. I have discussed medication side effects and warnings with the patient as well. Patient will call for further questions. Follow-up and Dispositions    · Return in about 2 weeks (around 8/17/2019), or if symptoms worsen or fail to improve.        Marquis Mcclendon NP

## 2019-08-03 NOTE — PROGRESS NOTES
Patient here for urinary frequency and burning on urination         1. Have you been to the ER, urgent care clinic since your last visit? Hospitalized since your last visit? No    2. Have you seen or consulted any other health care providers outside of the [address] [place] [address] since your last visit? Include any pap smears or colon screening.  No

## 2019-08-05 LAB
BACTERIA SPEC CULT: NORMAL
SERVICE CMNT-IMP: NORMAL

## 2019-08-09 ENCOUNTER — PATIENT OUTREACH (OUTPATIENT)
Dept: OTHER | Age: 39
End: 2019-08-09

## 2019-08-09 NOTE — LETTER
8/9/2019 9:38 AM 
 
Ms. Arlene Johnson [address] [facility] [address] Dear Arlene Johnson My name is Yvrose Galloway,  Employee Care Coordinator for [employer], and I have been trying to reach you. The Employee Care Management [facility]) program is a free-of-charge, confidential service provided to our employees and their family members covered by the [employer]. I can help you with care transitions such as when you come home from the hospital, when help is needed to manage your disease, or when you need assistance coordinating services or appointments. [facility] now partners with [facility]. If you are a qualifying employee, you may receive an additional 10 wellness incentive points for every month of active participation with an Employee Care Manager. As healthcare providers, we know that patients do better when they have close follow up with a primary care provider (PCP). I can help you find one that is convenient to you and covered by your insurance. I can also help you understand any after visit instructions, such as what symptoms to watch out for, or any new or changed medications. We can work together using your preferred communication -- telephone, email, [employer]. If you do not have a [employer] account, I can help you request access. Our program is designed to provide you with the opportunity to have a [employer] care manager partner with you for your healthcare needs. Due to not being able to reach you, I am closing out the current program, but will remain available to you should you have any questions. Please contact me at the below number if I can provide you with assistance for any of the above services. Sincerely, 
 
 
Reji Bianchi LPN  [place] [facility] Care Coordinator [address] [facility] 
[address] [place] [address], [address] [facility]  [address] C [phone]  F [phone]  [email or web address]

## 2019-09-17 DIAGNOSIS — K21.9 GASTROESOPHAGEAL REFLUX DISEASE WITHOUT ESOPHAGITIS: Primary | ICD-10-CM

## 2019-09-18 RX ORDER — OMEPRAZOLE 20 MG/1
CAPSULE, DELAYED RELEASE ORAL
Qty: 30 CAP | Refills: 2 | Status: SHIPPED | OUTPATIENT
Start: 2019-09-18 | End: 2019-12-22

## 2019-09-30 DIAGNOSIS — J45.20 MILD INTERMITTENT ASTHMA WITHOUT COMPLICATION: ICD-10-CM

## 2019-09-30 RX ORDER — ALBUTEROL SULFATE 90 UG/1
2 AEROSOL, METERED RESPIRATORY (INHALATION)
Qty: 1 INHALER | Refills: 0 | Status: SHIPPED | OUTPATIENT
Start: 2019-09-30 | End: 2019-12-03 | Stop reason: SDUPTHER

## 2019-10-14 DIAGNOSIS — J45.30 MILD PERSISTENT ASTHMA WITHOUT COMPLICATION: ICD-10-CM

## 2019-10-14 RX ORDER — BUDESONIDE AND FORMOTEROL FUMARATE DIHYDRATE 160; 4.5 UG/1; UG/1
AEROSOL RESPIRATORY (INHALATION)
Qty: 10.2 INHALER | Refills: 3 | Status: SHIPPED | OUTPATIENT
Start: 2019-10-14 | End: 2020-01-14 | Stop reason: SDUPTHER

## 2019-10-18 ENCOUNTER — OFFICE VISIT (OUTPATIENT)
Dept: FAMILY MEDICINE CLINIC | Facility: CLINIC | Age: 39
End: 2019-10-18

## 2019-10-18 VITALS
OXYGEN SATURATION: 97 % | WEIGHT: 198 LBS | HEART RATE: 73 BPM | BODY MASS INDEX: 35.08 KG/M2 | HEIGHT: 63 IN | DIASTOLIC BLOOD PRESSURE: 80 MMHG | TEMPERATURE: 97 F | RESPIRATION RATE: 12 BRPM | SYSTOLIC BLOOD PRESSURE: 131 MMHG

## 2019-10-18 DIAGNOSIS — H92.02 OTALGIA OF LEFT EAR: ICD-10-CM

## 2019-10-18 DIAGNOSIS — M54.30 SCIATIC LEG PAIN: Primary | ICD-10-CM

## 2019-10-18 RX ORDER — IBUPROFEN 800 MG/1
TABLET ORAL
Qty: 30 TAB | Refills: 0 | Status: SHIPPED | OUTPATIENT
Start: 2019-10-18 | End: 2020-06-11 | Stop reason: SDUPTHER

## 2019-10-18 RX ORDER — CYCLOBENZAPRINE HCL 5 MG
5 TABLET ORAL
Qty: 30 TAB | Refills: 0 | Status: SHIPPED | OUTPATIENT
Start: 2019-10-18 | End: 2020-01-14 | Stop reason: SDUPTHER

## 2019-10-18 RX ORDER — PREDNISONE 10 MG/1
TABLET ORAL
Qty: 20 TAB | Refills: 0 | OUTPATIENT
Start: 2019-10-18 | End: 2019-12-22

## 2019-10-18 NOTE — PROGRESS NOTES
Silverio Rincon is a 44 y.o. female presenting today for Hip Pain (started Wednesday hip that travel down the left side of patient leg)    HPI:  Silverio Rincon presents to the office today for left lower extremity sciatica. She has had pain radiating down her left side x1 week. She has previously had sciatica to the right lower extremity  several years ago and has had no further episodes. .  She denies any known injury but is complaining of mild lumbar pain with movement. Patient also presents complaining of otalgia, left ear. She notes that it is painful to move and feels like she has fluid in the ear. Review of Systems   Respiratory: Negative for cough and sputum production. Cardiovascular: Negative for chest pain and palpitations. Gastrointestinal: Negative for abdominal pain, nausea and vomiting. Allergies   Allergen Reactions    Azo [Phenazopyridine] Hives    Iodinated Contrast Media Shortness of Breath       Current Outpatient Medications   Medication Sig Dispense Refill    ibuprofen (MOTRIN) 800 mg tablet 1 tab q 6-8 hours PRN pain 30 Tab 0    predniSONE (DELTASONE) 10 mg tablet 3 tabs daily x 3 days, 2 tabs daily x 3 days, 1 tab daily x 3 days, 1/2 tab daily x 3 days 20 Tab 0    cyclobenzaprine (FLEXERIL) 5 mg tablet Take 1 Tab by mouth three (3) times daily (with meals). 30 Tab 0    ciprofloxacin-hydrocortisone (CIPRO HC OTIC) otic suspension Administer 3 Drops in left ear two (2) times a day for 7 days. 10 mL 0    budesonide-formoterol (SYMBICORT) 160-4.5 mcg/actuation HFAA TAKE 2 PUFFS BY INHALATION TWO (2) TIMES A DAY. INDICATIONS: MAINTENANCE THERAPY FOR ASTHMA 10.2 Inhaler 3    albuterol (PROVENTIL HFA, VENTOLIN HFA, PROAIR HFA) 90 mcg/actuation inhaler Take 2 Puffs by inhalation every four (4) hours as needed for Wheezing or Shortness of Breath.  1 Inhaler 0    omeprazole (PRILOSEC) 20 mg capsule Take one tab po daily  Indications: gastroesophageal reflux disease 30 Cap 2    Nebulizer & Compressor machine Use as directed 1 Each 0    Nebulizer Accessories kit Use as directed 1 Kit 1    albuterol (PROVENTIL) 5 mg/mL nebulizer solution 0.5 mL by Nebulization route every four (4) hours as needed for Wheezing or Shortness of Breath. Indications: Acute Asthma Attack, BRONCHOSPASM PREVENTION, EXERCISE-INDUCED BRONCHOSPASM PREVENTION 90 mL 3    multivitamin (ONE A DAY) tablet Take 1 Tab by mouth daily.          Past Medical History:   Diagnosis Date    Asthma     Chronic pain 6 months    painful menses    Fibroids     GERD (gastroesophageal reflux disease)     Heart palpitations        Past Surgical History:   Procedure Laterality Date    HX  SECTION  9755,1544    with BTL    HX HYSTEROSCOPY WITH ENDOMETRIAL ABLATION      No period since       Social History     Socioeconomic History    Marital status:      Spouse name: Not on file    Number of children: Not on file    Years of education: Not on file    Highest education level: Not on file   Occupational History     Employer: [employer]   Social Needs    Financial resource strain: Not on file    Food insecurity:     Worry: Not on file     Inability: Not on file    Transportation needs:     Medical: Not on file     Non-medical: Not on file   Tobacco Use    Smoking status: Former Smoker     Types: Cigarettes    Smokeless tobacco: Current User   Substance and Sexual Activity    Alcohol use: Yes     Comment: socially, 1 beer/weekend    Drug use: No    Sexual activity: Yes     Partners: Male     Birth control/protection: Surgical   Lifestyle    Physical activity:     Days per week: Not on file     Minutes per session: Not on file    Stress: Not on file   Relationships    Social connections:     Talks on phone: Not on file     Gets together: Not on file     Attends Muslim service: Not on file     Active member of club or organization: Not on file     Attends meetings of clubs or organizations: Not on file     Relationship status: Not on file    Intimate partner violence:     Fear of current or ex partner: Not on file     Emotionally abused: Not on file     Physically abused: Not on file     Forced sexual activity: Not on file   Other Topics Concern     Service Yes     Comment: [employer]    Blood Transfusions No    Caffeine Concern No    Occupational Exposure No    Hobby Hazards No    Sleep Concern No    Stress Concern No    Weight Concern Yes     Comment: PT IS OVER WEIGHT    Special Diet No    Back Care No    Exercise Yes     Comment: 4 DAYS A WEEK    Bike Helmet No    Seat Belt Yes    Self-Exams Yes   Social History Narrative    Not on file       Patient does not have an advanced directive on file    Vitals:    10/18/19 1016   BP: 131/80   Pulse: 73   Resp: 12   Temp: 97 °F (36.1 °C)   TempSrc: Oral   SpO2: 97%   Weight: 198 lb (89.8 kg)   Height: 5' 3\" (1.6 m)   PainSc:   7       Physical Exam   Constitutional: She is oriented to person, place, and time. No distress. HENT:   Left Ear: No drainage. No foreign bodies. Tympanic membrane is erythematous. Cardiovascular: Normal rate, regular rhythm and normal heart sounds. Pulmonary/Chest: Effort normal and breath sounds normal.   Neurological: She is alert and oriented to person, place, and time. Nursing note and vitals reviewed. Hospital Outpatient Visit on 08/03/2019   Component Date Value Ref Range Status    Special Requests: 08/03/2019 NO SPECIAL REQUESTS    Final    Culture result: 08/03/2019     Final                    Value:<10,000  COLONIES/mL  MIXED GRAM POSITIVE RAGHU, PROBABLE SKIN/GENITAL CONTAMINATION.      Office Visit on 08/03/2019   Component Date Value Ref Range Status    Color (UA POC) 08/03/2019 Yellow   Final    Clarity (UA POC) 08/03/2019 Clear   Final    Glucose (UA POC) 08/03/2019 Negative  Negative Final    Bilirubin (UA POC) 08/03/2019 Negative  Negative Final    Ketones (UA POC) 08/03/2019 Negative  Negative Final    Specific gravity (UA POC) 08/03/2019 1.030  1.001 - 1.035 Final    Blood (UA POC) 08/03/2019 1+  Negative Final    pH (UA POC) 08/03/2019 6.0  4.6 - 8.0 Final    Protein (UA POC) 08/03/2019 Negative  Negative Final    Urobilinogen (UA POC) 08/03/2019 0.2 mg/dL  0.2 - 1 Final    Nitrites (UA POC) 08/03/2019 Negative  Negative Final    Leukocyte esterase (UA POC) 08/03/2019 Negative  Negative Final       .No results found for any visits on 10/18/19. Assessment / Plan:      ICD-10-CM ICD-9-CM    1. Sciatic leg pain M54.30 724.3 predniSONE (DELTASONE) 10 mg tablet      cyclobenzaprine (FLEXERIL) 5 mg tablet   2. Otalgia of left ear H92.02 388.70 ibuprofen (MOTRIN) 800 mg tablet      ciprofloxacin-hydrocortisone (CIPRO HC OTIC) otic suspension     Sciatica-prednisone 10 mg tablet given  Flexeril and ibuprofen given  Patient is also complaining of left otalgia-Cipro ordered     I asked the patient if she  had any questions and answered her  questions. The patient stated that she understands the treatment plan and agrees with the treatment plan    This document was created with a voice activated dictation system and may contain transcription errors.

## 2019-10-18 NOTE — PROGRESS NOTES
Visit Vitals  /80   Pulse 73   Temp 97 °F (36.1 °C) (Oral)   Resp 12   Ht 5' 3\" (1.6 m)   Wt 198 lb (89.8 kg)   SpO2 97%   BMI 35.07 kg/m²     Jay Solano presents today for   Chief Complaint   Patient presents with    Hip Pain     started Wednesday hip that travel down the left side of patient leg       Is someone accompanying this pt? no    Is the patient using any DME equipment during OV? no    Depression Screening:  3 most recent PHQ Screens 8/3/2019   Little interest or pleasure in doing things Not at all   Feeling down, depressed, irritable, or hopeless Not at all   Total Score PHQ 2 0       Learning Assessment:  Learning Assessment 11/25/2015   PRIMARY LEARNER Patient   HIGHEST LEVEL OF EDUCATION - PRIMARY LEARNER  2 YEARS OF COLLEGE   BARRIERS PRIMARY LEARNER NONE   PRIMARY LANGUAGE ENGLISH   LEARNER PREFERENCE PRIMARY READING     VIDEOS     -   ANSWERED BY Patient   RELATIONSHIP SELF       Abuse Screening:  Abuse Screening Questionnaire 5/25/2018   Do you ever feel afraid of your partner? N   Are you in a relationship with someone who physically or mentally threatens you? N   Is it safe for you to go home? Y       Fall Risk  Fall Risk Assessment, last 12 mths 5/25/2018   Able to walk? Yes   Fall in past 12 months? No       Health Maintenance reviewed and discussed and ordered per Provider. Health Maintenance Due   Topic Date Due    Pneumococcal 0-64 years (1 of 1 - PPSV23) 05/02/1986    Influenza Age 5 to Adult  08/01/2019           Coordination of Care:  1. Have you been to the ER, urgent care clinic since your last visit? Hospitalized since your last visit? yes    2. Have you seen or consulted any other health care providers outside of the [address] [place] [address] since your last visit? Include any pap smears or colon screening.  no

## 2019-12-03 DIAGNOSIS — J45.20 MILD INTERMITTENT ASTHMA WITHOUT COMPLICATION: ICD-10-CM

## 2019-12-03 RX ORDER — ALBUTEROL SULFATE 90 UG/1
2 AEROSOL, METERED RESPIRATORY (INHALATION)
Qty: 1 INHALER | Refills: 0 | OUTPATIENT
Start: 2019-12-03 | End: 2019-12-22

## 2019-12-03 NOTE — TELEPHONE ENCOUNTER
Requested Prescriptions     Pending Prescriptions Disp Refills    albuterol (PROVENTIL HFA, VENTOLIN HFA, PROAIR HFA) 90 mcg/actuation inhaler 1 Inhaler 0     Sig: Take 2 Puffs by inhalation every four (4) hours as needed for Wheezing or Shortness of Breath.

## 2019-12-05 ENCOUNTER — OFFICE VISIT (OUTPATIENT)
Dept: SURGERY | Age: 39
End: 2019-12-05

## 2019-12-05 DIAGNOSIS — E66.9 OBESITY, UNSPECIFIED CLASSIFICATION, UNSPECIFIED OBESITY TYPE, UNSPECIFIED WHETHER SERIOUS COMORBIDITY PRESENT: Primary | ICD-10-CM

## 2019-12-05 NOTE — PROGRESS NOTES
Patient attended a Medically Supervised Weight Loss New Patient Orientation today where we discussed:  - New Direction Very Low Calorie Diet details  - Medical Supervision  - Nutrition education  - Cost of Meal Replacements  - Policies and compliance required for program enrollment.      Patients initial consultation with provider is tentatively scheduled for:  Future Appointments   Date Time Provider Sammi Pradhan   12/23/2019  9:00 AM Nedra Sampson NP [facility] SCHED   1/14/2020 11:30 AM AMPARO Barboza

## 2019-12-06 DIAGNOSIS — E66.01 SEVERE OBESITY (HCC): Primary | ICD-10-CM

## 2019-12-14 ENCOUNTER — HOSPITAL ENCOUNTER (OUTPATIENT)
Dept: LAB | Age: 39
Discharge: HOME | End: 2019-12-14
Payer: COMMERCIAL

## 2019-12-14 DIAGNOSIS — E66.01 SEVERE OBESITY (HCC): ICD-10-CM

## 2019-12-14 LAB
APPEARANCE UR: CLEAR
BACTERIA URNS QL MICRO: ABNORMAL /HPF
BILIRUB UR QL: NEGATIVE
COLOR UR: YELLOW
EPITH CASTS URNS QL MICRO: ABNORMAL /LPF (ref 0–5)
GLUCOSE UR STRIP.AUTO-MCNC: NEGATIVE MG/DL
HGB UR QL STRIP: NEGATIVE
KETONES UR QL STRIP.AUTO: NEGATIVE MG/DL
LEUKOCYTE ESTERASE UR QL STRIP.AUTO: NEGATIVE
NITRITE UR QL STRIP.AUTO: NEGATIVE
PH UR STRIP: 6 [PH] (ref 5–8)
PROT UR STRIP-MCNC: NEGATIVE MG/DL
RBC #/AREA URNS HPF: ABNORMAL /HPF (ref 0–5)
SP GR UR REFRACTOMETRY: 1.01 (ref 1–1.03)
UROBILINOGEN UR QL STRIP.AUTO: 0.2 EU/DL (ref 0.2–1)
WBC URNS QL MICRO: ABNORMAL /HPF (ref 0–4)

## 2019-12-14 PROCEDURE — 36415 COLL VENOUS BLD VENIPUNCTURE: CPT

## 2019-12-14 PROCEDURE — 81001 URINALYSIS AUTO W/SCOPE: CPT

## 2019-12-14 PROCEDURE — 93005 ELECTROCARDIOGRAM TRACING: CPT

## 2019-12-15 LAB
ATRIAL RATE: 62 BPM
CALCULATED P AXIS, ECG09: 64 DEGREES
CALCULATED R AXIS, ECG10: 53 DEGREES
CALCULATED T AXIS, ECG11: 42 DEGREES
DIAGNOSIS, 93000: NORMAL
P-R INTERVAL, ECG05: 138 MS
Q-T INTERVAL, ECG07: 384 MS
QRS DURATION, ECG06: 68 MS
QTC CALCULATION (BEZET), ECG08: 389 MS
VENTRICULAR RATE, ECG03: 62 BPM

## 2019-12-22 ENCOUNTER — APPOINTMENT (OUTPATIENT)
Dept: GENERAL RADIOLOGY | Age: 39
End: 2019-12-22
Attending: EMERGENCY MEDICINE
Payer: COMMERCIAL

## 2019-12-22 ENCOUNTER — HOSPITAL ENCOUNTER (EMERGENCY)
Age: 39
Discharge: HOME | End: 2019-12-22
Attending: EMERGENCY MEDICINE
Payer: COMMERCIAL

## 2019-12-22 VITALS
BODY MASS INDEX: 35.43 KG/M2 | WEIGHT: 200 LBS | TEMPERATURE: 98.4 F | DIASTOLIC BLOOD PRESSURE: 82 MMHG | RESPIRATION RATE: 17 BRPM | SYSTOLIC BLOOD PRESSURE: 150 MMHG | OXYGEN SATURATION: 100 % | HEART RATE: 94 BPM

## 2019-12-22 DIAGNOSIS — E87.6 HYPOKALEMIA: ICD-10-CM

## 2019-12-22 DIAGNOSIS — J45.21 INTERMITTENT ASTHMA WITH ACUTE EXACERBATION, UNSPECIFIED ASTHMA SEVERITY: Primary | ICD-10-CM

## 2019-12-22 DIAGNOSIS — J06.9 ACUTE UPPER RESPIRATORY INFECTION: ICD-10-CM

## 2019-12-22 LAB
ANION GAP SERPL CALC-SCNC: 8 MMOL/L (ref 3–18)
BASOPHILS # BLD: 0 K/UL (ref 0–0.1)
BASOPHILS NFR BLD: 0 % (ref 0–2)
BNP SERPL-MCNC: 26 PG/ML (ref 0–450)
BUN SERPL-MCNC: 9 MG/DL (ref 7–18)
BUN/CREAT SERPL: 11 (ref 12–20)
CALCIUM SERPL-MCNC: 9 MG/DL (ref 8.5–10.1)
CHLORIDE SERPL-SCNC: 106 MMOL/L (ref 100–111)
CK MB CFR SERPL CALC: 1.2 % (ref 0–4)
CK MB SERPL-MCNC: 1.7 NG/ML (ref 5–25)
CK SERPL-CCNC: 146 U/L (ref 26–192)
CO2 SERPL-SCNC: 26 MMOL/L (ref 21–32)
CREAT SERPL-MCNC: 0.84 MG/DL (ref 0.6–1.3)
DIFFERENTIAL METHOD BLD: NORMAL
EOSINOPHIL # BLD: 0.3 K/UL (ref 0–0.4)
EOSINOPHIL NFR BLD: 2 % (ref 0–5)
ERYTHROCYTE [DISTWIDTH] IN BLOOD BY AUTOMATED COUNT: 14.3 % (ref 11.6–14.5)
GLUCOSE SERPL-MCNC: 101 MG/DL (ref 74–99)
HCT VFR BLD AUTO: 42.1 % (ref 35–45)
HGB BLD-MCNC: 14.1 G/DL (ref 12–16)
LYMPHOCYTES # BLD: 2.6 K/UL (ref 0.9–3.6)
LYMPHOCYTES NFR BLD: 23 % (ref 21–52)
MCH RBC QN AUTO: 26.9 PG (ref 24–34)
MCHC RBC AUTO-ENTMCNC: 33.5 G/DL (ref 31–37)
MCV RBC AUTO: 80.2 FL (ref 74–97)
MONOCYTES # BLD: 0.7 K/UL (ref 0.05–1.2)
MONOCYTES NFR BLD: 6 % (ref 3–10)
NEUTS SEG # BLD: 8 K/UL (ref 1.8–8)
NEUTS SEG NFR BLD: 69 % (ref 40–73)
PLATELET # BLD AUTO: 235 K/UL (ref 135–420)
PMV BLD AUTO: 9.9 FL (ref 9.2–11.8)
POTASSIUM SERPL-SCNC: 3 MMOL/L (ref 3.5–5.5)
RBC # BLD AUTO: 5.25 M/UL (ref 4.2–5.3)
SODIUM SERPL-SCNC: 140 MMOL/L (ref 136–145)
TROPONIN I SERPL-MCNC: <0.02 NG/ML (ref 0–0.04)
WBC # BLD AUTO: 11.6 K/UL (ref 4.6–13.2)

## 2019-12-22 PROCEDURE — 94762 N-INVAS EAR/PLS OXIMTRY CONT: CPT

## 2019-12-22 PROCEDURE — 71045 X-RAY EXAM CHEST 1 VIEW: CPT

## 2019-12-22 PROCEDURE — 96374 THER/PROPH/DIAG INJ IV PUSH: CPT

## 2019-12-22 PROCEDURE — 80048 BASIC METABOLIC PNL TOTAL CA: CPT

## 2019-12-22 PROCEDURE — 82550 ASSAY OF CK (CPK): CPT

## 2019-12-22 PROCEDURE — 83880 ASSAY OF NATRIURETIC PEPTIDE: CPT

## 2019-12-22 PROCEDURE — 94640 AIRWAY INHALATION TREATMENT: CPT

## 2019-12-22 PROCEDURE — 99284 EMERGENCY DEPT VISIT MOD MDM: CPT

## 2019-12-22 PROCEDURE — 96361 HYDRATE IV INFUSION ADD-ON: CPT

## 2019-12-22 PROCEDURE — 85025 COMPLETE CBC W/AUTO DIFF WBC: CPT

## 2019-12-22 PROCEDURE — 74011250637 HC RX REV CODE- 250/637: Performed by: EMERGENCY MEDICINE

## 2019-12-22 PROCEDURE — 74011000250 HC RX REV CODE- 250: Performed by: EMERGENCY MEDICINE

## 2019-12-22 PROCEDURE — 74011250636 HC RX REV CODE- 250/636: Performed by: EMERGENCY MEDICINE

## 2019-12-22 RX ORDER — IPRATROPIUM BROMIDE AND ALBUTEROL SULFATE 2.5; .5 MG/3ML; MG/3ML
3 SOLUTION RESPIRATORY (INHALATION)
Status: COMPLETED | OUTPATIENT
Start: 2019-12-22 | End: 2019-12-22

## 2019-12-22 RX ORDER — ALBUTEROL SULFATE 90 UG/1
1-2 AEROSOL, METERED RESPIRATORY (INHALATION)
Qty: 1 INHALER | Refills: 0 | Status: SHIPPED | OUTPATIENT
Start: 2019-12-22 | End: 2020-02-20 | Stop reason: SDUPTHER

## 2019-12-22 RX ORDER — ALBUTEROL SULFATE 0.83 MG/ML
5 SOLUTION RESPIRATORY (INHALATION)
Status: COMPLETED | OUTPATIENT
Start: 2019-12-22 | End: 2019-12-22

## 2019-12-22 RX ORDER — ALBUTEROL SULFATE 0.83 MG/ML
2.5 SOLUTION RESPIRATORY (INHALATION)
Qty: 24 EACH | Refills: 0 | Status: SHIPPED | OUTPATIENT
Start: 2019-12-22 | End: 2020-02-21 | Stop reason: SDUPTHER

## 2019-12-22 RX ORDER — SODIUM CHLORIDE 9 MG/ML
500 INJECTION, SOLUTION INTRAVENOUS ONCE
Status: COMPLETED | OUTPATIENT
Start: 2019-12-22 | End: 2019-12-22

## 2019-12-22 RX ORDER — POTASSIUM CHLORIDE 20 MEQ/1
40 TABLET, EXTENDED RELEASE ORAL
Status: COMPLETED | OUTPATIENT
Start: 2019-12-22 | End: 2019-12-22

## 2019-12-22 RX ORDER — PREDNISONE 20 MG/1
60 TABLET ORAL DAILY
Qty: 12 TAB | Refills: 0 | Status: SHIPPED | OUTPATIENT
Start: 2019-12-22 | End: 2019-12-26

## 2019-12-22 RX ADMIN — METHYLPREDNISOLONE SODIUM SUCCINATE 62.5 MG: 125 INJECTION, POWDER, FOR SOLUTION INTRAMUSCULAR; INTRAVENOUS at 21:05

## 2019-12-22 RX ADMIN — ALBUTEROL SULFATE 5 MG: 2.5 SOLUTION RESPIRATORY (INHALATION) at 21:04

## 2019-12-22 RX ADMIN — SODIUM CHLORIDE 500 ML: 900 INJECTION, SOLUTION INTRAVENOUS at 21:04

## 2019-12-22 RX ADMIN — IPRATROPIUM BROMIDE AND ALBUTEROL SULFATE 3 ML: .5; 3 SOLUTION RESPIRATORY (INHALATION) at 20:35

## 2019-12-22 RX ADMIN — IPRATROPIUM BROMIDE AND ALBUTEROL SULFATE 3 ML: .5; 3 SOLUTION RESPIRATORY (INHALATION) at 21:50

## 2019-12-22 RX ADMIN — POTASSIUM CHLORIDE 40 MEQ: 20 TABLET, EXTENDED RELEASE ORAL at 21:58

## 2019-12-23 ENCOUNTER — OFFICE VISIT (OUTPATIENT)
Dept: SURGERY | Age: 39
End: 2019-12-23

## 2019-12-23 VITALS
HEIGHT: 63 IN | WEIGHT: 193.6 LBS | SYSTOLIC BLOOD PRESSURE: 124 MMHG | TEMPERATURE: 98.3 F | DIASTOLIC BLOOD PRESSURE: 88 MMHG | OXYGEN SATURATION: 100 % | HEART RATE: 80 BPM | RESPIRATION RATE: 18 BRPM | BODY MASS INDEX: 34.3 KG/M2

## 2019-12-23 DIAGNOSIS — J45.909 ASTHMA, UNSPECIFIED ASTHMA SEVERITY, UNSPECIFIED WHETHER COMPLICATED, UNSPECIFIED WHETHER PERSISTENT: ICD-10-CM

## 2019-12-23 DIAGNOSIS — E66.9 CLASS 1 OBESITY WITHOUT SERIOUS COMORBIDITY WITH BODY MASS INDEX (BMI) OF 34.0 TO 34.9 IN ADULT, UNSPECIFIED OBESITY TYPE: Primary | ICD-10-CM

## 2019-12-23 DIAGNOSIS — Z71.3 DIETARY COUNSELING AND SURVEILLANCE: ICD-10-CM

## 2019-12-23 NOTE — ED PROVIDER NOTES
Pt c/o cough/wheezing x one day. Using alb mdi, no sig improvement, took unknown dose of pred, thinks 20 mg, from old rx bottle. No fever. Cough non prod. No cp or abd pain. No fever or chills. Sx's wax/waming, mod, says typical of prev attacks. Denies chance of curr preg or testing.             Past Medical History:   Diagnosis Date    Asthma     Chronic pain 6 months    painful menses    Fibroids     GERD (gastroesophageal reflux disease)     Heart palpitations        Past Surgical History:   Procedure Laterality Date    HX  SECTION  [id number],[id number]    with BTL    HX HYSTEROSCOPY WITH ENDOMETRIAL ABLATION      No period since         Family History:   Problem Relation Age of Onset    Hypertension Mother     Hypertension Father     Diabetes Father     Parkinson's Disease Father     Cancer Paternal Grandmother        Social History     Socioeconomic History    Marital status:      Spouse name: Not on file    Number of children: Not on file    Years of education: Not on file    Highest education level: Not on file   Occupational History     Employer: [employer]   Social Needs    Financial resource strain: Not on file    Food insecurity:     Worry: Not on file     Inability: Not on file    Transportation needs:     Medical: Not on file     Non-medical: Not on file   Tobacco Use    Smoking status: Former Smoker     Types: Cigarettes    Smokeless tobacco: Never Used   Substance and Sexual Activity    Alcohol use: Yes     Comment: socially, 1 beer/weekend    Drug use: No    Sexual activity: Yes     Partners: Male     Birth control/protection: Surgical   Lifestyle    Physical activity:     Days per week: Not on file     Minutes per session: Not on file    Stress: Not on file   Relationships    Social connections:     Talks on phone: Not on file     Gets together: Not on file     Attends Holiness service: Not on file     Active member of club or organization: Not on file Attends meetings of clubs or organizations: Not on file     Relationship status: Not on file    Intimate partner violence:     Fear of current or ex partner: Not on file     Emotionally abused: Not on file     Physically abused: Not on file     Forced sexual activity: Not on file   Other Topics Concern     Service Yes     Comment: [employer]    Blood Transfusions No    Caffeine Concern No    Occupational Exposure No    Hobby Hazards No    Sleep Concern No    Stress Concern No    Weight Concern Yes     Comment: PT IS OVER WEIGHT    Special Diet No    Back Care No    Exercise Yes     Comment: 4 DAYS A WEEK    Bike Helmet No    Seat Belt Yes    Self-Exams Yes   Social History Narrative    Not on file         ALLERGIES: Azo [phenazopyridine] and Iodinated contrast media    Review of Systems   Constitutional: Negative for fever. HENT: Negative for congestion. Respiratory: Positive for cough and wheezing. Cardiovascular: Negative for chest pain. Gastrointestinal: Negative for abdominal pain and vomiting. Musculoskeletal: Negative for back pain. Skin: Negative for rash. Neurological: Negative for light-headedness. All other systems reviewed and are negative. Vitals:    12/22/19 2036 12/22/19 2045 12/22/19 2100 12/22/19 2130   BP: 158/43  121/73 129/69   Pulse: (!) 110  89 92   Resp:   21 22   Temp:  98.4 °F (36.9 °C)     SpO2: 95%  97% 97%   Weight: 90.7 kg (200 lb)               Physical Exam  Vitals signs and nursing note reviewed. Constitutional:       Appearance: She is well-developed. She is not diaphoretic. HENT:      Head: Normocephalic and atraumatic. Eyes:      Pupils: Pupils are equal, round, and reactive to light. Neck:      Musculoskeletal: Normal range of motion. Cardiovascular:      Rate and Rhythm: Normal rate and regular rhythm. Heart sounds: No murmur. Pulmonary:      Effort: Pulmonary effort is normal.      Breath sounds: Wheezing present. Abdominal:      Palpations: Abdomen is soft. Tenderness: There is no tenderness. Musculoskeletal:         General: No tenderness. Skin:     General: Skin is dry. Capillary Refill: Capillary refill takes less than 2 seconds. Findings: No rash. Neurological:      Mental Status: She is alert and oriented to person, place, and time. MDM       Procedures    Vitals:  Patient Vitals for the past 12 hrs:   Temp Pulse Resp BP SpO2   12/22/19 2130  92 22 129/69 97 %   12/22/19 2100  89 21 121/73 97 %   12/22/19 2045 98.4 °F (36.9 °C)       12/22/19 2036  (!) 110  158/43 95 %   12/22/19 2034    (!) 158/93 97 %         Medications ordered:   Medications   albuterol-ipratropium (DUO-NEB) 2.5 MG-0.5 MG/3 ML (3 mL Nebulization Given 12/22/19 2035)   albuterol (PROVENTIL VENTOLIN) nebulizer solution 5 mg (5 mg Nebulization Given 12/22/19 2104)   methylPREDNISolone (PF) (Solu-MEDROL) injection 62.5 mg (62.5 mg IntraVENous Given 12/22/19 2105)   0.9% sodium chloride infusion 500 mL (500 mL IntraVENous New Bag 12/22/19 2104)   albuterol-ipratropium (DUO-NEB) 2.5 MG-0.5 MG/3 ML (3 mL Nebulization Given 12/22/19 2150)   potassium chloride (K-DUR, KLOR-CON) SR tablet 40 mEq (40 mEq Oral Given 12/22/19 2158)         Lab findings:  Recent Results (from the past 12 hour(s))   CBC WITH AUTOMATED DIFF    Collection Time: 12/22/19  8:50 PM   Result Value Ref Range    WBC 11.6 4.6 - 13.2 K/uL    RBC 5.25 4.20 - 5.30 M/uL    HGB 14.1 12.0 - 16.0 g/dL    HCT 42.1 35.0 - 45.0 %    MCV 80.2 74.0 - 97.0 FL    MCH 26.9 24.0 - 34.0 PG    MCHC 33.5 31.0 - 37.0 g/dL    RDW 14.3 11.6 - 14.5 %    PLATELET 788 367 - 249 K/uL    MPV 9.9 9.2 - 11.8 FL    NEUTROPHILS 69 40 - 73 %    LYMPHOCYTES 23 21 - 52 %    MONOCYTES 6 3 - 10 %    EOSINOPHILS 2 0 - 5 %    BASOPHILS 0 0 - 2 %    ABS. NEUTROPHILS 8.0 1.8 - 8.0 K/UL    ABS. LYMPHOCYTES 2.6 0.9 - 3.6 K/UL    ABS. MONOCYTES 0.7 0.05 - 1.2 K/UL    ABS.  EOSINOPHILS 0.3 0.0 - 0.4 K/UL    ABS. BASOPHILS 0.0 0.0 - 0.1 K/UL    DF AUTOMATED     METABOLIC PANEL, BASIC    Collection Time: 12/22/19  8:50 PM   Result Value Ref Range    Sodium 140 136 - 145 mmol/L    Potassium 3.0 (L) 3.5 - 5.5 mmol/L    Chloride 106 100 - 111 mmol/L    CO2 26 21 - 32 mmol/L    Anion gap 8 3.0 - 18 mmol/L    Glucose 101 (H) 74 - 99 mg/dL    BUN 9 7.0 - 18 MG/DL    Creatinine 0.84 0.6 - 1.3 MG/DL    BUN/Creatinine ratio 11 (L) 12 - 20      GFR est AA >60 >60 ml/min/1.73m2    GFR est non-AA >60 >60 ml/min/1.73m2    Calcium 9.0 8.5 - 10.1 MG/DL   CARDIAC PANEL,(CK, CKMB & TROPONIN)    Collection Time: 12/22/19  8:50 PM   Result Value Ref Range     26 - 192 U/L    CK - MB 1.7 <3.6 ng/ml    CK-MB Index 1.2 0.0 - 4.0 %    Troponin-I, QT <0.02 0.0 - 0.045 NG/ML   NT-PRO BNP    Collection Time: 12/22/19  8:50 PM   Result Value Ref Range    NT pro-BNP 26 0 - 450 PG/ML           X-Ray, CT or other radiology findings or impressions:  XR CHEST PORT    (Results Pending)       Progress notes, Consult notes or additional Procedure notes:   10:13 PM pt feels much better, req dc, marked improvement, rare exp wheezing only. No sob, no complaints. Wants dc. To dc per pt req. Nl sats, nl vitals. No emc. Verb und of det ret inst given. Diagnosis:   1. Intermittent asthma with acute exacerbation, unspecified asthma severity    2. Acute upper respiratory infection    3.  Hypokalemia        Disposition: home    Follow-up Information     Follow up With Specialties Details Why Contact Info    Gaby Forman NP Nurse Practitioner Schedule an appointment as soon as possible for a visit in 1 day  [address] [address]  [phone] [id number] [facility] EMERGENCY DEPT Emergency Medicine Go to As needed, If symptoms worsen [place] [id number] [phone]           Patient's Medications   Start Taking    ALBUTEROL (PROVENTIL HFA, VENTOLIN HFA, PROAIR HFA) 90 MCG/ACTUATION INHALER    Take 1-2 Puffs by inhalation every four (4) hours as needed for Wheezing. ALBUTEROL (PROVENTIL VENTOLIN) 2.5 MG /3 ML (0.083 %) NEBU    3 mL by Nebulization route every four (4) hours as needed for Wheezing. PREDNISONE (DELTASONE) 20 MG TABLET    Take 60 mg by mouth daily for 4 days. Continue Taking    BUDESONIDE-FORMOTEROL (SYMBICORT) 160-4.5 MCG/ACTUATION HFAA    TAKE 2 PUFFS BY INHALATION TWO (2) TIMES A DAY. INDICATIONS: MAINTENANCE THERAPY FOR ASTHMA    CIPROFLOXACIN-HYDROCORTISONE (CIPRO HC OTIC) OTIC SUSPENSION    Administer 3 Drops in left ear two (2) times a day for 7 days. CYCLOBENZAPRINE (FLEXERIL) 5 MG TABLET    Take 1 Tab by mouth three (3) times daily (with meals). IBUPROFEN (MOTRIN) 800 MG TABLET    1 tab q 6-8 hours PRN pain    NEBULIZER & COMPRESSOR MACHINE    Use as directed    NEBULIZER ACCESSORIES KIT    Use as directed   These Medications have changed    No medications on file   Stop Taking    ALBUTEROL (PROVENTIL HFA, VENTOLIN HFA, PROAIR HFA) 90 MCG/ACTUATION INHALER    Take 2 Puffs by inhalation every four (4) hours as needed for Wheezing or Shortness of Breath. ALBUTEROL (PROVENTIL) 5 MG/ML NEBULIZER SOLUTION    0.5 mL by Nebulization route every four (4) hours as needed for Wheezing or Shortness of Breath. Indications: Acute Asthma Attack, BRONCHOSPASM PREVENTION, EXERCISE-INDUCED BRONCHOSPASM PREVENTION    MULTIVITAMIN (ONE A DAY) TABLET    Take 1 Tab by mouth daily.     OMEPRAZOLE (PRILOSEC) 20 MG CAPSULE    Take one tab po daily  Indications: gastroesophageal reflux disease    PREDNISONE (DELTASONE) 10 MG TABLET    3 tabs daily x 3 days, 2 tabs daily x 3 days, 1 tab daily x 3 days, 1/2 tab daily x 3 days

## 2019-12-23 NOTE — ED TRIAGE NOTES
Asthma exacerbation today; no relief with albuterol.   Took unknown dose prednisone dose (2 pills; states she thinks it was 10 mg each from previous prescription)

## 2019-12-23 NOTE — DISCHARGE INSTRUCTIONS
Return for pain, fever not resolving with motrin or tylenol, shortness of breath, vomiting, decreased fluid intake, weakness, numbness, dizziness, or any change or concerns. Patient Education        Asthma Attack: Care Instructions  Your Care Instructions    During an asthma attack, the airways swell and narrow. This makes it hard to breathe. Severe asthma attacks can be life-threatening, but you can help prevent them by keeping your asthma under control and treating symptoms before they get bad. Symptoms include being short of breath, having chest tightness, coughing, and wheezing. Noting and treating these symptoms can also help you avoid future trips to the emergency room. The doctor has checked you carefully, but problems can develop later. If you notice any problems or new symptoms, get medical treatment right away. Follow-up care is a key part of your treatment and safety. Be sure to make and go to all appointments, and call your doctor if you are having problems. It's also a good idea to know your test results and keep a list of the medicines you take. How can you care for yourself at home? · Follow your asthma action plan to prevent and treat attacks. If you don't have an asthma action plan, work with your doctor to create one. · Take your asthma medicines exactly as prescribed. Talk to your doctor right away if you have any questions about how to take them. ? Use your quick-relief medicine when you have symptoms of an attack. Quick-relief medicine is usually an albuterol inhaler. Some people need to use quick-relief medicine before they exercise. ? Take your controller medicine every day, not just when you have symptoms. Controller medicine is usually an inhaled corticosteroid. The goal is to prevent problems before they occur. Don't use your controller medicine to treat an attack that has already started. It doesn't work fast enough to help.   ? If your doctor prescribed corticosteroid pills to use during an attack, take them exactly as prescribed. It may take hours for the pills to work, but they may make the episode shorter and help you breathe better. ? Keep your quick-relief medicine with you at all times. · Talk to your doctor before using other medicines. Some medicines, such as aspirin, can cause asthma attacks in some people. · If you have a peak flow meter, use it to check how well you are breathing. This can help you predict when an asthma attack is going to occur. Then you can take medicine to prevent the asthma attack or make it less severe. · Do not smoke or allow others to smoke around you. Avoid smoky places. Smoking makes asthma worse. If you need help quitting, talk to your doctor about stop-smoking programs and medicines. These can increase your chances of quitting for good. · Learn what triggers an asthma attack for you, and avoid the triggers when you can. Common triggers include colds, smoke, air pollution, dust, pollen, mold, pets, cockroaches, stress, and cold air. · Avoid colds and the flu. Get a pneumococcal vaccine shot. If you have had one before, ask your doctor if you need a second dose. Get a flu vaccine every fall. If you must be around people with colds or the flu, wash your hands often. When should you call for help? Call 911 anytime you think you may need emergency care.  For example, call if:    · You have severe trouble breathing.    Call your doctor now or seek immediate medical care if:    · Your symptoms do not get better after you have followed your asthma action plan.     · You have new or worse trouble breathing.     · Your coughing and wheezing get worse.     · You cough up dark brown or bloody mucus (sputum).     · You have a new or higher fever.    Watch closely for changes in your health, and be sure to contact your doctor if:    · You need to use quick-relief medicine on more than 2 days a week (unless it is just for exercise).     · You cough more deeply or more often, especially if you notice more mucus or a change in the color of your mucus.     · You are not getting better as expected. Where can you learn more? Go to http://lorie-ede.info/. Enter V926 in the search box to learn more about \"Asthma Attack: Care Instructions. \"  Current as of: June 9, 2019  Content Version: 12.2  © 7960-4825 [employer]. Care instructions adapted under license by [employer] (which disclaims liability or warranty for this information). If you have questions about a medical condition or this instruction, always ask your healthcare professional. [address] [address] any warranty or liability for your use of this information. Patient Education        Upper Respiratory Infection (Cold): Care Instructions  Your Care Instructions    An upper respiratory infection, or URI, is an infection of the nose, sinuses, or throat. URIs are spread by coughs, sneezes, and direct contact. The common cold is the most frequent kind of URI. The flu and sinus infections are other kinds of URIs. Almost all URIs are caused by viruses. Antibiotics won't cure them. But you can treat most infections with home care. This may include drinking lots of fluids and taking over-the-counter pain medicine. You will probably feel better in 4 to 10 days. The doctor has checked you carefully, but problems can develop later. If you notice any problems or new symptoms, get medical treatment right away. Follow-up care is a key part of your treatment and safety. Be sure to make and go to all appointments, and call your doctor if you are having problems. It's also a good idea to know your test results and keep a list of the medicines you take. How can you care for yourself at home? · To prevent dehydration, drink plenty of fluids, enough so that your urine is light yellow or clear like water.  Choose water and other caffeine-free clear liquids until you feel better. If you have kidney, heart, or liver disease and have to limit fluids, talk with your doctor before you increase the amount of fluids you drink. · Take an over-the-counter pain medicine, such as acetaminophen (Tylenol), ibuprofen (Advil, Motrin), or naproxen (Aleve). Read and follow all instructions on the label. · Before you use cough and cold medicines, check the label. These medicines may not be safe for young children or for people with certain health problems. · Be careful when taking over-the-counter cold or flu medicines and Tylenol at the same time. Many of these medicines have acetaminophen, which is Tylenol. Read the labels to make sure that you are not taking more than the recommended dose. Too much acetaminophen (Tylenol) can be harmful. · Get plenty of rest.  · Do not smoke or allow others to smoke around you. If you need help quitting, talk to your doctor about stop-smoking programs and medicines. These can increase your chances of quitting for good. When should you call for help? Call 911 anytime you think you may need emergency care. For example, call if:    · You have severe trouble breathing.    Call your doctor now or seek immediate medical care if:    · You seem to be getting much sicker.     · You have new or worse trouble breathing.     · You have a new or higher fever.     · You have a new rash.    Watch closely for changes in your health, and be sure to contact your doctor if:    · You have a new symptom, such as a sore throat, an earache, or sinus pain.     · You cough more deeply or more often, especially if you notice more mucus or a change in the color of your mucus.     · You do not get better as expected. Where can you learn more? Go to http://lorie-ede.info/. Enter F105 in the search box to learn more about \"Upper Respiratory Infection (Cold): Care Instructions. \"  Current as of: June 9, 2019  Content Version: 12.2  © 5485-1451 [employer]. Care instructions adapted under license by [employer] (which disclaims liability or warranty for this information). If you have questions about a medical condition or this instruction, always ask your healthcare professional. [address] any warranty or liability for your use of this information. Patient Education        Hypokalemia: Care Instructions  Your Care Instructions    Hypokalemia (say \"sk-qh-[id number]-RENETTA-tyree-uh\") is a low level of potassium. The heart, muscles, kidneys, and nervous system all need potassium to work well. This problem has many different causes. Kidney problems, diet, and medicines like diuretics and laxatives can cause it. So can vomiting or diarrhea. In some cases, cancer is the cause. Your doctor may do tests to find the cause of your low potassium levels. You may need medicines to bring your potassium levels back to normal. You may also need regular blood tests to check your potassium. If you have very low potassium, you may need intravenous (IV) medicines. You also may need tests to check the electrical activity of your heart. Heart problems caused by low potassium levels can be very serious. Follow-up care is a key part of your treatment and safety. Be sure to make and go to all appointments, and call your doctor if you are having problems. It's also a good idea to know your test results and keep a list of the medicines you take. How can you care for yourself at home? · If your doctor recommends it, eat foods that have a lot of potassium. These include fresh fruits, juices, and vegetables. They also include nuts, beans, and milk. · Be safe with medicines. If your doctor prescribes medicines or potassium supplements, take them exactly as directed. Call your doctor if you have any problems with your medicines. · Get your potassium levels tested as often as your doctor tells you. When should you call for help?   Call 911 anytime you think you may need emergency care. For example, call if:    · You feel like your heart is missing beats. Heart problems caused by low potassium can cause death.     · You passed out (lost consciousness).     · You have a seizure.    Call your doctor now or seek immediate medical care if:    · You feel weak or unusually tired.     · You have severe arm or leg cramps.     · You have tingling or numbness.     · You feel sick to your stomach, or you vomit.     · You have belly cramps.     · You feel bloated or constipated.     · You have to urinate a lot.     · You feel very thirsty most of the time.     · You are dizzy or lightheaded, or you feel like you may faint.     · You feel depressed, or you lose touch with reality.    Watch closely for changes in your health, and be sure to contact your doctor if:    · You do not get better as expected. Where can you learn more? Go to http://lorie-ede.info/. Enter G358 in the search box to learn more about \"Hypokalemia: Care Instructions. \"  Current as of: November 6, 2018  Content Version: 12.2  © 9086-9513 [employer]. Care instructions adapted under license by [employer] (which disclaims liability or warranty for this information). If you have questions about a medical condition or this instruction, always ask your healthcare professional. [address] any warranty or liability for your use of this information.

## 2020-01-10 ENCOUNTER — CLINICAL SUPPORT (OUTPATIENT)
Dept: SURGERY | Age: 40
End: 2020-01-10

## 2020-01-10 VITALS
HEIGHT: 63 IN | HEART RATE: 70 BPM | SYSTOLIC BLOOD PRESSURE: 120 MMHG | DIASTOLIC BLOOD PRESSURE: 78 MMHG | WEIGHT: 191.8 LBS | BODY MASS INDEX: 33.98 KG/M2

## 2020-01-10 DIAGNOSIS — E66.9 OBESITY, UNSPECIFIED CLASSIFICATION, UNSPECIFIED OBESITY TYPE, UNSPECIFIED WHETHER SERIOUS COMORBIDITY PRESENT: Primary | ICD-10-CM

## 2020-01-10 NOTE — PROGRESS NOTES
Patient attended a weekly class as part of the Medically Supervised Weight Loss Program.  This class was facilitated by a Registered Dietitian. Topics taught at class focused on diet, particularly carbohydrate counting and portion control. Classes also focused on behavior changes and the importance of establishing a daily exercise routine. Progress Note: Weekly Medical Monitoring in the [facility] Weight Loss Program    Is there anything that you or the patient needs to let the supervising provider know about? yes    Over the past week, have you experienced any side-effects? Yes headache x 1 day  Boni Campos is a 44 y.o. female who is enrolled in [facility]    Boni Campos was prescribed the VLCD / LCD. Visit Vitals  /78   Pulse 70   Ht 5' 3\" (1.6 m)   Wt 87 kg (191 lb 12.8 oz)   BMI 33.98 kg/m²     Weight Metrics 1/10/2020 12/23/2019 12/23/2019 12/22/2019 10/18/2019 8/3/2019 7/6/2019   Weight 191 lb 12.8 oz - 193 lb 9.6 oz 200 lb 198 lb 198 lb 190 lb   Waist Measure Inches 32.5 33.5 - - - - -   BMI 33.98 kg/m2 - 34.29 kg/m2 35.43 kg/m2 35.07 kg/m2 35.07 kg/m2 33.66 kg/m2         Have you received any other medical care this week? no  If yes, where and for what? Have you had any change in your medications since your last visit? no  If yes what? Did you have any problems adhering to the program last week? no  If yes, please explain:       Eating Habits Over Last Week:  Did you take in 64 oz of non-caloric fluids?  no     Did you consume your prescribed meal replacement regimen each day? no       Physical Activity Over the Past Week:    Aerobic exercise: 120 min  Resistance exercise: 4 workouts / week

## 2020-01-14 ENCOUNTER — OFFICE VISIT (OUTPATIENT)
Dept: FAMILY MEDICINE CLINIC | Facility: CLINIC | Age: 40
End: 2020-01-14

## 2020-01-14 VITALS
RESPIRATION RATE: 12 BRPM | HEART RATE: 83 BPM | SYSTOLIC BLOOD PRESSURE: 128 MMHG | HEIGHT: 63 IN | DIASTOLIC BLOOD PRESSURE: 88 MMHG | BODY MASS INDEX: 34.2 KG/M2 | OXYGEN SATURATION: 98 % | TEMPERATURE: 97.4 F | WEIGHT: 193 LBS

## 2020-01-14 DIAGNOSIS — M54.30 SCIATIC LEG PAIN: ICD-10-CM

## 2020-01-14 DIAGNOSIS — J45.30 MILD PERSISTENT ASTHMA WITHOUT COMPLICATION: ICD-10-CM

## 2020-01-14 RX ORDER — MONTELUKAST SODIUM 10 MG/1
10 TABLET ORAL DAILY
Qty: 90 TAB | Refills: 1 | Status: SHIPPED | OUTPATIENT
Start: 2020-01-14 | End: 2020-08-02

## 2020-01-14 RX ORDER — CYCLOBENZAPRINE HCL 5 MG
5 TABLET ORAL
Qty: 30 TAB | Refills: 0 | Status: SHIPPED | OUTPATIENT
Start: 2020-01-14 | End: 2020-04-08

## 2020-01-14 RX ORDER — BUDESONIDE AND FORMOTEROL FUMARATE DIHYDRATE 160; 4.5 UG/1; UG/1
AEROSOL RESPIRATORY (INHALATION)
Qty: 10.2 INHALER | Refills: 3 | Status: SHIPPED | OUTPATIENT
Start: 2020-01-14 | End: 2020-08-02

## 2020-01-14 NOTE — PROGRESS NOTES
Tyree Katz is a 44 y.o. female presenting today for Medication Refill and Form Completion  . HPI:  Tyree Katz presents to the office today for medication refill. She has a past medical history for asthma presents today for a Symbicort refill. Per the patient she was seen in the ED on 12/22/2019 for asthma attack. She was given a prednisone taper dose, albuterol solution and inhaler at discharge. She denies any wheezing or coughing today. She is also requesting a prescription refill for Flexeril. Review of Systems   Respiratory: Negative for cough and sputum production. Cardiovascular: Negative for chest pain and palpitations. Gastrointestinal: Negative for abdominal pain and nausea. Musculoskeletal:        Patient has a history of sciatica. Allergies   Allergen Reactions    Azo [Phenazopyridine] Hives    Iodinated Contrast Media Shortness of Breath       Current Outpatient Medications   Medication Sig Dispense Refill    budesonide-formoterol (SYMBICORT) 160-4.5 mcg/actuation HFAA TAKE 2 PUFFS BY INHALATION TWO (2) TIMES A DAY. INDICATIONS: MAINTENANCE THERAPY FOR ASTHMA 10.2 Inhaler 3    cyclobenzaprine (FLEXERIL) 5 mg tablet Take 1 Tab by mouth three (3) times daily (with meals). 30 Tab 0    montelukast (SINGULAIR) 10 mg tablet Take 1 Tab by mouth daily. 90 Tab 1    albuterol (PROVENTIL HFA, VENTOLIN HFA, PROAIR HFA) 90 mcg/actuation inhaler Take 1-2 Puffs by inhalation every four (4) hours as needed for Wheezing. 1 Inhaler 0    albuterol (PROVENTIL VENTOLIN) 2.5 mg /3 mL (0.083 %) nebu 3 mL by Nebulization route every four (4) hours as needed for Wheezing. 24 Each 0    Nebulizer & Compressor machine Use as directed 1 Each 0    Nebulizer Accessories kit Use as directed 1 Kit 1    ibuprofen (MOTRIN) 800 mg tablet 1 tab q 6-8 hours PRN pain 30 Tab 0    ciprofloxacin-hydrocortisone (CIPRO HC OTIC) otic suspension Administer 3 Drops in left ear two (2) times a day for 7 days.  10 mL 0       Past Medical History:   Diagnosis Date    Asthma     Chronic pain 6 months    painful menses    Fibroids     GERD (gastroesophageal reflux disease)     Heart palpitations        Past Surgical History:   Procedure Laterality Date    HX  SECTION  8182,0202    with BTL    HX HYSTEROSCOPY WITH ENDOMETRIAL ABLATION      No period since       Social History     Socioeconomic History    Marital status:      Spouse name: Not on file    Number of children: Not on file    Years of education: Not on file    Highest education level: Not on file   Occupational History     Employer: [employer]   Social Needs    Financial resource strain: Not on file    Food insecurity:     Worry: Not on file     Inability: Not on file    Transportation needs:     Medical: Not on file     Non-medical: Not on file   Tobacco Use    Smoking status: Former Smoker     Types: Cigarettes     Last attempt to quit: 3/23/1998     Years since quittin.8    Smokeless tobacco: Current User    Tobacco comment: occ vapes   Substance and Sexual Activity    Alcohol use: Yes     Comment: socially, 1 beer/weekend    Drug use: No    Sexual activity: Yes     Partners: Male     Birth control/protection: Surgical   Lifestyle    Physical activity:     Days per week: Not on file     Minutes per session: Not on file    Stress: Not on file   Relationships    Social connections:     Talks on phone: Not on file     Gets together: Not on file     Attends Worship service: Not on file     Active member of club or organization: Not on file     Attends meetings of clubs or organizations: Not on file     Relationship status: Not on file    Intimate partner violence:     Fear of current or ex partner: Not on file     Emotionally abused: Not on file     Physically abused: Not on file     Forced sexual activity: Not on file   Other Topics Concern     Service Yes     Comment: [employer]    Blood Transfusions No    Caffeine Concern No    Occupational Exposure No    Hobby Hazards No    Sleep Concern No    Stress Concern No    Weight Concern Yes     Comment: PT IS OVER WEIGHT    Special Diet No    Back Care No    Exercise Yes     Comment: 4 DAYS A WEEK    Bike Helmet No    Seat Belt Yes    Self-Exams Yes   Social History Narrative    Not on file       Patient does not have an advanced directive on file    Vitals:    01/14/20 1142   BP: 128/88   Pulse: 83   Resp: 12   Temp: 97.4 °F (36.3 °C)   TempSrc: Oral   SpO2: 98%   Weight: 193 lb (87.5 kg)   Height: 5' 3\" (1.6 m)   PainSc:   0 - No pain       Physical Exam  Vitals signs and nursing note reviewed. Constitutional:       Appearance: Normal appearance. Cardiovascular:      Rate and Rhythm: Normal rate and regular rhythm. Pulses: Normal pulses. Heart sounds: Normal heart sounds. Pulmonary:      Effort: Pulmonary effort is normal.      Breath sounds: Normal breath sounds. Neurological:      Mental Status: She is alert. Admission on 12/22/2019, Discharged on 12/22/2019   Component Date Value Ref Range Status    WBC 12/22/2019 11.6  4.6 - 13.2 K/uL Final    RBC 12/22/2019 5.25  4.20 - 5.30 M/uL Final    HGB 12/22/2019 14.1  12.0 - 16.0 g/dL Final    HCT 12/22/2019 42.1  35.0 - 45.0 % Final    MCV 12/22/2019 80.2  74.0 - 97.0 FL Final    MCH 12/22/2019 26.9  24.0 - 34.0 PG Final    MCHC 12/22/2019 33.5  31.0 - 37.0 g/dL Final    RDW 12/22/2019 14.3  11.6 - 14.5 % Final    PLATELET 60/15/4810 622  135 - 420 K/uL Final    MPV 12/22/2019 9.9  9.2 - 11.8 FL Final    NEUTROPHILS 12/22/2019 69  40 - 73 % Final    LYMPHOCYTES 12/22/2019 23  21 - 52 % Final    MONOCYTES 12/22/2019 6  3 - 10 % Final    EOSINOPHILS 12/22/2019 2  0 - 5 % Final    BASOPHILS 12/22/2019 0  0 - 2 % Final    ABS. NEUTROPHILS 12/22/2019 8.0  1.8 - 8.0 K/UL Final    ABS. LYMPHOCYTES 12/22/2019 2.6  0.9 - 3.6 K/UL Final    ABS.  MONOCYTES 12/22/2019 0.7  0.05 - 1.2 K/UL Final    ABS. EOSINOPHILS 12/22/2019 0.3  0.0 - 0.4 K/UL Final    ABS. BASOPHILS 12/22/2019 0.0  0.0 - 0.1 K/UL Final    DF 12/22/2019 AUTOMATED    Final    Sodium 12/22/2019 140  136 - 145 mmol/L Final    Potassium 12/22/2019 3.0* 3.5 - 5.5 mmol/L Final    Chloride 12/22/2019 106  100 - 111 mmol/L Final    CO2 12/22/2019 26  21 - 32 mmol/L Final    Anion gap 12/22/2019 8  3.0 - 18 mmol/L Final    Glucose 12/22/2019 101* 74 - 99 mg/dL Final    BUN 12/22/2019 9  7.0 - 18 MG/DL Final    Creatinine 12/22/2019 0.84  0.6 - 1.3 MG/DL Final    BUN/Creatinine ratio 12/22/2019 11* 12 - 20   Final    GFR est AA 12/22/2019 >60  >60 ml/min/1.73m2 Final    GFR est non-AA 12/22/2019 >60  >60 ml/min/1.73m2 Final    Comment: (NOTE)  Estimated GFR is calculated using the Modification of Diet in Renal   Disease (MDRD) Study equation, reported for both  Americans   (GFRAA) and non- Americans (GFRNA), and normalized to 1.73m2   body surface area. The physician must decide which value applies to   the patient. The MDRD study equation should only be used in   individuals age 25 or older. It has not been validated for the   following: pregnant women, patients with serious comorbid conditions,   or on certain medications, or persons with extremes of body size,   muscle mass, or nutritional status.  Calcium 12/22/2019 9.0  8.5 - 10.1 MG/DL Final    CK 12/22/2019 146  26 - 192 U/L Final    CK - MB 12/22/2019 1.7  <3.6 ng/ml Final    CK-MB Index 12/22/2019 1.2  0.0 - 4.0 % Final    Troponin-I, QT 12/22/2019 <0.02  0.0 - 0.045 NG/ML Final    Comment: The presence of detectable troponin above the reference range indicates myocardial injury which may be due to ischemia, myocarditis, trauma, etc.  Clinical correlation is necessary to establish the significance of this finding. Sequential testing is recommended to determine if the typical rise and fall of cTnI is demonstrated.   Note:  Cardiac troponin I has a relatively long half life and may be present well after the CK MB has returned to baseline. The reference range is based on the 99th percentile of the referent population.  NT pro-BNP 12/22/2019 26  0 - 450 PG/ML Final    Comment:           For patients with dyspnea, NT proBNP is highly sensitive for detecting acute congestive heart failure. Also, an NT proBNP <300 pg/mL effectively rules out acute congestive heart failure, with 99% negative predictive value. Our reference ranges are for acute dyspnea. Age              Range (pg/ml)        0-49                0-450        50-75               0-900        >75                 0-1800       For ambulatory office patients, the ranges below apply: For patients with dyspnea, NT proBNP is highly sensitive for detecting acute congestive heart failure. Also, an NT proBNP <300 pg/mL effectively rules out acute congestive heart failure, with 99% negative predictive value. Our reference ranges are for acute dyspnea.      Hospital Outpatient Visit on 12/14/2019   Component Date Value Ref Range Status    Color 12/14/2019 YELLOW    Final    Appearance 12/14/2019 CLEAR    Final    Specific gravity 12/14/2019 1.009  1.005 - 1.030   Final    pH (UA) 12/14/2019 6.0  5.0 - 8.0   Final    Protein 12/14/2019 NEGATIVE   NEG mg/dL Final    Glucose 12/14/2019 NEGATIVE   NEG mg/dL Final    Ketone 12/14/2019 NEGATIVE   NEG mg/dL Final    Bilirubin 12/14/2019 NEGATIVE   NEG   Final    Blood 12/14/2019 NEGATIVE   NEG   Final    Urobilinogen 12/14/2019 0.2  0.2 - 1.0 EU/dL Final    Nitrites 12/14/2019 NEGATIVE   NEG   Final    Leukocyte Esterase 12/14/2019 NEGATIVE   NEG   Final    WBC 12/14/2019 0 to 1  0 - 4 /hpf Final    RBC 12/14/2019 NONE  0 - 5 /hpf Final    Epithelial cells 12/14/2019 4+  0 - 5 /lpf Final    Bacteria 12/14/2019 FEW* NEG /hpf Final   Hospital Outpatient Visit on 12/14/2019   Component Date Value Ref Range Status    Ventricular Rate 12/14/2019 62  BPM Final    Atrial Rate 12/14/2019 62  BPM Final    P-R Interval 12/14/2019 138  ms Final    QRS Duration 12/14/2019 68  ms Final    Q-T Interval 12/14/2019 384  ms Final    QTC Calculation (Bezet) 12/14/2019 389  ms Final    Calculated P Axis 12/14/2019 64  degrees Final    Calculated R Axis 12/14/2019 53  degrees Final    Calculated T Axis 12/14/2019 42  degrees Final    Diagnosis 12/14/2019    Final                    Value:Normal sinus rhythm  Normal ECG  When compared with ECG of 06-JUL-2019 06:42,  fusion complexes are no longer present  Confirmed by Sherri Iyer [phone]) on 12/15/2019 1:03:36 PM         .No results found for any visits on 01/14/20. Assessment / Plan:      ICD-10-CM ICD-9-CM    1. Mild persistent asthma without complication X78.56 215.86 budesonide-formoterol (SYMBICORT) 160-4.5 mcg/actuation HFAA      montelukast (SINGULAIR) 10 mg tablet   2. Sciatic leg pain M54.30 724.3 cyclobenzaprine (FLEXERIL) 5 mg tablet       Symbicor refilled  Flexeril rx  Patient given a handicapp form for her history of asthma. Follow-up and Dispositions    · Return in about 6 months (around 7/14/2020), or if symptoms worsen or fail to improve. I asked the patient if she  had any questions and answered her  questions. The patient stated that she understands the treatment plan and agrees with the treatment plan    This document was created with a voice activated dictation system and may contain transcription errors.

## 2020-01-14 NOTE — PROGRESS NOTES
Visit Vitals  /88   Pulse 83   Temp 97.4 °F (36.3 °C) (Oral)   Resp 12   Ht 5' 3\" (1.6 m)   Wt 193 lb (87.5 kg)   SpO2 98%   BMI 34.19 kg/m²     Soraida Amor presents today for   Chief Complaint   Patient presents with    Medication Refill    Form Completion       Is someone accompanying this pt? no    Is the patient using any DME equipment during OV? no    Depression Screening:  3 most recent PHQ Screens 1/14/2020   Little interest or pleasure in doing things Not at all   Feeling down, depressed, irritable, or hopeless Not at all   Total Score PHQ 2 0       Learning Assessment:  Learning Assessment 11/25/2015   PRIMARY LEARNER Patient   HIGHEST LEVEL OF EDUCATION - PRIMARY LEARNER  2 YEARS OF COLLEGE   BARRIERS PRIMARY LEARNER NONE   PRIMARY LANGUAGE ENGLISH   LEARNER PREFERENCE PRIMARY READING     VIDEOS     -   ANSWERED BY Patient   RELATIONSHIP SELF       Abuse Screening:  Abuse Screening Questionnaire 5/25/2018   Do you ever feel afraid of your partner? N   Are you in a relationship with someone who physically or mentally threatens you? N   Is it safe for you to go home? Y       Fall Risk  Fall Risk Assessment, last 12 mths 5/25/2018   Able to walk? Yes   Fall in past 12 months? No       Health Maintenance reviewed and discussed and ordered per Provider. Health Maintenance Due   Topic Date Due    Pneumococcal 0-64 years (1 of 1 - PPSV23) 05/02/1986    Influenza Age 5 to Adult  08/01/2019           Coordination of Care:  1. Have you been to the ER, urgent care clinic since your last visit? Hospitalized since your last visit? no    2. Have you seen or consulted any other health care providers outside of the [address] [place] [address] since your last visit? Include any pap smears or colon screening.  no

## 2020-01-17 ENCOUNTER — CLINICAL SUPPORT (OUTPATIENT)
Dept: SURGERY | Age: 40
End: 2020-01-17

## 2020-01-17 VITALS
HEIGHT: 63 IN | DIASTOLIC BLOOD PRESSURE: 74 MMHG | BODY MASS INDEX: 33.49 KG/M2 | HEART RATE: 72 BPM | WEIGHT: 189 LBS | SYSTOLIC BLOOD PRESSURE: 114 MMHG

## 2020-01-17 DIAGNOSIS — E66.9 OBESITY, UNSPECIFIED CLASSIFICATION, UNSPECIFIED OBESITY TYPE, UNSPECIFIED WHETHER SERIOUS COMORBIDITY PRESENT: Primary | ICD-10-CM

## 2020-01-17 NOTE — PROGRESS NOTES
Patient attended a weekly class as part of the Medically Supervised Weight Loss Program.  This class was facilitated by a Registered Dietitian. Topics taught at class focused on diet, particularly carbohydrate counting and portion control. Classes also focused on behavior changes and the importance of establishing a daily exercise routine. Progress Note: Weekly Medical Monitoring in the [facility] Weight Loss Program    Is there anything that you or the patient needs to let the supervising provider know about? no    Over the past week, have you experienced any side-effects? yes  Edith Simons is a 44 y.o. female who is enrolled in [facility]    [place] was prescribed the VLCD / LCD. Visit Vitals  /74   Pulse 72   Ht 5' 3\" (1.6 m)   Wt 85.7 kg (189 lb)   BMI 33.48 kg/m²     Weight Metrics 1/17/2020 1/14/2020 1/10/2020 12/23/2019 12/23/2019 12/22/2019 10/18/2019   Weight 189 lb 193 lb 191 lb 12.8 oz - 193 lb 9.6 oz 200 lb 198 lb   Waist Measure Inches 33 - 32.5 33.5 - - -   BMI 33.48 kg/m2 34.19 kg/m2 33.98 kg/m2 - 34.29 kg/m2 35.43 kg/m2 35.07 kg/m2         Have you received any other medical care this week? yes  If yes, where and for what? pcp    Have you had any change in your medications since your last visit? yes  If yes what? singulair added    Did you have any problems adhering to the program last week? no  If yes, please explain:       Eating Habits Over Last Week:  Did you take in 64 oz of non-caloric fluids? no     Did you consume your prescribed meal replacement regimen each day?  yes       Physical Activity Over the Past Week:    Aerobic exercise: 155 min  Resistance exercise: 4 workouts / week

## 2020-01-24 ENCOUNTER — CLINICAL SUPPORT (OUTPATIENT)
Dept: SURGERY | Age: 40
End: 2020-01-24

## 2020-01-24 VITALS
DIASTOLIC BLOOD PRESSURE: 68 MMHG | HEIGHT: 63 IN | SYSTOLIC BLOOD PRESSURE: 124 MMHG | BODY MASS INDEX: 33.26 KG/M2 | WEIGHT: 187.7 LBS | HEART RATE: 70 BPM

## 2020-01-24 DIAGNOSIS — E66.9 OBESITY, UNSPECIFIED CLASSIFICATION, UNSPECIFIED OBESITY TYPE, UNSPECIFIED WHETHER SERIOUS COMORBIDITY PRESENT: Primary | ICD-10-CM

## 2020-01-24 NOTE — PROGRESS NOTES
Chief Complaint   Patient presents with    Weight Management     Patient attended a weekly class as part of the Medically Supervised Weight Loss Program.  This class was facilitated by a Registered Dietitian. Topics taught at class focused on diet, particularly carbohydrate counting and portion control. Classes also focused on behavior changes and the importance of establishing a daily exercise routine. Progress Note: Weekly Medical Monitoring in the [facility] Weight Loss Program    Is there anything that you or the patient needs to let the supervising provider know about? no    Over the past week, have you experienced any side-effects? no    Ki Cárdenas is a 44 y.o. female who is enrolled in [facility] Loss Program    Ki Cárdenas was prescribed the VLCD / LCD. Visit Vitals  /68   Pulse 70   Ht 5' 3\" (1.6 m)   Wt 187 lb 11.2 oz (85.1 kg)   BMI 33.25 kg/m²     Weight Metrics 1/24/2020 1/17/2020 1/14/2020 1/10/2020 12/23/2019 12/23/2019 12/22/2019   Weight 187 lb 11.2 oz 189 lb 193 lb 191 lb 12.8 oz - 193 lb 9.6 oz 200 lb   Waist Measure Inches 33 33 - 32.5 33.5 - -   BMI 33.25 kg/m2 33.48 kg/m2 34.19 kg/m2 33.98 kg/m2 - 34.29 kg/m2 35.43 kg/m2         Have you received any other medical care this week? no  If yes, where and for what? Have you had any change in your medications since your last visit? no  If yes what? Did you have any problems adhering to the program last week? no  If yes, please explain:       Eating Habits Over Last Week:  Did you take in 64 oz of non-caloric fluids?  no     Did you consume your prescribed meal replacement regimen each day? no       Physical Activity Over the Past Week:    Aerobic exercise: 85 min  Resistance exercise: no workouts / week

## 2020-01-27 ENCOUNTER — HOSPITAL ENCOUNTER (OUTPATIENT)
Dept: LAB | Age: 40
Discharge: HOME | End: 2020-01-27
Payer: COMMERCIAL

## 2020-01-27 DIAGNOSIS — E66.9 CLASS 1 OBESITY WITHOUT SERIOUS COMORBIDITY WITH BODY MASS INDEX (BMI) OF 34.0 TO 34.9 IN ADULT, UNSPECIFIED OBESITY TYPE: ICD-10-CM

## 2020-01-27 DIAGNOSIS — E66.01 SEVERE OBESITY (HCC): ICD-10-CM

## 2020-01-27 LAB
ALBUMIN SERPL-MCNC: 3.4 G/DL (ref 3.4–5)
ALBUMIN/GLOB SERPL: 1 {RATIO} (ref 0.8–1.7)
ALP SERPL-CCNC: 92 U/L (ref 45–117)
ALT SERPL-CCNC: 37 U/L (ref 13–56)
ANION GAP SERPL CALC-SCNC: 5 MMOL/L (ref 3–18)
AST SERPL-CCNC: 33 U/L (ref 10–38)
BILIRUB SERPL-MCNC: 0.3 MG/DL (ref 0.2–1)
BUN SERPL-MCNC: 10 MG/DL (ref 7–18)
BUN/CREAT SERPL: 12 (ref 12–20)
CALCIUM SERPL-MCNC: 9 MG/DL (ref 8.5–10.1)
CHLORIDE SERPL-SCNC: 114 MMOL/L (ref 100–111)
CO2 SERPL-SCNC: 25 MMOL/L (ref 21–32)
CREAT SERPL-MCNC: 0.81 MG/DL (ref 0.6–1.3)
GLOBULIN SER CALC-MCNC: 3.5 G/DL (ref 2–4)
GLUCOSE SERPL-MCNC: 88 MG/DL (ref 74–99)
MAGNESIUM SERPL-MCNC: 1.8 MG/DL (ref 1.6–2.6)
POTASSIUM SERPL-SCNC: 4.1 MMOL/L (ref 3.5–5.5)
PROT SERPL-MCNC: 6.9 G/DL (ref 6.4–8.2)
SODIUM SERPL-SCNC: 144 MMOL/L (ref 136–145)
URATE SERPL-MCNC: 3.9 MG/DL (ref 2.6–7.2)

## 2020-01-27 PROCEDURE — 36415 COLL VENOUS BLD VENIPUNCTURE: CPT

## 2020-01-27 PROCEDURE — 80053 COMPREHEN METABOLIC PANEL: CPT

## 2020-01-27 PROCEDURE — 83735 ASSAY OF MAGNESIUM: CPT

## 2020-01-27 PROCEDURE — 84550 ASSAY OF BLOOD/URIC ACID: CPT

## 2020-01-31 ENCOUNTER — CLINICAL SUPPORT (OUTPATIENT)
Dept: SURGERY | Age: 40
End: 2020-01-31

## 2020-01-31 VITALS
BODY MASS INDEX: 33.5 KG/M2 | HEIGHT: 63 IN | HEART RATE: 72 BPM | WEIGHT: 189.1 LBS | SYSTOLIC BLOOD PRESSURE: 116 MMHG | DIASTOLIC BLOOD PRESSURE: 78 MMHG

## 2020-01-31 DIAGNOSIS — E66.9 OBESITY, UNSPECIFIED CLASSIFICATION, UNSPECIFIED OBESITY TYPE, UNSPECIFIED WHETHER SERIOUS COMORBIDITY PRESENT: Primary | ICD-10-CM

## 2020-01-31 NOTE — PROGRESS NOTES
Chief Complaint   Patient presents with    Weight Management     Patient attended a weekly class as part of the Medically Supervised Weight Loss Program.  This class was facilitated by a Registered Dietitian. Topics taught at class focused on diet, particularly carbohydrate counting and portion control. Classes also focused on behavior changes and the importance of establishing a daily exercise routine. Progress Note: Weekly Medical Monitoring in the [facility] Weight Loss Program    Is there anything that you or the patient needs to let the supervising provider know about? no    Over the past week, have you experienced any side-effects? no    Hema Petersen is a 44 y.o. female who is enrolled in [facility] Weight Loss Program    Hema Petersen was prescribed the VLCD / LCD. Visit Vitals  /78   Pulse 72   Ht 5' 3\" (1.6 m)   Wt 189 lb 1.6 oz (85.8 kg)   BMI 33.50 kg/m²     Weight Metrics 1/31/2020 1/24/2020 1/17/2020 1/14/2020 1/10/2020 12/23/2019 12/23/2019   Weight 189 lb 1.6 oz 187 lb 11.2 oz 189 lb 193 lb 191 lb 12.8 oz - 193 lb 9.6 oz   Waist Measure Inches 32.5 33 33 - 32.5 33.5 -   BMI 33.5 kg/m2 33.25 kg/m2 33.48 kg/m2 34.19 kg/m2 33.98 kg/m2 - 34.29 kg/m2         Have you received any other medical care this week? no  If yes, where and for what? Have you had any change in your medications since your last visit? no  If yes what? Did you have any problems adhering to the program last week? no  If yes, please explain:       Eating Habits Over Last Week:  Did you take in 64 oz of non-caloric fluids?  yes     Did you consume your prescribed meal replacement regimen each day? no       Physical Activity Over the Past Week:    Aerobic exercise: 210 min  Resistance exercise: no workouts / week

## 2020-02-04 ENCOUNTER — OFFICE VISIT (OUTPATIENT)
Dept: SURGERY | Age: 40
End: 2020-02-04

## 2020-02-04 VITALS
RESPIRATION RATE: 18 BRPM | BODY MASS INDEX: 33.42 KG/M2 | HEART RATE: 74 BPM | DIASTOLIC BLOOD PRESSURE: 80 MMHG | TEMPERATURE: 98.1 F | WEIGHT: 188.6 LBS | SYSTOLIC BLOOD PRESSURE: 120 MMHG | HEIGHT: 63 IN

## 2020-02-04 DIAGNOSIS — Z71.3 DIETARY COUNSELING AND SURVEILLANCE: ICD-10-CM

## 2020-02-04 DIAGNOSIS — E66.9 CLASS 1 OBESITY WITHOUT SERIOUS COMORBIDITY WITH BODY MASS INDEX (BMI) OF 33.0 TO 33.9 IN ADULT, UNSPECIFIED OBESITY TYPE: Primary | ICD-10-CM

## 2020-02-04 NOTE — PROGRESS NOTES
[facility] Progress Note:   Follow up Provider Visit    CC: [facility] Provider Visit       Sabino Heller is a 44 y.o. female who is here for her follow up provider visit for the VLCD Program. Britta Thomson has completed 5 weeks of the program to date. Weight Metrics 2/4/2020 1/31/2020 1/24/2020 1/17/2020 1/14/2020 1/10/2020 12/23/2019   Weight 188 lb 9.6 oz 189 lb 1.6 oz 187 lb 11.2 oz 189 lb 193 lb 191 lb 12.8 oz -   Waist Measure Inches 32.75 32.5 33 33 - 32.5 33.5   BMI 33.41 kg/m2 33.5 kg/m2 33.25 kg/m2 33.48 kg/m2 34.19 kg/m2 33.98 kg/m2 -         Current Outpatient Medications   Medication Sig Dispense Refill    budesonide-formoterol (SYMBICORT) 160-4.5 mcg/actuation HFAA TAKE 2 PUFFS BY INHALATION TWO (2) TIMES A DAY. INDICATIONS: MAINTENANCE THERAPY FOR ASTHMA 10.2 Inhaler 3    cyclobenzaprine (FLEXERIL) 5 mg tablet Take 1 Tab by mouth three (3) times daily (with meals). 30 Tab 0    montelukast (SINGULAIR) 10 mg tablet Take 1 Tab by mouth daily. 90 Tab 1    albuterol (PROVENTIL HFA, VENTOLIN HFA, PROAIR HFA) 90 mcg/actuation inhaler Take 1-2 Puffs by inhalation every four (4) hours as needed for Wheezing. 1 Inhaler 0    albuterol (PROVENTIL VENTOLIN) 2.5 mg /3 mL (0.083 %) nebu 3 mL by Nebulization route every four (4) hours as needed for Wheezing. 24 Each 0    ibuprofen (MOTRIN) 800 mg tablet 1 tab q 6-8 hours PRN pain 30 Tab 0    Nebulizer & Compressor machine Use as directed 1 Each 0    Nebulizer Accessories kit Use as directed 1 Kit 1    ciprofloxacin-hydrocortisone (CIPRO HC OTIC) otic suspension Administer 3 Drops in left ear two (2) times a day for 7 days. 10 mL 0         Participation   Did you attend clinic and class last week?  yes    Review of Systems  Since your last visit, have you experienced any complications? no  If yes, please list: n/a    Positive in BOLD  CONST: Fever, weight loss, fatigue or chills  GI: Nausea, vomiting, abdominal pain, change in bowel habits, hematochezia, melena, and GERD   INTEG: Dermatitis, abnormal moles  HEENT: Recent changes in vision, vertigo, epistaxis, dysphagia and hoarseness  CV: Chest pain, palpitations, HTN, edema and varicosities  RESP: Cough, shortness of breath, wheezing, hemoptysis, snoring and reactive airway disease  : Hematuria, dysuria, frequency, urgency, nocturia and stress urinary incontinence   MS: Weakness, joint pain and arthritis  ENDO: Diabetes, thyroid disease, polyuria, polydipsia, polyphagia, poor wound healing, heat intolerance, cold intolerance  LYMPH/HEME: Anemia, bruising and history of blood transfusions  NEURO: Dizziness, headache, fainting, seizures and stroke  PSYCH: Anxiety and depression      Are you taking an appetite suppressant? no  If so, is there any Chest Pain, Palpitations or Dizziness? BP Readings from Last 10 Encounters:   02/04/20 120/80   01/31/20 116/78   01/24/20 124/68   01/17/20 114/74   01/14/20 128/88   01/10/20 120/78   12/23/19 124/88   12/22/19 150/82   10/18/19 131/80   08/03/19 139/86         Have you received any other medical care this week? no  If yes, where and for what? Have you discontinued or changed any medicine or dose of your medicine since your last visit? no  If yes, where and for what? Diet  How many ounces of calorie-free liquids did you consume each day? >64  oz    How many meal replacements did you take each day? 4 MR     Did you have any problems adhering to the program?  no If yes, please explain: n/a      Exercise  Aerobic exercise: treadmill, recumbent bike 30 minutes 5-7 days a week with 20 minutes of resistance exercise       Review of Systems  Complete ROS negative except where noted above    Objective  Visit Vitals  /80   Pulse 74   Temp 98.1 °F (36.7 °C) (Oral)   Resp 18   Ht 5' 3\" (1.6 m)   Wt 85.5 kg (188 lb 9.6 oz)   BMI 33.41 kg/m²     No LMP recorded. Patient has had an ablation.     3 most recent PHQ Screens 1/14/2020   Little interest or pleasure in doing things Not at all   Feeling down, depressed, irritable, or hopeless Not at all   Total Score PHQ 2 0         Waist Circumference: I personally reviewed patient's Weight Management Doc Flowsheet  Neck Circumference: I personally reviewed patient's Weight Management Doc Flowsheet  Percent Body Fat: I personally reviewed patient's Weight Management Doc Flowsheet    Physical Exam     General: 44 y.o.) female in no acute distress. HEENT: Normocephalic, atraumatic, Pupils equal and reactive, nasopharynx clear, oropharynx clear and moist without lesions  NECK: Supple, no lymphadenopathy, thyromegaly, carotid bruits or jugular venous distension. trachea midline  RESP: Clear to auscultation bilaterally, no wheezes, rhonchi, or rales, normal respiratory excursion  CV: Regular rate and rhythm, no murmurs, rubs or gallops. 3+/4 pulses in bilateral dorsalis pedis and posterior tibialis. No distal edema or varicosities. ABD: Soft, nontender, nondistended, normoactive bowel sounds, no hernias, no hepatosplenomegaly  Extremities: Warm, well perfused, no tenderness or swelling, normal gait/station  Neuro: Sensation and strength grossly intact and symmetrical  Psych: Alert and oriented to person, place, and time. Assessment / Plan    Encounter Diagnoses   Name Primary?  Class 1 obesity without serious comorbidity with body mass index (BMI) of 33.0 to 33.9 in adult, unspecified obesity type Yes    BMI 33.0-33.9,adult     Dietary counseling and surveillance        1. Weight management well controlled   Progress was reviewed with patient   Weight lost since starting program: 13 pounds     2. Labs    Latest results reviewed with patient   Lab slip given to pt for f/up HDL labs    3.  Diet regimen   # of meal replacements prescribed: 4 MR    If modified LCD-nutritional guidelines:    Monthly Goal   5-8 pounds      Medical monitoring schedule:   Weekly BP/Weight checks   Monthly provider appointments  I have reviewed/discussed the above normal BMI with the patient. I have recommended the following interventions: dietary management education, guidance, and counseling, encourage exercise, monitor weight and prescribed dietary intake . Nafisa Kuhn Follow-up and Dispositions    · Return in about 1 month (around 3/4/2020). >50% of 25 minute visit spent face to face time with Bessy consisted of counseling & coordinating and/or discussing treatment plans in reference to her The primary encounter diagnosis was Class 1 obesity without serious comorbidity with body mass index (BMI) of 33.0 to 33.9 in adult, unspecified obesity type. Diagnoses of BMI 33.0-33.9,adult and Dietary counseling and surveillance were also pertinent to this visit.             CHARIS Cooper-BC

## 2020-02-14 ENCOUNTER — CLINICAL SUPPORT (OUTPATIENT)
Dept: SURGERY | Age: 40
End: 2020-02-14

## 2020-02-14 VITALS
HEIGHT: 63 IN | HEART RATE: 72 BPM | SYSTOLIC BLOOD PRESSURE: 130 MMHG | BODY MASS INDEX: 32.97 KG/M2 | DIASTOLIC BLOOD PRESSURE: 76 MMHG | WEIGHT: 186.1 LBS

## 2020-02-14 DIAGNOSIS — E66.9 OBESITY, UNSPECIFIED CLASSIFICATION, UNSPECIFIED OBESITY TYPE, UNSPECIFIED WHETHER SERIOUS COMORBIDITY PRESENT: Primary | ICD-10-CM

## 2020-02-14 NOTE — PROGRESS NOTES
Chief Complaint   Patient presents with    Weight Management     Patient attended a weekly class as part of the Medically Supervised Weight Loss Program.  This class was facilitated by a Registered Dietitian. Topics taught at class focused on diet, particularly carbohydrate counting and portion control. Classes also focused on behavior changes and the importance of establishing a daily exercise routine. Progress Note: Weekly Medical Monitoring in the [facility]    Is there anything that you or the patient needs to let the supervising provider know about? no    Over the past week, have you experienced any side-effects? no    Darlin Lomeli is a 44 y.o. female who is enrolled in [facility]    Darlin Lomeli was prescribed the VLCD / LCD. Visit Vitals  /76   Pulse 72   Ht 5' 3\" (1.6 m)   Wt 186 lb 1.6 oz (84.4 kg)   BMI 32.97 kg/m²     Weight Metrics 2/14/2020 2/4/2020 1/31/2020 1/24/2020 1/17/2020 1/14/2020 1/10/2020   Weight 186 lb 1.6 oz 188 lb 9.6 oz 189 lb 1.6 oz 187 lb 11.2 oz 189 lb 193 lb 191 lb 12.8 oz   Waist Measure Inches 32 32.75 32.5 33 33 - 32.5   BMI 32.97 kg/m2 33.41 kg/m2 33.5 kg/m2 33.25 kg/m2 33.48 kg/m2 34.19 kg/m2 33.98 kg/m2         Have you received any other medical care this week? no  If yes, where and for what? Have you had any change in your medications since your last visit? no  If yes what? Did you have any problems adhering to the program last week? no  If yes, please explain:       Eating Habits Over Last Week:  Did you take in 64 oz of non-caloric fluids? no     Did you consume your prescribed meal replacement regimen each day?  yes       Physical Activity Over the Past Week:    Aerobic exercise: 135 min  Resistance exercise: 10 minutes of workouts / week

## 2020-02-20 NOTE — TELEPHONE ENCOUNTER
Requested Prescriptions     Pending Prescriptions Disp Refills    albuterol (PROVENTIL HFA, VENTOLIN HFA, PROAIR HFA) 90 mcg/actuation inhaler 1 Inhaler 0     Sig: Take 1-2 Puffs by inhalation every four (4) hours as needed for Wheezing.

## 2020-02-21 RX ORDER — ALBUTEROL SULFATE 0.83 MG/ML
2.5 SOLUTION RESPIRATORY (INHALATION)
Qty: 24 EACH | Refills: 1 | Status: SHIPPED | OUTPATIENT
Start: 2020-02-21 | End: 2020-10-04

## 2020-02-21 RX ORDER — ALBUTEROL SULFATE 90 UG/1
1-2 AEROSOL, METERED RESPIRATORY (INHALATION)
Qty: 1 INHALER | Refills: 0 | Status: SHIPPED | OUTPATIENT
Start: 2020-02-21 | End: 2020-03-18

## 2020-02-21 NOTE — TELEPHONE ENCOUNTER
Last seen 01/14/20  Next appt  None    Requested Prescriptions     Pending Prescriptions Disp Refills    albuterol (PROVENTIL VENTOLIN) 2.5 mg /3 mL (0.083 %) nebu 24 Each 0     Sig: 3 mL by Nebulization route every four (4) hours as needed for Wheezing.

## 2020-03-02 ENCOUNTER — HOSPITAL ENCOUNTER (OUTPATIENT)
Dept: LAB | Age: 40
Discharge: HOME | End: 2020-03-02
Payer: COMMERCIAL

## 2020-03-02 DIAGNOSIS — E66.9 CLASS 1 OBESITY WITHOUT SERIOUS COMORBIDITY WITH BODY MASS INDEX (BMI) OF 33.0 TO 33.9 IN ADULT, UNSPECIFIED OBESITY TYPE: ICD-10-CM

## 2020-03-02 LAB
ALBUMIN SERPL-MCNC: 3.3 G/DL (ref 3.4–5)
ALBUMIN/GLOB SERPL: 1 {RATIO} (ref 0.8–1.7)
ALP SERPL-CCNC: 76 U/L (ref 45–117)
ALT SERPL-CCNC: 31 U/L (ref 13–56)
ANION GAP SERPL CALC-SCNC: 3 MMOL/L (ref 3–18)
AST SERPL-CCNC: 23 U/L (ref 10–38)
BILIRUB SERPL-MCNC: 0.4 MG/DL (ref 0.2–1)
BUN SERPL-MCNC: 11 MG/DL (ref 7–18)
BUN/CREAT SERPL: 14 (ref 12–20)
CALCIUM SERPL-MCNC: 8.4 MG/DL (ref 8.5–10.1)
CHLORIDE SERPL-SCNC: 111 MMOL/L (ref 100–111)
CO2 SERPL-SCNC: 27 MMOL/L (ref 21–32)
CREAT SERPL-MCNC: 0.78 MG/DL (ref 0.6–1.3)
GLOBULIN SER CALC-MCNC: 3.4 G/DL (ref 2–4)
GLUCOSE SERPL-MCNC: 77 MG/DL (ref 74–99)
POTASSIUM SERPL-SCNC: 4.5 MMOL/L (ref 3.5–5.5)
PROT SERPL-MCNC: 6.7 G/DL (ref 6.4–8.2)
SODIUM SERPL-SCNC: 141 MMOL/L (ref 136–145)
URATE SERPL-MCNC: 3.8 MG/DL (ref 2.6–7.2)

## 2020-03-02 PROCEDURE — 36415 COLL VENOUS BLD VENIPUNCTURE: CPT

## 2020-03-02 PROCEDURE — 80053 COMPREHEN METABOLIC PANEL: CPT

## 2020-03-02 PROCEDURE — 84550 ASSAY OF BLOOD/URIC ACID: CPT

## 2020-03-04 ENCOUNTER — OFFICE VISIT (OUTPATIENT)
Dept: SURGERY | Age: 40
End: 2020-03-04

## 2020-03-04 VITALS
SYSTOLIC BLOOD PRESSURE: 124 MMHG | WEIGHT: 184 LBS | HEART RATE: 70 BPM | DIASTOLIC BLOOD PRESSURE: 83 MMHG | RESPIRATION RATE: 16 BRPM | BODY MASS INDEX: 32.6 KG/M2 | HEIGHT: 63 IN | TEMPERATURE: 97.8 F | OXYGEN SATURATION: 100 %

## 2020-03-04 DIAGNOSIS — Z71.3 DIETARY COUNSELING AND SURVEILLANCE: ICD-10-CM

## 2020-03-04 DIAGNOSIS — E66.9 OBESITY, UNSPECIFIED CLASSIFICATION, UNSPECIFIED OBESITY TYPE, UNSPECIFIED WHETHER SERIOUS COMORBIDITY PRESENT: Primary | ICD-10-CM

## 2020-03-04 DIAGNOSIS — J45.909 UNCOMPLICATED ASTHMA, UNSPECIFIED ASTHMA SEVERITY, UNSPECIFIED WHETHER PERSISTENT: ICD-10-CM

## 2020-03-04 NOTE — PROGRESS NOTES
Nursing Note for Medical Monitoring in the [facility] Loss Program      Dora Patel is a 44 y.o. female who is enrolled in [facility]    Dora Patel was prescribed the VLCD / LCD. Did you have any problems adhering to the program last week? yes  If yes, please explain: sometimes patient gets home late, unsure of when to take 4 th meal    Since your last visit, have you experienced any complications? Yes, diarrhea and constipation issues    Have you received any other medical care this week? no  If yes, where and for what? Have you had any change in your medications since your last visit? no  If yes what? Are you taking an appetite suppressant? no   If yes:  Do you need a refill? BP Readings from Last 3 Encounters:   03/04/20 124/83   02/14/20 130/76   02/04/20 120/80        Eating Habits Over Last Week:  Did you take in 64 oz of non-caloric fluids?  yes     Did you consume your prescribed meal replacement regimen each day? no       Physical Activity Over the Past Week:    Aerobic exercise: 30 min daily  Resistance exercise: 7 workouts / week, each one about 10-15 minutes

## 2020-03-04 NOTE — PROGRESS NOTES
[facility] Progress Note:   Follow up Provider Visit    CC: [facility] Provider Visit       Soraida Amor is a 44 y.o. female who is here for her follow up provider visit for the VLCD Program.     Weight Metrics 3/4/2020 3/4/2020 2/14/2020 2/4/2020 1/31/2020 1/24/2020 1/17/2020   Weight - 184 lb 186 lb 1.6 oz 188 lb 9.6 oz 189 lb 1.6 oz 187 lb 11.2 oz 189 lb   Waist Measure Inches 32.5 - 32 32.75 32.5 33 33   BMI - 32.59 kg/m2 32.97 kg/m2 33.41 kg/m2 33.5 kg/m2 33.25 kg/m2 33.48 kg/m2         Current Outpatient Medications   Medication Sig Dispense Refill    albuterol (PROVENTIL HFA, VENTOLIN HFA, PROAIR HFA) 90 mcg/actuation inhaler Take 1-2 Puffs by inhalation every four (4) hours as needed for Wheezing. 1 Inhaler 0    albuterol (PROVENTIL VENTOLIN) 2.5 mg /3 mL (0.083 %) nebu 3 mL by Nebulization route every four (4) hours as needed for Wheezing. 24 Each 1    budesonide-formoterol (SYMBICORT) 160-4.5 mcg/actuation HFAA TAKE 2 PUFFS BY INHALATION TWO (2) TIMES A DAY. INDICATIONS: MAINTENANCE THERAPY FOR ASTHMA 10.2 Inhaler 3    cyclobenzaprine (FLEXERIL) 5 mg tablet Take 1 Tab by mouth three (3) times daily (with meals). 30 Tab 0    montelukast (SINGULAIR) 10 mg tablet Take 1 Tab by mouth daily. 90 Tab 1    ibuprofen (MOTRIN) 800 mg tablet 1 tab q 6-8 hours PRN pain 30 Tab 0    Nebulizer & Compressor machine Use as directed 1 Each 0    Nebulizer Accessories kit Use as directed 1 Kit 1    ciprofloxacin-hydrocortisone (CIPRO HC OTIC) otic suspension Administer 3 Drops in left ear two (2) times a day for 7 days. 10 mL 0         Participation   Did you attend clinic and class last week?  yes    Review of Systems  Since your last visit, have you experienced any complications? no  If yes, please list: n/a    Positive in BOLD  CONST: Fever, weight loss, fatigue or chills  GI: Nausea, vomiting, abdominal pain, change in bowel habits, hematochezia, melena, and GERD   INTEG: Dermatitis, abnormal moles  HEENT: Recent changes in vision, vertigo, epistaxis, dysphagia and hoarseness  CV: Chest pain, palpitations, HTN, edema and varicosities  RESP: Cough, shortness of breath, wheezing, hemoptysis, snoring and reactive airway disease  : Hematuria, dysuria, frequency, urgency, nocturia and stress urinary incontinence   MS: Weakness, joint pain and arthritis  ENDO: Diabetes, thyroid disease, polyuria, polydipsia, polyphagia, poor wound healing, heat intolerance, cold intolerance  LYMPH/HEME: Anemia, bruising and history of blood transfusions  NEURO: Dizziness, headache, fainting, seizures and stroke  PSYCH: Anxiety and depression      Are you taking an appetite suppressant? no  If so, is there any Chest Pain, Palpitations or Dizziness? BP Readings from Last 10 Encounters:   03/04/20 124/83   02/14/20 130/76   02/04/20 120/80   01/31/20 116/78   01/24/20 124/68   01/17/20 114/74   01/14/20 128/88   01/10/20 120/78   12/23/19 124/88   12/22/19 150/82         Have you received any other medical care this week? no  If yes, where and for what? Have you discontinued or changed any medicine or dose of your medicine since your last visit? no  If yes, where and for what? Diet  How many ounces of calorie-free liquids did you consume each day? 64 oz    How many meal replacements did you take each day? 4 MR    Did you have any problems adhering to the program?  yes If yes, please explain: Emotional eating last week      Exercise  30-45 minutes daily       Review of Systems  Complete ROS negative except where noted above    Objective  Visit Vitals  /83   Pulse 70   Temp 97.8 °F (36.6 °C) (Oral)   Resp 16   Ht 5' 3\" (1.6 m)   Wt 83.5 kg (184 lb)   SpO2 100%   BMI 32.59 kg/m²     No LMP recorded. Patient has had an ablation.     3 most recent PHQ Screens 1/14/2020   Little interest or pleasure in doing things Not at all   Feeling down, depressed, irritable, or hopeless Not at all   Total Score PHQ 2 0         Waist Circumference: I personally reviewed patient's Weight Management Doc Flowsheet  Neck Circumference: I personally reviewed patient's Weight Management Doc Flowsheet  Percent Body Fat: I personally reviewed patient's Weight Management Doc Flowsheet    Physical Exam     General: 44 y.o.) female in no acute distress. HEENT: Normocephalic, atraumatic, Pupils equal and reactive, nasopharynx clear, oropharynx clear and moist without lesions  NECK: Supple, no lymphadenopathy, thyromegaly, carotid bruits or jugular venous distension. trachea midline  RESP: Clear to auscultation bilaterally, no wheezes, rhonchi, or rales, normal respiratory excursion  CV: Regular rate and rhythm, no murmurs, rubs or gallops. 3+/4 pulses in bilateral dorsalis pedis and posterior tibialis. No distal edema or varicosities. ABD: Soft, nontender, nondistended, normoactive bowel sounds, no hernias, no hepatosplenomegaly  Extremities: Warm, well perfused, no tenderness or swelling, normal gait/station  Neuro: Sensation and strength grossly intact and symmetrical  Psych: Alert and oriented to person, place, and time. Assessment / Plan    Encounter Diagnoses   Name Primary?  Obesity, unspecified classification, unspecified obesity type, unspecified whether serious comorbidity present Yes    BMI 43.6-99.2,GBZQC     Uncomplicated asthma, unspecified asthma severity, unspecified whether persistent     Dietary counseling and surveillance        1. Weight management well controlled   Progress was reviewed with patient   - 4 pounds     2. Labs    Latest results reviewed with patient   Lab slip given to pt for f/up HDL labs    3. Diet regimen   # of meal replacements prescribed: 4 MR    If modified LCD-nutritional guidelines:    Monthly Goal   3-5 pounds     Medical monitoring schedule:   Weekly BP/Weight checks   Monthly provider appointments  I have reviewed/discussed the above normal BMI with the patient.   I have recommended the following interventions: dietary management education, guidance, and counseling, encourage exercise, monitor weight and prescribed dietary intake . .              >50% of 25 minute visit spent face to face time with Bessy consisted of counseling & coordinating and/or discussing treatment plans in reference to her The primary encounter diagnosis was Obesity, unspecified classification, unspecified obesity type, unspecified whether serious comorbidity present. Diagnoses of BMI 13.4-79.2,XJZBM, Uncomplicated asthma, unspecified asthma severity, unspecified whether persistent, and Dietary counseling and surveillance were also pertinent to this visit.             Castillo Wallace, FNP-BC

## 2020-03-12 ENCOUNTER — EMPLOYEE WELLNESS (OUTPATIENT)
Dept: FAMILY MEDICINE CLINIC | Age: 40
End: 2020-03-12

## 2020-03-13 ENCOUNTER — CLINICAL SUPPORT (OUTPATIENT)
Dept: SURGERY | Age: 40
End: 2020-03-13

## 2020-03-13 VITALS
SYSTOLIC BLOOD PRESSURE: 108 MMHG | WEIGHT: 182.4 LBS | BODY MASS INDEX: 32.32 KG/M2 | HEIGHT: 63 IN | DIASTOLIC BLOOD PRESSURE: 74 MMHG | HEART RATE: 68 BPM

## 2020-03-13 DIAGNOSIS — E66.9 OBESITY, UNSPECIFIED CLASSIFICATION, UNSPECIFIED OBESITY TYPE, UNSPECIFIED WHETHER SERIOUS COMORBIDITY PRESENT: Primary | ICD-10-CM

## 2020-03-13 NOTE — PROGRESS NOTES
Chief Complaint   Patient presents with    Weight Management     Patient attended a weekly class as part of the Medically Supervised Weight Loss Program.  This class was facilitated by a Registered Dietitian. Topics taught at class focused on diet, particularly carbohydrate counting and portion control. Classes also focused on behavior changes and the importance of establishing a daily exercise routine. Progress Note: Weekly Medical Monitoring in the [facility]    Is there anything that you or the patient needs to let the supervising provider know about? no    Over the past week, have you experienced any side-effects? Yes \"constipation and cramps\"    Precious Downs is a 44 y.o. female who is enrolled in [facility]    Precious Downs was prescribed the VLCD / LCD. Visit Vitals  /74   Pulse 68   Ht 5' 3\" (1.6 m)   Wt 182 lb 6.4 oz (82.7 kg)   BMI 32.31 kg/m²     Weight Metrics 3/13/2020 3/4/2020 3/4/2020 2/14/2020 2/4/2020 1/31/2020 1/24/2020   Weight 182 lb 6.4 oz - 184 lb 186 lb 1.6 oz 188 lb 9.6 oz 189 lb 1.6 oz 187 lb 11.2 oz   Waist Measure Inches 31 32.5 - 32 32.75 32.5 33   BMI 32.31 kg/m2 - 32.59 kg/m2 32.97 kg/m2 33.41 kg/m2 33.5 kg/m2 33.25 kg/m2         Have you received any other medical care this week? no  If yes, where and for what? Have you had any change in your medications since your last visit? no  If yes what? Did you have any problems adhering to the program last week? yes  If yes, please explain: \"was a bad week for me. I got some bad news and I went back into some of my old emotional eating habits so I wouldn't be surprised if I gained a few pounds back. \"      Eating Habits Over Last Week:  Did you take in 64 oz of non-caloric fluids?  no     Did you consume your prescribed meal replacement regimen each day? no       Physical Activity Over the Past Week:    Aerobic exercise: 120 min  Resistance exercise: 25 workouts / week

## 2020-03-15 LAB
CHOLEST SERPL-MCNC: 156 MG/DL
COTININE SERPL-MCNC: 1.8 NG/ML
GLUCOSE SERPL-MCNC: 75 MG/DL (ref 74–99)
HDLC SERPL-MCNC: 57 MG/DL (ref 40–60)
LDLC SERPL CALC-MCNC: 84.6 MG/DL (ref 0–100)
NICOTINE SERPL-MCNC: NORMAL NG/ML
TRIGL SERPL-MCNC: 72 MG/DL (ref ?–150)

## 2020-04-06 DIAGNOSIS — M54.30 SCIATIC LEG PAIN: ICD-10-CM

## 2020-04-08 RX ORDER — CYCLOBENZAPRINE HCL 5 MG
5 TABLET ORAL
Qty: 30 TAB | Refills: 0 | Status: SHIPPED | OUTPATIENT
Start: 2020-04-08 | End: 2020-08-02

## 2020-04-29 DIAGNOSIS — J45.20 MILD INTERMITTENT ASTHMA WITHOUT COMPLICATION: Primary | ICD-10-CM

## 2020-04-29 RX ORDER — ALBUTEROL SULFATE 90 UG/1
2 AEROSOL, METERED RESPIRATORY (INHALATION)
Qty: 1 INHALER | Refills: 1 | Status: SHIPPED | OUTPATIENT
Start: 2020-04-29 | End: 2020-06-29

## 2020-05-23 NOTE — TELEPHONE ENCOUNTER
Prior authorization submitted through [email or web address]. Awaiting response. all other ROS negative except as per HPI

## 2020-06-11 ENCOUNTER — OFFICE VISIT (OUTPATIENT)
Dept: FAMILY MEDICINE CLINIC | Age: 40
End: 2020-06-11

## 2020-06-11 VITALS
HEIGHT: 63 IN | DIASTOLIC BLOOD PRESSURE: 82 MMHG | OXYGEN SATURATION: 98 % | SYSTOLIC BLOOD PRESSURE: 134 MMHG | BODY MASS INDEX: 35.61 KG/M2 | WEIGHT: 201 LBS | HEART RATE: 88 BPM | TEMPERATURE: 98.4 F | RESPIRATION RATE: 18 BRPM

## 2020-06-11 DIAGNOSIS — M25.531 RIGHT WRIST PAIN: Primary | ICD-10-CM

## 2020-06-11 RX ORDER — HYDROCODONE BITARTRATE AND ACETAMINOPHEN 5; 325 MG/1; MG/1
1 TABLET ORAL
Qty: 30 TAB | Refills: 0 | Status: SHIPPED | OUTPATIENT
Start: 2020-06-11 | End: 2020-06-14

## 2020-06-11 RX ORDER — PREDNISONE 10 MG/1
TABLET ORAL
Qty: 33 TAB | Refills: 0 | OUTPATIENT
Start: 2020-06-11 | End: 2020-08-02

## 2020-06-11 RX ORDER — IBUPROFEN 800 MG/1
TABLET ORAL
Qty: 30 TAB | Refills: 0 | Status: SHIPPED | OUTPATIENT
Start: 2020-06-11 | End: 2020-08-04

## 2020-06-11 NOTE — PROGRESS NOTES
HISTORY OF PRESENT ILLNESS  Christi Felder is a 36 y.o. female. Patient presents for right wrist pain. HPI  It is very painful. Sometimes she feels like she is going to drop things. It is hard to make a fist.  It also \"feels\" different. Review of Systems   Constitutional: Negative. HENT: Negative. Respiratory: Negative. Cardiovascular: Negative. Musculoskeletal: Positive for joint pain (right wrist.). Visit Vitals  /82 (BP 1 Location: Left arm, BP Patient Position: Sitting)   Pulse 88   Temp 98.4 °F (36.9 °C) (Oral)   Resp 18   Ht 5' 3\" (1.6 m)   Wt 201 lb (91.2 kg)   SpO2 98%   BMI 35.61 kg/m²       Physical Exam  Constitutional:       General: She is not in acute distress. Appearance: Normal appearance. She is not ill-appearing. Cardiovascular:      Rate and Rhythm: Normal rate and regular rhythm. Heart sounds: No murmur. Pulmonary:      Effort: Pulmonary effort is normal. No respiratory distress. Breath sounds: Normal breath sounds. No stridor. No wheezing, rhonchi or rales. Musculoskeletal:      Right wrist: She exhibits decreased range of motion, tenderness and bony tenderness. She exhibits no swelling, no effusion and no crepitus. Neurological:      Mental Status: She is alert and oriented to person, place, and time. ASSESSMENT and PLAN    ICD-10-CM ICD-9-CM    1. Right wrist pain M25.531 719.43 predniSONE (DELTASONE) 10 mg tablet      HYDROcodone-acetaminophen (NORCO) 5-325 mg per tablet     PLAN:  Pt was asked to wear a wrist splint at night for 2 weeks. Prednisone: take with food. Day 1: 6 tablets  Day 2: 5 tablets  Day 3: 4 tablets  Days 4,5,6: 3 tablets  Days 7,8,9: 2 tablets  Days 10,11,12: 1 tablet. If symptoms persists beyond two weeks, Pt will be referred to neurology for evaluation. of carpal tunnel   I have discussed the diagnosis with the patient and the intended plan as seen in the above orders.   The patient has received an after-visit summary and questions were answered concerning future plans. I have discussed medication side effects and warnings with the patient as well. Patient will call for further questions. Follow-up and Dispositions    · Return if symptoms worsen or fail to improve.

## 2020-06-11 NOTE — PROGRESS NOTES
Patient is in the office today for right wrist pain 5/10 x 1 month. Patient states sometimes the pain radiates to fingers and it is hard for her to make a fist.       1. Have you been to the ER, urgent care clinic since your last visit? Hospitalized since your last visit? No    2. Have you seen or consulted any other health care providers outside of the [address] [place] [address] since your last visit? Include any pap smears or colon screening.  No

## 2020-06-27 DIAGNOSIS — J45.20 MILD INTERMITTENT ASTHMA WITHOUT COMPLICATION: ICD-10-CM

## 2020-06-29 RX ORDER — ALBUTEROL SULFATE 90 UG/1
AEROSOL, METERED RESPIRATORY (INHALATION)
Qty: 18 INHALER | Refills: 2 | Status: SHIPPED | OUTPATIENT
Start: 2020-06-29 | End: 2020-08-02

## 2020-07-17 DIAGNOSIS — M25.531 RIGHT WRIST PAIN: Primary | ICD-10-CM

## 2020-08-02 ENCOUNTER — HOSPITAL ENCOUNTER (EMERGENCY)
Age: 40
Discharge: HOME | End: 2020-08-02
Attending: EMERGENCY MEDICINE | Admitting: EMERGENCY MEDICINE
Payer: COMMERCIAL

## 2020-08-02 ENCOUNTER — APPOINTMENT (OUTPATIENT)
Dept: GENERAL RADIOLOGY | Age: 40
End: 2020-08-02
Attending: NURSE PRACTITIONER
Payer: COMMERCIAL

## 2020-08-02 VITALS
RESPIRATION RATE: 17 BRPM | BODY MASS INDEX: 33.66 KG/M2 | SYSTOLIC BLOOD PRESSURE: 118 MMHG | HEIGHT: 63 IN | DIASTOLIC BLOOD PRESSURE: 83 MMHG | OXYGEN SATURATION: 97 % | HEART RATE: 81 BPM | TEMPERATURE: 98.4 F | WEIGHT: 190 LBS

## 2020-08-02 DIAGNOSIS — R07.9 CHEST PAIN, UNSPECIFIED TYPE: ICD-10-CM

## 2020-08-02 DIAGNOSIS — R06.2 WHEEZING: ICD-10-CM

## 2020-08-02 DIAGNOSIS — J45.901 ASTHMA WITH ACUTE EXACERBATION, UNSPECIFIED ASTHMA SEVERITY, UNSPECIFIED WHETHER PERSISTENT: Primary | ICD-10-CM

## 2020-08-02 DIAGNOSIS — J45.20 MILD INTERMITTENT ASTHMA WITHOUT COMPLICATION: ICD-10-CM

## 2020-08-02 LAB
ALBUMIN SERPL-MCNC: 3.7 G/DL (ref 3.4–5)
ALBUMIN/GLOB SERPL: 0.9 {RATIO} (ref 0.8–1.7)
ALP SERPL-CCNC: 66 U/L (ref 45–117)
ALT SERPL-CCNC: 24 U/L (ref 13–56)
ANION GAP SERPL CALC-SCNC: 5 MMOL/L (ref 3–18)
AST SERPL-CCNC: 20 U/L (ref 10–38)
BASOPHILS # BLD: 0 K/UL (ref 0–0.1)
BASOPHILS NFR BLD: 0 % (ref 0–2)
BILIRUB SERPL-MCNC: 0.4 MG/DL (ref 0.2–1)
BUN SERPL-MCNC: 16 MG/DL (ref 7–18)
BUN/CREAT SERPL: 20 (ref 12–20)
CALCIUM SERPL-MCNC: 9.1 MG/DL (ref 8.5–10.1)
CHLORIDE SERPL-SCNC: 106 MMOL/L (ref 100–111)
CO2 SERPL-SCNC: 25 MMOL/L (ref 21–32)
CREAT SERPL-MCNC: 0.8 MG/DL (ref 0.6–1.3)
DIFFERENTIAL METHOD BLD: ABNORMAL
EOSINOPHIL # BLD: 0.3 K/UL (ref 0–0.4)
EOSINOPHIL NFR BLD: 3 % (ref 0–5)
ERYTHROCYTE [DISTWIDTH] IN BLOOD BY AUTOMATED COUNT: 14.9 % (ref 11.6–14.5)
GLOBULIN SER CALC-MCNC: 4.1 G/DL (ref 2–4)
GLUCOSE SERPL-MCNC: 77 MG/DL (ref 74–99)
HCT VFR BLD AUTO: 45.1 % (ref 35–45)
HGB BLD-MCNC: 14.7 G/DL (ref 12–16)
LYMPHOCYTES # BLD: 2.1 K/UL (ref 0.9–3.6)
LYMPHOCYTES NFR BLD: 21 % (ref 21–52)
MAGNESIUM SERPL-MCNC: 2.1 MG/DL (ref 1.6–2.6)
MCH RBC QN AUTO: 27 PG (ref 24–34)
MCHC RBC AUTO-ENTMCNC: 32.6 G/DL (ref 31–37)
MCV RBC AUTO: 82.8 FL (ref 74–97)
MONOCYTES # BLD: 0.6 K/UL (ref 0.05–1.2)
MONOCYTES NFR BLD: 6 % (ref 3–10)
NEUTS SEG # BLD: 6.7 K/UL (ref 1.8–8)
NEUTS SEG NFR BLD: 70 % (ref 40–73)
PLATELET # BLD AUTO: 250 K/UL (ref 135–420)
PMV BLD AUTO: 9.8 FL (ref 9.2–11.8)
POTASSIUM SERPL-SCNC: 3.8 MMOL/L (ref 3.5–5.5)
PROT SERPL-MCNC: 7.8 G/DL (ref 6.4–8.2)
RBC # BLD AUTO: 5.45 M/UL (ref 4.2–5.3)
SODIUM SERPL-SCNC: 136 MMOL/L (ref 136–145)
TROPONIN I SERPL-MCNC: <0.02 NG/ML (ref 0–0.04)
WBC # BLD AUTO: 9.7 K/UL (ref 4.6–13.2)

## 2020-08-02 PROCEDURE — 85025 COMPLETE CBC W/AUTO DIFF WBC: CPT

## 2020-08-02 PROCEDURE — 74011000250 HC RX REV CODE- 250: Performed by: NURSE PRACTITIONER

## 2020-08-02 PROCEDURE — 94640 AIRWAY INHALATION TREATMENT: CPT

## 2020-08-02 PROCEDURE — 80053 COMPREHEN METABOLIC PANEL: CPT

## 2020-08-02 PROCEDURE — 84484 ASSAY OF TROPONIN QUANT: CPT

## 2020-08-02 PROCEDURE — 93005 ELECTROCARDIOGRAM TRACING: CPT

## 2020-08-02 PROCEDURE — 83735 ASSAY OF MAGNESIUM: CPT

## 2020-08-02 PROCEDURE — 74011636637 HC RX REV CODE- 636/637: Performed by: NURSE PRACTITIONER

## 2020-08-02 PROCEDURE — 99283 EMERGENCY DEPT VISIT LOW MDM: CPT

## 2020-08-02 PROCEDURE — 71046 X-RAY EXAM CHEST 2 VIEWS: CPT

## 2020-08-02 RX ORDER — PREDNISONE 50 MG/1
50 TABLET ORAL DAILY
Qty: 5 TAB | Refills: 0 | Status: SHIPPED | OUTPATIENT
Start: 2020-08-02 | End: 2020-08-07

## 2020-08-02 RX ORDER — ALBUTEROL SULFATE 90 UG/1
2 AEROSOL, METERED RESPIRATORY (INHALATION)
Qty: 18 INHALER | Refills: 2 | OUTPATIENT
Start: 2020-08-02 | End: 2020-10-05

## 2020-08-02 RX ORDER — PREDNISONE 20 MG/1
60 TABLET ORAL
Status: COMPLETED | OUTPATIENT
Start: 2020-08-02 | End: 2020-08-02

## 2020-08-02 RX ORDER — IPRATROPIUM BROMIDE AND ALBUTEROL SULFATE 2.5; .5 MG/3ML; MG/3ML
3 SOLUTION RESPIRATORY (INHALATION)
Status: COMPLETED | OUTPATIENT
Start: 2020-08-02 | End: 2020-08-02

## 2020-08-02 RX ADMIN — PREDNISONE 60 MG: 20 TABLET ORAL at 18:34

## 2020-08-02 RX ADMIN — IPRATROPIUM BROMIDE AND ALBUTEROL SULFATE 3 ML: .5; 3 SOLUTION RESPIRATORY (INHALATION) at 17:51

## 2020-08-02 RX ADMIN — IPRATROPIUM BROMIDE AND ALBUTEROL SULFATE 3 ML: .5; 3 SOLUTION RESPIRATORY (INHALATION) at 18:34

## 2020-08-02 NOTE — ED NOTES
Both written and verbal discharge instructions given to pt with verbalized understanding of home care and follow up. Prescriptions faxed to pharmacy.

## 2020-08-02 NOTE — ED PROVIDER NOTES
EMERGENCY DEPARTMENT HISTORY AND PHYSICAL EXAM    5:58 PM      Date: 8/2/2020  Patient Name: Jean Paul Nice    History of Presenting Illness     Chief Complaint   Patient presents with    Asthma         History Provided By: Patient    Additional History (Context): Jean Paul Nice is a 36 y.o. female with hx of asthma who presents with complain of wheezing, shortness of breath that started this morning. Pt reports she run out of her inhaler. Pt also reports chest pain at night time when she lays down for the past 3 days. Pt reports she was using her inhaler for the pain with some relief. Patient denies any fevers, chills, body aches, nausea, vomiting, headaches, dizziness. PCP: Anisa Tejada NP    Current Outpatient Medications   Medication Sig Dispense Refill    albuterol (PROVENTIL HFA, VENTOLIN HFA, PROAIR HFA) 90 mcg/actuation inhaler Take 2 Puffs by inhalation every four (4) hours as needed for Wheezing. 18 Inhaler 2    predniSONE (DELTASONE) 50 mg tablet Take 1 Tab by mouth daily for 5 days. 5 Tab 0    ibuprofen (MOTRIN) 800 mg tablet 1 tab q 6-8 hours PRN pain 30 Tab 0    albuterol (PROVENTIL HFA, VENTOLIN HFA, PROAIR HFA) 90 mcg/actuation inhaler TAKE 1-2 PUFFS BY INHALATION EVERY FOUR (4) HOURS AS NEEDED FOR WHEEZING. 8.5 Inhaler 3    albuterol (PROVENTIL VENTOLIN) 2.5 mg /3 mL (0.083 %) nebu 3 mL by Nebulization route every four (4) hours as needed for Wheezing. 24 Each 1    ciprofloxacin-hydrocortisone (CIPRO HC OTIC) otic suspension Administer 3 Drops in left ear two (2) times a day for 7 days.  10 mL 0    Nebulizer & Compressor machine Use as directed 1 Each 0    Nebulizer Accessories kit Use as directed 1 Kit 1       Past History     Past Medical History:  Past Medical History:   Diagnosis Date    Asthma     Chronic pain 6 months    painful menses    Fibroids     GERD (gastroesophageal reflux disease)     Heart palpitations        Past Surgical History:  Past Surgical History: Procedure Laterality Date    HX  SECTION  3971,0300    with BTL    HX HYSTEROSCOPY WITH ENDOMETRIAL ABLATION      No period since       Family History:  Family History   Problem Relation Age of Onset    Hypertension Mother     Hypertension Father     Diabetes Father     Parkinson's Disease Father     Cancer Paternal Grandmother        Social History:  Social History     Tobacco Use    Smoking status: Former Smoker     Types: Cigarettes     Last attempt to quit: 3/23/1998     Years since quittin.3    Smokeless tobacco: Current User    Tobacco comment: occ vapes   Substance Use Topics    Alcohol use: Yes     Comment: socially, 1 beer/weekend    Drug use: No       Allergies: Allergies   Allergen Reactions    Azo [Phenazopyridine] Hives    Iodinated Contrast Media Shortness of Breath         Review of Systems       Review of Systems   Constitutional: Negative for chills and fever. Respiratory: Positive for shortness of breath and wheezing. Cardiovascular: Positive for chest pain. Gastrointestinal: Negative for abdominal pain, nausea and vomiting. Skin: Negative for rash. Neurological: Negative for weakness. All other systems reviewed and are negative. Physical Exam     Visit Vitals  /83   Pulse 81   Temp 98.4 °F (36.9 °C)   Resp 17   Ht 5' 3\" (1.6 m)   Wt 86.2 kg (190 lb)   SpO2 97%   BMI 33.66 kg/m²         Physical Exam  Vitals signs reviewed. Constitutional:       General: She is not in acute distress. Appearance: Normal appearance. She is well-developed. She is not ill-appearing or toxic-appearing. Comments: Pt in no acute distress   HENT:      Head: Normocephalic and atraumatic. Eyes:      Conjunctiva/sclera: Conjunctivae normal.      Pupils: Pupils are equal, round, and reactive to light. Neck:      Musculoskeletal: Normal range of motion. Trachea: Trachea normal.   Cardiovascular:      Rate and Rhythm: Normal rate and regular rhythm. Pulmonary:      Effort: Pulmonary effort is normal.      Breath sounds: Wheezing present. Comments: Inspiratory wheezes. Abdominal:      General: Bowel sounds are normal. There is no distension or abdominal bruit. Palpations: Abdomen is soft. Abdomen is not rigid. There is no shifting dullness, fluid wave, mass or pulsatile mass. Tenderness: There is generalized abdominal tenderness. There is rebound. There is no guarding. Negative signs include Summers's sign and McBurney's sign. Neurological:      General: No focal deficit present. Mental Status: She is alert and oriented to person, place, and time. Mental status is at baseline. Diagnostic Study Results     Labs -  Recent Results (from the past 12 hour(s))   CBC WITH AUTOMATED DIFF    Collection Time: 08/02/20  5:46 PM   Result Value Ref Range    WBC 9.7 4.6 - 13.2 K/uL    RBC 5.45 (H) 4.20 - 5.30 M/uL    HGB 14.7 12.0 - 16.0 g/dL    HCT 45.1 (H) 35.0 - 45.0 %    MCV 82.8 74.0 - 97.0 FL    MCH 27.0 24.0 - 34.0 PG    MCHC 32.6 31.0 - 37.0 g/dL    RDW 14.9 (H) 11.6 - 14.5 %    PLATELET 867 504 - 889 K/uL    MPV 9.8 9.2 - 11.8 FL    NEUTROPHILS 70 40 - 73 %    LYMPHOCYTES 21 21 - 52 %    MONOCYTES 6 3 - 10 %    EOSINOPHILS 3 0 - 5 %    BASOPHILS 0 0 - 2 %    ABS. NEUTROPHILS 6.7 1.8 - 8.0 K/UL    ABS. LYMPHOCYTES 2.1 0.9 - 3.6 K/UL    ABS. MONOCYTES 0.6 0.05 - 1.2 K/UL    ABS. EOSINOPHILS 0.3 0.0 - 0.4 K/UL    ABS.  BASOPHILS 0.0 0.0 - 0.1 K/UL    DF AUTOMATED     METABOLIC PANEL, COMPREHENSIVE    Collection Time: 08/02/20  5:46 PM   Result Value Ref Range    Sodium 136 136 - 145 mmol/L    Potassium 3.8 3.5 - 5.5 mmol/L    Chloride 106 100 - 111 mmol/L    CO2 25 21 - 32 mmol/L    Anion gap 5 3.0 - 18 mmol/L    Glucose 77 74 - 99 mg/dL    BUN 16 7.0 - 18 MG/DL    Creatinine 0.80 0.6 - 1.3 MG/DL    BUN/Creatinine ratio 20 12 - 20      GFR est AA >60 >60 ml/min/1.73m2    GFR est non-AA >60 >60 ml/min/1.73m2    Calcium 9.1 8.5 - 10.1 MG/DL    Bilirubin, total 0.4 0.2 - 1.0 MG/DL    ALT (SGPT) 24 13 - 56 U/L    AST (SGOT) 20 10 - 38 U/L    Alk. phosphatase 66 45 - 117 U/L    Protein, total 7.8 6.4 - 8.2 g/dL    Albumin 3.7 3.4 - 5.0 g/dL    Globulin 4.1 (H) 2.0 - 4.0 g/dL    A-G Ratio 0.9 0.8 - 1.7     TROPONIN I    Collection Time: 08/02/20  5:46 PM   Result Value Ref Range    Troponin-I, QT <0.02 0.0 - 0.045 NG/ML   MAGNESIUM    Collection Time: 08/02/20  5:46 PM   Result Value Ref Range    Magnesium 2.1 1.6 - 2.6 mg/dL       Radiologic Studies -   XR CHEST PA LAT    (Results Pending)         Medical Decision Making   I am the first provider for this patient. I reviewed the vital signs, available nursing notes, past medical history, past surgical history, family history and social history. Vital Signs-Reviewed the patient's vital signs. Records Reviewed: Nursing Notes and Old Medical Records (Time of Review: 5:58 PM)    ED Course: Progress Notes, Reevaluation, and Consults:      Provider Notes (Medical Decision Making):   Patient no acute distress. On exam patient had scattered inspiratory wheezing. Patient reports she ran out of her inhaler today. Patient reports she has been having chest pain at nighttime for the past 3 days that were minimally relieved with her inhaler. Patient reports this feels like her asthma attacks. Basic cardiac panel checked as well as EKG which were unremarkable. Suspect patient chest tightness and pain is due to her asthma exacerbation. Patient vital signs are normal.  Patient not hypoxic or febrile. I do not suspect PE or cardiac etiology at this time. X-ray looked normal, no signs of pneumonia. Patient given 2 breathing treatments during visit as well as prednisone which relieved all her symptoms. Patient reports she no longer has chest pain. Patient reports she felt a lot better. Plan to give patient a prednisone for a few days as well as a refill on her inhaler.   Patient was in no acute distress prior to discharge. 1850: Patient  no longer wheezing on reassessment stated she felt better and was not having any chest pain. Patient given strict return precautions if her symptoms worsen. Patient verbalized understanding. Patient to follow-up with her PCP. Diagnosis     Clinical Impression:   1. Asthma with acute exacerbation, unspecified asthma severity, unspecified whether persistent    2. Wheezing    3. Chest pain, unspecified type    4. Mild intermittent asthma without complication        Disposition: home     Follow-up Information     Follow up With Specialties Details [facility] [place] [address] EMERGENCY DEPT Emergency Medicine  If symptoms worsen [address] [place] [address]    Jone Preciado NP Nurse Practitioner Schedule an appointment as soon as possible for a visit in 1 day  [address] [place] [address]  [phone]             Patient's Medications   Start Taking    PREDNISONE (DELTASONE) 50 MG TABLET    Take 1 Tab by mouth daily for 5 days. Continue Taking    ALBUTEROL (PROVENTIL HFA, VENTOLIN HFA, PROAIR HFA) 90 MCG/ACTUATION INHALER    TAKE 1-2 PUFFS BY INHALATION EVERY FOUR (4) HOURS AS NEEDED FOR WHEEZING. ALBUTEROL (PROVENTIL VENTOLIN) 2.5 MG /3 ML (0.083 %) NEBU    3 mL by Nebulization route every four (4) hours as needed for Wheezing. CIPROFLOXACIN-HYDROCORTISONE (CIPRO HC OTIC) OTIC SUSPENSION    Administer 3 Drops in left ear two (2) times a day for 7 days.     IBUPROFEN (MOTRIN) 800 MG TABLET    1 tab q 6-8 hours PRN pain    NEBULIZER & COMPRESSOR MACHINE    Use as directed    NEBULIZER ACCESSORIES KIT    Use as directed   These Medications have changed    Modified Medication Previous Medication    ALBUTEROL (PROVENTIL HFA, VENTOLIN HFA, PROAIR HFA) 90 MCG/ACTUATION INHALER albuterol (PROVENTIL HFA, VENTOLIN HFA, PROAIR HFA) 90 mcg/actuation inhaler       Take 2 Puffs by inhalation every four (4) hours as needed for Wheezing. INHALE 1 TO 2 PUFFS BY MOUTH EVERY 4 HOURS AS NEEDED   Stop Taking    BUDESONIDE-FORMOTEROL (SYMBICORT) 160-4.5 MCG/ACTUATION HFAA    TAKE 2 PUFFS BY INHALATION TWO (2) TIMES A DAY. INDICATIONS: MAINTENANCE THERAPY FOR ASTHMA    CYCLOBENZAPRINE (FLEXERIL) 5 MG TABLET    TAKE 1 TAB BY MOUTH THREE (3) TIMES DAILY (WITH MEALS). MONTELUKAST (SINGULAIR) 10 MG TABLET    Take 1 Tab by mouth daily. PREDNISONE (DELTASONE) 10 MG TABLET    Day 1: 6 tablets Day 2: 5 tablets Day 3: 4 tablets Days 4,5,6: 3 tablets Days 7,8,9: 2 tablets Days 10,11,12: 1 tablet. Dictation disclaimer:  Please note that this dictation was completed with [employer]Star, the [employer] voice recognition software. Quite often unanticipated grammatical, syntax, homophones, and other interpretive errors are inadvertently transcribed by the computer software. Please disregard these errors. Please excuse any errors that have escaped final proofreading.

## 2020-08-02 NOTE — DISCHARGE INSTRUCTIONS
Patient Education        Asthma Attack: Care Instructions  Your Care Instructions     During an asthma attack, the airways swell and narrow. This makes it hard to breathe. Severe asthma attacks can be life-threatening, but you can help prevent them by keeping your asthma under control and treating symptoms before they get bad. Symptoms include being short of breath, having chest tightness, coughing, and wheezing. Noting and treating these symptoms can also help you avoid future trips to the emergency room. The doctor has checked you carefully, but problems can develop later. If you notice any problems or new symptoms, get medical treatment right away. Follow-up care is a key part of your treatment and safety. Be sure to make and go to all appointments, and call your doctor if you are having problems. It's also a good idea to know your test results and keep a list of the medicines you take. How can you care for yourself at home? · Follow your asthma action plan to prevent and treat attacks. If you don't have an asthma action plan, work with your doctor to create one. · Take your asthma medicines exactly as prescribed. Talk to your doctor right away if you have any questions about how to take them. ? Use your quick-relief medicine when you have symptoms of an attack. Quick-relief medicine is usually an albuterol inhaler. Some people need to use quick-relief medicine before they exercise. ? Take your controller medicine every day, not just when you have symptoms. Controller medicine is usually an inhaled corticosteroid. The goal is to prevent problems before they occur. Don't use your controller medicine to treat an attack that has already started. It doesn't work fast enough to help. ? If your doctor prescribed corticosteroid pills to use during an attack, take them exactly as prescribed. It may take hours for the pills to work, but they may make the episode shorter and help you breathe better. ?  Keep your quick-relief medicine with you at all times. · Talk to your doctor before using other medicines. Some medicines, such as aspirin, can cause asthma attacks in some people. · If you have a peak flow meter, use it to check how well you are breathing. This can help you predict when an asthma attack is going to occur. Then you can take medicine to prevent the asthma attack or make it less severe. · Do not smoke or allow others to smoke around you. Avoid smoky places. Smoking makes asthma worse. If you need help quitting, talk to your doctor about stop-smoking programs and medicines. These can increase your chances of quitting for good. · Learn what triggers an asthma attack for you, and avoid the triggers when you can. Common triggers include colds, smoke, air pollution, dust, pollen, mold, pets, cockroaches, stress, and cold air. · Avoid colds and the flu. Get a pneumococcal vaccine shot. If you have had one before, ask your doctor if you need a second dose. Get a flu vaccine every fall. If you must be around people with colds or the flu, wash your hands often. When should you call for help? [id number] anytime you think you may need emergency care. For example, call if:  · You have severe trouble breathing. Call your doctor now or seek immediate medical care if:  · Your symptoms do not get better after you have followed your asthma action plan. · You have new or worse trouble breathing. · Your coughing and wheezing get worse. · You cough up dark brown or bloody mucus (sputum). · You have a new or higher fever. Watch closely for changes in your health, and be sure to contact your doctor if:  · You need to use quick-relief medicine on more than 2 days a week (unless it is just for exercise). · You cough more deeply or more often, especially if you notice more mucus or a change in the color of your mucus. · You are not getting better as expected. Where can you learn more?   Go to http://www.gray.com/  Enter [id number] in the search box to learn more about \"Asthma Attack: Care Instructions. \"  Current as of: February 24, 2020               Content Version: 12.5  © 2006-2020 [email or web address]. Care instructions adapted under license by [employer] (which disclaims liability or warranty for this information). If you have questions about a medical condition or this instruction, always ask your healthcare professional. [place] [address] any warranty or liability for your use of this information. Patient Education        Chest Pain: Care Instructions  Your Care Instructions     There are many things that can cause chest pain. Some are not serious and will get better on their own in a few days. But some kinds of chest pain need more testing and treatment. Your doctor may have recommended a follow-up visit in the next 8 to 12 hours. If you are not getting better, you may need more tests or treatment. Even though your doctor has released you, you still need to watch for any problems. The doctor carefully checked you, but sometimes problems can develop later. If you have new symptoms or if your symptoms do not get better, get medical care right away. If you have worse or different chest pain or pressure that lasts more than 5 minutes or you passed out (lost consciousness), [id number] or seek other emergency help right away. A medical visit is only one step in your treatment. Even if you feel better, you still need to do what your doctor recommends, such as going to all suggested follow-up appointments and taking medicines exactly as directed. This will help you recover and help prevent future problems. How can you care for yourself at home? · Rest until you feel better. · Take your medicine exactly as prescribed. Call your doctor if you think you are having a problem with your medicine.   · Do not drive after taking a prescription pain medicine. When should you call for help? [id number]:   · You passed out (lost consciousness). · You have severe difficulty breathing. · You have symptoms of a heart attack. These may include:  ? Chest pain or pressure, or a strange feeling in your chest.  ? Sweating. ? Shortness of breath. ? Nausea or vomiting. ? Pain, pressure, or a strange feeling in your back, neck, jaw, or upper belly or in one or both shoulders or arms. ? Lightheadedness or sudden weakness. ? A fast or irregular heartbeat. After you call 911, the  may tell you to chew 1 adult-strength or 2 to 4 low-dose aspirin. Wait for an ambulance. Do not try to drive yourself. Call your doctor today if:   · You have any trouble breathing. · Your chest pain gets worse. · You are dizzy or lightheaded, or you feel like you may faint. · You are not getting better as expected. · You are having new or different chest pain. Where can you learn more? Go to http://lorie-ede.info/  Enter A120 in the search box to learn more about \"Chest Pain: Care Instructions. \"  Current as of: June 26, 2019               Content Version: 12.5  © 2381-9735 [employer]. Care instructions adapted under license by [employer] (which disclaims liability or warranty for this information). If you have questions about a medical condition or this instruction, always ask your healthcare professional. [place] [address] any warranty or liability for your use of this information. Return to Ed if symptoms worsen. Take prednisone as prescribed. Use inhaler as needed. Follow up with PCP in 2 days.

## 2020-08-02 NOTE — LETTER
[address] [place] [address] [facility] EMERGENCY DEPT 
[address] [facility] [address] 
[phone] Work/School Note Date: 8/2/2020 To Whom It May concern: 
 
Richard Johnson was seen and treated today in the emergency room by the following provider(s): 
Attending Provider: La Mcfarlane DO 
Nurse Practitioner: Felipe Bradley NP. Richard Johnson may return to work on 08/04/2020. Sincerely, Shanique Stallworth NP

## 2020-08-02 NOTE — ED TRIAGE NOTES
Pt states she had had a asthma flare x 3 days. States feels like her typical asthma flares, however, she ran out of her inhaler today. Pt speaking in full sentences, without any difficulty. Pt without any nasal flaring, stridor or grunting. Pt states she developed a headache today. Pt now stating she has had chest pressure x 3 days that is worse at night. States has not taken anything for her pain. Pt in NAD.

## 2020-08-03 ENCOUNTER — PATIENT OUTREACH (OUTPATIENT)
Dept: OTHER | Age: 40
End: 2020-08-03

## 2020-08-03 LAB
ATRIAL RATE: 69 BPM
CALCULATED P AXIS, ECG09: 75 DEGREES
CALCULATED R AXIS, ECG10: 95 DEGREES
CALCULATED T AXIS, ECG11: 46 DEGREES
DIAGNOSIS, 93000: NORMAL
P-R INTERVAL, ECG05: 132 MS
Q-T INTERVAL, ECG07: 350 MS
QRS DURATION, ECG06: 72 MS
QTC CALCULATION (BEZET), ECG08: 375 MS
VENTRICULAR RATE, ECG03: 69 BPM

## 2020-08-03 NOTE — PROGRESS NOTES
CM outreach        Patient with ED visit for asthma exacerbation 8/2. * Noted BMI of 33.66  * patient attempts x 3 with UTR results. 9:00am.  Patient on report as eligible for Case Management. Left discreet message on voicemail with this  contact information. Will attempt to contact again to offer [address] [place] [address] Management services. Chart Review:   ED 8/2    Triage:  Pt states she had had a asthma flare x 3 days. States feels like her typical asthma flares, however, she ran out of her inhaler today. Pt speaking in full sentences, without any difficulty. Pt without any nasal flaring, stridor or grunting. Pt states she developed a headache today. Pt now stating she has had chest pressure x 3 days that is worse at night. States has not taken anything for her pain. Pt in NAD. Visit Vitals   /83   Pulse 81   Temp 98.4 °F (36.9 °C)   Resp 17   Ht 5' 3\" (1.6 m)   Wt 86.2 kg (190 lb)   SpO2 97%   BMI 33.66 kg/m²     Clinical Impression:   1. Asthma with acute exacerbation, unspecified asthma severity, unspecified whether persistent    2. Wheezing    3. Chest pain, unspecified type    4.  Mild intermittent asthma without complication      Discharge Orders   Hide    (From 08/02/20 1647 through 08/02/20 1933)   Start  Ordered    Status  Ordering User    08/02/20 0000  08/02/20 1906   albuterol (PROVENTIL HFA, VENTOLIN HFA, PROAIR HFA) 90 mcg/actuation inhaler EVERY 4 HOURS AS NEEDED     Discontinue      Ordered  MARILEE VAIL    08/02/20 0000  08/02/20 1906   predniSONE (DELTASONE) 50 mg tablet DAILY     Discontinue      Ordered  MARILEE VAIL

## 2020-08-04 ENCOUNTER — PATIENT OUTREACH (OUTPATIENT)
Dept: OTHER | Age: 40
End: 2020-08-04

## 2020-08-04 RX ORDER — IBUPROFEN 800 MG/1
TABLET ORAL
Qty: 30 TAB | Refills: 0 | Status: SHIPPED | OUTPATIENT
Start: 2020-08-04 | End: 2020-10-04

## 2020-08-04 NOTE — PROGRESS NOTES
Second CM call    Patient with ED visit for asthma exacerbation 8/2. * Noted BMI of 33.66  * patient attempts x 3 with UTR results.        9:10 am Patient identified as eligible for [address] [place] [address] services. Second telephone outreach attempted. Left discreet voicemail with this  confidential contact information. Will send UTR letter.

## 2020-08-04 NOTE — LETTER
8/4/2020 9:12 AM 
 
Ms. Ramila Guardado [address] [facility] [address] Dear Ms. Kina Betancourt, My name is Gia Berrios, Associate Care Manager for [employer] and I have been trying to reach you. The Associate Care Management (ACM) program is a free-of-charge confidential service provided to our associates and their family members covered by the [address] [place] [address]. The program will provide an associate and his/her family with the [facility] expertise to assist in navigating the health care delivery system, provider services, and their overall care needsso as to assure and improve health care interactions and enhance the quality of life. This program is designed to provide you with the opportunity to have a [employer] [facility] partner with you for the following services: 
 
 1) when you come home from the hospital or emergency room 2) when help is needed to manage your disease 3) when you need assistance coordinating services or appointments [facility] is dedicated to empowering the good health of its community and improving the quality of care and care experiences for associates and their families. We are committed to safeguarding patient confidentiality and privacy, assuring that every associate has the respect he or she deserves in managing their health. The information shared with your care manager will not be shared with anyone else aside from those you identify as part of your care team, and will only be used to assist you with any identified care needs. Please contact me if you would like this service provided to you. Sincerely, 
 
 
 
 
JIM Rodriguez RN, MS, [address]  Associate Care [facility]       Phone:  [phone]     Fax:  [phone]    [email or web address]

## 2020-08-06 ENCOUNTER — TELEPHONE (OUTPATIENT)
Dept: FAMILY MEDICINE CLINIC | Facility: CLINIC | Age: 40
End: 2020-08-06

## 2020-08-06 NOTE — TELEPHONE ENCOUNTER
Patient called stating she went to the Er on Sunday for her asthma. They told her if her chest is still hurting to make appt with Dean Mcallister.   I tried to schedule for virtual visit, but she does not want a virtual.  Please call her back at [phone]

## 2020-08-11 NOTE — TELEPHONE ENCOUNTER
Called patient received VM could not leave message. Patient has appointment scheduled for 08/19/2020.

## 2020-08-19 ENCOUNTER — OFFICE VISIT (OUTPATIENT)
Dept: FAMILY MEDICINE CLINIC | Age: 40
End: 2020-08-19

## 2020-08-19 ENCOUNTER — HOSPITAL ENCOUNTER (OUTPATIENT)
Dept: LAB | Age: 40
Discharge: HOME | End: 2020-08-19
Payer: COMMERCIAL

## 2020-08-19 VITALS
OXYGEN SATURATION: 99 % | HEIGHT: 63 IN | DIASTOLIC BLOOD PRESSURE: 86 MMHG | RESPIRATION RATE: 16 BRPM | BODY MASS INDEX: 34.2 KG/M2 | TEMPERATURE: 98.7 F | WEIGHT: 193 LBS | SYSTOLIC BLOOD PRESSURE: 132 MMHG | HEART RATE: 86 BPM

## 2020-08-19 DIAGNOSIS — M62.838 MUSCLE SPASM: ICD-10-CM

## 2020-08-19 DIAGNOSIS — D72.829 LEUKOCYTOSIS, UNSPECIFIED TYPE: ICD-10-CM

## 2020-08-19 DIAGNOSIS — R07.9 CHEST PAIN, UNSPECIFIED TYPE: Primary | ICD-10-CM

## 2020-08-19 LAB
BILIRUB UR QL STRIP: NEGATIVE
GLUCOSE UR-MCNC: NEGATIVE MG/DL
KETONES P FAST UR STRIP-MCNC: NEGATIVE MG/DL
PH UR STRIP: 5.5 [PH] (ref 4.6–8)
PROT UR QL STRIP: NEGATIVE
SP GR UR STRIP: 1.03 (ref 1–1.03)
UA UROBILINOGEN AMB POC: NORMAL (ref 0.2–1)
URINALYSIS CLARITY POC: CLEAR
URINALYSIS COLOR POC: NORMAL
URINE BLOOD POC: NORMAL
URINE LEUKOCYTES POC: NORMAL
URINE NITRITES POC: NEGATIVE

## 2020-08-19 PROCEDURE — 87086 URINE CULTURE/COLONY COUNT: CPT

## 2020-08-19 RX ORDER — CYCLOBENZAPRINE HCL 5 MG
5 TABLET ORAL
Qty: 30 TAB | Refills: 1 | Status: SHIPPED | OUTPATIENT
Start: 2020-08-19 | End: 2020-10-04

## 2020-08-19 NOTE — PROGRESS NOTES
Chief Complaint   Patient presents with   [facility] Follow Up    Chest Pain       Pt preferred language for health care discussion is english. Is someone accompanying this pt? no    Is the patient using any DME equipment during OV? no    Depression Screening:  3 most recent [facility] Screens 1/14/2020 8/3/2019 6/13/2019 6/11/2019 8/13/2018 5/25/2018 3/30/2017   Little interest or pleasure in doing things Not at all Not at all Not at all Not at all Not at all More than half the days Not at all   Feeling down, depressed, irritable, or hopeless Not at all Not at all Not at all Not at all Not at all More than half the days Not at all   Total Score PHQ 2 0 0 0 0 0 4 0       Learning Assessment:  Learning Assessment 11/25/2015 6/5/2015 12/15/2014   PRIMARY LEARNER Patient Patient Patient   HIGHEST LEVEL OF EDUCATION - PRIMARY LEARNER  [address] [facility] PRIMARY LEARNER NONE - NONE   PRIMARY LANGUAGE ENGLISH ENGLISH ENGLISH   LEARNER PREFERENCE PRIMARY READING READING DEMONSTRATION     VIDEOS VIDEOS -     - PICTURES -   ANSWERED BY Patient Patient self   Sanjeev López 0707 Maintenance reviewed and discussed per provider. Yes        Advance Directive:  1. Do you have an advance directive in place? Patient Reply:no    2. If not, would you like material regarding how to put one in place? Patient Reply: no    Coordination of Care:  1. Have you been to the ER, urgent care clinic since your last visit? Hospitalized since your last visit? Yes [facility]bvone time then to [facility] and trafered to ob    2. Have you seen or consulted any other health care providers outside of the [address] [place] [address] since your last visit? Include any pap smears or colon screening.  no

## 2020-08-19 NOTE — PROGRESS NOTES
Dom Hartley is a 36 y.o. female presenting today for Hospital Follow Up and Chest Pain  . HPI:  Dom Hartley presents to the office today for hospital follow-up. Patient was seen in the ED on August 17, 2020 for complaints of right-sided chest pain that radiates to her back, arm and from the arm to the right leg. States she was seen for similar symptoms 2 weeks consecutively. She had a VQ scan for possible pulmonary embolism which was negative. She notes that her chest pain symptoms are intermittent and she had a chest x-ray done on August 2, 2020 which show no cardiopulmonary abnormality. She denied any chest pain symptoms today and is negative for cough and wheezing. ROS    Constitutional: No apparent distress noted  General- negative for fever, chills or fatigue  Eyes- negative visual changes  CV- chest pain/intermittent  Pul: negative cough or SOB  GI: negative nausea, flank pain, diarrhea, constipation  Urinary:- No dysuria or polyuria  MS- intermittent right arm parathesia  Neuro- negative headache, dizziness or weakness  Skin- negative for rashes or lesions. Psych- denies any anxiety or depression    Allergies   Allergen Reactions    Azo [Phenazopyridine] Hives    Iodinated Contrast Media Shortness of Breath       Current Outpatient Medications   Medication Sig Dispense Refill    cyclobenzaprine (FLEXERIL) 5 mg tablet Take 1 Tab by mouth three (3) times daily as needed for Muscle Spasm(s). 30 Tab 1    ibuprofen (MOTRIN) 800 mg tablet TAKE 1 TABLET BY MOUTH 6-8 HOURS AS NEEDED FOR PAIN 30 Tab 0    albuterol (PROVENTIL HFA, VENTOLIN HFA, PROAIR HFA) 90 mcg/actuation inhaler Take 2 Puffs by inhalation every four (4) hours as needed for Wheezing. 18 Inhaler 2    albuterol (PROVENTIL HFA, VENTOLIN HFA, PROAIR HFA) 90 mcg/actuation inhaler TAKE 1-2 PUFFS BY INHALATION EVERY FOUR (4) HOURS AS NEEDED FOR WHEEZING.  8.5 Inhaler 3    albuterol (PROVENTIL VENTOLIN) 2.5 mg /3 mL (0.083 %) nebu 3 mL by Nebulization route every four (4) hours as needed for Wheezing. 24 Each 1    Nebulizer & Compressor machine Use as directed 1 Each 0    Nebulizer Accessories kit Use as directed 1 Kit 1    ciprofloxacin-hydrocortisone (CIPRO HC OTIC) otic suspension Administer 3 Drops in left ear two (2) times a day for 7 days.  10 mL 0       Past Medical History:   Diagnosis Date    Asthma     Chronic pain 6 months    painful menses    Fibroids     GERD (gastroesophageal reflux disease)     Heart palpitations        Past Surgical History:   Procedure Laterality Date    HX  SECTION  [id number],9947    with BTL    HX HYSTEROSCOPY WITH ENDOMETRIAL ABLATION  2012    No period since       Social History     Socioeconomic History    Marital status:      Spouse name: Not on file    Number of children: Not on file    Years of education: Not on file    Highest education level: Not on file   Occupational History     Employer: [employer]   Social Needs    Financial resource strain: Not on file    Food insecurity     Worry: Not on file     Inability: Not on file    Transportation needs     Medical: Not on file     Non-medical: Not on file   Tobacco Use    Smoking status: Former Smoker     Types: Cigarettes     Last attempt to quit: 3/23/1998     Years since quittin.4    Smokeless tobacco: Former User    Tobacco comment: occ vapes   Substance and Sexual Activity    Alcohol use: Yes     Comment: socially, 1 beer/weekend    Drug use: No    Sexual activity: Yes     Partners: Male     Birth control/protection: Surgical   Lifestyle    Physical activity     Days per week: Not on file     Minutes per session: Not on file    Stress: Not on file   Relationships    Social connections     Talks on phone: Not on file     Gets together: Not on file     Attends Sabianism service: Not on file     Active member of club or organization: Not on file     Attends meetings of clubs or organizations: Not on file Relationship status: Not on file    Intimate partner violence     Fear of current or ex partner: Not on file     Emotionally abused: Not on file     Physically abused: Not on file     Forced sexual activity: Not on file   Other Topics Concern     Service Yes     Comment: [employer]    Blood Transfusions No    Caffeine Concern No    Occupational Exposure No    Hobby Hazards No    Sleep Concern No    Stress Concern No    Weight Concern Yes     Comment: PT IS OVER WEIGHT    Special Diet No    Back Care No    Exercise Yes     Comment: 4 DAYS A WEEK    Bike Helmet No    Seat Belt Yes    Self-Exams Yes   Social History Narrative    Not on file       Vitals:    08/19/20 1406   BP: 132/86   Pulse: 86   Resp: 16   Temp: 98.7 °F (37.1 °C)   TempSrc: Temporal   SpO2: 99%   Weight: 193 lb (87.5 kg)   Height: 5' 3\" (1.6 m)   PainSc:   0 - No pain       Physical Exam  Vitals signs and nursing note reviewed. Constitutional:       Appearance: Normal appearance. Cardiovascular:      Rate and Rhythm: Normal rate and regular rhythm. Pulses: Normal pulses. Heart sounds: Normal heart sounds. Pulmonary:      Effort: Pulmonary effort is normal.      Breath sounds: Normal breath sounds. Neurological:      Mental Status: She is alert.          Hospital Outpatient Visit on 08/19/2020   Component Date Value Ref Range Status    Special Requests: 08/19/2020 NO SPECIAL REQUESTS    Final    Culture result: 08/19/2020 No significant growth, <10,000 CFU/mL    Final   Office Visit on 08/19/2020   Component Date Value Ref Range Status    Color (UA POC) 08/19/2020 Light Yellow   Final    Clarity (UA POC) 08/19/2020 Clear   Final    Glucose (UA POC) 08/19/2020 Negative  Negative Final    Bilirubin (UA POC) 08/19/2020 Negative  Negative Final    Ketones (UA POC) 08/19/2020 Negative  Negative Final    Specific gravity (UA POC) 08/19/2020 1.030  1.001 - 1.035 Final    Blood (UA POC) 08/19/2020 1+  Negative Final    pH (UA POC) 08/19/2020 5.5  4.6 - 8.0 Final    Protein (UA POC) 08/19/2020 Negative  Negative Final    Urobilinogen (UA POC) 08/19/2020 0.2 mg/dL  0.2 - 1 Final    Nitrites (UA POC) 08/19/2020 Negative  Negative Final    Leukocyte esterase (UA POC) 08/19/2020 Trace  Negative Final   Admission on 08/02/2020, Discharged on 08/02/2020   Component Date Value Ref Range Status    Ventricular Rate 08/02/2020 69  BPM Final    Atrial Rate 08/02/2020 69  BPM Final    P-R Interval 08/02/2020 132  ms Final    QRS Duration 08/02/2020 72  ms Final    Q-T Interval 08/02/2020 350  ms Final    QTC Calculation (Bezet) 08/02/2020 375  ms Final    Calculated P Axis 08/02/2020 75  degrees Final    Calculated R Axis 08/02/2020 95  degrees Final    Calculated T Axis 08/02/2020 46  degrees Final    Diagnosis 08/02/2020    Final                    Value:Normal sinus rhythm  Rightward axis  Borderline ECG  When compared with ECG of 14-DEC-2019 10:39,  No significant change was found  Confirmed by Chica Payne [phone]) on 8/3/2020 7:25:56 PM      WBC 08/02/2020 9.7  4.6 - 13.2 K/uL Final    RBC 08/02/2020 5.45* 4.20 - 5.30 M/uL Final    HGB 08/02/2020 14.7  12.0 - 16.0 g/dL Final    HCT 08/02/2020 45.1* 35.0 - 45.0 % Final    MCV 08/02/2020 82.8  74.0 - 97.0 FL Final    MCH 08/02/2020 27.0  24.0 - 34.0 PG Final    MCHC 08/02/2020 32.6  31.0 - 37.0 g/dL Final    RDW 08/02/2020 14.9* 11.6 - 14.5 % Final    PLATELET 81/04/9410 471  135 - 420 K/uL Final    MPV 08/02/2020 9.8  9.2 - 11.8 FL Final    NEUTROPHILS 08/02/2020 70  40 - 73 % Final    LYMPHOCYTES 08/02/2020 21  21 - 52 % Final    MONOCYTES 08/02/2020 6  3 - 10 % Final    EOSINOPHILS 08/02/2020 3  0 - 5 % Final    BASOPHILS 08/02/2020 0  0 - 2 % Final    ABS. NEUTROPHILS 08/02/2020 6.7  1.8 - 8.0 K/UL Final    ABS. LYMPHOCYTES 08/02/2020 2.1  0.9 - 3.6 K/UL Final    ABS. MONOCYTES 08/02/2020 0.6  0.05 - 1.2 K/UL Final    ABS.  EOSINOPHILS 08/02/2020 0.3  0.0 - 0.4 K/UL Final    ABS. BASOPHILS 08/02/2020 0.0  0.0 - 0.1 K/UL Final    DF 08/02/2020 AUTOMATED    Final    Sodium 08/02/2020 136  136 - 145 mmol/L Final    Potassium 08/02/2020 3.8  3.5 - 5.5 mmol/L Final    Chloride 08/02/2020 106  100 - 111 mmol/L Final    CO2 08/02/2020 25  21 - 32 mmol/L Final    Anion gap 08/02/2020 5  3.0 - 18 mmol/L Final    Glucose 08/02/2020 77  74 - 99 mg/dL Final    BUN 08/02/2020 16  7.0 - 18 MG/DL Final    Creatinine 08/02/2020 0.80  0.6 - 1.3 MG/DL Final    BUN/Creatinine ratio 08/02/2020 20  12 - 20   Final    GFR est AA 08/02/2020 >60  >60 ml/min/1.73m2 Final    GFR est non-AA 08/02/2020 >60  >60 ml/min/1.73m2 Final    Comment: (NOTE)  Estimated GFR is calculated using the Modification of Diet in Renal   Disease (MDRD) Study equation, reported for both  Americans   (GFRAA) and non- Americans (GFRNA), and normalized to 1.73m2   body surface area. The physician must decide which value applies to   the patient. The MDRD study equation should only be used in   individuals age 25 or older. It has not been validated for the   following: pregnant women, patients with serious comorbid conditions,   or on certain medications, or persons with extremes of body size,   muscle mass, or nutritional status.  Calcium 08/02/2020 9.1  8.5 - 10.1 MG/DL Final    Bilirubin, total 08/02/2020 0.4  0.2 - 1.0 MG/DL Final    ALT (SGPT) 08/02/2020 24  13 - 56 U/L Final    AST (SGOT) 08/02/2020 20  10 - 38 U/L Final    Alk.  phosphatase 08/02/2020 66  45 - 117 U/L Final    Protein, total 08/02/2020 7.8  6.4 - 8.2 g/dL Final    Albumin 08/02/2020 3.7  3.4 - 5.0 g/dL Final    Globulin 08/02/2020 4.1* 2.0 - 4.0 g/dL Final    A-G Ratio 08/02/2020 0.9  0.8 - 1.7   Final    Troponin-I, QT 08/02/2020 <0.02  0.0 - 0.045 NG/ML Final    Comment: The presence of detectable troponin above the reference range indicates myocardial injury which may be due to ischemia, myocarditis, trauma, etc.  Clinical correlation is necessary to establish the significance of this finding. Sequential testing is recommended to determine if the typical rise and fall of cTnI is demonstrated. Note:  Cardiac troponin I has a relatively long half life and may be present well after the CK MB has returned to baseline. The reference range is based on the 99th percentile of the referent population.  Magnesium 08/02/2020 2.1  1.6 - 2.6 mg/dL Final       .  Results for orders placed or performed during the hospital encounter of 08/19/20   CULTURE, URINE    Specimen: Urine   Result Value Ref Range    Special Requests: NO SPECIAL REQUESTS      Culture result: No significant growth, <10,000 CFU/mL     Results for orders placed or performed in visit on 08/19/20   AMB POC URINALYSIS DIP STICK AUTO W/O MICRO   Result Value Ref Range    Color (UA POC) Light Yellow     Clarity (UA POC) Clear     Glucose (UA POC) Negative Negative    Bilirubin (UA POC) Negative Negative    Ketones (UA POC) Negative Negative    Specific gravity (UA POC) 1.030 1.001 - 1.035    Blood (UA POC) 1+ Negative    pH (UA POC) 5.5 4.6 - 8.0    Protein (UA POC) Negative Negative    Urobilinogen (UA POC) 0.2 mg/dL 0.2 - 1    Nitrites (UA POC) Negative Negative    Leukocyte esterase (UA POC) Trace Negative       Assessment / Plan:      ICD-10-CM ICD-9-CM    1. Chest pain, unspecified type  R07.9 786.50 NUCLEAR CARDIAC STRESS TEST   2. Muscle spasm  M62.838 728.85 cyclobenzaprine (FLEXERIL) 5 mg tablet   3. Leukocytosis, unspecified type  D72.829 288.60 CULTURE, URINE      AMB POC URINALYSIS DIP STICK AUTO W/O MICRO      CBC WITH AUTOMATED DIFF     Nuclear stress test ordered  Muscle spasms-Flexeril prescription given  Urine culture ordered  Follow-up in 1 month  Follow-up and Dispositions    · Return in about 4 weeks (around 9/16/2020), or if symptoms worsen or fail to improve.          I asked the patient if she  had any questions and answered her  questions. The patient stated that she understands the treatment plan and agrees with the treatment plan    This document was created with a voice activated dictation system and may contain transcription errors.

## 2020-08-20 LAB
BACTERIA SPEC CULT: NORMAL
SERVICE CMNT-IMP: NORMAL

## 2020-08-31 ENCOUNTER — HOSPITAL ENCOUNTER (OUTPATIENT)
Dept: NON INVASIVE DIAGNOSTICS | Age: 40
Discharge: HOME | End: 2020-08-31
Attending: NURSE PRACTITIONER
Payer: COMMERCIAL

## 2020-08-31 VITALS
SYSTOLIC BLOOD PRESSURE: 120 MMHG | DIASTOLIC BLOOD PRESSURE: 70 MMHG | BODY MASS INDEX: 34.2 KG/M2 | WEIGHT: 193 LBS | HEIGHT: 63 IN

## 2020-08-31 DIAGNOSIS — R07.9 CHEST PAIN, UNSPECIFIED TYPE: ICD-10-CM

## 2020-08-31 LAB
STRESS ANGINA INDEX: 0
STRESS BASELINE DIAS BP: 70 MMHG
STRESS BASELINE HR: 88 BPM
STRESS BASELINE SYS BP: 120 MMHG
STRESS ESTIMATED WORKLOAD: 10.1 METS
STRESS EXERCISE DUR MIN: NORMAL
STRESS PEAK DIAS BP: 80 MMHG
STRESS PEAK SYS BP: 160 MMHG
STRESS PERCENT HR ACHIEVED: 85 %
STRESS POST PEAK HR: 153 BPM
STRESS RATE PRESSURE PRODUCT: NORMAL BPM*MMHG
STRESS SR DUKE TREADMILL SCORE: 0
STRESS ST DEPRESSION: 0 MM
STRESS ST ELEVATION: 0 MM
STRESS TARGET HR: 180 BPM

## 2020-08-31 PROCEDURE — 93017 CV STRESS TEST TRACING ONLY: CPT

## 2020-08-31 PROCEDURE — 74011250636 HC RX REV CODE- 250/636: Performed by: NURSE PRACTITIONER

## 2020-08-31 RX ORDER — SODIUM CHLORIDE 9 MG/ML
250 INJECTION, SOLUTION INTRAVENOUS ONCE
Status: COMPLETED | OUTPATIENT
Start: 2020-08-31 | End: 2020-08-31

## 2020-08-31 RX ADMIN — SODIUM CHLORIDE 250 ML: 900 INJECTION, SOLUTION INTRAVENOUS at 09:40

## 2020-08-31 NOTE — PROGRESS NOTES
Patient was given 11.0 milliCuries of 99mTc-Sestamibi for the resting images. Patient was also given 33.0 milliCuries of 99mTc-Sestamibi for the stress images. Patient walked on treadmill during nuclear stress test.      Patient's armband was discarded and shredded.

## 2020-09-03 ENCOUNTER — PATIENT OUTREACH (OUTPATIENT)
Dept: OTHER | Age: 40
End: 2020-09-03

## 2020-09-03 NOTE — PROGRESS NOTES
[facility] Outreach     Patient with high utilization of ED for asthma/ CP.   * 8/2;  8/12- [facility] [place]; 8/17- [facility] [place]  * Cardiac Stress 8/31 -     10:45am Final outreach to patient for CM/  LVM. Resolving current episode for case management due to patient unable to reach. Patient has not been reached after repeated calls and letters. Letter sent to patient notifying completion of services due to unable to reach. This writer's contact information and information regarding program services included in materials sent.

## 2020-09-10 DIAGNOSIS — R06.00 DYSPNEA, UNSPECIFIED TYPE: Primary | ICD-10-CM

## 2020-10-04 ENCOUNTER — HOSPITAL ENCOUNTER (EMERGENCY)
Age: 40
Discharge: HOME | End: 2020-10-05
Attending: EMERGENCY MEDICINE
Payer: COMMERCIAL

## 2020-10-04 DIAGNOSIS — J45.901 ASTHMA WITH ACUTE EXACERBATION, UNSPECIFIED ASTHMA SEVERITY, UNSPECIFIED WHETHER PERSISTENT: Primary | ICD-10-CM

## 2020-10-04 PROCEDURE — 94640 AIRWAY INHALATION TREATMENT: CPT

## 2020-10-04 PROCEDURE — 93005 ELECTROCARDIOGRAM TRACING: CPT

## 2020-10-04 PROCEDURE — 74011000250 HC RX REV CODE- 250: Performed by: EMERGENCY MEDICINE

## 2020-10-04 PROCEDURE — 74011636637 HC RX REV CODE- 636/637: Performed by: EMERGENCY MEDICINE

## 2020-10-04 PROCEDURE — 99285 EMERGENCY DEPT VISIT HI MDM: CPT

## 2020-10-04 RX ORDER — PREDNISONE 20 MG/1
60 TABLET ORAL
Status: COMPLETED | OUTPATIENT
Start: 2020-10-04 | End: 2020-10-04

## 2020-10-04 RX ORDER — ALBUTEROL SULFATE 0.83 MG/ML
5 SOLUTION RESPIRATORY (INHALATION)
Status: COMPLETED | OUTPATIENT
Start: 2020-10-04 | End: 2020-10-05

## 2020-10-04 RX ORDER — IPRATROPIUM BROMIDE AND ALBUTEROL SULFATE 2.5; .5 MG/3ML; MG/3ML
3 SOLUTION RESPIRATORY (INHALATION)
Status: COMPLETED | OUTPATIENT
Start: 2020-10-04 | End: 2020-10-04

## 2020-10-04 RX ADMIN — PREDNISONE 60 MG: 20 TABLET ORAL at 23:46

## 2020-10-04 RX ADMIN — IPRATROPIUM BROMIDE AND ALBUTEROL SULFATE 3 ML: .5; 3 SOLUTION RESPIRATORY (INHALATION) at 23:46

## 2020-10-04 NOTE — LETTER
NOTIFICATION RETURN TO WORK / SCHOOL 
 
10/5/2020 12:56 AM 
 
Ms. Moriah Alves [address] [place] [id number] To Whom It May Concern: 
 
Moriah Alves is currently under the care of [id number] [place] [address] EMERGENCY DEPT. She will return to work/school on: 10/8/20 If there are questions or concerns please have the patient contact our office.  
 
 
 
Sincerely, 
 
Christ Cranker, MD

## 2020-10-05 VITALS
HEART RATE: 108 BPM | SYSTOLIC BLOOD PRESSURE: 127 MMHG | HEIGHT: 63 IN | TEMPERATURE: 97.9 F | OXYGEN SATURATION: 100 % | BODY MASS INDEX: 33.66 KG/M2 | DIASTOLIC BLOOD PRESSURE: 64 MMHG | WEIGHT: 190 LBS | RESPIRATION RATE: 36 BRPM

## 2020-10-05 LAB
ATRIAL RATE: 98 BPM
CALCULATED P AXIS, ECG09: 68 DEGREES
CALCULATED R AXIS, ECG10: 71 DEGREES
CALCULATED T AXIS, ECG11: 57 DEGREES
DIAGNOSIS, 93000: NORMAL
P-R INTERVAL, ECG05: 116 MS
Q-T INTERVAL, ECG07: 338 MS
QRS DURATION, ECG06: 68 MS
QTC CALCULATION (BEZET), ECG08: 431 MS
VENTRICULAR RATE, ECG03: 98 BPM

## 2020-10-05 PROCEDURE — 74011000250 HC RX REV CODE- 250: Performed by: EMERGENCY MEDICINE

## 2020-10-05 RX ORDER — ALBUTEROL SULFATE 90 UG/1
1-2 AEROSOL, METERED RESPIRATORY (INHALATION)
Qty: 1 INHALER | Refills: 0 | Status: SHIPPED | OUTPATIENT
Start: 2020-10-05 | End: 2021-01-28 | Stop reason: SDUPTHER

## 2020-10-05 RX ORDER — PREDNISONE 20 MG/1
60 TABLET ORAL DAILY
Qty: 12 TAB | Refills: 0 | Status: SHIPPED | OUTPATIENT
Start: 2020-10-05 | End: 2020-10-09

## 2020-10-05 RX ORDER — IPRATROPIUM BROMIDE AND ALBUTEROL SULFATE 2.5; .5 MG/3ML; MG/3ML
3 SOLUTION RESPIRATORY (INHALATION)
Status: COMPLETED | OUTPATIENT
Start: 2020-10-05 | End: 2020-10-05

## 2020-10-05 RX ADMIN — IPRATROPIUM BROMIDE AND ALBUTEROL SULFATE 3 ML: .5; 3 SOLUTION RESPIRATORY (INHALATION) at 01:00

## 2020-10-05 RX ADMIN — ALBUTEROL SULFATE 5 MG: 2.5 SOLUTION RESPIRATORY (INHALATION) at 00:03

## 2020-10-05 NOTE — ED NOTES
Pt reassessed and now smiling and states she is feeling so much better, pt states she feels like she is still wheezing a little bit, but overall feels amazing. MD notified and will continue to monitor.

## 2020-10-05 NOTE — DISCHARGE INSTRUCTIONS
Return for pain, fever not resolving with motrin or tylenol, shortness of breath, vomiting, decreased fluid intake, weakness, numbness, dizziness, or any change or concerns. Patient Education        Asthma Attack: Care Instructions  Your Care Instructions     During an asthma attack, the airways swell and narrow. This makes it hard to breathe. Severe asthma attacks can be life-threatening, but you can help prevent them by keeping your asthma under control and treating symptoms before they get bad. Symptoms include being short of breath, having chest tightness, coughing, and wheezing. Noting and treating these symptoms can also help you avoid future trips to the emergency room. The doctor has checked you carefully, but problems can develop later. If you notice any problems or new symptoms, get medical treatment right away. Follow-up care is a key part of your treatment and safety. Be sure to make and go to all appointments, and call your doctor if you are having problems. It's also a good idea to know your test results and keep a list of the medicines you take. How can you care for yourself at home? · Follow your asthma action plan to prevent and treat attacks. If you don't have an asthma action plan, work with your doctor to create one. · Take your asthma medicines exactly as prescribed. Talk to your doctor right away if you have any questions about how to take them. ? Use your quick-relief medicine when you have symptoms of an attack. Quick-relief medicine is usually an albuterol inhaler. Some people need to use quick-relief medicine before they exercise. ? Take your controller medicine every day, not just when you have symptoms. Controller medicine is usually an inhaled corticosteroid. The goal is to prevent problems before they occur. Don't use your controller medicine to treat an attack that has already started. It doesn't work fast enough to help.   ? If your doctor prescribed corticosteroid pills to use during an attack, take them exactly as prescribed. It may take hours for the pills to work, but they may make the episode shorter and help you breathe better. ? Keep your quick-relief medicine with you at all times. · Talk to your doctor before using other medicines. Some medicines, such as aspirin, can cause asthma attacks in some people. · If you have a peak flow meter, use it to check how well you are breathing. This can help you predict when an asthma attack is going to occur. Then you can take medicine to prevent the asthma attack or make it less severe. · Do not smoke or allow others to smoke around you. Avoid smoky places. Smoking makes asthma worse. If you need help quitting, talk to your doctor about stop-smoking programs and medicines. These can increase your chances of quitting for good. · Learn what triggers an asthma attack for you, and avoid the triggers when you can. Common triggers include colds, smoke, air pollution, dust, pollen, mold, pets, cockroaches, stress, and cold air. · Avoid colds and the flu. Get a pneumococcal vaccine shot. If you have had one before, ask your doctor if you need a second dose. Get a flu vaccine every fall. If you must be around people with colds or the flu, wash your hands often. When should you call for help? Call 911 anytime you think you may need emergency care. For example, call if:    · You have severe trouble breathing. Call your doctor now or seek immediate medical care if:    · Your symptoms do not get better after you have followed your asthma action plan.     · You have new or worse trouble breathing.     · Your coughing and wheezing get worse.     · You cough up dark brown or bloody mucus (sputum).     · You have a new or higher fever.    Watch closely for changes in your health, and be sure to contact your doctor if:    · You need to use quick-relief medicine on more than 2 days a week (unless it is just for exercise).     · You cough more deeply or more often, especially if you notice more mucus or a change in the color of your mucus.     · You are not getting better as expected. Where can you learn more? Go to http://www.gray.com/  Enter O619 in the search box to learn more about \"Asthma Attack: Care Instructions. \"  Current as of: February 24, 2020               Content Version: 12.6  © 1799-9710 [employer]. Care instructions adapted under license by [employer] (which disclaims liability or warranty for this information). If you have questions about a medical condition or this instruction, always ask your healthcare professional. [place] [address] any warranty or liability for your use of this information.

## 2020-10-05 NOTE — ED PROVIDER NOTES
Pt c/o asthma attack, says started this am,about 12 hours, worsening. Tried alb mdi x one didn't help much. No fever, no cough. No chest or abd pain. No leg pain or swelling. No weakness. Long h/o asthma, feels like typical attack. No recent steroid use per pt. No abd pain/nausea or chance of curr pregnancy.             Past Medical History:   Diagnosis Date    Asthma     Chronic pain 6 months    painful menses    Fibroids     GERD (gastroesophageal reflux disease)     Heart palpitations        Past Surgical History:   Procedure Laterality Date    HX  SECTION  [id number]    with BTL    HX HYSTEROSCOPY WITH ENDOMETRIAL ABLATION      No period since         Family History:   Problem Relation Age of Onset    Hypertension Mother     Hypertension Father     Diabetes Father     Parkinson's Disease Father     Cancer Paternal Grandmother        Social History     Socioeconomic History    Marital status:      Spouse name: Not on file    Number of children: Not on file    Years of education: Not on file    Highest education level: Not on file   Occupational History     Employer: [employer]   Social Needs    Financial resource strain: Not on file    Food insecurity     Worry: Not on file     Inability: Not on file    Transportation needs     Medical: Not on file     Non-medical: Not on file   Tobacco Use    Smoking status: Former Smoker     Types: Cigarettes     Last attempt to quit: 3/23/1998     Years since quittin.5    Smokeless tobacco: Former User    Tobacco comment: occ vapes   Substance and Sexual Activity    Alcohol use: Yes     Comment: socially, 1 beer/weekend    Drug use: No    Sexual activity: Yes     Partners: Male     Birth control/protection: Surgical   Lifestyle    Physical activity     Days per week: Not on file     Minutes per session: Not on file    Stress: Not on file   Relationships    Social connections     Talks on phone: Not on file     Gets together: Not on file     Attends [employer] service: Not on file     Active member of club or organization: Not on file     Attends meetings of clubs or organizations: Not on file     Relationship status: Not on file    Intimate partner violence     Fear of current or ex partner: Not on file     Emotionally abused: Not on file     Physically abused: Not on file     Forced sexual activity: Not on file   Other Topics Concern     Service Yes     Comment: [employer]    Blood Transfusions No    Caffeine Concern No    Occupational Exposure No    Hobby Hazards No    Sleep Concern No    Stress Concern No    Weight Concern Yes     Comment: PT IS OVER WEIGHT    Special Diet No    Back Care No    Exercise Yes     Comment: 4 DAYS A WEEK    Bike Helmet No    Seat Belt Yes    Self-Exams Yes   Social History Narrative    Not on file         ALLERGIES: Azo [phenazopyridine] and Iodinated contrast media    Review of Systems   Constitutional: Negative for fever. HENT: Negative for congestion. Respiratory: Positive for wheezing. Negative for cough. Cardiovascular: Negative for chest pain. Gastrointestinal: Negative for abdominal pain and vomiting. Musculoskeletal: Negative for back pain. Skin: Negative for rash. Neurological: Negative for light-headedness. All other systems reviewed and are negative. Vitals:    10/04/20 2343 10/05/20 0000 10/05/20 0030 10/05/20 0100   BP: (!) 164/92 133/80 129/75 127/64   Pulse: (!) 108      Resp: (!) 36      Temp: 97.9 °F (36.6 °C)      SpO2: 100% 100% 98% 100%   Weight: 86.2 kg (190 lb)      Height: 5' 3\" (1.6 m)               Physical Exam  Vitals signs and nursing note reviewed. Constitutional:       Appearance: She is well-developed. She is not diaphoretic. HENT:      Head: Normocephalic and atraumatic. Eyes:      Pupils: Pupils are equal, round, and reactive to light. Neck:      Musculoskeletal: Normal range of motion.    Cardiovascular: Rate and Rhythm: Normal rate and regular rhythm. Heart sounds: No murmur. Pulmonary:      Effort: No respiratory distress. Breath sounds: Wheezing (+ exp) present. Comments: + tachypnic  Abdominal:      Palpations: Abdomen is soft. Tenderness: There is no abdominal tenderness. Musculoskeletal:         General: No tenderness. Skin:     General: Skin is dry. Capillary Refill: Capillary refill takes less than 2 seconds. Findings: No rash. Neurological:      Mental Status: She is alert and oriented to person, place, and time. MDM       Procedures    Vitals:  No data found. Medications ordered:   Medications   albuterol-ipratropium (DUO-NEB) 2.5 MG-0.5 MG/3 ML (3 mL Nebulization Given 10/4/20 2346)   predniSONE (DELTASONE) tablet 60 mg (60 mg Oral Given 10/4/20 2346)   albuterol (PROVENTIL VENTOLIN) nebulizer solution 5 mg (5 mg Nebulization Given 10/5/20 0003)   albuterol-ipratropium (DUO-NEB) 2.5 MG-0.5 MG/3 ML (3 mL Nebulization Given 10/5/20 0100)         Lab findings:  No results found for this or any previous visit (from the past 12 hour(s)). X-Ray, CT or other radiology findings or impressions:  No orders to display       Progress notes, Consult notes or additional Procedure notes:   12:54 AM markedly improved, mild exp wheezing only, nl rr, no s/o distress throughout stay, no cough. Pt agrees w one more tx, wants dc. To dc w detailed ret inst given. No emc. Not c/w pna/hypoxia/bacteremia/sepsisptx. Diagnosis:   1.  Asthma with acute exacerbation, unspecified asthma severity, unspecified whether persistent        Disposition: home    Follow-up Information     Follow up With Specialties Details Why Contact Kyle Oates NP Nurse Practitioner Schedule an appointment as soon as possible for a visit in 1 day  [address] [facility] [address] [phone] [address] [facility] EMERGENCY DEPT Emergency Medicine Go to As needed [address] [facility]  [phone]           Discharge Medication List as of 10/5/2020  1:04 AM      START taking these medications    Details   predniSONE (DELTASONE) 20 mg tablet Take 60 mg by mouth daily for 4 days. , Normal, Disp-12 Tab,R-0         CONTINUE these medications which have CHANGED    Details   albuterol (PROVENTIL HFA, VENTOLIN HFA, PROAIR HFA) 90 mcg/actuation inhaler Take 1-2 Puffs by inhalation every four (4) hours as needed for Wheezing., Normal, Disp-1 Inhaler,R-0         CONTINUE these medications which have NOT CHANGED    Details   Nebulizer & Compressor machine Use as directed, Print, Disp-1 Each, R-0      Nebulizer Accessories kit Use as directed, Print, Disp-1 Kit, R-1      ciprofloxacin-hydrocortisone (CIPRO HC OTIC) otic suspension Administer 3 Drops in left ear two (2) times a day for 7 days. , Normal, Disp-10 mL, R-0

## 2020-10-06 ENCOUNTER — PATIENT OUTREACH (OUTPATIENT)
Dept: OTHER | Age: 40
End: 2020-10-06

## 2020-10-06 NOTE — PROGRESS NOTES
Initial HPRP:   Patient on report as discharged from [facility] ED Visit 10/5/20 for Asthma with Acute Exacerbation. Initial attempt to contact patient for transitions of care.  Left discreet message on voicemail with this Care Coordinator's contact information.  Will attempt outreach on 10/7/20.            albuterol 90 mcg/actuation inhaler  predniSONE 20 mg tablet    Call 911 anytime you think you may need emergency care. For example, call if:   You have severe trouble breathing. Call your doctor now or seek immediate medical care if:  Your symptoms do not get better after you have followed your asthma  action plan. You have new or worse trouble breathing. Your coughing and wheezing get worse. You cough up dark brown or bloody mucus (sputum). You have a new or higher fever. You need to use quick-relief medicine on more than 2 days a week (unless it  is just for exercise). You cough more deeply or more often, especially if you notice more mucus  or a change in the color of your mucus. You are not getting better as expected.

## 2020-10-08 ENCOUNTER — VIRTUAL VISIT (OUTPATIENT)
Dept: FAMILY MEDICINE CLINIC | Age: 40
End: 2020-10-08
Payer: COMMERCIAL

## 2020-10-08 DIAGNOSIS — J45.41 MODERATE PERSISTENT ASTHMA WITH ACUTE EXACERBATION: Primary | ICD-10-CM

## 2020-10-08 DIAGNOSIS — E66.01 SEVERE OBESITY (HCC): ICD-10-CM

## 2020-10-08 DIAGNOSIS — R51.9 HEADACHE DISORDER: ICD-10-CM

## 2020-10-08 PROCEDURE — 99214 OFFICE O/P EST MOD 30 MIN: CPT | Performed by: NURSE PRACTITIONER

## 2020-10-08 RX ORDER — IBUPROFEN 800 MG/1
800 TABLET ORAL
Qty: 60 TAB | Refills: 1 | Status: SHIPPED | OUTPATIENT
Start: 2020-10-08 | End: 2020-12-03 | Stop reason: SDUPTHER

## 2020-10-08 RX ORDER — BUDESONIDE AND FORMOTEROL FUMARATE DIHYDRATE 160; 4.5 UG/1; UG/1
2 AEROSOL RESPIRATORY (INHALATION) EVERY 12 HOURS
Qty: 1 INHALER | Refills: 0 | Status: SHIPPED | OUTPATIENT
Start: 2020-10-08 | End: 2020-11-15

## 2020-10-08 RX ORDER — CYCLOBENZAPRINE HCL 5 MG
5 TABLET ORAL
Qty: 30 TAB | Refills: 1 | Status: SHIPPED | OUTPATIENT
Start: 2020-10-08 | End: 2020-12-03 | Stop reason: SDUPTHER

## 2020-10-08 NOTE — PROGRESS NOTES
Deana Morton is a 36 y.o. female who was seen by synchronous (real-time) audio-video technology on 10/8/2020 for Chest Pain (follow-up)      Assessment & Plan:   Diagnoses and all orders for this visit:    1. Moderate persistent asthma with acute exacerbation  -     budesonide-formoteroL (SYMBICORT) 160-4.5 mcg/actuation HFAA; Take 2 Puffs by inhalation every twelve (12) hours. 2. Severe obesity (HCC)    3. Headache disorder  -     cyclobenzaprine (FLEXERIL) 5 mg tablet; Take 1 Tab by mouth three (3) times daily as needed for Muscle Spasm(s). -     ibuprofen (MOTRIN) 800 mg tablet; Take 1 Tab by mouth every eight (8) hours as needed for Pain. Asthma-Symbicort added. Referral to pulmonary placed on 9/10/2020  History of muscle spasms-refill Flexeril  Headaches-patient generally takes ibuprofen which controls her headaches      Subjective: The patient presents for an Audio-visual teleconference appointment for follow-up care. She was seen for a face-to-face visit on August 19, 2020 and was complaining of right-sided chest pain that radiated to the her back arm and from the arm to the right leg. She states that she was seen for similar symptoms 2 weeks consecutively in the ED. She had a VQ scan for possible PE which was negative. She was complaining of intermittent chest pain symptoms and chest rate done in August, 2020 showed no cardiopulmonary abnormality. She was sent for nuclear cardiac stress test and had a completed on August 31, 2020 and the test was negative. She presents today noting she was in the ED last week secondary to dyspnea. She admits that she started vaping but quit approximately 4 months ago. She believes the vaping exacerbated her asthma symptoms. She is currently on a prednisone taper dose prescribed by the ED. She is frequently using her rescue inhaler as well as nebulizer treatments. Severe obesity-patient is a previous BMI was 33.6.   Notes that she exercises most days and is continue to work on lifestyle modification. Prior to Admission medications    Medication Sig Start Date End Date Taking? Authorizing Provider   budesonide-formoteroL (SYMBICORT) 160-4.5 mcg/actuation HFAA Take 2 Puffs by inhalation every twelve (12) hours. 10/8/20  Yes Jennifer Mcbride NP   cyclobenzaprine (FLEXERIL) 5 mg tablet Take 1 Tab by mouth three (3) times daily as needed for Muscle Spasm(s). 10/8/20  Yes Jennifer Mcbride NP   ibuprofen (MOTRIN) 800 mg tablet Take 1 Tab by mouth every eight (8) hours as needed for Pain. 10/8/20  Yes Jennifer Mcbride NP   predniSONE (DELTASONE) 20 mg tablet Take 60 mg by mouth daily for 4 days. 10/5/20 10/9/20 Yes Gail Hernández MD   albuterol (PROVENTIL HFA, VENTOLIN HFA, PROAIR HFA) 90 mcg/actuation inhaler Take 1-2 Puffs by inhalation every four (4) hours as needed for Wheezing. 10/5/20  Yes Gail Hernández MD   Nebulizer & Compressor machine Use as directed 3/30/17  Yes Jerel Rodgers MD   Nebulizer Accessories kit Use as directed 3/30/17  Yes Jerel Rodgers MD   ciprofloxacin-hydrocortisone ([facility] OTIC) otic suspension Administer 3 Drops in left ear two (2) times a day for 7 days. 10/18/19 10/25/19  Jennifer Mcbride NP     Patient Active Problem List   Diagnosis Code    Mild intermittent asthma J45.20    Obesity E66.9    Palpitations R00.2    Precordial pain R07.2    Severe obesity ([facility]) E66.01    Asthma J45.909     Patient Active Problem List    Diagnosis Date Noted    Asthma 06/13/2019    Severe obesity ([address] 10/10/2018    Palpitations 07/26/2016    Precordial pain 07/26/2016    Mild intermittent asthma 07/07/2014    Obesity 07/07/2014     Current Outpatient Medications   Medication Sig Dispense Refill    budesonide-formoteroL (SYMBICORT) 160-4.5 mcg/actuation HFAA Take 2 Puffs by inhalation every twelve (12) hours.  1 Inhaler 0    cyclobenzaprine (FLEXERIL) 5 mg tablet Take 1 Tab by mouth three (3) times daily as needed for Muscle Spasm(s). 30 Tab 1    ibuprofen (MOTRIN) 800 mg tablet Take 1 Tab by mouth every eight (8) hours as needed for Pain. 60 Tab 1    predniSONE (DELTASONE) 20 mg tablet Take 60 mg by mouth daily for 4 days. 12 Tab 0    albuterol (PROVENTIL HFA, VENTOLIN HFA, PROAIR HFA) 90 mcg/actuation inhaler Take 1-2 Puffs by inhalation every four (4) hours as needed for Wheezing. 1 Inhaler 0    Nebulizer & Compressor machine Use as directed 1 Each 0    Nebulizer Accessories kit Use as directed 1 Kit 1    ciprofloxacin-hydrocortisone (CIPRO HC OTIC) otic suspension Administer 3 Drops in left ear two (2) times a day for 7 days. 10 mL 0     Allergies   Allergen Reactions    Azo [Phenazopyridine] Hives    Iodinated Contrast Media Shortness of Breath     Past Medical History:   Diagnosis Date    Asthma     Chronic pain 6 months    painful menses    Fibroids     GERD (gastroesophageal reflux disease)     Heart palpitations        ROS    Constitutional: No apparent distress noted  General- negative for fever, chills or fatigue  Eyes- negative visual changes  CV- denies chest pain, palpitation  Pul:  dyspnea  GI: negative nausea, flank pain, diarrhea, constipation  Urinary:- No dysuria or polyuria  MS- negative myalgia, negative joint pain  Neuro- negative headache, dizziness or weakness  Skin- negative for rashes or lesions. Psych- denies any anxiety or depression    Objective:   No flowsheet data found.      [INSTRUCTIONS:  \"[x]\" Indicates a positive item  \"[]\" Indicates a negative item  -- DELETE ALL ITEMS NOT EXAMINED]    Constitutional: [x] Appears well-developed and well-nourished [x] No apparent distress      [] Abnormal -     Mental status: [x] Alert and awake  [x] Oriented to person/place/time [x] Able to follow commands    [] Abnormal -     Eyes:   EOM    [x]  Normal    [] Abnormal -   Sclera  [x]  Normal    [] Abnormal -          Discharge [x]  None visible   [] Abnormal -     HENT: [x] Normocephalic, atraumatic  [] Abnormal -   [x] Mouth/Throat: Mucous membranes are moist    External Ears [x] Normal  [] Abnormal -    Neck: [x] No visualized mass [] Abnormal -     Pulmonary/Chest: [x] Respiratory effort normal   [x] No visualized signs of difficulty breathing or respiratory distress        [] Abnormal -      Musculoskeletal:   [x] Normal gait with no signs of ataxia         [x] Normal range of motion of neck        [] Abnormal -     Neurological:        [x] No Facial Asymmetry (Cranial nerve 7 motor function) (limited exam due to video visit)          [x] No gaze palsy        [] Abnormal -          Skin:        [x] No significant exanthematous lesions or discoloration noted on facial skin         [] Abnormal -            Psychiatric:       [x] Normal Affect [] Abnormal -        [x] No Hallucinations    Other pertinent observable physical exam findings:-        We discussed the expected course, resolution and complications of the diagnosis(es) in detail. Medication risks, benefits, costs, interactions, and alternatives were discussed as indicated. I advised her to contact the office if her condition worsens, changes or fails to improve as anticipated. She expressed understanding with the diagnosis(es) and plan. Kelley Moss, who was evaluated through a patient-initiated, synchronous (real-time) audio-video encounter, and/or her healthcare decision maker, is aware that it is a billable service, with coverage as determined by her insurance carrier. She provided verbal consent to proceed: Yes, and patient identification was verified. It was conducted pursuant to the emergency declaration under the [address] [place] [address] and the [employer] General Act. A caregiver was present when appropriate. Ability to conduct physical exam was limited. I was at home. The patient was at home.       [place] Camille Tucker, NP

## 2020-10-08 NOTE — PROGRESS NOTES
Isaias Juares presents today for   Chief Complaint   Patient presents with    Chest Pain     follow-up       Virtual/telephone visit    Depression Screening:  3 most recent PHQ Screens 10/8/2020   Little interest or pleasure in doing things Not at all   Feeling down, depressed, irritable, or hopeless Not at all   Total Score PHQ 2 0       Learning Assessment:  Learning Assessment 11/25/2015   PRIMARY LEARNER Patient   HIGHEST LEVEL OF EDUCATION - PRIMARY LEARNER  2 YEARS OF COLLEGE   BARRIERS PRIMARY LEARNER NONE   PRIMARY LANGUAGE ENGLISH   LEARNER PREFERENCE PRIMARY READING     VIDEOS     -   ANSWERED BY Patient   Irving Shannon Maintenance reviewed and discussed and ordered per Provider. Health Maintenance Due   Topic Date Due    Pneumococcal 0-64 years (1 of 1 - PPSV23) 05/02/1986    Flu Vaccine (1) 09/01/2020   . Coordination of Care:  1. Have you been to the ER, urgent care clinic since your last visit? Hospitalized since your last visit? no    2. Have you seen or consulted any other health care providers outside of the [address] [place] [address] since your last visit? Include any pap smears or colon screening.  no

## 2020-10-12 ENCOUNTER — PATIENT OUTREACH (OUTPATIENT)
Dept: OTHER | Age: 40
End: 2020-10-12

## 2020-10-12 NOTE — PROGRESS NOTES
Patient identified as eligible for [address] [place] [address] Management services. Second telephone outreach attempted. Could not leave a  voicemail message \"Mailbox Full\". Will send UTR letter via [address] Next Outreach 10/28/20 f/u - ED Assessment/? Pulmonology Appt. .    Chart Review: 10/8/20 Virtual Visit - Kervin Galeas NP    Presents for an Audio-visual teleconference appointment for follow-up care. She was seen for a face-to-face visit on August 19, 2020 and was complaining of right-sided chest pain that radiated to the her back arm and from the arm to the right leg. She states that she was seen for similar symptoms 2 weeks consecutively in the ED. She had a VQ scan for possible PE which was negative. She was complaining of intermittent chest pain symptoms and chest rate done in August, 2020 showed no cardiopulmonary abnormality. She was sent for nuclear cardiac stress test and had a completed on August 31, 2020 and the test was negative. She presents today noting she was in the ED last week secondary to dyspnea. She admits that she started vaping but quit approximately 4 months ago. She believes the vaping exacerbated her asthma symptoms. She is currently on a prednisone taper dose prescribed by the ED. She is frequently using her rescue inhaler as well as nebulizer treatments. Assessment & Plan    1. Moderate persistent asthma with acute exacerbation  -     budesonide-formoteroL (SYMBICORT) 160-4.5 mcg/actuation HFAA; Take 2 Puffs by inhalation every twelve (12) hours.     2. Severe obesity (HCC)     3. Headache disorder  -     cyclobenzaprine (FLEXERIL) 5 mg tablet; Take 1 Tab by mouth three (3) times daily as needed for Muscle Spasm(s). -     ibuprofen (MOTRIN) 800 mg tablet; Take 1 Tab by mouth every eight (8) hours as needed for Pain.     Asthma-Symbicort added.   Referral to pulmonary placed on 9/10/2020  History of muscle spasms-refill Flexeril  Headaches-patient generally takes ibuprofen7 which controls her headaches

## 2020-10-12 NOTE — LETTER
10/12/2020 11:50 AM 
 
Ms. Onur Cartagena [address] [facility] [address] Dear Onur Cartagena My name is Katelynn Ingram, Associate Care Coordinator for [facility] and I have been trying to reach you. The Associate Care Management (ACM) program is a free-of-charge confidential service provided to our associates and their family members covered by the [facility]. The program will provide an associate and his/her family with the [facility] expertise to assist in navigating the health care delivery system, provider services, and their overall care needsso as to assure and improve health care interactions and enhance the quality of life. This program is designed to provide you with the opportunity to have a [facility] partner with you for the following services: 
 
 1) when you come home from the hospital or emergency room 2) when help is needed to manage your disease 3) when you need assistance coordinating services or appointments 4) when you need additional education, resources or assistance reaching your Be Well Health Program goals/requirements such as Be Well With Diabetes [facility] is dedicated to empowering the good health of its community and improving the quality of care and care experiences for associates and their families. We are committed to safeguarding patient confidentiality and privacy, assuring that every associate has the respect he or she deserves in managing their health. The information shared with your care manager will not be shared with anyone else aside from those you identify as part of your care team, and will only be used to assist you with any identified care needs. Please contact me if you would like this service provided to you. Sincerely, 
 
 
Mallorie Quiles LPN  [facility] Care Coordinator [place] [address], [address] [facility]  [address] C [phone]  F [phone]  [email or web address]

## 2020-11-05 ENCOUNTER — PATIENT OUTREACH (OUTPATIENT)
Dept: OTHER | Age: 40
End: 2020-11-05

## 2020-11-05 NOTE — PROGRESS NOTES
HPRP f/u:  Telephone attempt to contact patient for Health Promotion and Risk Prevention. Left discreet message on voicemail with this CC contact information. Will follow for one month for transitions of care needs. Next outreach is 11/11/20 for discussion f/u - ED Assessment and Resolve Episode.     Noted Patient has an appointment 11/17/20 09:30 AM - New Patient   Katie Arreguin MD

## 2020-11-11 ENCOUNTER — PATIENT OUTREACH (OUTPATIENT)
Dept: OTHER | Age: 40
End: 2020-11-11

## 2020-11-11 NOTE — LETTER
11/11/2020 11:06 AM 
 
Ms. Geno Kenny [address] [facility] [address] Dear Geno Kenny My name is Tuan Alva,  Associate Care Coordinator for [facility], and I have been trying to reach you. The Associate Care Management (ACM) program is a free-of-charge, confidential service provided to our employees and their family members covered by the [facility]. I can help you with care transitions such as when you come home from the hospital, when help is needed to manage your disease, or when you need assistance coordinating services or appointments. As healthcare providers, we know that patients do better when they have close follow up with a primary care provider (PCP). I can help you find one that is convenient to you and covered by your insurance. I can also help you understand any after visit instructions, such as what symptoms to watch out for, or any new or changed medications. We can work together using your preferred communication -- telephone, email, [email or web address]. If you do not have a [employer] account, I can help you request access. Our program is designed to provide you with the opportunity to have a [facility] care manager partner with you for your healthcare needs. Due to not being able to reach you, I am closing out the current program, but will remain available to you should you have any questions. Please contact me at the below number if I can provide you with assistance for any of the above services. Sincerely, 
 
William Sosa LPN  [facility] Health Care Coordinator Associate Care Management [facility] [address] [phone]  F [phone]  [email or web address]

## 2020-11-17 ENCOUNTER — OFFICE VISIT (OUTPATIENT)
Dept: PULMONOLOGY | Age: 40
End: 2020-11-17
Payer: COMMERCIAL

## 2020-11-17 VITALS
BODY MASS INDEX: 37.28 KG/M2 | HEIGHT: 63 IN | SYSTOLIC BLOOD PRESSURE: 125 MMHG | HEART RATE: 74 BPM | OXYGEN SATURATION: 98 % | TEMPERATURE: 97.8 F | DIASTOLIC BLOOD PRESSURE: 84 MMHG | RESPIRATION RATE: 18 BRPM | WEIGHT: 210.4 LBS

## 2020-11-17 DIAGNOSIS — J45.40 MODERATE PERSISTENT ASTHMA, UNSPECIFIED WHETHER COMPLICATED: Primary | ICD-10-CM

## 2020-11-17 DIAGNOSIS — M94.0 COSTOCHONDRITIS: ICD-10-CM

## 2020-11-17 DIAGNOSIS — E66.9 OBESITY (BMI 30-39.9): ICD-10-CM

## 2020-11-17 DIAGNOSIS — Z91.14 POOR COMPLIANCE WITH MEDICATION: ICD-10-CM

## 2020-11-17 PROCEDURE — 99204 OFFICE O/P NEW MOD 45 MIN: CPT | Performed by: INTERNAL MEDICINE

## 2020-11-17 NOTE — PROGRESS NOTES
Evelyn Piper presents today for   Chief Complaint   Patient presents with    Shortness of Breath     Per NP Crystal Phelps, dyspnea. CXR 8/2/20.  Asthma     Per Pt. Symbicort has helped some. Complains of intermitent chest pains. Is someone accompanying this pt? No    Is the patient using any DME equipment during OV? No    -DME Company NA    Depression Screening:  3 most recent PHQ Screens 11/17/2020   Little interest or pleasure in doing things Not at all   Feeling down, depressed, irritable, or hopeless Not at all   Total Score PHQ 2 0       Learning Assessment:  Learning Assessment 11/25/2015   PRIMARY LEARNER Patient   HIGHEST LEVEL OF EDUCATION - PRIMARY LEARNER  2 YEARS OF COLLEGE   BARRIERS PRIMARY LEARNER NONE   PRIMARY LANGUAGE ENGLISH   LEARNER PREFERENCE PRIMARY READING     VIDEOS     -   ANSWERED BY Patient   RELATIONSHIP SELF       Abuse Screening:  Abuse Screening Questionnaire 10/8/2020   Do you ever feel afraid of your partner? N   Are you in a relationship with someone who physically or mentally threatens you? N   Is it safe for you to go home? Y       Fall Risk  Fall Risk Assessment, last 12 mths 5/25/2018   Able to walk? Yes   Fall in past 12 months? No         Coordination of Care:  1. Have you been to the ER, urgent care clinic since your last visit? Hospitalized since your last visit? Yes; Name: Carlton Paula 10/4/20 Wheezing. 2. Have you seen or consulted any other health care providers outside of the [address] [place] [address] since your last visit? Include any pap smears or colon screening.  No.

## 2020-11-17 NOTE — PROGRESS NOTES
HISTORY OF PRESENT ILLNESS  Sree Cornell is a 36 y.o. female referred for management of asthma. Pt has a history of Asthma since childhood. She was doing well until the past year when she noted an increase in frequency of exacerbations resulting in several ED visits. She was using her rescue inhaler up to 5 times a day. She also noted bilateral upper chest wall and sternal pains \"like someone beat me up\" which caused her to stop Vaping. She was started on Symbicort recently which reduced her rescue inhaler use to once a day. Chest wall pain persists despite Symbicort. Pt admits to using Symbicort only in the am and not bid as written. Asthma is triggered by dust and fumes from cleaning chemicals. Pt denies fever, hemoptysis, orthopnea, pedal edema, PND. Review of Systems   Constitutional: Negative for chills, diaphoresis, fever, malaise/fatigue and weight loss. HENT: Negative for congestion, ear discharge, ear pain, hearing loss, nosebleeds, sinus pain, sore throat and tinnitus. Eyes: Negative for blurred vision, double vision, photophobia, pain, discharge and redness. Respiratory: Positive for shortness of breath and wheezing. Negative for cough, hemoptysis, sputum production and stridor. Cardiovascular: Positive for chest pain. Negative for palpitations, orthopnea, claudication, leg swelling and PND. Gastrointestinal: Negative for abdominal pain, blood in stool, constipation, diarrhea, heartburn, melena, nausea and vomiting. Genitourinary: Negative for dysuria, flank pain, frequency, hematuria and urgency. Musculoskeletal: Negative for back pain, falls, joint pain, myalgias and neck pain. Skin: Negative for itching and rash. Neurological: Negative for dizziness, tingling, tremors, sensory change, speech change, focal weakness, seizures, loss of consciousness, weakness and headaches. Endo/Heme/Allergies: Negative for environmental allergies and polydipsia.  Does not bruise/bleed easily. Psychiatric/Behavioral: Negative for depression, hallucinations, memory loss, substance abuse and suicidal ideas. The patient is not nervous/anxious and does not have insomnia. Past Medical History:   Diagnosis Date    Asthma     Chronic pain 6 months    painful menses    Fibroids     GERD (gastroesophageal reflux disease)     Heart palpitations      Past Surgical History:   Procedure Laterality Date    HX  SECTION  0746,5848    with BTL    HX HYSTEROSCOPY WITH ENDOMETRIAL ABLATION      No period since     Current Outpatient Medications on File Prior to Visit   Medication Sig Dispense Refill    aspirin/salicylamide/caffeine (BC HEADACHE POWDER PO) Take  by mouth.  budesonide-formoteroL (SYMBICORT) 160-4.5 mcg/actuation HFAA TAKE 2 PUFFS BY INHALATION EVERY TWELVE (12) HOURS. 10.2 Inhaler 2    ibuprofen (MOTRIN) 800 mg tablet Take 1 Tab by mouth every eight (8) hours as needed for Pain. 60 Tab 1    albuterol (PROVENTIL HFA, VENTOLIN HFA, PROAIR HFA) 90 mcg/actuation inhaler Take 1-2 Puffs by inhalation every four (4) hours as needed for Wheezing. 1 Inhaler 0    Nebulizer & Compressor machine Use as directed 1 Each 0    Nebulizer Accessories kit Use as directed 1 Kit 1    cyclobenzaprine (FLEXERIL) 5 mg tablet Take 1 Tab by mouth three (3) times daily as needed for Muscle Spasm(s). 30 Tab 1    ciprofloxacin-hydrocortisone (CIPRO HC OTIC) otic suspension Administer 3 Drops in left ear two (2) times a day for 7 days. 10 mL 0     No current facility-administered medications on file prior to visit.       Allergies   Allergen Reactions    Azo [Phenazopyridine] Hives    Iodinated Contrast Media Shortness of Breath     Other reaction(s): wheezing/sob     Family History   Problem Relation Age of Onset    Hypertension Mother     Hypertension Father     Diabetes Father     Parkinson's Disease Father     Cancer Paternal Grandmother      Social History     Socioeconomic History  Marital status:      Spouse name: Not on file    Number of children: Not on file    Years of education: Not on file    Highest education level: Not on file   Occupational History     Employer: [address] [place] [address] Financial resource strain: Not on file    Food insecurity     Worry: Not on file     Inability: Not on file    Transportation needs     Medical: Not on file     Non-medical: Not on file   Tobacco Use    Smoking status: Former Smoker     Types: Cigarettes     Last attempt to quit: 3/23/1998     Years since quittin.6    Smokeless tobacco: Former User    Tobacco comment: smoked a few as a teen, Vaped for a year   Substance and Sexual Activity    Alcohol use: Yes     Comment: socially, 2 beer/weekend    Drug use: No    Sexual activity: Yes     Partners: Male     Birth control/protection: Surgical   Lifestyle    Physical activity     Days per week: Not on file     Minutes per session: Not on file    Stress: Not on file   Relationships    Social connections     Talks on phone: Not on file     Gets together: Not on file     Attends Sikh service: Not on file     Active member of club or organization: Not on file     Attends meetings of clubs or organizations: Not on file     Relationship status: Not on file    Intimate partner violence     Fear of current or ex partner: Not on file     Emotionally abused: Not on file     Physically abused: Not on file     Forced sexual activity: Not on file   Other Topics Concern     Service Yes     Comment: [employer]    Blood Transfusions No    Caffeine Concern No    Occupational Exposure No    Hobby Hazards No    Sleep Concern No    Stress Concern No    Weight Concern Yes     Comment: PT IS OVER WEIGHT    Special Diet No    Back Care No    Exercise Yes     Comment: 4 DAYS A WEEK    Bike Helmet No    Seat Belt Yes    Self-Exams Yes   Social History Narrative    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 Blood pressure 125/84, pulse 74, temperature 97.8 °F (36.6 °C), temperature source Oral, resp. rate 18, height 5' 3\" (1.6 m), weight 95.4 kg (210 lb 6.4 oz), SpO2 98 %. Physical Exam  Constitutional:       General: She is not in acute distress. Appearance: She is obese. She is not ill-appearing or diaphoretic. HENT:      Head: Normocephalic and atraumatic. Right Ear: External ear normal.      Left Ear: External ear normal.      Nose:      Comments: Deferred      Mouth/Throat:      Comments: Deferred   Eyes:      General: No scleral icterus. Right eye: No discharge. Left eye: No discharge. Extraocular Movements: Extraocular movements intact. Conjunctiva/sclera: Conjunctivae normal.      Pupils: Pupils are equal, round, and reactive to light. Neck:      Musculoskeletal: No neck rigidity or muscular tenderness. Vascular: No carotid bruit. Cardiovascular:      Rate and Rhythm: Normal rate and regular rhythm. Pulses: Normal pulses. Heart sounds: No murmur. No gallop. Pulmonary:      Effort: Pulmonary effort is normal. No respiratory distress. Breath sounds: Normal breath sounds. No stridor. No wheezing, rhonchi or rales. Chest:      Chest wall: Tenderness: chest pain reproduced by sternal pressure. Abdominal:      Palpations: Abdomen is soft. There is no mass. Tenderness: There is no abdominal tenderness. There is no rebound. Musculoskeletal:         General: No swelling, tenderness, deformity or signs of injury. Right lower leg: No edema. Left lower leg: No edema. Lymphadenopathy:      Cervical: No cervical adenopathy. Skin:     General: Skin is warm and dry. Coloration: Skin is not jaundiced or pale. Findings: No bruising, erythema, lesion or rash. Neurological:      General: No focal deficit present. Mental Status: She is alert and oriented to person, place, and time.       Coordination: Coordination normal.   Psychiatric:         Mood and Affect: Mood normal.         Behavior: Behavior normal.         Thought Content: Thought content normal.         Judgment: Judgment normal.       XR Results (most recent):  Results from Hospital Encounter encounter on 08/02/20   XR CHEST PA LAT    Narrative PA And Lateral Chest    CPT CODE: 22212    COMPARISON: 12/22/2019. CLINICAL INFORMATION: Asthma. Shortness of breath. FINDINGS:    Heart size and mediastinal contours within normal limits. No pulmonary vascular  congestion, effusion, or pneumothorax. Bronchovascular markings within normal  limits. No acute consolidation. No acute osseous abnormality. Impression IMPRESSION:    1. No radiographic evidence for an acute cardiopulmonary abnormality. ASSESSMENT and PLAN  Encounter Diagnoses   Name Primary?  Moderate persistent asthma, unspecified whether complicated Yes    Costochondritis     Obesity (BMI 30-39. 9)     Poor compliance with medication        Pt with persistent Asthma with apparent response to LABA/ICS. Would continue Symbicort and prn Albuterol. No note of eosinophilia on review of CBC. Baseline PFT's ordered. COVID test for pretest screen. Pt to continue prn NSAIDS for costochondritis. Low index of suspicion for VTE. Healthy weight discussion vladislav as this impacts asthma control. Trigger avoidance advised. Instructed pt on proper dosing and inhaler technique. Explained importance of medication adherence in asthma control. RTC 3 months or sooner prn. Will call pt with PFT results. Over half of this 45 minute visit was spent in face to face counseling.

## 2020-12-03 ENCOUNTER — VIRTUAL VISIT (OUTPATIENT)
Dept: FAMILY MEDICINE CLINIC | Age: 40
End: 2020-12-03
Payer: COMMERCIAL

## 2020-12-03 DIAGNOSIS — R51.9 HEADACHE DISORDER: ICD-10-CM

## 2020-12-03 DIAGNOSIS — J45.41 MODERATE PERSISTENT ASTHMA WITH ACUTE EXACERBATION: Primary | ICD-10-CM

## 2020-12-03 PROCEDURE — 99213 OFFICE O/P EST LOW 20 MIN: CPT | Performed by: NURSE PRACTITIONER

## 2020-12-03 RX ORDER — CYCLOBENZAPRINE HCL 5 MG
5 TABLET ORAL
Qty: 30 TAB | Refills: 1 | Status: SHIPPED | OUTPATIENT
Start: 2020-12-03 | End: 2021-03-03 | Stop reason: SDUPTHER

## 2020-12-03 RX ORDER — IBUPROFEN 800 MG/1
800 TABLET ORAL
Qty: 60 TAB | Refills: 1 | Status: SHIPPED | OUTPATIENT
Start: 2020-12-03 | End: 2021-02-16 | Stop reason: SDUPTHER

## 2020-12-03 NOTE — PROGRESS NOTES
Lanie Garcia is a 36 y.o. female who was seen by synchronous (real-time) audio-video technology on 12/3/2020 for Wheezing (follow-up)    Assessment & Plan:   Diagnoses and all orders for this visit:    1. Moderate persistent asthma with acute exacerbation    2. Headache disorder  -     cyclobenzaprine (FLEXERIL) 5 mg tablet; Take 1 Tab by mouth three (3) times daily as needed for Muscle Spasm(s). -     ibuprofen (MOTRIN) 800 mg tablet; Take 1 Tab by mouth every eight (8) hours as needed for Pain. Subjective: The patient presents for an Audio-visual teleconference appointment for follow-up care. She was seen for virtual visit on October 8, 2020. She was complaining of persistent episodes of dyspnea. She was also frequently using her rescue inhaler and nebulizer treatments. She also had multiple visits in the ED secondary to dyspnea. Patient was started on Symbicort inhaler and given a referral to prednisone and asked to follow-up today for reevaluation. Per the patient she is feeling much better with the Symbicort. She has not had to use her rescue inhaler for multiple weeks. Robbin Galeano She was also seen by Dr. Jimbo Bales and was diagnosed with costochondritis. Patient also had baseline PFTs ordered which she has not completed. Prior to Admission medications    Medication Sig Start Date End Date Taking? Authorizing Provider   cyclobenzaprine (FLEXERIL) 5 mg tablet Take 1 Tab by mouth three (3) times daily as needed for Muscle Spasm(s). 12/3/20  Yes Roxy Potash, NP   ibuprofen (MOTRIN) 800 mg tablet Take 1 Tab by mouth every eight (8) hours as needed for Pain. 12/3/20  Yes Roxy Potmicah, NP   aspirin/salicylamide/caffeine (BC HEADACHE POWDER PO) Take  by mouth. Yes Provider, Historical   budesonide-formoteroL (SYMBICORT) 160-4.5 mcg/actuation HFAA TAKE 2 PUFFS BY INHALATION EVERY TWELVE (12) HOURS.  11/15/20  Yes Roxy Potmicah, NP   albuterol (PROVENTIL HFA, VENTOLIN HFA, PROAIR HFA) 90 mcg/actuation inhaler Take 1-2 Puffs by inhalation every four (4) hours as needed for Wheezing. 10/5/20  Yes Christian Kilpatrick MD   Nebulizer & Compressor machine Use as directed 3/30/17  Yes Shanice Isidro MD   Nebulizer Accessories kit Use as directed 3/30/17  Yes Shanice Isidro MD   cyclobenzaprine (FLEXERIL) 5 mg tablet Take 1 Tab by mouth three (3) times daily as needed for Muscle Spasm(s). 10/8/20 12/3/20  Candida Shelton NP   ibuprofen (MOTRIN) 800 mg tablet Take 1 Tab by mouth every eight (8) hours as needed for Pain. 10/8/20 12/3/20  Candida Shelton NP   ciprofloxacin-hydrocortisone (CIPRO HC OTIC) otic suspension Administer 3 Drops in left ear two (2) times a day for 7 days. 10/18/19 10/25/19  Candida Shelton NP     Patient Active Problem List   Diagnosis Code    Mild intermittent asthma J45.20    Obesity E66.9    Palpitations R00.2    Precordial pain R07.2    Severe obesity ([facility]) E66.01    Asthma J45.909     Patient Active Problem List    Diagnosis Date Noted    Asthma 06/13/2019    Severe obesity ([address] 10/10/2018    Palpitations 07/26/2016    Precordial pain 07/26/2016    Mild intermittent asthma 07/07/2014    Obesity 07/07/2014     Current Outpatient Medications   Medication Sig Dispense Refill    cyclobenzaprine (FLEXERIL) 5 mg tablet Take 1 Tab by mouth three (3) times daily as needed for Muscle Spasm(s). 30 Tab 1    ibuprofen (MOTRIN) 800 mg tablet Take 1 Tab by mouth every eight (8) hours as needed for Pain. 60 Tab 1    aspirin/salicylamide/caffeine (BC HEADACHE POWDER PO) Take  by mouth.  budesonide-formoteroL (SYMBICORT) 160-4.5 mcg/actuation HFAA TAKE 2 PUFFS BY INHALATION EVERY TWELVE (12) HOURS. 10.2 Inhaler 2    albuterol (PROVENTIL HFA, VENTOLIN HFA, PROAIR HFA) 90 mcg/actuation inhaler Take 1-2 Puffs by inhalation every four (4) hours as needed for Wheezing.  1 Inhaler 0    Nebulizer & Compressor machine Use as directed 1 Each 0    Nebulizer Accessories kit Use as directed 1 Kit 1    ciprofloxacin-hydrocortisone (CIPRO HC OTIC) otic suspension Administer 3 Drops in left ear two (2) times a day for 7 days. 10 mL 0     Allergies   Allergen Reactions    Azo [Phenazopyridine] Hives    Iodinated Contrast Media Shortness of Breath     Other reaction(s): wheezing/sob     Past Medical History:   Diagnosis Date    Asthma     Chronic pain 6 months    painful menses    Fibroids     GERD (gastroesophageal reflux disease)     Heart palpitations        ROS    Constitutional: No apparent distress noted  General- negative for fever, chills or fatigue  Eyes- negative visual changes  CV- denies chest pain, palpitation  Pul: negative cough or SOB  GI: negative nausea, flank pain, diarrhea, constipation  Urinary:- No dysuria or polyuria  MS- negative myalgia, negative joint pain  Neuro- negative headache, dizziness or weakness  Skin- negative for rashes or lesions. Psych- denies any anxiety or depression    Objective:   No flowsheet data found.      [INSTRUCTIONS:  \"[x]\" Indicates a positive item  \"[]\" Indicates a negative item  -- DELETE ALL ITEMS NOT EXAMINED]    Constitutional: [x] Appears well-developed and well-nourished [x] No apparent distress      [] Abnormal -     Mental status: [x] Alert and awake  [x] Oriented to person/place/time [x] Able to follow commands    [] Abnormal -     Eyes:   EOM    [x]  Normal    [] Abnormal -   Sclera  [x]  Normal    [] Abnormal -          Discharge [x]  None visible   [] Abnormal -     HENT: [x] Normocephalic, atraumatic  [] Abnormal -   [x] Mouth/Throat: Mucous membranes are moist    External Ears [x] Normal  [] Abnormal -    Neck: [x] No visualized mass [] Abnormal -     Pulmonary/Chest: [x] Respiratory effort normal   [x] No visualized signs of difficulty breathing or respiratory distress        [] Abnormal -      Musculoskeletal:   [x] Normal gait with no signs of ataxia         [x] Normal range of motion of neck        [] Abnormal -     Neurological:        [x] No Facial Asymmetry (Cranial nerve 7 motor function) (limited exam due to video visit)          [x] No gaze palsy        [] Abnormal -          Skin:        [x] No significant exanthematous lesions or discoloration noted on facial skin         [] Abnormal -            Psychiatric:       [x] Normal Affect [] Abnormal -        [x] No Hallucinations    Other pertinent observable physical exam findings:-        We discussed the expected course, resolution and complications of the diagnosis(es) in detail. Medication risks, benefits, costs, interactions, and alternatives were discussed as indicated. I advised her to contact the office if her condition worsens, changes or fails to improve as anticipated. She expressed understanding with the diagnosis(es) and plan. Viktoriya Black, who was evaluated through a patient-initiated, synchronous (real-time) audio-video encounter, and/or her healthcare decision maker, is aware that it is a billable service, with coverage as determined by her insurance carrier. She provided verbal consent to proceed: Yes, and patient identification was verified. It was conducted pursuant to the emergency declaration under the [facility], [address] [place] [address] authority and the [employer] and [employer] General Act. A caregiver was present when appropriate. Ability to conduct physical exam was limited. I was at home. The patient was at home.       Sheila Sewell NP

## 2020-12-03 NOTE — PROGRESS NOTES
Kelley Mcville presents today for   Chief Complaint   Patient presents with    Wheezing     follow-up       Virtual/telephone visit    Depression Screening:  3 most recent PHQ Screens 12/3/2020   Little interest or pleasure in doing things Not at all   Feeling down, depressed, irritable, or hopeless Not at all   Total Score PHQ 2 0       Learning Assessment:  Learning Assessment 11/25/2015   PRIMARY LEARNER Patient   HIGHEST LEVEL OF EDUCATION - PRIMARY LEARNER  2 YEARS OF COLLEGE   BARRIERS PRIMARY LEARNER NONE   PRIMARY LANGUAGE ENGLISH   LEARNER PREFERENCE PRIMARY READING     VIDEOS     -   ANSWERED BY Patient   RELATIONSHIP SELF       Health Maintenance reviewed and discussed and ordered per Provider. Health Maintenance Due   Topic Date Due    Pneumococcal 0-64 years (1 of 1 - PPSV23) 05/02/1986   . Coordination of Care:  1. Have you been to the ER, urgent care clinic since your last visit? Hospitalized since your last visit? no    2. Have you seen or consulted any other health care providers outside of the [address] [place] [address] since your last visit? Include any pap smears or colon screening.  no

## 2020-12-07 DIAGNOSIS — J45.41 MODERATE PERSISTENT ASTHMA WITH ACUTE EXACERBATION: ICD-10-CM

## 2020-12-07 NOTE — TELEPHONE ENCOUNTER
Last seen 12/03/20  Next appt none      Requested Prescriptions     Pending Prescriptions Disp Refills    budesonide-formoteroL (SYMBICORT) 160-4.5 mcg/actuation HFAA 10.2 Inhaler 2     Sig: Take 2 Puffs by inhalation every twelve (12) hours.

## 2020-12-08 RX ORDER — BUDESONIDE AND FORMOTEROL FUMARATE DIHYDRATE 160; 4.5 UG/1; UG/1
2 AEROSOL RESPIRATORY (INHALATION) EVERY 12 HOURS
Qty: 10.2 INHALER | Refills: 2 | Status: SHIPPED | OUTPATIENT
Start: 2020-12-08 | End: 2021-01-28 | Stop reason: SDUPTHER

## 2020-12-16 ENCOUNTER — DOCUMENTATION ONLY (OUTPATIENT)
Dept: PULMONOLOGY | Age: 40
End: 2020-12-16

## 2021-01-05 ENCOUNTER — OFFICE VISIT (OUTPATIENT)
Dept: NEUROLOGY | Age: 41
End: 2021-01-05
Payer: COMMERCIAL

## 2021-01-05 ENCOUNTER — OFFICE VISIT (OUTPATIENT)
Dept: NEUROLOGY | Age: 41
End: 2021-01-05

## 2021-01-05 VITALS
WEIGHT: 220.4 LBS | HEIGHT: 63 IN | DIASTOLIC BLOOD PRESSURE: 80 MMHG | TEMPERATURE: 98 F | RESPIRATION RATE: 20 BRPM | HEART RATE: 94 BPM | OXYGEN SATURATION: 96 % | BODY MASS INDEX: 39.05 KG/M2 | SYSTOLIC BLOOD PRESSURE: 132 MMHG

## 2021-01-05 VITALS
BODY MASS INDEX: 37.63 KG/M2 | OXYGEN SATURATION: 96 % | HEART RATE: 94 BPM | RESPIRATION RATE: 20 BRPM | DIASTOLIC BLOOD PRESSURE: 80 MMHG | SYSTOLIC BLOOD PRESSURE: 132 MMHG | TEMPERATURE: 98 F | HEIGHT: 64 IN | WEIGHT: 220.4 LBS

## 2021-01-05 DIAGNOSIS — G56.01 CARPAL TUNNEL SYNDROME OF RIGHT WRIST: Primary | ICD-10-CM

## 2021-01-05 DIAGNOSIS — M54.50 RIGHT LOW BACK PAIN, UNSPECIFIED CHRONICITY, UNSPECIFIED WHETHER SCIATICA PRESENT: ICD-10-CM

## 2021-01-05 PROCEDURE — 99203 OFFICE O/P NEW LOW 30 MIN: CPT | Performed by: STUDENT IN AN ORGANIZED HEALTH CARE EDUCATION/TRAINING PROGRAM

## 2021-01-05 NOTE — PROGRESS NOTES
Melvin Mason is a 36 y.o. female new patient in office today to discuss right wrist pain; as referred by Vickey Pollack NP. Patient reports no active pain at this time. Pain is intermittent, causing hand weakness. Additional c/o right arm, back and leg pain.

## 2021-01-05 NOTE — LETTER
1/6/2021 Patient: Agus Peacock YOB: 1980 Date of Visit: 1/5/2021 Susie Butler NP 
[address] [place] [address] [address] [employer] Dear Susie Butler NP, Thank you for referring Ms. Kiran Reyes to [facility] for evaluation. My notes for this consultation are attached. If you have questions, please do not hesitate to call me. I look forward to following your patient along with you. Sincerely, Nolan Day MD

## 2021-01-05 NOTE — PROGRESS NOTES
Ana Borja is a 36 y.o. female . presents for New Patient and Wrist Pain (right)   . A 36years old female patient with medical history of asthma here for evaluation of right wrist pain over a couple of months duration. She has difficulty moving the hand at the wrist.  Her hand feels weak and has some difficulty opening jars. She has occasional tingling and numbness in the fingers. Sometimes pain wakes her up from sleep and the whole right upper extremity feels numb. Tends to shake her arm. She has occasional neck pain but is not letting. No stroke trauma. No diabetes. No movement of the left side. She has lower back pain which radiates to the right lower extremity for about 2 months. No worsening with activity but tends to get worse when standing for some time. Does not have any joint swelling. No previous diagnosis of arthritis. Review of Systems   Constitutional: Negative for chills, fever and weight loss (gained weight). HENT: Negative for hearing loss and tinnitus. Eyes: Negative for blurred vision and double vision. Respiratory: Negative for cough, shortness of breath and wheezing. Cardiovascular: Positive for chest pain. Negative for leg swelling. Orthopnea: anterior. Gastrointestinal: Negative for nausea and vomiting. Genitourinary: Negative for dysuria, frequency and urgency. Musculoskeletal: Positive for back pain (radiates to the RLE), joint pain (right wrist) and neck pain. Skin: Negative for itching and rash. Neurological: Positive for tingling, focal weakness (right hand) and headaches. Negative for dizziness, tremors and seizures.        Past Medical History:   Diagnosis Date    Asthma     Chronic pain 6 months    painful menses    Fibroids     GERD (gastroesophageal reflux disease)     Heart palpitations        Past Surgical History:   Procedure Laterality Date    HX  SECTION  [id number],[id number]    with BTL    HX HYSTEROSCOPY WITH ENDOMETRIAL ABLATION     No period since        Family History   Problem Relation Age of Onset    Hypertension Mother     Hypertension Father     Diabetes Father     Parkinson's Disease Father     Cancer Paternal Grandmother         Social History     Socioeconomic History    Marital status:      Spouse name: Not on file    Number of children: Not on file    Years of education: Not on file    Highest education level: Not on file   Occupational History     Employer: [employer] Financial resource strain: Not on file    Food insecurity     Worry: Not on file     Inability: Not on file    Transportation needs     Medical: Not on file     Non-medical: Not on file   Tobacco Use    Smoking status: Former Smoker     Types: Cigarettes     Quit date: 3/23/1998     Years since quittin.8    Smokeless tobacco: Former User    Tobacco comment: smoked a few as a teen, Vaped for a year   Substance and Sexual Activity    Alcohol use: Yes     Comment: socially, 2 beer/weekend    Drug use: No    Sexual activity: Yes     Partners: Male     Birth control/protection: Surgical   Lifestyle    Physical activity     Days per week: Not on file     Minutes per session: Not on file    Stress: Not on file   Relationships    Social connections     Talks on phone: Not on file     Gets together: Not on file     Attends Confucianism service: Not on file     Active member of club or organization: Not on file     Attends meetings of clubs or organizations: Not on file     Relationship status: Not on file    Intimate partner violence     Fear of current or ex partner: Not on file     Emotionally abused: Not on file     Physically abused: Not on file     Forced sexual activity: Not on file   Other Topics Concern     Service Yes     Comment: [employer]    Blood Transfusions No    Caffeine Concern No    Occupational Exposure No    Hobby Hazards No    Sleep Concern No    Stress Concern No    Weight Concern Yes Comment: PT IS OVER WEIGHT    Special Diet No    Back Care No    Exercise Yes     Comment: 4 DAYS A WEEK    Bike Helmet No    Seat Belt Yes    Self-Exams Yes   Social History Narrative    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 Allergies   Allergen Reactions    Azo [Phenazopyridine] Hives    Iodinated Contrast Media Shortness of Breath     Other reaction(s): wheezing/sob         Current Outpatient Medications   Medication Sig Dispense Refill    budesonide-formoteroL (SYMBICORT) 160-4.5 mcg/actuation HFAA Take 2 Puffs by inhalation every twelve (12) hours. 10.2 Inhaler 2    ibuprofen (MOTRIN) 800 mg tablet Take 1 Tab by mouth every eight (8) hours as needed for Pain. 60 Tab 1    aspirin/salicylamide/caffeine (BC HEADACHE POWDER PO) Take  by mouth.  albuterol (PROVENTIL HFA, VENTOLIN HFA, PROAIR HFA) 90 mcg/actuation inhaler Take 1-2 Puffs by inhalation every four (4) hours as needed for Wheezing. 1 Inhaler 0    BIOTIN PO Take  by mouth.  Lactobacillus acidophilus (PROBIOTIC PO) Take  by mouth.  ascorbic acid (VITAMIN C PO) Take  by mouth.  cyclobenzaprine (FLEXERIL) 5 mg tablet Take 1 Tab by mouth three (3) times daily as needed for Muscle Spasm(s). 30 Tab 1    ciprofloxacin-hydrocortisone (CIPRO HC OTIC) otic suspension Administer 3 Drops in left ear two (2) times a day for 7 days. 10 mL 0    Nebulizer & Compressor machine Use as directed 1 Each 0    Nebulizer Accessories kit Use as directed 1 Kit 1         Physical Exam  Constitutional:       Appearance: Normal appearance. HENT:      Head: Normocephalic and atraumatic. Mouth/Throat:      Mouth: Mucous membranes are moist.      Pharynx: Oropharynx is clear. No oropharyngeal exudate. Eyes:      Extraocular Movements: Extraocular movements intact. Pupils: Pupils are equal, round, and reactive to light. Neck:      Musculoskeletal: Normal range of motion and neck supple.    Pulmonary:      Effort: Pulmonary effort is normal. No respiratory distress. Musculoskeletal: Normal range of motion. General: Tenderness (right wrist) present. Right lower leg: No edema. Left lower leg: No edema. Neurological:      Mental Status: She is alert. Comments: Mental status: Awake, alert, oriented x3, follows simple and complex commands, no neglect. Speech and languge: fluent, coherent,  and comprehension intact  CN: VFF, EOMI, PERRLA, face sensation intact , no facial asymmetry noted, palate elevation symmetric bilat, SS+SCM 5/5 bilat, tongue midline  Motor: no pronator drift, tone normal throughout, strength 5/5 throughout excetp slightly decreased hand  on the right(from ? Pain). Sensory: intact to light touch and PP  Throughout. Negative Tinel and Phalen signs. Coordination: FNF, HS accurate w/o dysmetria  DTR: 2+ throughout  Gait: normal.             No visits with results within 3 Month(s) from this visit. Latest known visit with results is:   Admission on 10/04/2020, Discharged on 10/05/2020   Component Date Value Ref Range Status    Ventricular Rate 10/04/2020 98  BPM Final    Atrial Rate 10/04/2020 98  BPM Final    P-R Interval 10/04/2020 116  ms Final    QRS Duration 10/04/2020 68  ms Final    Q-T Interval 10/04/2020 338  ms Final    QTC Calculation (Bezet) 10/04/2020 431  ms Final    Calculated P Axis 10/04/2020 68  degrees Final    Calculated R Axis 10/04/2020 71  degrees Final    Calculated T Axis 10/04/2020 57  degrees Final    Diagnosis 10/04/2020    Final                    Value:Normal sinus rhythm  Possible Left atrial enlargement  Borderline ECG  When compared with ECG of 31-AUG-2020 09:29,  No significant change was found  Confirmed by Heidy Mason ([id number]) on 10/5/2020 7:55:01 PM               ICD-10-CM ICD-9-CM    1. Carpal tunnel syndrome of right wrist  G56.01 354.0 EMG LIMITED   2.  Right low back pain, unspecified chronicity, unspecified whether sciatica present  M54.5 724.2        A 36years old female patient here for evaluation of right wrist pain over a couple of months duration. No trauma. No previous history of arthritis. Has numbness last night which wakes her up. Some difficulty holding with the right hand. Negative Tinel's and Phalen signs on exam.  Possible carpal tunnel syndrome; cannot rule out the possibility of right hand arthritis. Had similar evaluation in the past for right wrist pain with negative x-rays. We will do EMG/nerve conduction study of the upper extremities. She also has lower back pain mainly on the right side and radiates to the right lower extremity. No weakness. No significant difficulty walking. We will continue with as needed ibuprofen. Negative straight leg raising. We will continue the conservative management. We will see her in the clinic in 3 months time.

## 2021-01-28 ENCOUNTER — TRANSCRIBE ORDER (OUTPATIENT)
Dept: SCHEDULING | Age: 41
End: 2021-01-28

## 2021-01-28 DIAGNOSIS — Z12.31 VISIT FOR SCREENING MAMMOGRAM: Primary | ICD-10-CM

## 2021-01-28 DIAGNOSIS — J45.41 MODERATE PERSISTENT ASTHMA WITH ACUTE EXACERBATION: ICD-10-CM

## 2021-01-28 NOTE — TELEPHONE ENCOUNTER
Requested Prescriptions     Pending Prescriptions Disp Refills    budesonide-formoteroL (SYMBICORT) 160-4.5 mcg/actuation HFAA 10.2 Inhaler 2     Sig: Take 2 Puffs by inhalation every twelve (12) hours.  albuterol (PROVENTIL HFA, VENTOLIN HFA, PROAIR HFA) 90 mcg/actuation inhaler 1 Inhaler 0     Sig: Take 1-2 Puffs by inhalation every four (4) hours as needed for Wheezing.

## 2021-01-31 RX ORDER — BUDESONIDE AND FORMOTEROL FUMARATE DIHYDRATE 160; 4.5 UG/1; UG/1
2 AEROSOL RESPIRATORY (INHALATION) EVERY 12 HOURS
Qty: 10.2 INHALER | Refills: 2 | Status: SHIPPED | OUTPATIENT
Start: 2021-01-31 | End: 2021-03-29 | Stop reason: SDUPTHER

## 2021-01-31 RX ORDER — ALBUTEROL SULFATE 90 UG/1
1-2 AEROSOL, METERED RESPIRATORY (INHALATION)
Qty: 1 INHALER | Refills: 0 | Status: SHIPPED | OUTPATIENT
Start: 2021-01-31 | End: 2021-03-29 | Stop reason: SDUPTHER

## 2021-02-12 ENCOUNTER — OFFICE VISIT (OUTPATIENT)
Dept: NEUROLOGY | Age: 41
End: 2021-02-12
Payer: COMMERCIAL

## 2021-02-12 VITALS
HEIGHT: 64 IN | TEMPERATURE: 95.7 F | HEART RATE: 77 BPM | WEIGHT: 207.4 LBS | DIASTOLIC BLOOD PRESSURE: 78 MMHG | SYSTOLIC BLOOD PRESSURE: 118 MMHG | BODY MASS INDEX: 35.41 KG/M2 | RESPIRATION RATE: 16 BRPM | OXYGEN SATURATION: 99 %

## 2021-02-12 DIAGNOSIS — G56.03 BILATERAL CARPAL TUNNEL SYNDROME: Primary | ICD-10-CM

## 2021-02-12 PROCEDURE — 95910 NRV CNDJ TEST 7-8 STUDIES: CPT | Performed by: PSYCHIATRY & NEUROLOGY

## 2021-02-12 PROCEDURE — 95886 MUSC TEST DONE W/N TEST COMP: CPT | Performed by: PSYCHIATRY & NEUROLOGY

## 2021-02-12 NOTE — PROGRESS NOTES
Neurology Clinic  [facility], [address]                  Test Date:  2021    Patient: Kiran Reyes : 1980 Physician: Dr. Yong Ennis   Sex: Female Height:  cm Ref Phys: Dr. Ayesha Bosch   ID#: Weight:  lbs. Technician: [facility]     Patient Complaints:      Medications:      Patient History / Exam:      NCV & EMG Findings:  Evaluation of the left median sensory and the right median sensory nerves showed prolonged distal peak latency (L3.8, R4.3 ms) and decreased conduction velocity (Wrist-2nd Digit, L37, R33 m/s). All remaining nerves (as indicated in the following tables) were within normal limits. Left vs. Right side comparison data for the median motor nerve indicates abnormal L-R latency difference (0.9 ms). The ulnar motor nerve indicates abnormal L-R amplitude difference (27.4 %). The median sensory nerve indicates abnormal L-R latency difference (0.5 ms). All remaining left vs. right side differences were within normal limits. All examined muscles (as indicated in the following table) showed no evidence of electrical instability. Impression:  Bilaterally decreased median sensory latencies left greater than right, consistent with a Grade l carpel tunnel syndrome.       ___________________________  Dr. Yong Ennis        Nerve Conduction Studies  Anti Sensory Summary Table     Stim Site NR Peak (ms) Norm Peak (ms) P-T Amp (µV) Norm P-T Amp Site1 Site2 Delta-P (ms) Dist (cm) Miguel Angel (m/s) Norm Miguel Angel (m/s)   Left Median Anti Sensory (2nd Digit)   Wrist    3.8 <3.6 32.3 >10 Wrist 2nd Digit 3.8 14.0 37 >39   Right Median Anti Sensory (2nd Digit)   Wrist    4.3 <3.6 29.5 >10 Wrist 2nd Digit 4.3 14.0 33 >39   Elbow    4.4  75.1  Elbow Wrist 0.1 0.0  >48   Left Ulnar Anti Sensory (5th Digit)   Wrist    3.3 <3.7 41.3 >15.0 Wrist 5th Digit 3.3 14.0 42 >38   Right Ulnar Anti Sensory (5th Digit)   Wrist    3.5 <3.7 56.1 >15.0 Wrist 5th Digit 3.5 14.0 40 >38 B Elbow    3.4  71.5  B Elbow Wrist 0.1 0.0  >47     Motor Summary Table     Stim Site NR Onset (ms) Norm Onset (ms) O-P Amp (mV) Norm O-P Amp Site1 Site2 Delta-0 (ms) Dist (cm) Miguel Angel (m/s) Norm Miguel Angel (m/s)   Left Median Motor (Abd Poll Brev)   Wrist    3.0 <4.2 10.2 >5 Elbow Wrist 3.7 23.0 62 >50   Elbow    6.7  9.7          Right Median Motor (Abd Poll Brev)   Wrist    3.9 <4.2 7.7 >5 Elbow Wrist 3.8 23.0 61 >50   Elbow    7.7  6.0          Left Ulnar Motor (Abd Dig Min)   Wrist    3.4 <4.2 5.3 >3 B Elbow Wrist 3.2 20.0 63 >53   B Elbow    6.6  5.1  A Elbow B Elbow 2.1 14.0 67 >53   A Elbow    8.7  4.6          Right Ulnar Motor (Abd Dig Min)   Wrist    3.1 <4.2 7.3 >3 B Elbow Wrist 3.3 20.0 61 >53   B Elbow    6.4  6.9  A Elbow B Elbow 2.1 15.0 71 >53   A Elbow    8.5  6.1            EMG     Side Muscle Nerve Root Ins Act Fibs Psw Amp Dur Poly Recrt Int Glena Dry Comment   Right 1stDorInt Ulnar C8-T1 Nml Nml Nml Nml Nml 0 Nml Nml    Right Abd Poll Brev Median C8-T1 Nml Nml Nml Nml Nml 0 Nml Nml    Right Biceps Musculocut C5-6 Nml Nml Nml Nml Nml 0 Nml Nml    Right Sup Ulnar C8-T1 Nml Nml Nml Nml Nml 0 Nml Nml    Right  Median C8-T1 Nml Nml Nml Nml Nml 0 Nml Nml            Nerve Conduction Studies  Anti Sensory Left/Right Comparison     Stim Site L Lat (ms) R Lat (ms) L-R Lat (ms) L Amp (µV) R Amp (µV) L-R Amp (%) Site1 Site2 L Miguel Angel (m/s) R Miguel Angel (m/s) L-R Miguel Angel (m/s)   Median Anti Sensory (2nd Digit)   Wrist 3.8 4.3 0.5 32.3 29.5 8.7 Wrist 2nd Digit 37 33 4   Ulnar Anti Sensory (5th Digit)   Wrist 3.3 3.5 0.2 41.3 56.1 26.4 Wrist 5th Digit 42 40 2     Motor Left/Right Comparison     Stim Site L Lat (ms) R Lat (ms) L-R Lat (ms) L Amp (mV) R Amp (mV) L-R Amp (%) Site1 Site2 L Miguel Angel (m/s) R Miguel Angel (m/s) L-R Miguel Angel (m/s)   Median Motor (Abd Poll Brev)   Wrist 3.0 3.9 0.9 10.2 7.7 24.5 Elbow Wrist 62 61 1   Elbow 6.7 7.7 1.0 9.7 6.0 38.1        Ulnar Motor (Abd Dig Min)   Wrist 3.4 3.1 0.3 5.3 7.3 27.4 B Elbow Wrist 63 61 2   B Elbow 6.6 6.4 0.2 5.1 6.9 26.1 A Elbow B Elbow 67 71 4   A Elbow 8.7 8.5 0.2 4.6 6.1 24.6              Waveforms:

## 2021-02-12 NOTE — PROGRESS NOTES
Agus Peacock is a 36 y.o. female who is in the office today for an EMG. 1. Have you been to the ER, urgent care clinic since your last visit? Hospitalized since your last visit? No    2. Have you seen or consulted any other health care providers outside of the [address] [place] [address] since your last visit? Include any pap smears or colon screening.  No

## 2021-02-16 DIAGNOSIS — R51.9 HEADACHE DISORDER: ICD-10-CM

## 2021-02-16 DIAGNOSIS — G56.01 CARPAL TUNNEL SYNDROME OF RIGHT WRIST: Primary | ICD-10-CM

## 2021-02-16 RX ORDER — IBUPROFEN 800 MG/1
800 TABLET ORAL
Qty: 60 TAB | Refills: 0 | Status: SHIPPED | OUTPATIENT
Start: 2021-02-16 | End: 2021-03-08

## 2021-03-03 DIAGNOSIS — R51.9 HEADACHE DISORDER: ICD-10-CM

## 2021-03-03 NOTE — TELEPHONE ENCOUNTER
Requested Prescriptions     Pending Prescriptions Disp Refills    cyclobenzaprine (FLEXERIL) 5 mg tablet 30 Tab 1     Sig: Take 1 Tab by mouth three (3) times daily as needed for Muscle Spasm(s).

## 2021-03-04 RX ORDER — CYCLOBENZAPRINE HCL 5 MG
5 TABLET ORAL
Qty: 30 TAB | Refills: 1 | Status: SHIPPED | OUTPATIENT
Start: 2021-03-04 | End: 2021-04-22 | Stop reason: SDUPTHER

## 2021-03-29 DIAGNOSIS — J45.41 MODERATE PERSISTENT ASTHMA WITH ACUTE EXACERBATION: ICD-10-CM

## 2021-03-30 RX ORDER — BUDESONIDE AND FORMOTEROL FUMARATE DIHYDRATE 160; 4.5 UG/1; UG/1
2 AEROSOL RESPIRATORY (INHALATION) EVERY 12 HOURS
Qty: 10.2 INHALER | Refills: 2 | Status: SHIPPED | OUTPATIENT
Start: 2021-03-30 | End: 2021-12-01 | Stop reason: SDUPTHER

## 2021-03-30 RX ORDER — ALBUTEROL SULFATE 90 UG/1
1-2 AEROSOL, METERED RESPIRATORY (INHALATION)
Qty: 1 INHALER | Refills: 0 | Status: SHIPPED | OUTPATIENT
Start: 2021-03-30 | End: 2021-05-12 | Stop reason: SDUPTHER

## 2021-04-05 ENCOUNTER — HOSPITAL ENCOUNTER (OUTPATIENT)
Dept: MAMMOGRAPHY | Age: 41
Discharge: HOME | End: 2021-04-05
Attending: NURSE PRACTITIONER
Payer: COMMERCIAL

## 2021-04-05 DIAGNOSIS — Z12.31 VISIT FOR SCREENING MAMMOGRAM: ICD-10-CM

## 2021-04-05 PROCEDURE — 77063 BREAST TOMOSYNTHESIS BI: CPT

## 2021-04-06 ENCOUNTER — VIRTUAL VISIT (OUTPATIENT)
Dept: FAMILY MEDICINE CLINIC | Age: 41
End: 2021-04-06
Payer: COMMERCIAL

## 2021-04-06 DIAGNOSIS — R39.9 UTI SYMPTOMS: Primary | ICD-10-CM

## 2021-04-06 DIAGNOSIS — R92.8 ABNORMAL MAMMOGRAM: ICD-10-CM

## 2021-04-06 DIAGNOSIS — M79.661 PAIN OF RIGHT CALF: ICD-10-CM

## 2021-04-06 PROCEDURE — 99214 OFFICE O/P EST MOD 30 MIN: CPT | Performed by: NURSE PRACTITIONER

## 2021-04-06 RX ORDER — NITROFURANTOIN 25; 75 MG/1; MG/1
100 CAPSULE ORAL 2 TIMES DAILY
Qty: 14 CAP | Refills: 0 | Status: SHIPPED | OUTPATIENT
Start: 2021-04-06 | End: 2021-06-03 | Stop reason: ALTCHOICE

## 2021-04-06 RX ORDER — FLUCONAZOLE 150 MG/1
150 TABLET ORAL
Qty: 2 TAB | Refills: 0 | Status: SHIPPED | OUTPATIENT
Start: 2021-04-06 | End: 2021-04-10

## 2021-04-06 NOTE — PROGRESS NOTES
Marvia Schaumann is a 36 y.o. female who was seen by synchronous (real-time) audio-video technology on 4/6/2021 for Urinary Frequency and Urinary Burning    Assessment & Plan:   Diagnoses and all orders for this visit:    1. UTI symptoms  -     nitrofurantoin, macrocrystal-monohydrate, (Macrobid) 100 mg capsule; Take 1 Cap by mouth two (2) times a day. -     fluconazole (DIFLUCAN) 150 mg tablet; Take 1 Tab by mouth every seventy-two (72) hours for 2 doses. FDA advises cautious prescribing of oral fluconazole in pregnancy. 2. Abnormal mammogram  -     HECTOR MAMMO LT DX INCL CAD; Future  -     US BREAST LT LIMITED=<3 QUAD; Future    3. Pain of right calf  -     DUPLEX LOWER EXT VENOUS RIGHT; Future          Subjective: The patient presents for an Audio-visual teleconference appointment for complaints of urinary symptoms. Patient notes her symptoms have been ongoing for 24 to 48 hours. She is complaining of urinary burning and frequency. She has been taken over-the-counter generic Azo but notes the symptoms are ongoing. Patient also had a mammogram completed yesterday. Per the mammogram report she has some calcifications in the left breast.  Recommend she had a ultrasound of the left breast and diagnostic mammo-. Patient is also complaining of right calf pain. This is been ongoing over the last several months. She has had to take over-the-counter pain medications at night to control the symptoms. She denied any known injury. Prior to Admission medications    Medication Sig Start Date End Date Taking? Authorizing Provider   nitrofurantoin, macrocrystal-monohydrate, (Macrobid) 100 mg capsule Take 1 Cap by mouth two (2) times a day. 4/6/21  Yes Mayela Winters NP   fluconazole (DIFLUCAN) 150 mg tablet Take 1 Tab by mouth every seventy-two (72) hours for 2 doses. FDA advises cautious prescribing of oral fluconazole in pregnancy.  4/6/21 4/10/21 Yes Mayela Winters NP   budesonide-formoteroL (SYMBICORT) 160-4.5 mcg/actuation HFAA Take 2 Puffs by inhalation every twelve (12) hours. 3/30/21  Yes Shiva Lopez NP   albuterol (PROVENTIL HFA, VENTOLIN HFA, PROAIR HFA) 90 mcg/actuation inhaler Take 1-2 Puffs by inhalation every four (4) hours as needed for Wheezing. 3/30/21  Yes Shiva Lopez NP   cyclobenzaprine (FLEXERIL) 5 mg tablet Take 1 Tab by mouth three (3) times daily as needed for Muscle Spasm(s). 3/4/21  Yes Shiva Lopez NP   BIOTIN PO Take  by mouth. Yes Provider, Historical   Lactobacillus acidophilus (PROBIOTIC PO) Take  by mouth. Yes Provider, Historical   ascorbic acid (VITAMIN C PO) Take  by mouth. Yes Provider, Historical   aspirin/salicylamide/caffeine (BC HEADACHE POWDER PO) Take  by mouth. Yes Provider, Historical   Nebulizer & Compressor machine Use as directed 3/30/17  Yes Rabia Martinez MD   Nebulizer Accessories kit Use as directed 3/30/17  Yes Rabia Martinez MD     Patient Active Problem List   Diagnosis Code    Mild intermittent asthma J45.20    Obesity E66.9    Palpitations R00.2    Precordial pain R07.2    Severe obesity ([facility] [address] E66.01    Asthma J45.909     Patient Active Problem List    Diagnosis Date Noted    Asthma 06/13/2019    Severe obesity ([facility] [address] 10/10/2018    Palpitations 07/26/2016    Precordial pain 07/26/2016    Mild intermittent asthma 07/07/2014    Obesity 07/07/2014     Current Outpatient Medications   Medication Sig Dispense Refill    nitrofurantoin, macrocrystal-monohydrate, (Macrobid) 100 mg capsule Take 1 Cap by mouth two (2) times a day. 14 Cap 0    fluconazole (DIFLUCAN) 150 mg tablet Take 1 Tab by mouth every seventy-two (72) hours for 2 doses. FDA advises cautious prescribing of oral fluconazole in pregnancy. 2 Tab 0    budesonide-formoteroL (SYMBICORT) 160-4.5 mcg/actuation HFAA Take 2 Puffs by inhalation every twelve (12) hours.  10.2 Inhaler 2    albuterol (PROVENTIL HFA, VENTOLIN HFA, PROAIR HFA) 90 mcg/actuation inhaler Take 1-2 Puffs by inhalation every four (4) hours as needed for Wheezing. 1 Inhaler 0    cyclobenzaprine (FLEXERIL) 5 mg tablet Take 1 Tab by mouth three (3) times daily as needed for Muscle Spasm(s). 30 Tab 1    BIOTIN PO Take  by mouth.  Lactobacillus acidophilus (PROBIOTIC PO) Take  by mouth.  ascorbic acid (VITAMIN C PO) Take  by mouth.  aspirin/salicylamide/caffeine (BC HEADACHE POWDER PO) Take  by mouth.  Nebulizer & Compressor machine Use as directed 1 Each 0    Nebulizer Accessories kit Use as directed 1 Kit 1     Allergies   Allergen Reactions    Azo [Phenazopyridine] Hives    Iodinated Contrast Media Shortness of Breath     Other reaction(s): wheezing/sob     Past Medical History:   Diagnosis Date    Asthma     Chronic pain 6 months    painful menses    Fibroids     GERD (gastroesophageal reflux disease)     Heart palpitations        ROS    ROS:  History obtained from the patient intake forms which are reviewed with the patient  · General: negative for - chills, fever, weight changes or malaise  · HEENT: no sore throat, nasal congestion, vision problems or ear problems  · Respiratory: no cough, shortness of breath, or wheezing  · Cardiovascular: no chest pain, palpitations, or dyspnea on exertion  · Gastrointestinal: no abdominal pain, N/V, change in bowel habits, or black or bloody stools  · Musculoskeletal right calf pain  · Neurological: no numbness, tingling, headache or dizziness  · Endo:  No polyuria or polydipsia. · : Urinary frequency and burning  · Psychological: negative for - anxiety, depression, sleep disturbances, suicidal or homicidal ideations    Objective:   No flowsheet data found.      [INSTRUCTIONS:  \"[x]\" Indicates a positive item  \"[]\" Indicates a negative item  -- DELETE ALL ITEMS NOT EXAMINED]    Constitutional: [x] Appears well-developed and well-nourished [x] No apparent distress      [] Abnormal -     Mental status: [x] Alert and awake  [x] Oriented to person/place/time [x] Able to follow commands    [] Abnormal -     Eyes:   EOM    [x]  Normal    [] Abnormal -   Sclera  [x]  Normal    [] Abnormal -          Discharge [x]  None visible   [] Abnormal -     HENT: [x] Normocephalic, atraumatic  [] Abnormal -   [x] Mouth/Throat: Mucous membranes are moist    External Ears [x] Normal  [] Abnormal -    Neck: [x] No visualized mass [] Abnormal -     Pulmonary/Chest: [x] Respiratory effort normal   [x] No visualized signs of difficulty breathing or respiratory distress        [] Abnormal -      Musculoskeletal:   [x] Normal gait with no signs of ataxia         [x] Normal range of motion of neck        [] Abnormal -     Neurological:        [x] No Facial Asymmetry (Cranial nerve 7 motor function) (limited exam due to video visit)          [x] No gaze palsy        [] Abnormal -          Skin:        [x] No significant exanthematous lesions or discoloration noted on facial skin         [] Abnormal -            Psychiatric:       [x] Normal Affect [] Abnormal -        [x] No Hallucinations    Other pertinent observable physical exam findings:-        We discussed the expected course, resolution and complications of the diagnosis(es) in detail. Medication risks, benefits, costs, interactions, and alternatives were discussed as indicated. I advised her to contact the office if her condition worsens, changes or fails to improve as anticipated. She expressed understanding with the diagnosis(es) and plan. Kamini Carter, was evaluated through a synchronous (real-time) audio-video encounter. The patient (or guardian if applicable) is aware that this is a billable service. Verbal consent to proceed has been obtained within the past 12 months.  The visit was conducted pursuant to the emergency declaration under the [facility] waiver authority and the [employer] Appropriations Act. Patient identification was verified, and a caregiver was present when appropriate. The patient was located in a state where the provider was credentialed to provide care.       Shawnee Vieyra NP

## 2021-04-06 NOTE — PROGRESS NOTES
Nicolette Murray presents today for   Chief Complaint   Patient presents with    Urinary Frequency    Urinary Burning       Virtual/telephone visit    Depression Screening:  3 most recent PHQ Screens 4/6/2021   Little interest or pleasure in doing things Not at all   Feeling down, depressed, irritable, or hopeless Not at all   Total Score PHQ 2 0       Learning Assessment:  Learning Assessment 11/25/2015   PRIMARY LEARNER Patient   HIGHEST LEVEL OF EDUCATION - PRIMARY LEARNER  2 YEARS OF COLLEGE   BARRIERS PRIMARY LEARNER NONE   PRIMARY LANGUAGE ENGLISH   LEARNER PREFERENCE PRIMARY READING     VIDEOS     -   ANSWERED BY Patient   RELATIONSHIP SELF       Health Maintenance reviewed and discussed and ordered per Provider. Health Maintenance Due   Topic Date Due    Pneumococcal 0-64 years (1 of 1 - PPSV23) Never done    COVID-19 Vaccine (1) Never done    PAP AKA CERVICAL CYTOLOGY  04/06/2021   . Coordination of Care:  1. Have you been to the ER, urgent care clinic since your last visit? Hospitalized since your last visit? no    2. Have you seen or consulted any other health care providers outside of the [address] [place] [address] since your last visit? Include any pap smears or colon screening.  no

## 2021-04-09 ENCOUNTER — HOSPITAL ENCOUNTER (OUTPATIENT)
Dept: VASCULAR SURGERY | Age: 41
Discharge: HOME | End: 2021-04-09
Attending: NURSE PRACTITIONER
Payer: COMMERCIAL

## 2021-04-09 DIAGNOSIS — M79.661 PAIN OF RIGHT CALF: ICD-10-CM

## 2021-04-09 PROCEDURE — 93971 EXTREMITY STUDY: CPT

## 2021-04-14 ENCOUNTER — HOSPITAL ENCOUNTER (OUTPATIENT)
Dept: ULTRASOUND IMAGING | Age: 41
Discharge: HOME | End: 2021-04-14
Attending: NURSE PRACTITIONER
Payer: COMMERCIAL

## 2021-04-14 ENCOUNTER — HOSPITAL ENCOUNTER (OUTPATIENT)
Dept: MAMMOGRAPHY | Age: 41
Discharge: HOME | End: 2021-04-14
Attending: NURSE PRACTITIONER
Payer: COMMERCIAL

## 2021-04-14 DIAGNOSIS — R92.8 ABNORMAL MAMMOGRAM: ICD-10-CM

## 2021-04-14 DIAGNOSIS — N64.89 BREAST ASYMMETRY: ICD-10-CM

## 2021-04-14 PROCEDURE — 76642 ULTRASOUND BREAST LIMITED: CPT

## 2021-04-14 PROCEDURE — 77065 DX MAMMO INCL CAD UNI: CPT

## 2021-04-22 DIAGNOSIS — R51.9 HEADACHE DISORDER: ICD-10-CM

## 2021-04-23 RX ORDER — CYCLOBENZAPRINE HCL 5 MG
5 TABLET ORAL
Qty: 30 TAB | Refills: 1 | Status: SHIPPED | OUTPATIENT
Start: 2021-04-23 | End: 2021-06-21 | Stop reason: SDUPTHER

## 2021-05-12 ENCOUNTER — PATIENT MESSAGE (OUTPATIENT)
Dept: FAMILY MEDICINE CLINIC | Age: 41
End: 2021-05-12

## 2021-05-13 DIAGNOSIS — M25.539 CHRONIC WRIST PAIN, UNSPECIFIED LATERALITY: Primary | ICD-10-CM

## 2021-05-13 DIAGNOSIS — G89.29 CHRONIC WRIST PAIN, UNSPECIFIED LATERALITY: Primary | ICD-10-CM

## 2021-05-13 RX ORDER — IBUPROFEN 800 MG/1
800 TABLET ORAL
Qty: 30 TAB | Refills: 0 | Status: SHIPPED | OUTPATIENT
Start: 2021-05-13 | End: 2021-06-03 | Stop reason: SDUPTHER

## 2021-05-14 NOTE — TELEPHONE ENCOUNTER
TC to patient she was notified that prescription was sent to her pharmacy. She was instructed if she takes Ibuprofen and BC powders she needs to Take Prilosec to coat the lining of her stomach. Referral was placed.

## 2021-05-14 NOTE — TELEPHONE ENCOUNTER
----- Message from Xi Donato sent at 5/12/2021 10:13 AM EDT -----  Regarding: Prescription Question  Contact: [phone]  Good morning, I would like to know if I could get a prescription refill for the ibuprofen 800 mg. My wrist has been in a lot of pain and all I have to take is BC powders. I know both of these medications are not good for me to keep taking but I dont know what else to take to relieve the pain. Thanks so much for your help.

## 2021-05-24 DIAGNOSIS — R92.8 ABNORMAL MAMMOGRAM: Primary | ICD-10-CM

## 2021-06-03 ENCOUNTER — OFFICE VISIT (OUTPATIENT)
Dept: NEUROLOGY | Age: 41
End: 2021-06-03
Payer: COMMERCIAL

## 2021-06-03 VITALS
RESPIRATION RATE: 20 BRPM | SYSTOLIC BLOOD PRESSURE: 108 MMHG | OXYGEN SATURATION: 96 % | BODY MASS INDEX: 33.63 KG/M2 | TEMPERATURE: 97.8 F | HEIGHT: 64 IN | WEIGHT: 197 LBS | DIASTOLIC BLOOD PRESSURE: 76 MMHG | HEART RATE: 56 BPM

## 2021-06-03 DIAGNOSIS — M54.41 CHRONIC BILATERAL LOW BACK PAIN WITH RIGHT-SIDED SCIATICA: Primary | ICD-10-CM

## 2021-06-03 DIAGNOSIS — H53.8 BLURRING, LEFT EYE: ICD-10-CM

## 2021-06-03 DIAGNOSIS — M25.539 CHRONIC WRIST PAIN, UNSPECIFIED LATERALITY: ICD-10-CM

## 2021-06-03 DIAGNOSIS — G89.29 CHRONIC BILATERAL LOW BACK PAIN WITH RIGHT-SIDED SCIATICA: Primary | ICD-10-CM

## 2021-06-03 DIAGNOSIS — R20.0 LEFT SIDED NUMBNESS: ICD-10-CM

## 2021-06-03 DIAGNOSIS — G89.29 CHRONIC WRIST PAIN, UNSPECIFIED LATERALITY: ICD-10-CM

## 2021-06-03 PROCEDURE — 99214 OFFICE O/P EST MOD 30 MIN: CPT | Performed by: STUDENT IN AN ORGANIZED HEALTH CARE EDUCATION/TRAINING PROGRAM

## 2021-06-03 RX ORDER — IBUPROFEN 800 MG/1
800 TABLET ORAL
Qty: 30 TABLET | Refills: 3 | Status: SHIPPED | OUTPATIENT
Start: 2021-06-03 | End: 2021-06-23

## 2021-06-03 NOTE — LETTER
NOTIFICATION RETURN TO WORK 
 
6/3/2021 9:35 AM 
 
Ms. Olga Perez [address] [facility] [address] To Whom It May Concern: 
 
Olga Perez is currently under the care of Tevin Sam. She will return to work on: 6/4/2021 If there are questions or concerns please have the patient contact our office. Sincerely, Candance Lota, MD

## 2021-06-03 NOTE — PROGRESS NOTES
Marvia Schaumann is a 39 y.o. female . presents for No chief complaint on file. .    A 39years old female patient here for follow-up of right hand pain. She was last in the clinic on January 6, 2021. EMG of the upper extremities was done on February 12, 2021. The EMG was suggestive of bilateral carpal tunnel syndrome relatively severe on the right side; bilateral increased distal latency of the median nerve at the wrist.  Today, she has multifocal complaints. He complains of numbness and pain/tingling over her lower back. Also complains of pain over the right lower extremity; involving all of the right lower extremity. She has numbness over both feet. She also complains of numbness over the left side of her body. Has numbness when she is waking up from sleep. Her lower extremities feel weak. Her upper extremities also get is retired. No difficulty walking. Symptoms have been going on for about a year. She now also starts complaining of blurring of vision on the left side; also has pain when moving her left eye. No diplopia. No previous history of transient visual loss. No incontinence to bowel or bladder. No electric shocklike sensation when moving her neck. She currently works at [employer]. From previous encounter:  A [address]years old female patient with medical history of asthma here for evaluation of right wrist pain over a couple of months duration. She has difficulty moving the hand at the wrist.  Her hand feels weak and has some difficulty opening jars. She has occasional tingling and numbness in the fingers. Sometimes pain wakes her up from sleep and the whole right upper extremity feels numb. Tends to shake her arm. She has occasional neck pain but is not letting. No stroke trauma. No diabetes. No movement of the left side. She has lower back pain which radiates to the right lower extremity for about 2 months.   No worsening with activity but tends to get worse when standing for some time.  Does not have any joint swelling. No previous diagnosis of arthritis. Review of Systems   Constitutional: Negative for chills, fever and weight loss. HENT: Negative for hearing loss and tinnitus. Eyes: Positive for blurred vision (left side). Negative for double vision. Respiratory: Positive for cough. Negative for hemoptysis, sputum production and shortness of breath. Cardiovascular: Positive for chest pain. Negative for leg swelling. Orthopnea: anterior. Gastrointestinal: Negative for nausea and vomiting. Genitourinary: Negative for dysuria, frequency and urgency. No incontinence   Musculoskeletal: Positive for back pain (radiates to the RLE), joint pain (right wrist) and neck pain (mild). Skin: Negative for itching and rash. Neurological: Positive for dizziness (sometimes), tingling, weakness (generalized wekaness) and headaches (mild). Negative for tremors, focal weakness (right hand) and seizures. Psychiatric/Behavioral: Negative for depression. The patient is nervous/anxious.         Past Medical History:   Diagnosis Date    Asthma     Chronic pain 6 months    painful menses    Fibroids     GERD (gastroesophageal reflux disease)     Heart palpitations        Past Surgical History:   Procedure Laterality Date    HX  SECTION  4190,7017    with BTL    HX HYSTEROSCOPY WITH ENDOMETRIAL ABLATION      No period since        Family History   Problem Relation Age of Onset    Hypertension Mother     Hypertension Father     Diabetes Father     Parkinson's Disease Father     Cancer Paternal Grandmother     MS Sister         Social History     Socioeconomic History    Marital status:      Spouse name: Not on file    Number of children: Not on file    Years of education: Not on file    Highest education level: Not on file   Occupational History     Employer: [employer]   Tobacco Use    Smoking status: Former Smoker     Types: Cigarettes     Quit date: 3/23/1998     Years since quittin.2    Smokeless tobacco: Former User    Tobacco comment: smoked a few as a teen, Vaped for a year   Substance and Sexual Activity    Alcohol use: Yes     Comment: socially, 2 beer/weekend    Drug use: No    Sexual activity: Yes     Partners: Male     Birth control/protection: Surgical   Other Topics Concern     Service Yes     Comment:  [employer]    Blood Transfusions No    Caffeine Concern No    Occupational Exposure No    Hobby Hazards No    Sleep Concern No    Stress Concern No    Weight Concern Yes     Comment: PT IS OVER WEIGHT    Special Diet No    Back Care No    Exercise Yes     Comment: 4 DAYS A WEEK    Bike Helmet No    Seat Belt Yes    Self-Exams Yes   Social History Narrative    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 Social Determinants of Health     Financial Resource Strain:     Difficulty of Paying Living Expenses:    Food Insecurity:     Worried About Running Out of Food in the Last Year:     [address] in the Last Year:    Transportation Needs:     Lack of Transportation (Medical):  Lack of Transportation (Non-Medical):    Physical Activity:     Days of Exercise per Week:     Minutes of Exercise per Session:    Stress:     Feeling of Stress :    Social Connections:     Frequency of Communication with Friends and Family:     Frequency of Social Gatherings with Friends and Family:     Attends Oriental orthodox Services:     Active Member of Clubs or Organizations:     Attends Club or Organization Meetings:     Marital Status:    Intimate Partner Violence:     Fear of Current or Ex-Partner:     Emotionally Abused:     Physically Abused:     Sexually Abused:          Allergies   Allergen Reactions    Azo [Phenazopyridine] Hives    Iodinated Contrast Media Shortness of Breath     Other reaction(s): wheezing/sob         Current Outpatient Medications   Medication Sig Dispense Refill    ibuprofen (MOTRIN) 800 mg tablet Take 1 Tablet by mouth every eight (8) hours as needed for Pain for up to 20 days. 30 Tablet 3    albuterol (PROVENTIL HFA, VENTOLIN HFA, PROAIR HFA) 90 mcg/actuation inhaler Take 1-2 Puffs by inhalation every four (4) hours as needed for Wheezing. 1 Inhaler 2    cyclobenzaprine (FLEXERIL) 5 mg tablet Take 1 Tab by mouth three (3) times daily as needed for Muscle Spasm(s). 30 Tab 1    budesonide-formoteroL (SYMBICORT) 160-4.5 mcg/actuation HFAA Take 2 Puffs by inhalation every twelve (12) hours. 10.2 Inhaler 2    BIOTIN PO Take  by mouth.  ascorbic acid (VITAMIN C PO) Take  by mouth.  Lactobacillus acidophilus (PROBIOTIC PO) Take  by mouth. (Patient not taking: Reported on 6/3/2021)      Nebulizer & Compressor machine Use as directed 1 Each 0    Nebulizer Accessories kit Use as directed 1 Kit 1         Physical Exam  Constitutional:       Appearance: Normal appearance. HENT:      Head: Normocephalic and atraumatic. Mouth/Throat:      Mouth: Mucous membranes are moist.      Pharynx: Oropharynx is clear. No oropharyngeal exudate. Eyes:      Extraocular Movements: Extraocular movements intact. Pupils: Pupils are equal, round, and reactive to light. Pulmonary:      Effort: Pulmonary effort is normal. No respiratory distress. Musculoskeletal:         General: Tenderness (right wrist) present. Normal range of motion. Cervical back: Normal range of motion and neck supple. Right lower leg: No edema. Left lower leg: No edema. Neurological:      Mental Status: She is alert. Comments: Mental status: Awake, alert, oriented x3, follows simple and complex commands, no neglect.   Speech and languge: fluent, coherent,  and comprehension intact  CN: Negative funduscopy; VFF, EOMI, PERRLA, face sensation intact , no facial asymmetry noted, palate elevation symmetric bilat, SS+SCM 5/5 bilat, tongue midline  Motor: no pronator drift, tone normal throughout, strength 5/5 throughout excetp slightly decreased hand  bilaterally and poor effort. Sensory: Decreased light touch and pinprick over the left upper extremity and right lower extremity. Negative Tinel and Phalen signs. Coordination: FNF  accurate w/o dysmetria  DTR: 2+ throughout; plantar downgoing. Gait: normal.             No visits with results within 3 Month(s) from this visit. Latest known visit with results is:   Admission on 10/04/2020, Discharged on 10/05/2020   Component Date Value Ref Range Status    Ventricular Rate 10/04/2020 98  BPM Final    Atrial Rate 10/04/2020 98  BPM Final    P-R Interval 10/04/2020 116  ms Final    QRS Duration 10/04/2020 68  ms Final    Q-T Interval 10/04/2020 338  ms Final    QTC Calculation (Bezet) 10/04/2020 431  ms Final    Calculated P Axis 10/04/2020 68  degrees Final    Calculated R Axis 10/04/2020 71  degrees Final    Calculated T Axis 10/04/2020 57  degrees Final    Diagnosis 10/04/2020    Final                    Value:Normal sinus rhythm  Possible Left atrial enlargement  Borderline ECG  When compared with ECG of 31-AUG-2020 09:29,  No significant change was found  Confirmed by Bernice Hancock ([id number]) on 10/5/2020 7:55:01 PM               ICD-10-CM ICD-9-CM    1. Chronic bilateral low back pain with right-sided sciatica  M54.41 724.2 MRI LUMB SPINE WO CONT    G89.29 724.3 ibuprofen (MOTRIN) 800 mg tablet     338.29    2. Blurring, left eye  H53.8 368.8 MRI BRAIN W WO CONT   3. Left sided numbness  R20.0 782.0 MRI BRAIN W WO CONT   4. Chronic wrist pain, unspecified laterality  M25.539 719.43 ibuprofen (MOTRIN) 800 mg tablet    G89.29 338.29        A 39years old female patient here for evaluation of right wrist pain over a couple of months duration. At the middle February 2021 which was suggestive of bilateral carpal tunnel syndrome more severe on the right side; is mild in severity with only sensory findings. Normal motor studies.   Today, she has nonspecific multifocal complaints including numbness over the left upper extremity, lower back numbness and pain, and right lower extremity pain and numbness. The new left side blurring of vision with pain on movement is concerning for ON. Also has chronic lower back pain; possible right radicular symptoms. Will get MRI of the lumbosacral spine. We will also get MRI of the brain with and without contrast.  Need to rule out possibility of demyelinating disease. Need to be seen by ophthalmology after the MRI. I have advised her on use of bilateral wrist splints at nighttime for the carpal tunnel. We will see her in 3 months time but will call her with the results of the MRIs.

## 2021-06-08 ENCOUNTER — APPOINTMENT (OUTPATIENT)
Dept: GENERAL RADIOLOGY | Age: 41
End: 2021-06-08
Attending: EMERGENCY MEDICINE
Payer: COMMERCIAL

## 2021-06-08 ENCOUNTER — HOSPITAL ENCOUNTER (EMERGENCY)
Age: 41
Discharge: HOME | End: 2021-06-08
Attending: EMERGENCY MEDICINE
Payer: COMMERCIAL

## 2021-06-08 VITALS
BODY MASS INDEX: 34.02 KG/M2 | WEIGHT: 192 LBS | RESPIRATION RATE: 19 BRPM | OXYGEN SATURATION: 98 % | DIASTOLIC BLOOD PRESSURE: 68 MMHG | HEIGHT: 63 IN | TEMPERATURE: 98.5 F | HEART RATE: 72 BPM | SYSTOLIC BLOOD PRESSURE: 127 MMHG

## 2021-06-08 DIAGNOSIS — M79.2 NERVE PAIN: ICD-10-CM

## 2021-06-08 DIAGNOSIS — R07.9 CHEST PAIN, UNSPECIFIED TYPE: Primary | ICD-10-CM

## 2021-06-08 DIAGNOSIS — E87.6 HYPOKALEMIA: ICD-10-CM

## 2021-06-08 LAB
ALBUMIN SERPL-MCNC: 3.7 G/DL (ref 3.4–5)
ALBUMIN/GLOB SERPL: 1 {RATIO} (ref 0.8–1.7)
ALP SERPL-CCNC: 77 U/L (ref 45–117)
ALT SERPL-CCNC: 25 U/L (ref 13–56)
ANION GAP SERPL CALC-SCNC: 7 MMOL/L (ref 3–18)
AST SERPL-CCNC: 26 U/L (ref 10–38)
ATRIAL RATE: 81 BPM
BASOPHILS # BLD: 0 K/UL (ref 0–0.1)
BASOPHILS NFR BLD: 0 % (ref 0–2)
BILIRUB SERPL-MCNC: 0.4 MG/DL (ref 0.2–1)
BUN SERPL-MCNC: 16 MG/DL (ref 7–18)
BUN/CREAT SERPL: 18 (ref 12–20)
CALCIUM SERPL-MCNC: 8.6 MG/DL (ref 8.5–10.1)
CALCULATED P AXIS, ECG09: 63 DEGREES
CALCULATED R AXIS, ECG10: 54 DEGREES
CALCULATED T AXIS, ECG11: 58 DEGREES
CHLORIDE SERPL-SCNC: 108 MMOL/L (ref 100–111)
CK MB CFR SERPL CALC: 0.7 % (ref 0–4)
CK MB SERPL-MCNC: 2.2 NG/ML (ref 5–25)
CK SERPL-CCNC: 294 U/L (ref 26–192)
CO2 SERPL-SCNC: 23 MMOL/L (ref 21–32)
CREAT SERPL-MCNC: 0.91 MG/DL (ref 0.6–1.3)
DIAGNOSIS, 93000: NORMAL
DIFFERENTIAL METHOD BLD: ABNORMAL
EOSINOPHIL # BLD: 0.6 K/UL (ref 0–0.4)
EOSINOPHIL NFR BLD: 5 % (ref 0–5)
ERYTHROCYTE [DISTWIDTH] IN BLOOD BY AUTOMATED COUNT: 14.2 % (ref 11.6–14.5)
GLOBULIN SER CALC-MCNC: 3.6 G/DL (ref 2–4)
GLUCOSE SERPL-MCNC: 86 MG/DL (ref 74–99)
HCT VFR BLD AUTO: 37.9 % (ref 35–45)
HGB BLD-MCNC: 12.8 G/DL (ref 12–16)
LYMPHOCYTES # BLD: 2.5 K/UL (ref 0.9–3.6)
LYMPHOCYTES NFR BLD: 22 % (ref 21–52)
MCH RBC QN AUTO: 27.4 PG (ref 24–34)
MCHC RBC AUTO-ENTMCNC: 33.8 G/DL (ref 31–37)
MCV RBC AUTO: 81.2 FL (ref 74–97)
MONOCYTES # BLD: 0.7 K/UL (ref 0.05–1.2)
MONOCYTES NFR BLD: 6 % (ref 3–10)
NEUTS SEG # BLD: 7.7 K/UL (ref 1.8–8)
NEUTS SEG NFR BLD: 67 % (ref 40–73)
P-R INTERVAL, ECG05: 130 MS
PLATELET # BLD AUTO: 268 K/UL (ref 135–420)
PMV BLD AUTO: 10.2 FL (ref 9.2–11.8)
POTASSIUM SERPL-SCNC: 3.1 MMOL/L (ref 3.5–5.5)
PROT SERPL-MCNC: 7.3 G/DL (ref 6.4–8.2)
Q-T INTERVAL, ECG07: 366 MS
QRS DURATION, ECG06: 78 MS
QTC CALCULATION (BEZET), ECG08: 425 MS
RBC # BLD AUTO: 4.67 M/UL (ref 4.2–5.3)
SODIUM SERPL-SCNC: 138 MMOL/L (ref 136–145)
TROPONIN I SERPL-MCNC: <0.02 NG/ML (ref 0–0.04)
VENTRICULAR RATE, ECG03: 81 BPM
WBC # BLD AUTO: 11.5 K/UL (ref 4.6–13.2)

## 2021-06-08 PROCEDURE — 80053 COMPREHEN METABOLIC PANEL: CPT

## 2021-06-08 PROCEDURE — 74011250637 HC RX REV CODE- 250/637: Performed by: EMERGENCY MEDICINE

## 2021-06-08 PROCEDURE — 85025 COMPLETE CBC W/AUTO DIFF WBC: CPT

## 2021-06-08 PROCEDURE — 82553 CREATINE MB FRACTION: CPT

## 2021-06-08 PROCEDURE — 93005 ELECTROCARDIOGRAM TRACING: CPT

## 2021-06-08 PROCEDURE — 99284 EMERGENCY DEPT VISIT MOD MDM: CPT

## 2021-06-08 PROCEDURE — 71045 X-RAY EXAM CHEST 1 VIEW: CPT

## 2021-06-08 RX ORDER — GABAPENTIN 300 MG/1
300 CAPSULE ORAL 2 TIMES DAILY
Qty: 20 CAPSULE | Refills: 0 | Status: SHIPPED | OUTPATIENT
Start: 2021-06-08 | End: 2021-08-03 | Stop reason: SDUPTHER

## 2021-06-08 RX ORDER — POTASSIUM CHLORIDE 20 MEQ/1
20 TABLET, EXTENDED RELEASE ORAL DAILY
Qty: 10 TABLET | Refills: 0 | Status: SHIPPED | OUTPATIENT
Start: 2021-06-08 | End: 2021-06-18

## 2021-06-08 RX ORDER — GABAPENTIN 100 MG/1
CAPSULE ORAL 3 TIMES DAILY
Status: CANCELLED | OUTPATIENT
Start: 2021-06-08

## 2021-06-08 RX ORDER — POTASSIUM CHLORIDE 20 MEQ/1
40 TABLET, EXTENDED RELEASE ORAL
Status: COMPLETED | OUTPATIENT
Start: 2021-06-08 | End: 2021-06-08

## 2021-06-08 RX ADMIN — POTASSIUM CHLORIDE 40 MEQ: 1500 TABLET, EXTENDED RELEASE ORAL at 05:04

## 2021-06-08 NOTE — LETTER
[address] [place] [address] [facility] 
[address] [facility] [address] [phone] Work/School Note Date: 6/8/2021 To Whom It May concern: 
 
Mikey Neal was seen and treated today in the emergency room by the following provider(s): 
Attending Provider: Heath Singleton MD. Mikey Neal may return to work on 06/10/2020. Sincerely, Arabella Aguayo RN

## 2021-06-08 NOTE — ED TRIAGE NOTES
Patient presents to ER for C/O sob and arm tingling for a few days, states Chest pain began last night.

## 2021-06-08 NOTE — ED PROVIDER NOTES
HPI  Patient is a 38 yo female who  presents to ER for C/O sob and arm tingling for a few days. She states she had a  vague chest pain that  began last night. Minimal symptoms at present. She has had multiple similar chest pain work-ups. No etiology has ever been found for her chest pain.     Past Medical History:   Diagnosis Date    Asthma     Chronic pain 6 months    painful menses    Fibroids     GERD (gastroesophageal reflux disease)     Heart palpitations        Past Surgical History:   Procedure Laterality Date    HX  SECTION  0657,4419    with BTL    HX HYSTEROSCOPY WITH ENDOMETRIAL ABLATION      No period since         Family History:   Problem Relation Age of Onset    Hypertension Mother     Hypertension Father     Diabetes Father     Parkinson's Disease Father     Cancer Paternal Grandmother     MS Sister        Social History     Socioeconomic History    Marital status:      Spouse name: Not on file    Number of children: Not on file    Years of education: Not on file    Highest education level: Not on file   Occupational History     Employer: [employer]   Tobacco Use    Smoking status: Former Smoker     Types: Cigarettes     Quit date: 3/23/1998     Years since quittin.2    Smokeless tobacco: Former User    Tobacco comment: smoked a few as a teen, Vaped for a year   Substance and Sexual Activity    Alcohol use: Yes     Comment: socially, 2 beer/weekend    Drug use: No    Sexual activity: Yes     Partners: Male     Birth control/protection: Surgical   Other Topics Concern     Service Yes     Comment: [employer]    Blood Transfusions No    Caffeine Concern No    Occupational Exposure No    Hobby Hazards No    Sleep Concern No    Stress Concern No    Weight Concern Yes     Comment: PT IS OVER WEIGHT    Special Diet No    Back Care No    Exercise Yes     Comment: 4 DAYS A WEEK    Bike Helmet No    Seat Belt Yes    Self-Exams Yes   Social History Narrative    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 Social Determinants of Health     Financial Resource Strain:     Difficulty of Paying Living Expenses:    Food Insecurity:     Worried About Running Out of Food in the Last Year:     [address] in the Last Year:    Transportation Needs:     Lack of Transportation (Medical):  Lack of Transportation (Non-Medical):    Physical Activity:     Days of Exercise per Week:     Minutes of Exercise per Session:    Stress:     Feeling of Stress :    Social Connections:     Frequency of Communication with Friends and Family:     Frequency of Social Gatherings with Friends and Family:     Attends Rastafarian Services:     Active Member of Clubs or Organizations:     Attends Club or Organization Meetings:     Marital Status:    Intimate Partner Violence:     Fear of Current or Ex-Partner:     Emotionally Abused:     Physically Abused:     Sexually Abused: ALLERGIES: Azo [phenazopyridine] and Iodinated contrast media    Review of Systems   Constitutional: Negative. HENT: Negative. Eyes: Negative. Respiratory: Negative. Cardiovascular: Positive for chest pain and palpitations. Gastrointestinal: Negative. Endocrine: Negative. Genitourinary: Negative. Musculoskeletal: Negative. Skin: Negative. Allergic/Immunologic: Negative. Neurological: Negative. Hematological: Negative. Psychiatric/Behavioral: Negative. All other systems reviewed and are negative. Vitals:    06/08/21 0229   BP: (!) 148/78   Pulse: 76   Resp: 15   Temp: 98.6 °F (37 °C)   SpO2: 100%   Weight: 87.1 kg (192 lb)   Height: 5' 3\" (1.6 m)            Physical Exam  Vitals and nursing note reviewed. Constitutional:       General: She is not in acute distress. Appearance: She is well-developed. She is not diaphoretic. HENT:      Head: Normocephalic.       Right Ear: External ear normal.      Left Ear: External ear normal.      Mouth/Throat:      Pharynx: No oropharyngeal exudate. Eyes:      General: No scleral icterus. Right eye: No discharge. Left eye: No discharge. Conjunctiva/sclera: Conjunctivae normal.      Pupils: Pupils are equal, round, and reactive to light. Neck:      Thyroid: No thyromegaly. Vascular: No JVD. Trachea: No tracheal deviation. Cardiovascular:      Rate and Rhythm: Normal rate and regular rhythm. Heart sounds: Normal heart sounds. No murmur heard. No friction rub. No gallop. Pulmonary:      Effort: Pulmonary effort is normal. No respiratory distress. Breath sounds: Normal breath sounds. No stridor. No wheezing or rales. Chest:      Chest wall: No tenderness. Abdominal:      General: Bowel sounds are normal. There is no distension. Palpations: Abdomen is soft. There is no mass. Tenderness: There is no abdominal tenderness. There is no guarding or rebound. Musculoskeletal:         General: No tenderness. Normal range of motion. Cervical back: Normal range of motion and neck supple. Lymphadenopathy:      Cervical: No cervical adenopathy. Skin:     General: Skin is warm and dry. Coloration: Skin is not pale. Findings: No erythema or rash. Neurological:      Mental Status: She is alert and oriented to person, place, and time. Cranial Nerves: No cranial nerve deficit. Motor: No abnormal muscle tone. Coordination: Coordination normal.      Deep Tendon Reflexes: Reflexes normal.          MDM      Lab tests: Interpreted by me    Chest x-ray: Interpreted by me    EKG: Interpreted by me    Procedures    Dx:acute chest pain    Disp: F/U PCP in 2 to 3 days. Return ER needed. Rx: gabapentin and motrin. Dictation disclaimer:  Please note that this dictation was completed with [place], the [email or web address] voice recognition software.   Quite often unanticipated grammatical, syntax, homophones, and other interpretive errors are inadvertently transcribed by the computer software. Please disregard these errors. Please excuse any errors that have escaped final proofreading.

## 2021-06-09 ENCOUNTER — PATIENT OUTREACH (OUTPATIENT)
Dept: OTHER | Age: 41
End: 2021-06-09

## 2021-06-09 NOTE — PROGRESS NOTES
Initial HPRP:   Patient on report as discharged from [facility] ED Visit 6/8/21 for Chest Pain, Hypokalemia. Initial attempt to contact patient for transitions of care.  Left discreet message on voicemail with this Care Coordinator's contact information.  Will attempt outreach on 6/10/21.        gabapentin 300 mg capsule  potassium chloride 20 mEq tablet    Call 911 anytime you think you may need emergency care. For example, call if:   You have chest pain or pressure. This may occur with:  Sweating. Shortness of breath. Nausea or vomiting. Pain that spreads from the chest to the neck, jaw, or one or both shoulders or arms. Dizziness or lightheadedness. You passed out (lost consciousness). A fast or uneven pulse. You feel like your heart is missing beats. Heart problems caused by low potassium can cause death. After calling 911, chew 1 adult-strength aspirin. Wait for an ambulance. Do not try to drive  yourself. You have sudden chest pain and shortness of breath, or you cough up blood. You have a seizure. Call your doctor now or seek immediate medical care if:  have any trouble breathing. Your chest pain gets worse. Your chest pain occurs consistently with exercise and is relieved by rest.  Your chest pain does not get better after 1 week. You feel weak or unusually tired. You have severe arm or leg cramps. You feel very thirsty most of the time. You feel depressed, or you lose touch with reality.

## 2021-06-10 ENCOUNTER — PATIENT OUTREACH (OUTPATIENT)
Dept: OTHER | Age: 41
End: 2021-06-10

## 2021-06-10 DIAGNOSIS — G89.29 CHRONIC BILATERAL LOW BACK PAIN WITH RIGHT-SIDED SCIATICA: ICD-10-CM

## 2021-06-10 DIAGNOSIS — R20.0 LEFT SIDED NUMBNESS: ICD-10-CM

## 2021-06-10 DIAGNOSIS — H53.8 BLURRING, LEFT EYE: ICD-10-CM

## 2021-06-10 DIAGNOSIS — M54.41 CHRONIC BILATERAL LOW BACK PAIN WITH RIGHT-SIDED SCIATICA: ICD-10-CM

## 2021-06-10 NOTE — PROGRESS NOTES
Verified  and address for HIPAA security. Introduced [employer] for patient. Patient does not identify any Care Management needs at this time and declines services. *Spoke with patient who reports that she is doing somewhat better. Patient has a MRI scheduled on  @ 3p - will follow up with necessary Providers for results. Patient states that she is doing well enough that additional support is not needed at this time. Patient had no other questions or concerns. Contact information provided and reminded her that I can be reached if any needs arise in the future.

## 2021-06-14 RX ORDER — ALBUTEROL SULFATE 90 UG/1
1-2 AEROSOL, METERED RESPIRATORY (INHALATION)
Qty: 1 INHALER | Refills: 2 | Status: CANCELLED | OUTPATIENT
Start: 2021-06-14

## 2021-06-14 RX ORDER — ALBUTEROL SULFATE 90 UG/1
1-2 AEROSOL, METERED RESPIRATORY (INHALATION)
Qty: 1 INHALER | Refills: 2 | Status: SHIPPED | OUTPATIENT
Start: 2021-06-14 | End: 2021-08-03 | Stop reason: SDUPTHER

## 2021-06-18 ENCOUNTER — HOSPITAL ENCOUNTER (OUTPATIENT)
Age: 41
Discharge: HOME | End: 2021-06-18
Attending: STUDENT IN AN ORGANIZED HEALTH CARE EDUCATION/TRAINING PROGRAM
Payer: COMMERCIAL

## 2021-06-18 PROCEDURE — A9575 INJ GADOTERATE MEGLUMI 0.1ML: HCPCS | Performed by: STUDENT IN AN ORGANIZED HEALTH CARE EDUCATION/TRAINING PROGRAM

## 2021-06-18 PROCEDURE — 74011636320 HC RX REV CODE- 636/320: Performed by: STUDENT IN AN ORGANIZED HEALTH CARE EDUCATION/TRAINING PROGRAM

## 2021-06-18 PROCEDURE — 72148 MRI LUMBAR SPINE W/O DYE: CPT

## 2021-06-18 RX ADMIN — GADOTERATE MEGLUMINE 18 ML: 376.9 INJECTION INTRAVENOUS at 14:57

## 2021-06-21 ENCOUNTER — TELEPHONE (OUTPATIENT)
Dept: FAMILY MEDICINE CLINIC | Age: 41
End: 2021-06-21

## 2021-06-21 DIAGNOSIS — R51.9 HEADACHE DISORDER: ICD-10-CM

## 2021-06-21 NOTE — TELEPHONE ENCOUNTER
Requested Prescriptions     Pending Prescriptions Disp Refills    cyclobenzaprine (FLEXERIL) 5 mg tablet 30 Tablet 1     Sig: Take 1 Tablet by mouth three (3) times daily as needed for Muscle Spasm(s).

## 2021-06-22 NOTE — TELEPHONE ENCOUNTER
Patient needs appointment. Information given to [facility] to schedule patient appt.

## 2021-06-24 RX ORDER — CYCLOBENZAPRINE HCL 5 MG
5 TABLET ORAL
Qty: 30 TABLET | Refills: 1 | Status: SHIPPED | OUTPATIENT
Start: 2021-06-24 | End: 2021-08-22 | Stop reason: SDUPTHER

## 2021-07-10 LAB
CHOLEST SERPL-MCNC: 152 MG/DL
GLUCOSE SERPL-MCNC: 65 MG/DL (ref 74–99)
HDLC SERPL-MCNC: 69 MG/DL (ref 40–60)
LDLC SERPL CALC-MCNC: 72.6 MG/DL (ref 0–100)
TRIGL SERPL-MCNC: 52 MG/DL (ref ?–150)

## 2021-07-23 ENCOUNTER — APPOINTMENT (OUTPATIENT)
Dept: GENERAL RADIOLOGY | Age: 41
End: 2021-07-23
Attending: EMERGENCY MEDICINE
Payer: COMMERCIAL

## 2021-07-23 ENCOUNTER — APPOINTMENT (OUTPATIENT)
Dept: CT IMAGING | Age: 41
End: 2021-07-23
Attending: EMERGENCY MEDICINE
Payer: COMMERCIAL

## 2021-07-23 ENCOUNTER — HOSPITAL ENCOUNTER (EMERGENCY)
Age: 41
Discharge: HOME | End: 2021-07-23
Attending: EMERGENCY MEDICINE
Payer: COMMERCIAL

## 2021-07-23 VITALS
BODY MASS INDEX: 34.15 KG/M2 | DIASTOLIC BLOOD PRESSURE: 74 MMHG | RESPIRATION RATE: 23 BRPM | SYSTOLIC BLOOD PRESSURE: 112 MMHG | TEMPERATURE: 98.6 F | WEIGHT: 200 LBS | OXYGEN SATURATION: 98 % | HEIGHT: 64 IN | HEART RATE: 65 BPM

## 2021-07-23 DIAGNOSIS — R51.9 ACUTE NONINTRACTABLE HEADACHE, UNSPECIFIED HEADACHE TYPE: Primary | ICD-10-CM

## 2021-07-23 DIAGNOSIS — R07.89 CHEST WALL PAIN: ICD-10-CM

## 2021-07-23 LAB
ANION GAP SERPL CALC-SCNC: 5 MMOL/L (ref 3–18)
BASOPHILS # BLD: 0.1 K/UL (ref 0–0.1)
BASOPHILS NFR BLD: 1 % (ref 0–2)
BUN SERPL-MCNC: 12 MG/DL (ref 7–18)
BUN/CREAT SERPL: 15 (ref 12–20)
CALCIUM SERPL-MCNC: 8.6 MG/DL (ref 8.5–10.1)
CHLORIDE SERPL-SCNC: 106 MMOL/L (ref 100–111)
CO2 SERPL-SCNC: 28 MMOL/L (ref 21–32)
CREAT SERPL-MCNC: 0.82 MG/DL (ref 0.6–1.3)
DIFFERENTIAL METHOD BLD: ABNORMAL
EOSINOPHIL # BLD: 0.6 K/UL (ref 0–0.4)
EOSINOPHIL NFR BLD: 7 % (ref 0–5)
ERYTHROCYTE [DISTWIDTH] IN BLOOD BY AUTOMATED COUNT: 13.7 % (ref 11.6–14.5)
GLUCOSE SERPL-MCNC: 90 MG/DL (ref 74–99)
HCT VFR BLD AUTO: 39.5 % (ref 35–45)
HGB BLD-MCNC: 12.8 G/DL (ref 12–16)
LYMPHOCYTES # BLD: 2.5 K/UL (ref 0.9–3.6)
LYMPHOCYTES NFR BLD: 30 % (ref 21–52)
MCH RBC QN AUTO: 26.8 PG (ref 24–34)
MCHC RBC AUTO-ENTMCNC: 32.4 G/DL (ref 31–37)
MCV RBC AUTO: 82.6 FL (ref 74–97)
MONOCYTES # BLD: 0.5 K/UL (ref 0.05–1.2)
MONOCYTES NFR BLD: 5 % (ref 3–10)
NEUTS SEG # BLD: 4.8 K/UL (ref 1.8–8)
NEUTS SEG NFR BLD: 57 % (ref 40–73)
PLATELET # BLD AUTO: 287 K/UL (ref 135–420)
PMV BLD AUTO: 9.9 FL (ref 9.2–11.8)
POTASSIUM SERPL-SCNC: 3.9 MMOL/L (ref 3.5–5.5)
RBC # BLD AUTO: 4.78 M/UL (ref 4.2–5.3)
SODIUM SERPL-SCNC: 139 MMOL/L (ref 136–145)
TROPONIN I SERPL-MCNC: <0.02 NG/ML (ref 0–0.04)
WBC # BLD AUTO: 8.4 K/UL (ref 4.6–13.2)

## 2021-07-23 PROCEDURE — 96374 THER/PROPH/DIAG INJ IV PUSH: CPT

## 2021-07-23 PROCEDURE — 74011250636 HC RX REV CODE- 250/636

## 2021-07-23 PROCEDURE — 85025 COMPLETE CBC W/AUTO DIFF WBC: CPT

## 2021-07-23 PROCEDURE — 84484 ASSAY OF TROPONIN QUANT: CPT

## 2021-07-23 PROCEDURE — 80048 BASIC METABOLIC PNL TOTAL CA: CPT

## 2021-07-23 PROCEDURE — 99285 EMERGENCY DEPT VISIT HI MDM: CPT

## 2021-07-23 PROCEDURE — 96375 TX/PRO/DX INJ NEW DRUG ADDON: CPT

## 2021-07-23 PROCEDURE — 71045 X-RAY EXAM CHEST 1 VIEW: CPT

## 2021-07-23 PROCEDURE — 70450 CT HEAD/BRAIN W/O DYE: CPT

## 2021-07-23 PROCEDURE — 74011250636 HC RX REV CODE- 250/636: Performed by: EMERGENCY MEDICINE

## 2021-07-23 PROCEDURE — 93005 ELECTROCARDIOGRAM TRACING: CPT

## 2021-07-23 RX ORDER — DIPHENHYDRAMINE HYDROCHLORIDE 50 MG/ML
25 INJECTION, SOLUTION INTRAMUSCULAR; INTRAVENOUS ONCE
Status: COMPLETED | OUTPATIENT
Start: 2021-07-23 | End: 2021-07-23

## 2021-07-23 RX ORDER — PROCHLORPERAZINE EDISYLATE 5 MG/ML
10 INJECTION INTRAMUSCULAR; INTRAVENOUS
Status: COMPLETED | OUTPATIENT
Start: 2021-07-23 | End: 2021-07-23

## 2021-07-23 RX ORDER — KETOROLAC TROMETHAMINE 30 MG/ML
15 INJECTION, SOLUTION INTRAMUSCULAR; INTRAVENOUS
Status: COMPLETED | OUTPATIENT
Start: 2021-07-23 | End: 2021-07-23

## 2021-07-23 RX ADMIN — PROCHLORPERAZINE EDISYLATE 10 MG: 5 INJECTION INTRAMUSCULAR; INTRAVENOUS at 17:36

## 2021-07-23 RX ADMIN — KETOROLAC TROMETHAMINE 15 MG: 30 INJECTION, SOLUTION INTRAMUSCULAR; INTRAVENOUS at 17:22

## 2021-07-23 RX ADMIN — DIPHENHYDRAMINE HYDROCHLORIDE 25 MG: 50 INJECTION INTRAMUSCULAR; INTRAVENOUS at 17:36

## 2021-07-23 NOTE — ED TRIAGE NOTES
Pt reports chest pain, numbness down left arm, and headache x3 days.  Reports she has taken multiple things to assist in headache and \"nothing has helped and I'm sick of it\"

## 2021-07-23 NOTE — DISCHARGE INSTRUCTIONS
Head CT scan negative. EKG and lab work within normal limits. Excedrin available over-the-counter for headache. Recheck with your primary care physician in 3 to 5 days if not improved. Return for severe, worsening headache, severe chest pain, or other acutely worsening symptoms.

## 2021-07-23 NOTE — ED PROVIDER NOTES
80-year-old female here primarily for evaluation of headache. Patient notes onset of occipital headache 3 days ago. Is been constant since then. Minimal relief with multiple over-the-counter medicines. Headaches associated with photophobia and nausea. No prior history. No trauma. Patient also notes intermittent numbness involving left upper extremity. She states the tire left arm will occasionally become numb. Nothing appears to precipitate this. Had an episode yesterday lasting about 20 minutes. Currently not having any paresthesia involving the extremity. Denies active neck pain. No associated weakness when the arm does become numb. Finally the patient notes right-sided anterior chest wall pain. This began today. Pain is worse with pressure and inspiration and relieved with elevation of the right breast.  No cough, fever, chills. Has history of cardiac palpitations but otherwise no heart disease or coronary artery disease risk factors.            Past Medical History:   Diagnosis Date    Asthma     Chest pain     Chronic pain 6 months    painful menses    Fibroids     GERD (gastroesophageal reflux disease)     Headache disorder     Heart palpitations        Past Surgical History:   Procedure Laterality Date    HX  SECTION  4834,4833    with BTL    HX HYSTEROSCOPY WITH ENDOMETRIAL ABLATION      No period since         Family History:   Problem Relation Age of Onset    Hypertension Mother     Hypertension Father     Diabetes Father     Parkinson's Disease Father     Cancer Paternal Grandmother     MS Sister        Social History     Socioeconomic History    Marital status:      Spouse name: Not on file    Number of children: Not on file    Years of education: Not on file    Highest education level: Not on file   Occupational History     Employer: [employer]   Tobacco Use    Smoking status: Former Smoker     Types: Cigarettes     Quit date: 3/23/1998     Years since quittin.3    Smokeless tobacco: Former User    Tobacco comment: smoked a few as a teen, Vaped for a year   Substance and Sexual Activity    Alcohol use: Yes     Comment: socially, 2 beer/weekend    Drug use: No    Sexual activity: Yes     Partners: Male     Birth control/protection: Surgical   Other Topics Concern     Service Yes     Comment: [employer]    Blood Transfusions No    Caffeine Concern No    Occupational Exposure No    Hobby Hazards No    Sleep Concern No    Stress Concern No    Weight Concern Yes     Comment: PT IS OVER WEIGHT    Special Diet No    Back Care No    Exercise Yes     Comment: 4 DAYS A WEEK    Bike Helmet No    Seat Belt Yes    Self-Exams Yes   Social History Narrative    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 Social Determinants of Health     Financial Resource Strain:     Difficulty of Paying Living Expenses:    Food Insecurity:     Worried About Running Out of Food in the Last Year:     [address] in the Last Year:    Transportation Needs:     Lack of Transportation (Medical):  Lack of Transportation (Non-Medical):    Physical Activity:     Days of Exercise per Week:     Minutes of Exercise per Session:    Stress:     Feeling of Stress :    Social Connections:     Frequency of Communication with Friends and Family:     Frequency of Social Gatherings with Friends and Family:     Attends Christian Services:     Active Member of Clubs or Organizations:     Attends Club or Organization Meetings:     Marital Status:    Intimate Partner Violence:     Fear of Current or Ex-Partner:     Emotionally Abused:     Physically Abused:     Sexually Abused: ALLERGIES: Azo [phenazopyridine] and Iodinated contrast media    Review of Systems   Respiratory: Negative for cough and shortness of breath. Cardiovascular: Positive for chest pain. Gastrointestinal: Negative for abdominal pain.    Neurological: Positive for numbness and headaches. All other systems reviewed and are negative. Vitals:    07/23/21 1800 07/23/21 1806 07/23/21 1815 07/23/21 1845   BP: 134/80  120/76 112/74   Pulse: 69  61 65   Resp: 17 20 23   Temp:       SpO2: (!) 85% 100% 100% 98%   Weight:       Height:                Physical Exam  Vitals and nursing note reviewed. Constitutional:       General: She is not in acute distress. Appearance: She is well-developed. HENT:      Head: Normocephalic and atraumatic. Eyes:      General: No scleral icterus. Extraocular Movements: Extraocular movements intact. Pupils: Pupils are equal, round, and reactive to light. Comments: Discs sharp. Neck:      Comments: No cervical spine tenderness. Cardiovascular:      Rate and Rhythm: Normal rate and regular rhythm. Pulmonary:      Effort: Pulmonary effort is normal.      Comments: Right anterior superior chest wall tender to palpation without crepitance or deformity. Chest:      Chest wall: Tenderness present. Musculoskeletal:      Comments: Upper extremity motor function intact. Light touch sensory grossly preserved. Normal pulses. Symmetric biceps DTRs. Skin:     General: Skin is warm and dry. Neurological:      General: No focal deficit present. Mental Status: She is alert and oriented to person, place, and time. Recent Results (from the past 12 hour(s))   CBC WITH AUTOMATED DIFF    Collection Time: 07/23/21  4:40 PM   Result Value Ref Range    WBC 8.4 4.6 - 13.2 K/uL    RBC 4.78 4.20 - 5.30 M/uL    HGB 12.8 12.0 - 16.0 g/dL    HCT 39.5 35.0 - 45.0 %    MCV 82.6 74.0 - 97.0 FL    MCH 26.8 24.0 - 34.0 PG    MCHC 32.4 31.0 - 37.0 g/dL    RDW 13.7 11.6 - 14.5 %    PLATELET 991 912 - 311 K/uL    MPV 9.9 9.2 - 11.8 FL    NEUTROPHILS 57 40 - 73 %    LYMPHOCYTES 30 21 - 52 %    MONOCYTES 5 3 - 10 %    EOSINOPHILS 7 (H) 0 - 5 %    BASOPHILS 1 0 - 2 %    ABS. NEUTROPHILS 4.8 1.8 - 8.0 K/UL    ABS.  LYMPHOCYTES 2.5 0.9 - 3.6 K/UL    ABS. MONOCYTES 0.5 0.05 - 1.2 K/UL    ABS. EOSINOPHILS 0.6 (H) 0.0 - 0.4 K/UL    ABS. BASOPHILS 0.1 0.0 - 0.1 K/UL    DF AUTOMATED     METABOLIC PANEL, BASIC    Collection Time: 07/23/21  4:40 PM   Result Value Ref Range    Sodium 139 136 - 145 mmol/L    Potassium 3.9 3.5 - 5.5 mmol/L    Chloride 106 100 - 111 mmol/L    CO2 28 21 - 32 mmol/L    Anion gap 5 3.0 - 18 mmol/L    Glucose 90 74 - 99 mg/dL    BUN 12 7.0 - 18 MG/DL    Creatinine 0.82 0.6 - 1.3 MG/DL    BUN/Creatinine ratio 15 12 - 20      GFR est AA >60 >60 ml/min/1.73m2    GFR est non-AA >60 >60 ml/min/1.73m2    Calcium 8.6 8.5 - 10.1 MG/DL   TROPONIN I    Collection Time: 07/23/21  4:40 PM   Result Value Ref Range    Troponin-I, QT <0.02 0.0 - 0.045 NG/ML   EKG, 12 LEAD, INITIAL    Collection Time: 07/23/21  4:40 PM   Result Value Ref Range    Ventricular Rate 76 BPM    Atrial Rate 76 BPM    P-R Interval 130 ms    QRS Duration 72 ms    Q-T Interval 366 ms    QTC Calculation (Bezet) 411 ms    Calculated P Axis 67 degrees    Calculated R Axis -15 degrees    Calculated T Axis 47 degrees    Diagnosis       Normal sinus rhythm with sinus arrhythmia  Anterior infarct , age undetermined  Abnormal ECG  When compared with ECG of 08-JUN-2021 02:13,  Questionable change in QRS axis       CT HEAD WO CONT   Final Result   1. No acute intracranial process identified. XR CHEST PORT   Final Result      Negative study. EKG interpreted per myself at 1641 reveals normal sinus rhythm, rate 76. Normal intervals and axis. No ST segment elevations. No acute ischemic change. MDM  Number of Diagnoses or Management Options  Diagnosis management comments: Impression: 1. New onset headache. Consider migraine, tension contracture. Less likely due to acute structural intracranial process but given intermittent paresthesias of the arm will obtain CT scan to rule out possible mass lesion. 2.  Left upper extremity paresthesia.   Intermittent symptoms for 6 to 8 weeks. Nondermatomal.  Not associated with weakness. No associated cervical neck pain. Etiology of this paresthesias uncertain. No deficits currently. 3.  Right sided chest pain. Clinically this is musculoskeletal in nature, reproducible with palpation. Not associated with shortness of breath. Unlikely PE. Head CT scan reviewed. Patient medicated with migraine cocktail (Compazine 10 mg, Benadryl 25 mg, Toradol 15 mg IV). Chest x-ray, EKG, labs reviewed. Procedures      Diagnosis:   1. Acute nonintractable headache, unspecified headache type    2. Chest wall pain          Disposition: home    Follow-up Information     Follow up With Specialties Details Why Contact Info    Halina Serrano NP Nurse Practitioner Schedule an appointment as soon as possible for a visit in 3 days As needed, for recheck of ongoing symptoms [address] [place] [id number]  [phone] [address] [facility] EMERGENCY DEPT Emergency Medicine  As needed, If symptoms worsen [address] [facility]  [phone]          Patient's Medications   Start Taking    No medications on file   Continue Taking    ALBUTEROL (PROVENTIL HFA, VENTOLIN HFA, PROAIR HFA) 90 MCG/ACTUATION INHALER    Take 1-2 Puffs by inhalation every four (4) hours as needed for Wheezing. ASCORBIC ACID (VITAMIN C PO)    Take  by mouth. BIOTIN PO    Take  by mouth. BUDESONIDE-FORMOTEROL (SYMBICORT) 160-4.5 MCG/ACTUATION HFAA    Take 2 Puffs by inhalation every twelve (12) hours. CYCLOBENZAPRINE (FLEXERIL) 5 MG TABLET    Take 1 Tablet by mouth three (3) times daily as needed for Muscle Spasm(s). GABAPENTIN (NEURONTIN) 300 MG CAPSULE    Take 1 Capsule by mouth two (2) times a day. Max Daily Amount: 600 mg. LACTOBACILLUS ACIDOPHILUS (PROBIOTIC PO)    Take  by mouth.     NEBULIZER & COMPRESSOR MACHINE    Use as directed    NEBULIZER ACCESSORIES KIT    Use as directed   These Medications have changed    No medications on file   Stop Taking    No medications on file

## 2021-07-23 NOTE — ED NOTES
Patient states she drove here today. Explained to her that she could  Not drive home with the medication that she received. She will call to find a ride.

## 2021-07-24 LAB
ATRIAL RATE: 76 BPM
CALCULATED P AXIS, ECG09: 67 DEGREES
CALCULATED R AXIS, ECG10: -15 DEGREES
CALCULATED T AXIS, ECG11: 47 DEGREES
DIAGNOSIS, 93000: NORMAL
P-R INTERVAL, ECG05: 130 MS
Q-T INTERVAL, ECG07: 366 MS
QRS DURATION, ECG06: 72 MS
QTC CALCULATION (BEZET), ECG08: 411 MS
VENTRICULAR RATE, ECG03: 76 BPM

## 2021-07-26 ENCOUNTER — PATIENT OUTREACH (OUTPATIENT)
Dept: OTHER | Age: 41
End: 2021-07-26

## 2021-07-26 NOTE — PROGRESS NOTES
Initial HPRP:   Patient on report as discharged from [facility] ED Visit 7/23/21 for Acute nonintractable headache, unspecified headache type.  Initial attempt to contact patient for transitions of care.  Left discreet message on voicemail with this Care Coordinator's contact information.  Will attempt outreach on 7/27/21.  Sid Umanzor

## 2021-07-27 ENCOUNTER — PATIENT OUTREACH (OUTPATIENT)
Dept: OTHER | Age: 41
End: 2021-07-27

## 2021-07-27 NOTE — LETTER
7/27/2021 4:38 PM    Ms. Hayden Gan  [address] [address]    Dear Hayden Gan    My name is Jean Carlos Mcintosh, Associate Care Coordinator for [employer] and I have been trying to reach you. The Associate Care Management (ACM) program is a free-of-charge confidential service provided to our associates and their family members covered by the [facility]. The program will provide an associate and his/her family with the [facility] expertise to assist in navigating the health care delivery system, provider services, and their overall care needsso as to assure and improve health care interactions and enhance the quality of life. This program is designed to provide you with the opportunity to have a [employer] partner with you for the following services:     1) when you come home from the hospital or emergency room   2) when help is needed to manage your disease   3) when you need assistance coordinating services or appointments  4) when you need additional education, resources or assistance reaching your Be Well Health Program goals/requirements such as Be Well With Diabetes      [facility] is dedicated to empowering the good health of its community and improving the quality of care and care experiences for associates and their families. We are committed to safeguarding patient confidentiality and privacy, assuring that every associate has the respect he or she deserves in managing their health. The information shared with your care manager will not be shared with anyone else aside from those you identify as part of your care team, and will only be used to assist you with any identified care needs. Please contact me if you would like this service provided to you.        Sincerely,    Norm Storey, Dereck Plummer Coordinator  Associate Care Management  [facility]  [address]  [address]   [place] [phone]  F [phone]  [email or web address]

## 2021-07-27 NOTE — PROGRESS NOTES
Patient identified as eligible for [address] [place] [address] services. Second telephone outreach attempted. Left discreet voicemail with this CM confidential contact information. Will send UTR letter via [address] Next Outreach 8/12/21 f/u - PCP Appointment.

## 2021-07-29 ENCOUNTER — TELEPHONE (OUTPATIENT)
Dept: NEUROLOGY | Age: 41
End: 2021-07-29

## 2021-07-29 NOTE — TELEPHONE ENCOUNTER
Patient went to have MRI and was unable to have procedure due to her having hair pins in her hair. She tried to reschedule but was told her order  and a new order is needed. Please advise.

## 2021-07-31 DIAGNOSIS — R20.0 LEFT SIDED NUMBNESS: ICD-10-CM

## 2021-07-31 DIAGNOSIS — H53.8 BLURRING, LEFT EYE: Primary | ICD-10-CM

## 2021-08-03 ENCOUNTER — OFFICE VISIT (OUTPATIENT)
Dept: FAMILY MEDICINE CLINIC | Age: 41
End: 2021-08-03
Payer: COMMERCIAL

## 2021-08-03 VITALS
OXYGEN SATURATION: 98 % | HEIGHT: 64 IN | RESPIRATION RATE: 16 BRPM | DIASTOLIC BLOOD PRESSURE: 80 MMHG | SYSTOLIC BLOOD PRESSURE: 122 MMHG | BODY MASS INDEX: 33.29 KG/M2 | HEART RATE: 83 BPM | WEIGHT: 195 LBS | TEMPERATURE: 98.5 F

## 2021-08-03 DIAGNOSIS — R20.2 PARESTHESIA: Primary | ICD-10-CM

## 2021-08-03 DIAGNOSIS — J45.41 MODERATE PERSISTENT ASTHMA WITH ACUTE EXACERBATION: ICD-10-CM

## 2021-08-03 DIAGNOSIS — R51.9 HEADACHE DISORDER: ICD-10-CM

## 2021-08-03 PROCEDURE — 99214 OFFICE O/P EST MOD 30 MIN: CPT | Performed by: NURSE PRACTITIONER

## 2021-08-03 RX ORDER — ALBUTEROL SULFATE 90 UG/1
1-2 AEROSOL, METERED RESPIRATORY (INHALATION)
Qty: 1 INHALER | Refills: 2 | Status: SHIPPED | OUTPATIENT
Start: 2021-08-03 | End: 2021-09-05 | Stop reason: SDUPTHER

## 2021-08-03 RX ORDER — GABAPENTIN 300 MG/1
300 CAPSULE ORAL 2 TIMES DAILY
Qty: 180 CAPSULE | Refills: 1 | Status: SHIPPED | OUTPATIENT
Start: 2021-08-03 | End: 2022-01-07 | Stop reason: SDUPTHER

## 2021-08-03 RX ORDER — SUMATRIPTAN 50 MG/1
TABLET, FILM COATED ORAL
Qty: 15 TABLET | Refills: 1 | Status: SHIPPED | OUTPATIENT
Start: 2021-08-03 | End: 2021-09-05

## 2021-08-03 NOTE — PROGRESS NOTES
Vishal Leos presents today for   Chief Complaint   Patient presents with    Tingling     arms and legs    Dizziness    Headache       Is someone accompanying this pt? no    Is the patient using any DME equipment during OV? no    Depression Screening:  3 most recent PHQ Screens 8/3/2021   Little interest or pleasure in doing things Not at all   Feeling down, depressed, irritable, or hopeless Not at all   Total Score PHQ 2 0       Learning Assessment:  Learning Assessment 11/25/2015   PRIMARY LEARNER Patient   HIGHEST LEVEL OF EDUCATION - PRIMARY LEARNER  2 YEARS OF COLLEGE   BARRIERS PRIMARY LEARNER NONE   PRIMARY LANGUAGE ENGLISH   LEARNER PREFERENCE PRIMARY READING     VIDEOS     -   ANSWERED BY Patient   RELATIONSHIP SELF       Health Maintenance reviewed and discussed and ordered per Provider. Health Maintenance Due   Topic Date Due    PAP AKA CERVICAL CYTOLOGY  04/06/2021   . Coordination of Care:  1. Have you been to the ER, urgent care clinic since your last visit? Hospitalized since your last visit? yes    2. Have you seen or consulted any other health care providers outside of the [address] [place] [address] since your last visit? Include any pap smears or colon screening.  no

## 2021-08-03 NOTE — LETTER
NOTIFICATION RETURN TO WORK / SCHOOL    8/3/2021 9:24 AM    Ms. Valery Torres  [address] [address]      To Whom It May Concern:    Valery Torres is currently under the care of [facility]. She will return to work/school on: 8/9/2021    If there are questions or concerns please have the patient contact our office.         Sincerely,          Bee Beard NP

## 2021-08-03 NOTE — PROGRESS NOTES
Donald Rivera is a 39 y.o. female presenting today for Tingling (arms and legs), Dizziness, and Headache  . Chief Complaint   Patient presents with    Tingling     arms and legs    Dizziness    Headache       HPI:  Donald Rivera presents to the office today for paresthesia to her arms and legs. She is complaining of dizziness and headache. Feels like something is attacking her body. She is concerned about possible MS as her sister was diagnosed with the disease at her age. The symptoms have been ongoing x 2 weeks. She was seen by Dr. Quentin Tinoco in February, 2021 for headache and a MRI was ordered which she has not completed. She reports she did have numbness in her hand when she was seen by Neurologist and was diagnosed with CTS. She reports her symptoms have progressively worsening and has beenawaken out of her sleep secondary to pain. She was seen in the ED and given a prescription for Gabapentin which has been helpful  She is complaining of headache x 2 weeks. She is scheduled for an MRI on Tuesday, August 10, 2021    Review of Systems   Neurological: Positive for headaches. Paresthesia       ROS:  History obtained from the patient intake forms which are reviewed with the patient  · General: negative for - chills, fever, weight changes or malaise  · HEENT: no sore throat, nasal congestion, vision problems or ear problems  · Respiratory: no cough, shortness of breath, or wheezing  · Cardiovascular: no chest pain, palpitations, or dyspnea on exertion  · Gastrointestinal: no abdominal pain, N/V, change in bowel habits, or black or bloody stools  · Musculoskeletal: no back pain, joint pain, joint stiffness, muscle pain or muscle weakness  · Neurological: paresthesia bilateral arms, legs, face, lip and chest, headache  · Endo:  No polyuria or polydipsia. · : no hematuria, dysuria, frequency, hesitancy, or nocturia.     · Psychological: negative for - anxiety, depression, sleep disturbances, suicidal or homicidal ideations    Allergies   Allergen Reactions    Azo [Phenazopyridine] Hives    Iodinated Contrast Media Shortness of Breath     Other reaction(s): wheezing/sob       PHQ Screening   3 most recent PHQ Screens 8/3/2021   Little interest or pleasure in doing things Not at all   Feeling down, depressed, irritable, or hopeless Not at all   Total Score PHQ 2 0       History  Past Medical History:   Diagnosis Date    Asthma     Chest pain     Chronic pain 6 months    painful menses    Fibroids     GERD (gastroesophageal reflux disease)     Headache disorder     Heart palpitations        Past Surgical History:   Procedure Laterality Date    HX  SECTION  ,51    with BTL    HX HYSTEROSCOPY WITH ENDOMETRIAL ABLATION  2012    No period since       Social History     Socioeconomic History    Marital status:      Spouse name: Not on file    Number of children: Not on file    Years of education: Not on file    Highest education level: Not on file   Occupational History     Employer: [employer]   Tobacco Use    Smoking status: Former Smoker     Types: Cigarettes     Quit date: 3/23/1998     Years since quittin.3    Smokeless tobacco: Former User    Tobacco comment: smoked a few as a teen, Vaped for a year   Substance and Sexual Activity    Alcohol use: Yes     Comment: socially, 2 beer/weekend    Drug use: No    Sexual activity: Yes     Partners: Male     Birth control/protection: Surgical   Other Topics Concern     Service Yes     Comment: [employer]    Blood Transfusions No    Caffeine Concern No    Occupational Exposure No    Hobby Hazards No    Sleep Concern No    Stress Concern No    Weight Concern Yes     Comment: PT IS OVER WEIGHT    Special Diet No    Back Care No    Exercise Yes     Comment: 4 DAYS A WEEK    Bike Helmet No    Seat Belt Yes    Self-Exams Yes   Social History Narrative    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  She has no pets.      Social Determinants of Health     Financial Resource Strain:     Difficulty of Paying Living Expenses:    Food Insecurity:     Worried About Running Out of Food in the Last Year:     [address] in the Last Year:    Transportation Needs:     Lack of Transportation (Medical):  Lack of Transportation (Non-Medical):    Physical Activity:     Days of Exercise per Week:     Minutes of Exercise per Session:    Stress:     Feeling of Stress :    Social Connections:     Frequency of Communication with Friends and Family:     Frequency of Social Gatherings with Friends and Family:     Attends [employer] Services:     Active Member of Clubs or Organizations:     Attends Club or Organization Meetings:     Marital Status:    Intimate Partner Violence:     Fear of Current or Ex-Partner:     Emotionally Abused:     Physically Abused:     Sexually Abused:        Current Outpatient Medications   Medication Sig Dispense Refill    albuterol (PROVENTIL HFA, VENTOLIN HFA, PROAIR HFA) 90 mcg/actuation inhaler Take 1-2 Puffs by inhalation every four (4) hours as needed for Wheezing. 1 Inhaler 2    gabapentin (Neurontin) 300 mg capsule Take 1 Capsule by mouth two (2) times a day. Max Daily Amount: 600 mg. 180 Capsule 1    SUMAtriptan (IMITREX) 50 mg tablet Take 1 tablet by mouth x 1 doses. May repeat x 1 dose in 2 hours if needed. Max dose is 100 mg/day 15 Tablet 1    budesonide-formoteroL (SYMBICORT) 160-4.5 mcg/actuation HFAA Take 2 Puffs by inhalation every twelve (12) hours. 10.2 Inhaler 2    Nebulizer & Compressor machine Use as directed 1 Each 0    Nebulizer Accessories kit Use as directed 1 Kit 1    cyclobenzaprine (FLEXERIL) 5 mg tablet Take 1 Tablet by mouth three (3) times daily as needed for Muscle Spasm(s). (Patient not taking: Reported on 8/3/2021) 30 Tablet 1    BIOTIN PO Take  by mouth.  (Patient not taking: Reported on 8/3/2021)      Lactobacillus acidophilus (PROBIOTIC PO) Take by mouth. (Patient not taking: Reported on 6/3/2021)      ascorbic acid (VITAMIN C PO) Take  by mouth. (Patient not taking: Reported on 8/3/2021)           Vitals:    08/03/21 0847 08/03/21 0937   BP: (!) 144/90 122/80   Pulse: 83    Resp: 16    Temp: 98.5 °F (36.9 °C)    TempSrc: Oral    SpO2: 98%    Weight: 195 lb (88.5 kg)    Height: 5' 4\" (1.626 m)        Physical Exam  Vitals and nursing note reviewed. Constitutional:       Appearance: Normal appearance. HENT:      Head: Normocephalic. Cardiovascular:      Rate and Rhythm: Normal rate and regular rhythm. Pulses: Normal pulses. Heart sounds: Normal heart sounds. Pulmonary:      Effort: Pulmonary effort is normal.      Breath sounds: Normal breath sounds. Abdominal:      General: Bowel sounds are normal.      Palpations: Abdomen is soft. Skin:     General: Skin is warm and dry. Neurological:      General: No focal deficit present. Mental Status: She is alert.          Admission on 07/23/2021, Discharged on 07/23/2021   Component Date Value Ref Range Status    Ventricular Rate 07/23/2021 76  BPM Final    Atrial Rate 07/23/2021 76  BPM Final    P-R Interval 07/23/2021 130  ms Final    QRS Duration 07/23/2021 72  ms Final    Q-T Interval 07/23/2021 366  ms Final    QTC Calculation (Bezet) 07/23/2021 411  ms Final    Calculated P Axis 07/23/2021 67  degrees Final    Calculated R Axis 07/23/2021 -15  degrees Final    Calculated T Axis 07/23/2021 47  degrees Final    Diagnosis 07/23/2021    Final                    Value:Normal sinus rhythm with sinus arrhythmia  Anterior infarct , age undetermined  Abnormal ECG  When compared with ECG of 08-JUN-2021 02:13,  Questionable change in QRS axis  Confirmed by Becky Carl MD, ----- ([id number]) on 7/24/2021 9:28:11 AM      WBC 07/23/2021 8.4  4.6 - 13.2 K/uL Final    RBC 07/23/2021 4.78  4.20 - 5.30 M/uL Final    HGB 07/23/2021 12.8  12.0 - 16.0 g/dL Final    HCT 07/23/2021 39.5  35.0 - 45.0 % Final    MCV 07/23/2021 82.6  74.0 - 97.0 FL Final    MCH 07/23/2021 26.8  24.0 - 34.0 PG Final    MCHC 07/23/2021 32.4  31.0 - 37.0 g/dL Final    RDW 07/23/2021 13.7  11.6 - 14.5 % Final    PLATELET 61/14/0707 725  135 - 420 K/uL Final    MPV 07/23/2021 9.9  9.2 - 11.8 FL Final    NEUTROPHILS 07/23/2021 57  40 - 73 % Final    LYMPHOCYTES 07/23/2021 30  21 - 52 % Final    MONOCYTES 07/23/2021 5  3 - 10 % Final    EOSINOPHILS 07/23/2021 7* 0 - 5 % Final    BASOPHILS 07/23/2021 1  0 - 2 % Final    ABS. NEUTROPHILS 07/23/2021 4.8  1.8 - 8.0 K/UL Final    ABS. LYMPHOCYTES 07/23/2021 2.5  0.9 - 3.6 K/UL Final    ABS. MONOCYTES 07/23/2021 0.5  0.05 - 1.2 K/UL Final    ABS. EOSINOPHILS 07/23/2021 0.6* 0.0 - 0.4 K/UL Final    ABS. BASOPHILS 07/23/2021 0.1  0.0 - 0.1 K/UL Final    DF 07/23/2021 AUTOMATED    Final    Sodium 07/23/2021 139  136 - 145 mmol/L Final    Potassium 07/23/2021 3.9  3.5 - 5.5 mmol/L Final    Chloride 07/23/2021 106  100 - 111 mmol/L Final    CO2 07/23/2021 28  21 - 32 mmol/L Final    Anion gap 07/23/2021 5  3.0 - 18 mmol/L Final    Glucose 07/23/2021 90  74 - 99 mg/dL Final    BUN 07/23/2021 12  7.0 - 18 MG/DL Final    Creatinine 07/23/2021 0.82  0.6 - 1.3 MG/DL Final    BUN/Creatinine ratio 07/23/2021 15  12 - 20   Final    GFR est AA 07/23/2021 >60  >60 ml/min/1.73m2 Final    GFR est non-AA 07/23/2021 >60  >60 ml/min/1.73m2 Final    Comment: (NOTE)  Estimated GFR is calculated using the Modification of Diet in Renal   Disease (MDRD) Study equation, reported for both  Americans   (GFRAA) and non- Americans (GFRNA), and normalized to 1.73m2   body surface area. The physician must decide which value applies to   the patient. The MDRD study equation should only be used in   individuals age 25 or older.  It has not been validated for the   following: pregnant women, patients with serious comorbid conditions,   or on certain medications, or persons with extremes of body size,   muscle mass, or nutritional status.  Calcium 07/23/2021 8.6  8.5 - 10.1 MG/DL Final    Troponin-I, QT 07/23/2021 <0.02  0.0 - 0.045 NG/ML Final    Comment: The presence of detectable troponin above the reference range indicates myocardial injury which may be due to ischemia, myocarditis, trauma, etc.  Clinical correlation is necessary to establish the significance of this finding. Sequential testing is recommended to determine if the typical rise and fall of cTnI is demonstrated. Note:  Cardiac troponin I has a relatively long half life and may be present well after the CK MB has returned to baseline. The reference range is based on the 99th percentile of the referent population. Orders Only on 07/10/2021   Component Date Value Ref Range Status    Glucose 07/10/2021 65* 74 - 99 mg/dL Final    Cholesterol, total 07/10/2021 152  <200 MG/DL Final    HDL Cholesterol 07/10/2021 69* 40 - 60 MG/DL Final    LDL, calculated 07/10/2021 72.6  0 - 100 MG/DL Final    Triglyceride 07/10/2021 52  <150 MG/DL Final    Comment: The drugs N-acetylcysteine (NAC) and  Metamiszole have been found to cause falsely  low results in this chemical assay. Please  be sure to submit blood samples obtained  BEFORE administration of either of these  drugs to assure correct results.      Admission on 06/08/2021, Discharged on 06/08/2021   Component Date Value Ref Range Status    Ventricular Rate 06/08/2021 81  BPM Final    Atrial Rate 06/08/2021 81  BPM Final    P-R Interval 06/08/2021 130  ms Final    QRS Duration 06/08/2021 78  ms Final    Q-T Interval 06/08/2021 366  ms Final    QTC Calculation (Bezet) 06/08/2021 425  ms Final    Calculated P Axis 06/08/2021 63  degrees Final    Calculated R Axis 06/08/2021 54  degrees Final    Calculated T Axis 06/08/2021 58  degrees Final    Diagnosis 06/08/2021    Final                    Value:Normal sinus rhythm  Normal ECG  When compared with ECG of 04-OCT-2020 23:47,  No significant change was found  Confirmed by Becky Carl MD, ----- ([id number]) on 6/8/2021 4:00:58 PM      WBC 06/08/2021 11.5  4.6 - 13.2 K/uL Final    RBC 06/08/2021 4.67  4.20 - 5.30 M/uL Final    HGB 06/08/2021 12.8  12.0 - 16.0 g/dL Final    HCT 06/08/2021 37.9  35.0 - 45.0 % Final    MCV 06/08/2021 81.2  74.0 - 97.0 FL Final    MCH 06/08/2021 27.4  24.0 - 34.0 PG Final    MCHC 06/08/2021 33.8  31.0 - 37.0 g/dL Final    RDW 06/08/2021 14.2  11.6 - 14.5 % Final    PLATELET 57/09/7532 658  135 - 420 K/uL Final    MPV 06/08/2021 10.2  9.2 - 11.8 FL Final    NEUTROPHILS 06/08/2021 67  40 - 73 % Final    LYMPHOCYTES 06/08/2021 22  21 - 52 % Final    MONOCYTES 06/08/2021 6  3 - 10 % Final    EOSINOPHILS 06/08/2021 5  0 - 5 % Final    BASOPHILS 06/08/2021 0  0 - 2 % Final    ABS. NEUTROPHILS 06/08/2021 7.7  1.8 - 8.0 K/UL Final    ABS. LYMPHOCYTES 06/08/2021 2.5  0.9 - 3.6 K/UL Final    ABS. MONOCYTES 06/08/2021 0.7  0.05 - 1.2 K/UL Final    ABS. EOSINOPHILS 06/08/2021 0.6* 0.0 - 0.4 K/UL Final    ABS.  BASOPHILS 06/08/2021 0.0  0.0 - 0.1 K/UL Final    DF 06/08/2021 AUTOMATED    Final    Sodium 06/08/2021 138  136 - 145 mmol/L Final    Potassium 06/08/2021 3.1* 3.5 - 5.5 mmol/L Final    Chloride 06/08/2021 108  100 - 111 mmol/L Final    CO2 06/08/2021 23  21 - 32 mmol/L Final    Anion gap 06/08/2021 7  3.0 - 18 mmol/L Final    Glucose 06/08/2021 86  74 - 99 mg/dL Final    BUN 06/08/2021 16  7.0 - 18 MG/DL Final    Creatinine 06/08/2021 0.91  0.6 - 1.3 MG/DL Final    BUN/Creatinine ratio 06/08/2021 18  12 - 20   Final    GFR est AA 06/08/2021 >60  >60 ml/min/1.73m2 Final    GFR est non-AA 06/08/2021 >60  >60 ml/min/1.73m2 Final    Comment: (NOTE)  Estimated GFR is calculated using the Modification of Diet in Renal   Disease (MDRD) Study equation, reported for both  Americans   (GFRAA) and non- Americans (GFRNA), and normalized to 1.73m2   body surface area. The physician must decide which value applies to   the patient. The MDRD study equation should only be used in   individuals age 25 or older. It has not been validated for the   following: pregnant women, patients with serious comorbid conditions,   or on certain medications, or persons with extremes of body size,   muscle mass, or nutritional status.  Calcium 06/08/2021 8.6  8.5 - 10.1 MG/DL Final    Bilirubin, total 06/08/2021 0.4  0.2 - 1.0 MG/DL Final    ALT (SGPT) 06/08/2021 25  13 - 56 U/L Final    AST (SGOT) 06/08/2021 26  10 - 38 U/L Final    Alk. phosphatase 06/08/2021 77  45 - 117 U/L Final    Protein, total 06/08/2021 7.3  6.4 - 8.2 g/dL Final    Albumin 06/08/2021 3.7  3.4 - 5.0 g/dL Final    Globulin 06/08/2021 3.6  2.0 - 4.0 g/dL Final    A-G Ratio 06/08/2021 1.0  0.8 - 1.7   Final    CK - MB 06/08/2021 2.2  <3.6 ng/ml Final    CK-MB Index 06/08/2021 0.7  0.0 - 4.0 % Final    CK 06/08/2021 294* 26 - 192 U/L Final    Troponin-I, QT 06/08/2021 <0.02  0.0 - 0.045 NG/ML Final    Comment: The presence of detectable troponin above the reference range indicates myocardial injury which may be due to ischemia, myocarditis, trauma, etc.  Clinical correlation is necessary to establish the significance of this finding. Sequential testing is recommended to determine if the typical rise and fall of cTnI is demonstrated. Note:  Cardiac troponin I has a relatively long half life and may be present well after the CK MB has returned to baseline. The reference range is based on the 99th percentile of the referent population. No results found for any visits on 08/03/21. Patient Care Team:  Patient Care Team:  Kaleb Saucedo NP as PCP - General (Nurse Practitioner)  Kaleb Saucedo NP as PCP - Arn[address] Dr Empaneled Provider  Naseem Cortes MD (Pulmonary Disease)  Lizabeth Camarena as Benefits Care Manager      Assessment / Plan:      ICD-10-CM ICD-9-CM    1.  Paresthesia  R20.2 782.0 gabapentin (Neurontin) 300 mg capsule      POTASSIUM   2. Headache disorder  R51.9 784.0 SUMAtriptan (IMITREX) 50 mg tablet   3. Moderate persistent asthma with acute exacerbation  J45.41 493.92 albuterol (PROVENTIL HFA, VENTOLIN HFA, PROAIR HFA) 90 mcg/actuation inhaler     [id number] with neurology  Medication refills  Sumatriptan for headache disorder  3-month follow-up        I asked the patient if she  had any questions and answered her  questions. The patient stated that she understands the treatment plan and agrees with the treatment plan    This document was created with a voice activated dictation system and may contain transcription errors.

## 2021-08-10 ENCOUNTER — HOSPITAL ENCOUNTER (OUTPATIENT)
Age: 41
Discharge: HOME | End: 2021-08-10
Attending: STUDENT IN AN ORGANIZED HEALTH CARE EDUCATION/TRAINING PROGRAM
Payer: COMMERCIAL

## 2021-08-10 PROCEDURE — A9575 INJ GADOTERATE MEGLUMI 0.1ML: HCPCS | Performed by: STUDENT IN AN ORGANIZED HEALTH CARE EDUCATION/TRAINING PROGRAM

## 2021-08-10 PROCEDURE — 74011636320 HC RX REV CODE- 636/320: Performed by: STUDENT IN AN ORGANIZED HEALTH CARE EDUCATION/TRAINING PROGRAM

## 2021-08-10 PROCEDURE — 70553 MRI BRAIN STEM W/O & W/DYE: CPT

## 2021-08-10 RX ADMIN — GADOTERATE MEGLUMINE 18 ML: 376.9 INJECTION INTRAVENOUS at 07:32

## 2021-08-12 ENCOUNTER — PATIENT OUTREACH (OUTPATIENT)
Dept: OTHER | Age: 41
End: 2021-08-12

## 2021-08-12 NOTE — PROGRESS NOTES
HPRP progress note    Patient eligible for [facility] care management    Received notification of discharge from [facility] [place] ED on 21 for Acute nonintractable headache. Contacted patient to discuss post discharge needs and offer care management services. Two patient identifiers verified. Discussed the care management program.  Patient agrees to care management services at this time. PMH:   Past Medical History:   Diagnosis Date    Asthma     Chest pain     Chronic pain 6 months    painful menses    Fibroids     GERD (gastroesophageal reflux disease)     Headache disorder     Heart palpitations        Social History:   Social History     Socioeconomic History    Marital status:      Spouse name: Not on file    Number of children: Not on file    Years of education: Not on file    Highest education level: Not on file   Occupational History     Employer: [employer]   Tobacco Use    Smoking status: Former Smoker     Types: Cigarettes     Quit date: 3/23/1998     Years since quittin.4    Smokeless tobacco: Former User    Tobacco comment: smoked a few as a teen, Vaped for a year   Substance and Sexual Activity    Alcohol use: Yes     Comment: socially, 2 beer/weekend    Drug use: No    Sexual activity: Yes     Partners: Male     Birth control/protection: Surgical   Other Topics Concern     Service Yes     Comment: [employer]    Blood Transfusions No    Caffeine Concern No    Occupational Exposure No    Hobby Hazards No    Sleep Concern No    Stress Concern No    Weight Concern Yes     Comment: PT IS OVER WEIGHT    Special Diet No    Back Care No    Exercise Yes     Comment: 4 DAYS A WEEK    Bike Helmet No    Seat Belt Yes    Self-Exams Yes   Social History Narrative    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       Social Determinants of Health     Financial Resource Strain:     Difficulty of Paying Living Expenses:    Food Insecurity:     Worried About Running Out of Food in the Last Year:     Angel of Food in the Last Year:    Transportation Needs:     Lack of Transportation (Medical):  Lack of Transportation (Non-Medical):    Physical Activity:     Days of Exercise per Week:     Minutes of Exercise per Session:    Stress:     Feeling of Stress :    Social Connections:     Frequency of Communication with Friends and Family:     Frequency of Social Gatherings with Friends and Family:     Attends [employer] Services:     Active Member of Clubs or Organizations:     Attends Club or Organization Meetings:     Marital Status:    Intimate Partner Violence:     Fear of Current or Ex-Partner:     Emotionally Abused:     Physically Abused:     Sexually Abused:          Care management assessment completed:    *Spoke with patient who reports that she is doing ok. Patient states that her headaches are worsening. Patient states that she saw her PCP and was given a prescription for Migraine Headache and it has helped some. Patient also states that she is still experiencing Numbness in her Back and   Patient had an MRI and will see Neurology for results. Chart Review: 8/3/21 AMPARO Cabralelda Snellen presents to the office today for paresthesia to her arms and legs. She is complaining of dizziness and headache. Feels like something is attacking her body. She is concerned about possible MS as her sister was diagnosed with the disease at her age. The symptoms have been ongoing x 2 weeks. She was seen by Dr. Yolanda Saucedo in February, 2021 for headache and a MRI was ordered which she has not completed. She reports she did have numbness in her hand when she was seen by Neurologist and was diagnosed with CTS. She reports her symptoms have progressively worsening and has beenawaken out of her sleep secondary to pain.  She was seen in the ED and given a prescription for Gabapentin which has been helpful  She is complaining of headache x 2 weeks.  She is scheduled for an MRI on Tuesday, August 10, 2021       Assessment / Plan:     [id number] with neurology  Medication refills  Sumatriptan for headache disorder  3-month follow-up    Condition Focused Assessment:  Neurological Condition Focused Assessment    Description of pain: throbbing pain, sharp pain. Associated symptoms: dizziness. Have you recently had any of the following? Any recent changes in mood, thinking or new symptoms no  Any change in speech no  Difficulty swallowing no  Dizziness/lightheadedness yes  Fatigue no    Anxiety no  Confusion no   Sleep disturbance no     Visual changes no  Limb paralysis, or facial drooping no  Weakness no  Trouble with coordinating your movement, or change in gait no  Medication changes? Sumatriptan (IMITREX) 50mg tablet - Migraine  Any recent changes in activity no      Red Flags:  Call 911 anytime you think you may need emergency care. For example, call if:   Recognize signs and symptoms of STROKE:  F-face looks uneven  A-arms unable to move or move unevenly  S-speech slurred or non-existent  T-time-call 911 as soon as signs and symptoms begin-DO NOT go  Back to bed or wait to see if you get better-TIME IS BRAIN. Medications:  New Medications at Discharge: None Noted  Changed Medications at Discharge: None Noted  Discontinued Medications at Discharge: None Noted  Current Outpatient Medications   Medication Sig    albuterol (PROVENTIL HFA, VENTOLIN HFA, PROAIR HFA) 90 mcg/actuation inhaler Take 1-2 Puffs by inhalation every four (4) hours as needed for Wheezing.  gabapentin (Neurontin) 300 mg capsule Take 1 Capsule by mouth two (2) times a day. Max Daily Amount: 600 mg.    SUMAtriptan (IMITREX) 50 mg tablet Take 1 tablet by mouth x 1 doses. May repeat x 1 dose in 2 hours if needed. Max dose is 100 mg/day    cyclobenzaprine (FLEXERIL) 5 mg tablet Take 1 Tablet by mouth three (3) times daily as needed for Muscle Spasm(s).  (Patient not taking: Reported on 8/3/2021)    budesonide-formoteroL (SYMBICORT) 160-4.5 mcg/actuation HFAA Take 2 Puffs by inhalation every twelve (12) hours.  BIOTIN PO Take  by mouth. (Patient not taking: Reported on 8/3/2021)    Lactobacillus acidophilus (PROBIOTIC PO) Take  by mouth. (Patient not taking: Reported on 6/3/2021)    ascorbic acid (VITAMIN C PO) Take  by mouth. (Patient not taking: Reported on 8/3/2021)    Nebulizer & Compressor machine Use as directed   [facility] Use as directed     No current facility-administered medications for this visit. There are no discontinued medications. Performed medication reconciliation with patient, and patient verbalizes understanding of administration of home medications. There were no barriers to obtaining medications identified at this time. Preventative Care     Health Maintenance   Topic Date Due    PAP AKA CERVICAL CYTOLOGY  04/06/2021    Flu Vaccine (1) 09/01/2021    Lipid Screen  12/21/2023    DTaP/Tdap/Td series (2 - Td or Tdap) 04/29/2025    Hepatitis C Screening  Completed    COVID-19 Vaccine  Completed    Pneumococcal 0-64 years  Aged Out       CM Identified  Problems  (Contributing problems for risk for readmission)   1. Pain  2. Potential Learning Needs       Goals:  Initial Plan of Care as discussed and agreed with patient:      Patients pain will be controlled at 5/10 or less with pain medication and/or non-pharmacologic methods.   · 8/12/21 -  Rates pain as 8 or 9 on 0-10 pain scale     Demonstrates no  Red Flag Symptoms  Educated on signs and symptoms of infection to monitor for  · 8/12/21 - Denies any Red Flag Symptoms    Completes all Follow-Up appointments within 30 days of ED Visit  Educated on importance of Follow Up for prevention of complications  · [birth year] - Patient attended Initial PCP Appointment on Tuesday, 8/3/21 @ 8:40a  · Patient has Neurology Appointment on Monday, 8/23/21 @ 10a      Barriers/Support system:  patient and spouse    Barriers/Challenges to Care: []  Decline in memory    []  Language barrier     []  Emotional                  []  Limited mobility  []  Lack of motivation     [] Vision, hearing or cognitive impairment []  Knowledge [] Financial Barriers []  Lack of support  [x]  Pain [x]  Other Back and Arm Numbness []  None    PCP/Specialist follow up: Patient is scheduled to follow up with Suma Collins NP in 3 months. Future Appointments   Date Time Provider Sammi Pradhan   8/23/2021 10:00 AM Miguelina Amos NP [facility] AMB   10/4/2021  9:20 AM Dannette Spurling, MD [facility] AMB      Reviewed red flags with patient, and patient verbalizes understanding. Patient given an opportunity to ask questions. No other clinical/social/functional needs noted. The patient agrees to contact the PCP office for questions related to their healthcare. The patient expressed thanks, offered no additional questions and ended the call.       Plan for next call: 8/31/21 f/u - Neurology Appointment

## 2021-08-14 DIAGNOSIS — R20.0 LEFT SIDED NUMBNESS: ICD-10-CM

## 2021-08-19 ENCOUNTER — HOSPITAL ENCOUNTER (EMERGENCY)
Age: 41
Discharge: HOME | End: 2021-08-19
Attending: EMERGENCY MEDICINE
Payer: COMMERCIAL

## 2021-08-19 VITALS
OXYGEN SATURATION: 100 % | TEMPERATURE: 98 F | HEART RATE: 61 BPM | DIASTOLIC BLOOD PRESSURE: 64 MMHG | RESPIRATION RATE: 14 BRPM | SYSTOLIC BLOOD PRESSURE: 101 MMHG

## 2021-08-19 DIAGNOSIS — N39.0 URINARY TRACT INFECTION WITHOUT HEMATURIA, SITE UNSPECIFIED: ICD-10-CM

## 2021-08-19 DIAGNOSIS — E87.6 HYPOKALEMIA: Primary | ICD-10-CM

## 2021-08-19 DIAGNOSIS — G43.809 OTHER MIGRAINE WITHOUT STATUS MIGRAINOSUS, NOT INTRACTABLE: ICD-10-CM

## 2021-08-19 LAB
ALBUMIN SERPL-MCNC: 3.6 G/DL (ref 3.4–5)
ALBUMIN/GLOB SERPL: 1 {RATIO} (ref 0.8–1.7)
ALP SERPL-CCNC: 75 U/L (ref 45–117)
ALT SERPL-CCNC: 21 U/L (ref 13–56)
ANION GAP SERPL CALC-SCNC: 7 MMOL/L (ref 3–18)
APPEARANCE UR: ABNORMAL
AST SERPL-CCNC: 26 U/L (ref 10–38)
ATRIAL RATE: 72 BPM
BACTERIA URNS QL MICRO: ABNORMAL /HPF
BASOPHILS # BLD: 0.1 K/UL (ref 0–0.1)
BASOPHILS NFR BLD: 1 % (ref 0–2)
BILIRUB SERPL-MCNC: 0.2 MG/DL (ref 0.2–1)
BILIRUB UR QL: NEGATIVE
BUN SERPL-MCNC: 14 MG/DL (ref 7–18)
BUN/CREAT SERPL: 13 (ref 12–20)
CALCIUM SERPL-MCNC: 8.5 MG/DL (ref 8.5–10.1)
CALCULATED P AXIS, ECG09: 67 DEGREES
CALCULATED R AXIS, ECG10: 13 DEGREES
CALCULATED T AXIS, ECG11: 44 DEGREES
CHLORIDE SERPL-SCNC: 112 MMOL/L (ref 100–111)
CO2 SERPL-SCNC: 23 MMOL/L (ref 21–32)
COLOR UR: YELLOW
CREAT SERPL-MCNC: 1.07 MG/DL (ref 0.6–1.3)
DIAGNOSIS, 93000: NORMAL
DIFFERENTIAL METHOD BLD: NORMAL
EOSINOPHIL # BLD: 0.4 K/UL (ref 0–0.4)
EOSINOPHIL NFR BLD: 4 % (ref 0–5)
EPITH CASTS URNS QL MICRO: ABNORMAL /LPF (ref 0–5)
ERYTHROCYTE [DISTWIDTH] IN BLOOD BY AUTOMATED COUNT: 13.8 % (ref 11.6–14.5)
GLOBULIN SER CALC-MCNC: 3.5 G/DL (ref 2–4)
GLUCOSE SERPL-MCNC: 87 MG/DL (ref 74–99)
GLUCOSE UR STRIP.AUTO-MCNC: NEGATIVE MG/DL
HCG SERPL QL: NEGATIVE
HCT VFR BLD AUTO: 38 % (ref 35–45)
HGB BLD-MCNC: 12.6 G/DL (ref 12–16)
HGB UR QL STRIP: NEGATIVE
KETONES UR QL STRIP.AUTO: NEGATIVE MG/DL
LEUKOCYTE ESTERASE UR QL STRIP.AUTO: ABNORMAL
LYMPHOCYTES # BLD: 2.7 K/UL (ref 0.9–3.6)
LYMPHOCYTES NFR BLD: 29 % (ref 21–52)
MCH RBC QN AUTO: 26.5 PG (ref 24–34)
MCHC RBC AUTO-ENTMCNC: 33.2 G/DL (ref 31–37)
MCV RBC AUTO: 80 FL (ref 74–97)
MONOCYTES # BLD: 0.6 K/UL (ref 0.05–1.2)
MONOCYTES NFR BLD: 6 % (ref 3–10)
NEUTS SEG # BLD: 5.4 K/UL (ref 1.8–8)
NEUTS SEG NFR BLD: 60 % (ref 40–73)
NITRITE UR QL STRIP.AUTO: NEGATIVE
P-R INTERVAL, ECG05: 138 MS
PH UR STRIP: 7 [PH] (ref 5–8)
PLATELET # BLD AUTO: 291 K/UL (ref 135–420)
PMV BLD AUTO: 9.8 FL (ref 9.2–11.8)
POTASSIUM SERPL-SCNC: 3.2 MMOL/L (ref 3.5–5.5)
PROT SERPL-MCNC: 7.1 G/DL (ref 6.4–8.2)
PROT UR STRIP-MCNC: NEGATIVE MG/DL
Q-T INTERVAL, ECG07: 384 MS
QRS DURATION, ECG06: 76 MS
QTC CALCULATION (BEZET), ECG08: 420 MS
RBC # BLD AUTO: 4.75 M/UL (ref 4.2–5.3)
RBC #/AREA URNS HPF: ABNORMAL /HPF (ref 0–5)
SODIUM SERPL-SCNC: 142 MMOL/L (ref 136–145)
SP GR UR REFRACTOMETRY: 1.02 (ref 1–1.03)
UROBILINOGEN UR QL STRIP.AUTO: 1 EU/DL (ref 0.2–1)
VENTRICULAR RATE, ECG03: 72 BPM
WBC # BLD AUTO: 9.1 K/UL (ref 4.6–13.2)
WBC URNS QL MICRO: ABNORMAL /HPF (ref 0–4)

## 2021-08-19 PROCEDURE — 99285 EMERGENCY DEPT VISIT HI MDM: CPT

## 2021-08-19 PROCEDURE — 84703 CHORIONIC GONADOTROPIN ASSAY: CPT

## 2021-08-19 PROCEDURE — 96374 THER/PROPH/DIAG INJ IV PUSH: CPT

## 2021-08-19 PROCEDURE — 80053 COMPREHEN METABOLIC PANEL: CPT

## 2021-08-19 PROCEDURE — 96361 HYDRATE IV INFUSION ADD-ON: CPT

## 2021-08-19 PROCEDURE — 93005 ELECTROCARDIOGRAM TRACING: CPT

## 2021-08-19 PROCEDURE — 96375 TX/PRO/DX INJ NEW DRUG ADDON: CPT

## 2021-08-19 PROCEDURE — 74011250636 HC RX REV CODE- 250/636: Performed by: EMERGENCY MEDICINE

## 2021-08-19 PROCEDURE — 81001 URINALYSIS AUTO W/SCOPE: CPT

## 2021-08-19 PROCEDURE — 85025 COMPLETE CBC W/AUTO DIFF WBC: CPT

## 2021-08-19 PROCEDURE — 74011250637 HC RX REV CODE- 250/637: Performed by: EMERGENCY MEDICINE

## 2021-08-19 RX ORDER — METOCLOPRAMIDE HYDROCHLORIDE 5 MG/ML
10 INJECTION INTRAMUSCULAR; INTRAVENOUS
Status: COMPLETED | OUTPATIENT
Start: 2021-08-19 | End: 2021-08-19

## 2021-08-19 RX ORDER — ACETAMINOPHEN 500 MG
1000 TABLET ORAL
Status: COMPLETED | OUTPATIENT
Start: 2021-08-19 | End: 2021-08-19

## 2021-08-19 RX ORDER — KETOROLAC TROMETHAMINE 15 MG/ML
15 INJECTION, SOLUTION INTRAMUSCULAR; INTRAVENOUS
Status: COMPLETED | OUTPATIENT
Start: 2021-08-19 | End: 2021-08-19

## 2021-08-19 RX ORDER — POTASSIUM CHLORIDE 20 MEQ/1
20 TABLET, EXTENDED RELEASE ORAL
Status: COMPLETED | OUTPATIENT
Start: 2021-08-19 | End: 2021-08-19

## 2021-08-19 RX ORDER — NITROFURANTOIN 25; 75 MG/1; MG/1
100 CAPSULE ORAL 2 TIMES DAILY
Qty: 10 CAPSULE | Refills: 0 | Status: SHIPPED | OUTPATIENT
Start: 2021-08-19 | End: 2021-08-24

## 2021-08-19 RX ORDER — DIPHENHYDRAMINE HYDROCHLORIDE 50 MG/ML
25 INJECTION, SOLUTION INTRAMUSCULAR; INTRAVENOUS ONCE
Status: COMPLETED | OUTPATIENT
Start: 2021-08-19 | End: 2021-08-19

## 2021-08-19 RX ADMIN — KETOROLAC TROMETHAMINE 15 MG: 15 INJECTION, SOLUTION INTRAMUSCULAR; INTRAVENOUS at 03:14

## 2021-08-19 RX ADMIN — SODIUM CHLORIDE 1000 ML: 900 INJECTION, SOLUTION INTRAVENOUS at 01:16

## 2021-08-19 RX ADMIN — POTASSIUM CHLORIDE 20 MEQ: 1500 TABLET, EXTENDED RELEASE ORAL at 03:14

## 2021-08-19 RX ADMIN — METOCLOPRAMIDE HYDROCHLORIDE 10 MG: 5 INJECTION INTRAMUSCULAR; INTRAVENOUS at 01:16

## 2021-08-19 RX ADMIN — ACETAMINOPHEN 1000 MG: 500 TABLET ORAL at 01:16

## 2021-08-19 RX ADMIN — DIPHENHYDRAMINE HYDROCHLORIDE 25 MG: 50 INJECTION, SOLUTION INTRAMUSCULAR; INTRAVENOUS at 03:14

## 2021-08-19 NOTE — ED NOTES
Patient up for discharge. Discharge results have been reviewed with patient by the Provider. Armband removed and shredded per policy.

## 2021-08-19 NOTE — ED PROVIDER NOTES
EMERGENCY DEPARTMENT HISTORY AND PHYSICAL EXAM    1:02 AM  Date: 2021  Patient Name: Analia Bruner    History of Presenting Illness       History Provided By:     HPI: Analia Bruner is a 39 y.o. female with past medical history of asthma, fibroids, headaches/migraines presents with a headache on and off for a month. Patient states that she feels numbness of the back of her head. Headache is like her usual migraine character, built gradual in intensity is constant, mild to moderate in severity, worse with light, accompanied by nausea. No neck stiffness, tenderness. Denies any fever or chills. No weakness, extremity numbness vision changes or paresthesias. Patient is on medication for here migraine prescribed by her neurologist but cannot refill her prescription yet as insurance will not cover. PCP: Tara Gray NP    Past History     Past Medical History:  Past Medical History:   Diagnosis Date    Asthma     Chest pain     Chronic pain 6 months    painful menses    Fibroids     GERD (gastroesophageal reflux disease)     Headache disorder     Heart palpitations        Past Surgical History:  Past Surgical History:   Procedure Laterality Date    HX  SECTION  0163,2611    with BTL    HX HYSTEROSCOPY WITH ENDOMETRIAL ABLATION      No period since       Family History:  Family History   Problem Relation Age of Onset    Hypertension Mother     Hypertension Father     Diabetes Father     Parkinson's Disease Father     Cancer Paternal [de-identified]     MS Sister        Social History:  Social History     Tobacco Use    Smoking status: Former Smoker     Types: Cigarettes     Quit date: 3/23/1998     Years since quittin.4    Smokeless tobacco: Former User    Tobacco comment: smoked a few as a teen, Vaped for a year   Substance Use Topics    Alcohol use: Yes     Comment: socially, 2 beer/weekend    Drug use: No       Allergies:   Allergies   Allergen Reactions    Azo [Phenazopyridine] Hives    Iodinated Contrast Media Shortness of Breath     Other reaction(s): wheezing/sob       Review of Systems   Review of Systems   Constitutional: Negative for activity change, appetite change and chills. HENT: Negative for congestion, ear discharge, ear pain and sore throat. Eyes: Negative for photophobia and pain. Respiratory: Negative for cough and choking. Cardiovascular: Negative for palpitations and leg swelling. Gastrointestinal: Negative for anal bleeding and rectal pain. Endocrine: Negative for polydipsia and polyuria. Genitourinary: Negative for genital sores and urgency. Musculoskeletal: Negative for arthralgias and myalgias. Neurological: Positive for headaches. Negative for dizziness, seizures and speech difficulty. Psychiatric/Behavioral: Negative for hallucinations, self-injury and suicidal ideas. Physical Exam     Patient Vitals for the past 12 hrs:   Pulse Resp BP SpO2   08/19/21 0047 75 15  100 %   08/19/21 0046 78 14 (!) 148/92 98 %       Physical Exam  Constitutional:       Appearance: She is well-developed. HENT:      Head: Normocephalic and atraumatic. Eyes:      General:         Right eye: No discharge. Left eye: No discharge. Cardiovascular:      Rate and Rhythm: Normal rate and regular rhythm. Heart sounds: Normal heart sounds. Pulmonary:      Effort: Pulmonary effort is normal. No respiratory distress. Breath sounds: Normal breath sounds. No wheezing or rales. Abdominal:      General: Bowel sounds are normal. There is no distension. Palpations: Abdomen is soft. Tenderness: There is no abdominal tenderness. There is no rebound. Genitourinary:     Rectum: Guaiac result negative. Musculoskeletal:         General: Normal range of motion. Cervical back: Normal range of motion and neck supple. Skin:     General: Skin is warm and dry.    Neurological:      Mental Status: She is alert and oriented to person, place, and time. Cranial Nerves: No cranial nerve deficit. Sensory: No sensory deficit. Motor: No weakness. Deep Tendon Reflexes: Reflexes are normal and symmetric. Comments: OmayraTerrances Brudzinski negative         Diagnostic Study Results     Labs -  Recent Results (from the past 12 hour(s))   EKG, 12 LEAD, INITIAL    Collection Time: 08/19/21 12:47 AM   Result Value Ref Range    Ventricular Rate 72 BPM    Atrial Rate 72 BPM    P-R Interval 138 ms    QRS Duration 76 ms    Q-T Interval 384 ms    QTC Calculation (Bezet) 420 ms    Calculated P Axis 67 degrees    Calculated R Axis 13 degrees    Calculated T Axis 44 degrees    Diagnosis       Normal sinus rhythm  Normal ECG  When compared with ECG of 23-JUL-2021 16:40,  No significant change was found         Radiologic Studies -   No results found. Medical Decision Making     ED Course: Progress Notes, Reevaluation, and Consults:    1:02 AM Initial assessment performed. The patients presenting problems have been discussed, and they/their family are in agreement with the care plan formulated and outlined with them. I have encouraged them to ask questions as they arise throughout their visit. Provider Notes (Medical Decision Making):   Patient presents with headache/migraine  No neurological deficit on exam  Had an MRI on 10th of August, no need for imaging at present  Labs as interpreted by me:  No leukocytosis, mild hypokalemia potassium repleted   MRI on 10th of August  No acute findings apart from several low flow vascular malformations, either capillary telangiectasias, or cavernous angiomas  UA positive for UTI  Will be treated with antibiotics  Patient given migraine cocktail  Feels better reassessment  Will be discharged with PMD neurology follow-up      Vital Signs-Reviewed the patient's vital signs. Reviewed pt's pulse ox reading.          Records Reviewed: old medical records  -I am the first provider for this patient.  -I reviewed the vital signs, available nursing notes, past medical history, past surgical history, family history and social history. Current Facility-Administered Medications   Medication Dose Route Frequency Provider Last Rate Last Admin    sodium chloride 0.9 % bolus infusion 1,000 mL  1,000 mL IntraVENous ONCE Paolo Celis MD        metoclopramide HCl (REGLAN) injection 10 mg  10 mg IntraVENous NOW Paolo Celis MD        acetaminophen (TYLENOL) tablet 1,000 mg  1,000 mg Oral NOW Paolo Celis MD         Current Outpatient Medications   Medication Sig Dispense Refill    albuterol (PROVENTIL HFA, VENTOLIN HFA, PROAIR HFA) 90 mcg/actuation inhaler Take 1-2 Puffs by inhalation every four (4) hours as needed for Wheezing. 1 Inhaler 2    gabapentin (Neurontin) 300 mg capsule Take 1 Capsule by mouth two (2) times a day. Max Daily Amount: 600 mg. 180 Capsule 1    SUMAtriptan (IMITREX) 50 mg tablet Take 1 tablet by mouth x 1 doses. May repeat x 1 dose in 2 hours if needed. Max dose is 100 mg/day 15 Tablet 1    cyclobenzaprine (FLEXERIL) 5 mg tablet Take 1 Tablet by mouth three (3) times daily as needed for Muscle Spasm(s). (Patient not taking: Reported on 8/3/2021) 30 Tablet 1    budesonide-formoteroL (SYMBICORT) 160-4.5 mcg/actuation HFAA Take 2 Puffs by inhalation every twelve (12) hours. 10.2 Inhaler 2    BIOTIN PO Take  by mouth. (Patient not taking: Reported on 8/3/2021)      Lactobacillus acidophilus (PROBIOTIC PO) Take  by mouth. (Patient not taking: Reported on 6/3/2021)      ascorbic acid (VITAMIN C PO) Take  by mouth. (Patient not taking: Reported on 8/3/2021)      Nebulizer & Compressor machine Use as directed 1 Each 0    Nebulizer Accessories kit Use as directed 1 Kit 1        Clinical Impression     Clinical Impression: No diagnosis found. Disposition:    Pt has been reexamined. Patient has no new complaints, changes, or physical findings.   Care plan outlined and precautions discussed. Results were reviewed with the patient. All medications were reviewed with the patient; will d/c home with PMD f/u. All of pt's questions and concerns were addressed. Patient was instructed and agrees to follow up with PMD, as well as to return to the ED upon further deterioration. Patient is ready to go home. This note was dictated utilizing voice recognition software which may lead to typographical errors. I apologize in advance if the situation occurs. If questions arise please do not hesitate to contact me or call our department. This note was dictated utilizing voice recognition software which may lead to typographical errors. I apologize in advance if the situation occurs. If questions arise please do not hesitate to contact me or call our department.     Bailey Gonzalez MD  1:02 AM

## 2021-08-20 ENCOUNTER — PATIENT OUTREACH (OUTPATIENT)
Dept: OTHER | Age: 41
End: 2021-08-20

## 2021-08-20 DIAGNOSIS — Q28.3 BRAIN VASCULAR MALFORMATION: Primary | ICD-10-CM

## 2021-08-20 NOTE — PROGRESS NOTES
Spoke with patient, made aware of provider's comments. Patient is scheduled for follow-up with NP on this upcoming Monday.

## 2021-08-20 NOTE — PROGRESS NOTES
Telephone attempt to contact patient for Health Promotion and Risk Prevention. Left discreet message on voicemail with this CC contact information.       [facility] ED Visit 8/19/21 Migraine HA, UTI, Hypokalemia

## 2021-08-22 DIAGNOSIS — R51.9 HEADACHE DISORDER: ICD-10-CM

## 2021-08-23 ENCOUNTER — VIRTUAL VISIT (OUTPATIENT)
Dept: NEUROLOGY | Age: 41
End: 2021-08-23
Payer: COMMERCIAL

## 2021-08-23 ENCOUNTER — PATIENT OUTREACH (OUTPATIENT)
Dept: OTHER | Age: 41
End: 2021-08-23

## 2021-08-23 ENCOUNTER — TELEPHONE (OUTPATIENT)
Dept: NEUROLOGY | Age: 41
End: 2021-08-23

## 2021-08-23 DIAGNOSIS — G56.03 BILATERAL CARPAL TUNNEL SYNDROME: Primary | ICD-10-CM

## 2021-08-23 DIAGNOSIS — Z72.820 POOR SLEEP: ICD-10-CM

## 2021-08-23 DIAGNOSIS — R51.9 HEADACHE DISORDER: ICD-10-CM

## 2021-08-23 PROCEDURE — 99214 OFFICE O/P EST MOD 30 MIN: CPT | Performed by: NURSE PRACTITIONER

## 2021-08-23 RX ORDER — AMITRIPTYLINE HYDROCHLORIDE 10 MG/1
TABLET, FILM COATED ORAL
Qty: 60 TABLET | Refills: 1 | Status: SHIPPED | OUTPATIENT
Start: 2021-08-23 | End: 2021-10-04

## 2021-08-23 NOTE — PROGRESS NOTES
Rocco Raines is a 39 y.o. female who was seen by synchronous (real-time) audio-video technology on 8/23/2021 for Headache        Assessment & Plan:   Diagnoses and all orders for this visit:    1. Bilateral carpal tunnel syndrome  -     REFERRAL TO HAND SURGERY    2. Headache disorder    3. Poor sleep    Other orders  -     amitriptyline (ELAVIL) 10 mg tablet; Take 10 mg at bedtime for a week then increase to 20 mg at bedtime. Indications: migraine prevention        Patient presents for follow up headaches. She is a patient of Dr. Neil Cao who was last seen in June. Headaches started one month ago. Endorses risk factors such as poor sleep. Recently had MRI of the brain and is being referred to Dr. Andrea Jaimes for evaluation of vascular malformations. No findings consistent with demyelinating disorder. Also had upper extremity EMG with diagnosis of b/l CTS and said she tried braces without improvement. Will place referral to hand specialist for further treatment consideration. Was having back numbness without pain and had unremarkable MRI of the lumbar spine. We discussed starting amitriptyline 10 mg at bedtime for a week then increase to 20 mg at bedtime thereafter for headache prevention. Discussed avoiding routine use of over-the-counter analgesics more than twice a week for risk of rebound headache. Future consideration for sleep medicine referral pending symptoms. She will follow-up with me in 4 weeks for medication check. She will maintain October follow-up with Dr. Jovi Reyes as previously indicated. All questions addressed and patient is agreeable plan of care. I spent at least 30 minutes on this visit with this established patient. [id number]  Subjective: This is a 44-year-old female who presents for follow-up. Last seen by Dr. Neil Cao in June. Seen for wrist pain and numbness of the body. Today endorses headaches. Her head has been bothering her. Headaches started over a month ago.  No inciting factor or injury. Headache are top of the head and back of the head. Fire pain. She was prescribed Imitrex and this helped for awhile. Same intensity headache throughout the day. Positive light sensitivity. She thought it could be migraines. Occasional chest pain. She said she wanted to be checked for MS previously for concerns of body numbness. No trouble talking, walking, swallowing. No vision loss episodes. Was using ibuprofen 800 mg and BC powder as needed for headaches. Does not take ibuprofen any longer. She took [facility] last night. Taking this 3 days out of the week. Headaches are worse at night. Years ago she was treated for migraine. She said that she never formally was diagnosed with migraines. Endorses poor sleep. She said headaches are worse at night. She will get 2 to 3 hours of sleep and then will wake up with headaches. Has not had a sleep study. Reports having EMG done in the past and diagnosed with bilateral carpal tunnel syndrome. She said that she was wearing bracing and this did not help much. Did not do physical therapy. No additional concerns at this time. Prior to Admission medications    Medication Sig Start Date End Date Taking? Authorizing Provider   amitriptyline (ELAVIL) 10 mg tablet Take 10 mg at bedtime for a week then increase to 20 mg at bedtime. Indications: migraine prevention 8/23/21  Yes Dom Fernandez NP   nitrofurantoin, macrocrystal-monohydrate, (MACROBID) 100 mg capsule Take 1 Capsule by mouth two (2) times a day for 5 days. Indications: UTI 8/19/21 8/24/21 Yes Nancy Stewart MD   albuterol (PROVENTIL HFA, VENTOLIN HFA, PROAIR HFA) 90 mcg/actuation inhaler Take 1-2 Puffs by inhalation every four (4) hours as needed for Wheezing. 8/3/21  Yes Halina Serrano NP   gabapentin (Neurontin) 300 mg capsule Take 1 Capsule by mouth two (2) times a day.  Max Daily Amount: 600 mg. 8/3/21  Yes Halina Serrano NP   SUMAtriptan (IMITREX) 50 mg tablet Take 1 tablet by mouth x 1 doses. May repeat x 1 dose in 2 hours if needed. Max dose is 100 mg/day 8/3/21  Yes Gwendolyn Awad NP   budesonide-formoteroL (SYMBICORT) 160-4.5 mcg/actuation HFAA Take 2 Puffs by inhalation every twelve (12) hours. 3/30/21  Yes Gwendolyn Awad NP   Nebulizer & Compressor machine Use as directed 3/30/17  Yes Chery Collins MD   Nebulizer Accessories kit Use as directed 3/30/17  Yes Chery Collins MD   cyclobenzaprine (FLEXERIL) 5 mg tablet Take 1 Tablet by mouth three (3) times daily as needed for Muscle Spasm(s). Patient not taking: Reported on 8/3/2021 6/24/21   Gwendolyn Awad NP   BIOTIN PO Take  by mouth. Patient not taking: Reported on 8/3/2021    Provider, Historical   Lactobacillus acidophilus (PROBIOTIC PO) Take  by mouth. Patient not taking: Reported on 6/3/2021    Provider, Historical   ascorbic acid (VITAMIN C PO) Take  by mouth. Patient not taking: Reported on 8/3/2021    Provider, Historical     Patient Active Problem List   Diagnosis Code    Mild intermittent asthma J45.20    Obesity E66.9    Palpitations R00.2    Precordial pain R07.2    Severe obesity ([facility] [address] E66.01    Asthma J45.909     Patient Active Problem List    Diagnosis Date Noted    Asthma 06/13/2019    Severe obesity ([facility] [address] 10/10/2018    Palpitations 07/26/2016    Precordial pain 07/26/2016    Mild intermittent asthma 07/07/2014    Obesity 07/07/2014     Current Outpatient Medications   Medication Sig Dispense Refill    amitriptyline (ELAVIL) 10 mg tablet Take 10 mg at bedtime for a week then increase to 20 mg at bedtime. Indications: migraine prevention 60 Tablet 1    nitrofurantoin, macrocrystal-monohydrate, (MACROBID) 100 mg capsule Take 1 Capsule by mouth two (2) times a day for 5 days. Indications: UTI 10 Capsule 0    albuterol (PROVENTIL HFA, VENTOLIN HFA, PROAIR HFA) 90 mcg/actuation inhaler Take 1-2 Puffs by inhalation every four (4) hours as needed for Wheezing.  1 Inhaler 2    gabapentin (Neurontin) 300 mg capsule Take 1 Capsule by mouth two (2) times a day. Max Daily Amount: 600 mg. 180 Capsule 1    SUMAtriptan (IMITREX) 50 mg tablet Take 1 tablet by mouth x 1 doses. May repeat x 1 dose in 2 hours if needed. Max dose is 100 mg/day 15 Tablet 1    budesonide-formoteroL (SYMBICORT) 160-4.5 mcg/actuation HFAA Take 2 Puffs by inhalation every twelve (12) hours. 10.2 Inhaler 2    Nebulizer & Compressor machine Use as directed 1 Each 0    Nebulizer Accessories kit Use as directed 1 Kit 1    cyclobenzaprine (FLEXERIL) 5 mg tablet Take 1 Tablet by mouth three (3) times daily as needed for Muscle Spasm(s). (Patient not taking: Reported on 8/3/2021) 30 Tablet 1    BIOTIN PO Take  by mouth. (Patient not taking: Reported on 8/3/2021)      Lactobacillus acidophilus (PROBIOTIC PO) Take  by mouth. (Patient not taking: Reported on 6/3/2021)      ascorbic acid (VITAMIN C PO) Take  by mouth.  (Patient not taking: Reported on 8/3/2021)       Allergies   Allergen Reactions    Azo [Phenazopyridine] Hives    Iodinated Contrast Media Shortness of Breath     Other reaction(s): wheezing/sob     Past Medical History:   Diagnosis Date    Asthma     Chest pain     Chronic pain 6 months    painful menses    Fibroids     GERD (gastroesophageal reflux disease)     Headache disorder     Heart palpitations      Past Surgical History:   Procedure Laterality Date    HX  SECTION  1332,8231    with BTL    HX HYSTEROSCOPY WITH ENDOMETRIAL ABLATION      No period since     Family History   Problem Relation Age of Onset    Hypertension Mother     Hypertension Father     Diabetes Father     Parkinson's Disease Father     Cancer Paternal Grandmother     MS Sister      Social History     Tobacco Use    Smoking status: Former Smoker     Types: Cigarettes     Quit date: 3/23/1998     Years since quittin.4    Smokeless tobacco: Former User    Tobacco comment: smoked a few as a teen, Vaped for a year   Substance Use Topics    Alcohol use: Yes     Comment: socially, 2 beer/weekend       Review of Systems  GENERAL: Denies fever or fatigue  CARDIAC: No CP or SOB  PULMONARY: No cough of SOB  MUSCULOSKELETAL: No new joint pain  NEURO: SEE HPI      Objective:     Patient-Reported Vitals 8/23/2021   Patient-Reported Weight 195lb      General: alert, cooperative, no distress   Mental  status: normal mood, behavior, speech, dress, motor activity, and thought processes, able to follow commands   HENT: NCAT   Neck: no visualized mass   Resp: no respiratory distress   Neuro: no gross deficits   Skin: no discoloration or lesions of concern on visible areas   Psychiatric: normal affect, consistent with stated mood, no evidence of hallucinations     Additional exam findings: This is a very pleasant 15-year-old female. She is alert and in no apparent distress. Full fund of knowledge. Speech is clear. No facial asymmetry. No signs of ataxia or incoordination. Results  MRI BRAIN W WO CONT (Accession [id number]) (Order [id number])  Allergies     Medium: Azo [Phenazopyridine]   Not Specified: Iodinated Contrast Media   Exam Information    Status Exam Begun  Exam Ended    Final [99] 8/10/2021 07:31 8/10/2021  8:26 AM [id number]  8:26 AM   Result Information    Status: Final result (Exam End: 8/10/2021 08:26) Provider Status: Reviewed   MRI BRAIN W WO CONT: Result Notes   Jhony Funk LPN   1/77/5985  5:73 PM EDT       Spoke with patient, made aware of provider's comments. Patient is scheduled for follow-up with NP on this upcoming Monday.          Study Result    Narrative & Impression   EXAM: MRI BRAIN W WO CONT     CLINICAL INDICATION/HISTORY: Headaches with numbness in the arms and legs, onset  February 2021, worsened June 2021. Intermittent blurred vision.  No preceding  injury.     TECHNIQUE: Multisequence multiplanar MR imaging acquired through the brain.      Contrast used: 18 cc Dotarem     COMPARISON: Head CT July 2021     FINDINGS:     Parenchyma: There is a mild amount of small caliber T2 and FLAIR  hyperintensities within the bilateral cerebral hemispheres. These are relatively  peripheral, somewhat along the internal watershed distribution. Diffusion  imaging is without restriction. No acute infarction. No acute hemorrhage.      With contrast administration, along the paramedian left frontal lobe along the  falx there is a vague 7 mm region of vascular blush. There is no surrounding  signal abnormality, nor is there mass effect. As on axial series 10 image 22,  and coronal series 9 image 34.  -There are a couple of smaller but similar foci of vascular blush within the  right side of the zay; as on axial series 10 images 9-10, and coronal series 9  image 19.   -Each of these several above-described regions with manifest susceptibility  artifact. No foci of calcification in these locations on CT.  -Possibly an additional focus at the low anterior paramedian right frontal lobe,  as on coronal image 33. Not corroborated on axial imaging.  -At the left side of the zay, a subtle developmental venous anomaly; axial  series 10 image 11, coronal series 9 image 20; also axial gradient series 7  image 45     CSF spaces: Ventricles and cisterns remain midline in position     IAC regions: Unremarkable     Parasellar region: Unremarkable     Vasculature: Appropriate flow voids within the major skull base vasculature.     Cervicomedullary junction: Patent     Orbits: Unremarkable     Paranasal sinuses: Clear            IMPRESSION     1.   There are several small caliber foci of vascular blush, including frontal  lobes and right side of the zay  -The imaging features are most characteristic of several low flow vascular  malformations, either capillary telangiectasias, or cavernous angiomas;  multiplicity/features are not classic for either, but favor telangiectasias  -Associated susceptibility artifact with these foci suggesting prior  microhemorrhage, or related to hemoglobin content  -Small developmental venous anomaly (DVA) within the left side of the zay; DVA  have association with cavernous angiomas  -Given the multiplicity of findings, consider follow-up MRI in one year     2. Mild amount of nonspecific white matter disease which can be seen in the  setting of chronic headaches, vasculitis. Not the appearance of multiple  sclerosis. Results  MRI LUMB SPINE WO CONT (Accession [id number]) (Order [id number])  Allergies     Medium: Azo [Phenazopyridine]   Not Specified: Iodinated Contrast Media   Exam Information    Status Exam Begun  Exam Ended    Final [99] 6/18/2021 15:01 6/18/2021  3:35 PM [id number]  3:35 PM   Result Information    Status: Final result (Exam End: 6/18/2021 15:35) Provider Status: Open   Study Result    Narrative & Impression   EXAM: MRI LUMB SPINE WO CONT     CLINICAL INDICATION/HISTORY: 39 years Female. Chronic lowback pain; radiating  numbness from the back to the right LE. [place] Additional History: None     COMPARISON: None     TECHNIQUE: Multiplanar multi-sequential imaging of the lumbar spine without  contrast.      FINDINGS:     5 lumbar type vertebral bodies are assumed for the purposes of this dictation. The disc space at axial T2 image 7 will be referred to as L5/S1.      Vertebral alignment: Straightening of the lumbar lordosis. No spondylolisthesis.     Vertebral body heights: Lumbar vertebral body heights are preserved. No evidence  of acute or chronic fracture.     Marrow signal: Subcentimeter T1 hyperintense intraosseous lesion at the superior  T12 endplate, likely intraosseous hemangioma or focus of fatty marrow. Otherwise  unremarkable.     The conus medullaris terminates at the mid L1 level, with normal signal and  morphology.     Several follicles noted in the right ovary.  The visualized paraspinal soft  tissues are otherwise unremarkable.     Disc levels:     L1/L2: No significant disc herniation. Mild bilateral facet joint hypertrophy. Spinal canal: No significant stenosis. Right foramen: No significant stenosis. Left foramen: No significant stenosis.     L2/L3: No significant disc herniation. Spinal canal: No significant stenosis. Right foramen: No significant stenosis. Left foramen: No significant stenosis.     L3/L4: Tiny disc bulge. Minimal bilateral facet joint hypertrophy. Trace right  facet joint effusion. Spinal canal: No significant stenosis. Right foramen: No significant stenosis. Left foramen: No significant stenosis.     L4/L5: No significant disc herniation. Minimal bilateral facet joint  hypertrophy. Spinal canal: No significant stenosis. Right foramen: No significant stenosis. Left foramen: No significant stenosis.     L5/S1: Mild disc desiccation. Tiny disc bulge. Spinal canal: No significant stenosis. Right foramen: No significant stenosis. Left foramen: No significant stenosis.     IMPRESSION  1. Minimal degenerative changes as discussed above. 2.  No acute or chronic lumbar vertebral fracture. No significant spinal canal  or foraminal stenosis. We discussed the expected course, resolution and complications of the diagnosis(es) in detail. Medication risks, benefits, costs, interactions, and alternatives were discussed as indicated. I advised her to contact the office if her condition worsens, changes or fails to improve as anticipated. She expressed understanding with the diagnosis(es) and plan. Sue Marx, was evaluated through a synchronous (real-time) audio-video encounter. The patient (or guardian if applicable) is aware that this is a billable service. Verbal consent to proceed has been obtained within the past 12 months.  The visit was conducted pursuant to the emergency declaration under the [facility]5 waiver authority and the [facility] Response Supplemental Appropriations Act. Patient identification was verified, and a caregiver was present when appropriate. The patient was located in a state where the provider was credentialed to provide care.     Walter Cedillo NP

## 2021-08-23 NOTE — TELEPHONE ENCOUNTER
Patient called and stated she was referred to Dr. Hai Luque and called to schedule and they have not rec'd anything for the patient  Fax number [phone] [address] [facility] [place]

## 2021-08-23 NOTE — LETTER
8/23/2021 11:23 AM    Ms. Mesfin Rosenberg  [address] [address]    Dear Joyice Koyanagi have been trying to reach you for a follow up call for services with our Associate Care Management Program. Your wellbeing is very important to us. With continued partnership in the [facility] [place] [facility] program, we hope to work with you to optimize your health and increase your quality of life. I look forward to continuing to work with you. Please contact me at your convenience and we can schedule a time that works best for you.      Sincerely,    Abdoulaye Choi LPN  [facility] Care Coordinator  Associate Care Management  [facility]  [address]  [place] [phone]   [phone]  [email or web address]

## 2021-08-23 NOTE — PROGRESS NOTES
Ronald Melo presents today for   Chief Complaint   Patient presents with    Headache       Is someone accompanying this pt? Virtual visit [phone]    Is the patient using any DME equipment during OV? no    Depression Screening:  3 most recent PHQ Screens 8/3/2021   Little interest or pleasure in doing things Not at all   Feeling down, depressed, irritable, or hopeless Not at all   Total Score PHQ 2 0       Learning Assessment:  Learning Assessment 11/25/2015   PRIMARY LEARNER Patient   HIGHEST LEVEL OF EDUCATION - PRIMARY LEARNER  2 YEARS OF COLLEGE   BARRIERS PRIMARY LEARNER NONE   PRIMARY LANGUAGE ENGLISH   LEARNER PREFERENCE PRIMARY READING     VIDEOS     -   ANSWERED BY Patient   RELATIONSHIP SELF       Abuse Screening:  Abuse Screening Questionnaire 4/6/2021   Do you ever feel afraid of your partner? N   Are you in a relationship with someone who physically or mentally threatens you? N   Is it safe for you to go home? Y       Fall Risk  Fall Risk Assessment, last 12 mths 5/25/2018   Able to walk? Yes   Fall in past 12 months? No         Coordination of Care:  1. Have you been to the ER, urgent care clinic since your last visit? Hospitalized since your last visit? no    2. Have you seen or consulted any other health care providers outside of the [address] [place] [address] since your last visit? Include any pap smears or colon screening.  no

## 2021-08-23 NOTE — PROGRESS NOTES
.Telephone attempt to contact patient for Health Promotion and Risk Prevention.  Left discreet message on voicemail with this CC contact information. [address] letter sent via [address]  [facility] ED Visit 8/19/21 Migraine HA, UTI, Hypokalemia

## 2021-08-25 RX ORDER — CYCLOBENZAPRINE HCL 5 MG
5 TABLET ORAL
Qty: 30 TABLET | Refills: 1 | Status: SHIPPED | OUTPATIENT
Start: 2021-08-25 | End: 2021-10-19 | Stop reason: SDUPTHER

## 2021-08-27 ENCOUNTER — TELEPHONE (OUTPATIENT)
Dept: FAMILY MEDICINE CLINIC | Age: 41
End: 2021-08-27

## 2021-08-27 NOTE — TELEPHONE ENCOUNTER
Patient called stated that she went to see a neuro surg she was referred by [facility] to see Dr Jeanine Barthel  and he stated that she do not need to take the [facility]  any more . She needs something else in the place of that like a stronger tylenol or something.  Please advise

## 2021-08-31 ENCOUNTER — PATIENT OUTREACH (OUTPATIENT)
Dept: OTHER | Age: 41
End: 2021-08-31

## 2021-08-31 NOTE — TELEPHONE ENCOUNTER
Not sure what is meant but a stronger Tylenol  If the Neurologist is telling her to just take tylenol she can take the highest strength available OTC.

## 2021-08-31 NOTE — PROGRESS NOTES
[facility] follow up. Final call made today. Contacted patient for transitions of care services. Verified   and Zip Code for HIPAA security. *Spoke with patient who reports that she is better. Finally getting answers to what has been going on with her. Chart Review: 21 Melani James NP - Neurology  Patient presents for follow up headaches. She is a patient of Dr. Umu Witt who was last seen in . Headaches started one month ago. Endorses risk factors such as poor sleep. Recently had MRI of the brain and is being referred to Dr. Keegan Montes for evaluation of vascular malformations. No findings consistent with demyelinating disorder. Also had upper extremity EMG with diagnosis of b/l CTS and said she tried braces without improvement. Will place referral to hand specialist for further treatment consideration. Was having back numbness without pain and had unremarkable MRI of the lumbar spine. We discussed starting amitriptyline 10 mg at bedtime for a week then increase to 20 mg at bedtime thereafter for headache prevention. Discussed avoiding routine use of over-the-counter analgesics more than twice a week for risk of rebound headache. Future consideration for sleep medicine referral pending symptoms. She will follow-up with me in 4 weeks for medication check. She will maintain October follow-up with Dr. Yahaira Ball as previously indicated. All questions addressed and patient is agreeable plan of care.     Goals:  Initial Plan of Care as discussed and agreed with patient:      Patients pain will be controlled at 5/10 or less with pain medication and/or non-pharmacologic methods.   § 21 -  Rates pain as 8 or 9 on 0-10 pain scale   § 21 - Denies pain at this time - rates as 0 on 0-10 pain scale     Demonstrates no  Red Flag Symptoms  Educated on signs and symptoms of infection to monitor for  § 21 - Denies any Red Flag Symptoms  § 21 - Denies any Red Flag Symptoms     Completes all Follow-Up appointments within 30 days of ED Visit  Educated on importance of Follow Up for prevention of complications  § [birth year] - Patient attended Initial PCP Appointment on Tuesday, 8/3/21 @ 8:40a  § Patient has Neurology Appointment on Monday, 8/23/21 @ 10a  § 8/31/21 - Patient attended Neurology Appointment - will Follow Up in 4 Weeks.       Patient had no other questions or concerns. Contact information provided and reminded her that I can be reached if any needs arise in the future    Resolving current episode 7/23/21(Transitions of care complete). No further ED/UC or hospital admissions within 30 days post discharge. Patient attended follow-up appointments as directed. Patient has met patient stated and/or medical goals. No outreach from patient to [address] [place] [address].

## 2021-09-02 NOTE — TELEPHONE ENCOUNTER
TC to patient she was instructed by neuro to stop taking the Imitrex. Provider stated pt can take Tylenol OTC for her headaches.

## 2021-09-05 ENCOUNTER — HOSPITAL ENCOUNTER (EMERGENCY)
Age: 41
Discharge: HOME | End: 2021-09-06
Attending: EMERGENCY MEDICINE
Payer: COMMERCIAL

## 2021-09-05 ENCOUNTER — APPOINTMENT (OUTPATIENT)
Dept: GENERAL RADIOLOGY | Age: 41
End: 2021-09-05
Attending: EMERGENCY MEDICINE
Payer: COMMERCIAL

## 2021-09-05 DIAGNOSIS — R07.89 MUSCULOSKELETAL CHEST PAIN: Primary | ICD-10-CM

## 2021-09-05 DIAGNOSIS — J45.41 MODERATE PERSISTENT ASTHMA WITH ACUTE EXACERBATION: ICD-10-CM

## 2021-09-05 PROCEDURE — 85379 FIBRIN DEGRADATION QUANT: CPT

## 2021-09-05 PROCEDURE — 99285 EMERGENCY DEPT VISIT HI MDM: CPT

## 2021-09-05 PROCEDURE — 85025 COMPLETE CBC W/AUTO DIFF WBC: CPT

## 2021-09-05 PROCEDURE — 84484 ASSAY OF TROPONIN QUANT: CPT

## 2021-09-05 PROCEDURE — 93005 ELECTROCARDIOGRAM TRACING: CPT

## 2021-09-05 PROCEDURE — 81003 URINALYSIS AUTO W/O SCOPE: CPT

## 2021-09-05 PROCEDURE — 81025 URINE PREGNANCY TEST: CPT

## 2021-09-05 PROCEDURE — 83690 ASSAY OF LIPASE: CPT

## 2021-09-05 PROCEDURE — 80053 COMPREHEN METABOLIC PANEL: CPT

## 2021-09-05 PROCEDURE — 83735 ASSAY OF MAGNESIUM: CPT

## 2021-09-05 NOTE — LETTER
NOTIFICATION RETURN TO WORK / SCHOOL    9/6/2021 4:05 AM    Ms. Eleni Pradhan  [address] [address]      To Whom It May Concern:    Eleni Pradhan is currently under the care of [facility] EMERGENCY DEPT. Please excuse her from work 9/6 and 9/7 (2021). If there are questions or concerns please have the patient contact our office.         Sincerely,        Kodi Costa RN

## 2021-09-06 ENCOUNTER — APPOINTMENT (OUTPATIENT)
Dept: GENERAL RADIOLOGY | Age: 41
End: 2021-09-06
Attending: EMERGENCY MEDICINE
Payer: COMMERCIAL

## 2021-09-06 VITALS
HEART RATE: 81 BPM | SYSTOLIC BLOOD PRESSURE: 114 MMHG | DIASTOLIC BLOOD PRESSURE: 72 MMHG | BODY MASS INDEX: 32.61 KG/M2 | TEMPERATURE: 98.4 F | RESPIRATION RATE: 24 BRPM | OXYGEN SATURATION: 97 % | WEIGHT: 190 LBS

## 2021-09-06 LAB
ALBUMIN SERPL-MCNC: 3.4 G/DL (ref 3.4–5)
ALBUMIN/GLOB SERPL: 0.9 {RATIO} (ref 0.8–1.7)
ALP SERPL-CCNC: 71 U/L (ref 45–117)
ALT SERPL-CCNC: 21 U/L (ref 13–56)
ANION GAP SERPL CALC-SCNC: 7 MMOL/L (ref 3–18)
APPEARANCE UR: CLEAR
AST SERPL-CCNC: 24 U/L (ref 10–38)
ATRIAL RATE: 74 BPM
BASOPHILS # BLD: 0.1 K/UL (ref 0–0.1)
BASOPHILS NFR BLD: 1 % (ref 0–2)
BILIRUB SERPL-MCNC: 0.3 MG/DL (ref 0.2–1)
BILIRUB UR QL: NEGATIVE
BUN SERPL-MCNC: 13 MG/DL (ref 7–18)
BUN/CREAT SERPL: 14 (ref 12–20)
CALCIUM SERPL-MCNC: 8.5 MG/DL (ref 8.5–10.1)
CALCULATED P AXIS, ECG09: 72 DEGREES
CALCULATED R AXIS, ECG10: 65 DEGREES
CALCULATED T AXIS, ECG11: 57 DEGREES
CHLORIDE SERPL-SCNC: 107 MMOL/L (ref 100–111)
CO2 SERPL-SCNC: 25 MMOL/L (ref 21–32)
COLOR UR: YELLOW
CREAT SERPL-MCNC: 0.93 MG/DL (ref 0.6–1.3)
D DIMER PPP FEU-MCNC: 0.56 UG/ML(FEU)
DIAGNOSIS, 93000: NORMAL
DIFFERENTIAL METHOD BLD: ABNORMAL
EOSINOPHIL # BLD: 0.5 K/UL (ref 0–0.4)
EOSINOPHIL NFR BLD: 6 % (ref 0–5)
ERYTHROCYTE [DISTWIDTH] IN BLOOD BY AUTOMATED COUNT: 13.6 % (ref 11.6–14.5)
GLOBULIN SER CALC-MCNC: 3.8 G/DL (ref 2–4)
GLUCOSE SERPL-MCNC: 86 MG/DL (ref 74–99)
GLUCOSE UR STRIP.AUTO-MCNC: NEGATIVE MG/DL
HCG UR QL: NEGATIVE
HCT VFR BLD AUTO: 38.7 % (ref 35–45)
HGB BLD-MCNC: 12.7 G/DL (ref 12–16)
HGB UR QL STRIP: NEGATIVE
KETONES UR QL STRIP.AUTO: NEGATIVE MG/DL
LEUKOCYTE ESTERASE UR QL STRIP.AUTO: NEGATIVE
LIPASE SERPL-CCNC: 56 U/L (ref 73–393)
LYMPHOCYTES # BLD: 2.5 K/UL (ref 0.9–3.6)
LYMPHOCYTES NFR BLD: 28 % (ref 21–52)
MAGNESIUM SERPL-MCNC: 2.1 MG/DL (ref 1.6–2.6)
MCH RBC QN AUTO: 26.6 PG (ref 24–34)
MCHC RBC AUTO-ENTMCNC: 32.8 G/DL (ref 31–37)
MCV RBC AUTO: 81.1 FL (ref 78–100)
MONOCYTES # BLD: 0.6 K/UL (ref 0.05–1.2)
MONOCYTES NFR BLD: 7 % (ref 3–10)
NEUTS SEG # BLD: 5.1 K/UL (ref 1.8–8)
NEUTS SEG NFR BLD: 58 % (ref 40–73)
NITRITE UR QL STRIP.AUTO: NEGATIVE
P-R INTERVAL, ECG05: 132 MS
PH UR STRIP: 6.5 [PH] (ref 5–8)
PLATELET # BLD AUTO: 262 K/UL (ref 135–420)
PMV BLD AUTO: 10 FL (ref 9.2–11.8)
POTASSIUM SERPL-SCNC: 3.5 MMOL/L (ref 3.5–5.5)
PROT SERPL-MCNC: 7.2 G/DL (ref 6.4–8.2)
PROT UR STRIP-MCNC: NEGATIVE MG/DL
Q-T INTERVAL, ECG07: 354 MS
QRS DURATION, ECG06: 70 MS
QTC CALCULATION (BEZET), ECG08: 392 MS
RBC # BLD AUTO: 4.77 M/UL (ref 4.2–5.3)
SODIUM SERPL-SCNC: 139 MMOL/L (ref 136–145)
SP GR UR REFRACTOMETRY: 1.02 (ref 1–1.03)
TROPONIN I SERPL-MCNC: <0.02 NG/ML (ref 0–0.04)
TROPONIN I SERPL-MCNC: <0.02 NG/ML (ref 0–0.04)
UROBILINOGEN UR QL STRIP.AUTO: 1 EU/DL (ref 0.2–1)
VENTRICULAR RATE, ECG03: 74 BPM
WBC # BLD AUTO: 8.8 K/UL (ref 4.6–13.2)

## 2021-09-06 PROCEDURE — 71045 X-RAY EXAM CHEST 1 VIEW: CPT

## 2021-09-06 PROCEDURE — 84484 ASSAY OF TROPONIN QUANT: CPT

## 2021-09-06 PROCEDURE — 74011250637 HC RX REV CODE- 250/637: Performed by: EMERGENCY MEDICINE

## 2021-09-06 RX ORDER — ACETAMINOPHEN 500 MG
1000 TABLET ORAL ONCE
Status: COMPLETED | OUTPATIENT
Start: 2021-09-06 | End: 2021-09-06

## 2021-09-06 RX ORDER — DIAZEPAM 10 MG/1
10 TABLET ORAL EVERY 8 HOURS
Qty: 10 TABLET | Refills: 0 | Status: SHIPPED | OUTPATIENT
Start: 2021-09-06 | End: 2021-09-09

## 2021-09-06 RX ORDER — DIAZEPAM 10 MG/1
10 TABLET ORAL EVERY 8 HOURS
Qty: 10 TABLET | Refills: 0 | Status: SHIPPED | OUTPATIENT
Start: 2021-09-06 | End: 2021-09-06 | Stop reason: SDUPTHER

## 2021-09-06 RX ORDER — DIAZEPAM 5 MG/1
10 TABLET ORAL
Status: COMPLETED | OUTPATIENT
Start: 2021-09-06 | End: 2021-09-06

## 2021-09-06 RX ADMIN — DIAZEPAM 10 MG: 5 TABLET ORAL at 01:00

## 2021-09-06 RX ADMIN — ACETAMINOPHEN 1000 MG: 500 TABLET ORAL at 00:59

## 2021-09-06 NOTE — ED PROVIDER NOTES
EMERGENCY DEPARTMENT HISTORY AND PHYSICAL EXAM    11:53 PM  Date: 2021  Patient Name: Rocco Raines    History of Presenting Illness     Chief Complaint   Patient presents with    Chest Pain        History Provided By: Patient    HPI: Rocco Raines is a 39 y.o. female with history of multiple medical problems as below including AVMs of the brain. Patient is presenting with right-sided chest pain and right arm pain that started today after lifting heavy objects. Pain is been persistent for over 1 hour. Pain is sharp, worse with palpation and movement. Denies shortness of breath or cough. No GI symptoms. No previous history of VTE or recent immobilization. Patient asked for her D-dimer to be checked because when she checked it last week it was elevated. No significant family history of cardiac disease. Location:  Severity:  Timing/course:    Onset/Duration:     PCP: Vance Pandya NP    Past History     Past Medical History:  Past Medical History:   Diagnosis Date    Asthma     Chest pain     Chronic pain 6 months    painful menses    Fibroids     GERD (gastroesophageal reflux disease)     Headache disorder     Heart palpitations     Neurological disorder     bleeding in head diagnosed        Past Surgical History:  Past Surgical History:   Procedure Laterality Date    HX  SECTION  3486,2304    with BTL    HX HYSTEROSCOPY WITH ENDOMETRIAL ABLATION      No period since       Family History:  Family History   Problem Relation Age of Onset    Hypertension Mother     Hypertension Father     Diabetes Father     Parkinson's Disease Father     Cancer Paternal Grandmother     MS Sister        Social History:  Social History     Tobacco Use    Smoking status: Former Smoker     Types: Cigarettes     Quit date: 3/23/1998     Years since quittin.4    Smokeless tobacco: Former User    Tobacco comment: smoked a few as a teen, Vaped for a year   Substance Use Topics  Alcohol use: Yes     Comment: socially, 2 beer/weekend    Drug use: No       Allergies: Allergies   Allergen Reactions    Azo [Phenazopyridine] Hives    Iodinated Contrast Media Shortness of Breath     Other reaction(s): wheezing/sob       Review of Systems   Review of Systems   Cardiovascular: Positive for chest pain. Musculoskeletal:        Arm pain   All other systems reviewed and are negative. Physical Exam     Patient Vitals for the past 12 hrs:   Temp Pulse Resp BP SpO2   09/05/21 2334 98.4 °F (36.9 °C) 84 15 130/72 100 %       Physical Exam  Vitals and nursing note reviewed. Constitutional:       General: She is not in acute distress. HENT:      Head: Normocephalic and atraumatic. Eyes:      Extraocular Movements: Extraocular movements intact. Cardiovascular:      Rate and Rhythm: Normal rate. Heart sounds: Normal heart sounds. Pulmonary:      Effort: Pulmonary effort is normal.      Breath sounds: Normal breath sounds. Chest:      Chest wall: Tenderness present. Musculoskeletal:         General: Normal range of motion. Cervical back: Normal range of motion and neck supple. Skin:     General: Skin is warm and dry. Neurological:      General: No focal deficit present. Mental Status: She is alert.    Psychiatric:         Mood and Affect: Mood normal.         Behavior: Behavior normal.         Diagnostic Study Results     Labs -  Recent Results (from the past 12 hour(s))   EKG, 12 LEAD, INITIAL    Collection Time: 09/05/21 11:25 PM   Result Value Ref Range    Ventricular Rate 74 BPM    Atrial Rate 74 BPM    P-R Interval 132 ms    QRS Duration 70 ms    Q-T Interval 354 ms    QTC Calculation (Bezet) 392 ms    Calculated P Axis 72 degrees    Calculated R Axis 65 degrees    Calculated T Axis 57 degrees    Diagnosis       Normal sinus rhythm  Normal ECG  When compared with ECG of 19-AUG-2021 00:47,  No significant change was found         Radiologic Studies -   No results found. Medical Decision Making     ED Course: Progress Notes, Reevaluation, and Consults:    11:53 PM Initial assessment performed. The patients presenting problems have been discussed, and they/their family are in agreement with the care plan formulated and outlined with them. I have encouraged them to ask questions as they arise throughout their visit. 12:28 AM  Results were discussed with the patient and she is not comfortable doing a CTA. She only asked for her D-dimer to be checked because when she checked it the last time it was elevated. I explained to the patient I cannot rule out a PE without a CTA however she is not hypoxic nor tachycardic and the pain is very likely to be musculoskeletal.  The D-dimer is right at the cutoff. If the patient is uncomfortable with the CTA and there is a different explanation for the pain I think it is not unreasonable to not get it. She feels like she had too many CAT scans and really prefers not to. She is very aware of her health situation none has good health literacy and seems reliable. She was provided with strict return precautions to come back and get a CTA of her symptoms were to get worse. Patient verbalized understanding. Provider Notes (Medical Decision Making): 60-year-old female presenting with right-sided chest pain radiating to the right arm. Well-appearing on exam not in distress. Very tender to palpation across the anterior right chest and diffuse arm. Neurovascularly intact. No external signs of trauma. Likely musculoskeletal.  However given her history will obtain screening labs we will check her troponin. Patient wanted her D-dimer to be evaluated as well because she works in the [email or web address] and she checked it herself last week and it was elevated. She is not on birth control pills, PERC negative. Procedures:     Critical Care Time:     Vital Signs-Reviewed the patient's vital signs. Reviewed pt's pulse ox reading. EKG:  Interpreted by the EP. Time Interpreted:    Rate:    Rhythm: Normal Sinus Rhythm 74   Interpretation: Normal   Comparison: No significant interval changes    Records Reviewed: Nursing Notes, Old Medical Records, Previous electrocardiograms, Previous Radiology Studies and Previous Laboratory Studies (Time of Review: 11:53 PM)  -I am the first provider for this patient.  -I reviewed the vital signs, available nursing notes, past medical history, past surgical history, family history and social history. Current Outpatient Medications   Medication Sig Dispense Refill    cyclobenzaprine (FLEXERIL) 5 mg tablet Take 1 Tablet by mouth three (3) times daily as needed for Muscle Spasm(s). 30 Tablet 1    amitriptyline (ELAVIL) 10 mg tablet Take 10 mg at bedtime for a week then increase to 20 mg at bedtime. Indications: migraine prevention 60 Tablet 1    albuterol (PROVENTIL HFA, VENTOLIN HFA, PROAIR HFA) 90 mcg/actuation inhaler Take 1-2 Puffs by inhalation every four (4) hours as needed for Wheezing. 1 Inhaler 2    gabapentin (Neurontin) 300 mg capsule Take 1 Capsule by mouth two (2) times a day. Max Daily Amount: 600 mg. 180 Capsule 1    SUMAtriptan (IMITREX) 50 mg tablet Take 1 tablet by mouth x 1 doses. May repeat x 1 dose in 2 hours if needed. Max dose is 100 mg/day 15 Tablet 1    budesonide-formoteroL (SYMBICORT) 160-4.5 mcg/actuation HFAA Take 2 Puffs by inhalation every twelve (12) hours. 10.2 Inhaler 2    BIOTIN PO Take  by mouth. (Patient not taking: Reported on 8/3/2021)      Lactobacillus acidophilus (PROBIOTIC PO) Take  by mouth. (Patient not taking: Reported on 6/3/2021)      ascorbic acid (VITAMIN C PO) Take  by mouth. (Patient not taking: Reported on 8/3/2021)      Nebulizer & Compressor machine Use as directed 1 Each 0    Nebulizer Accessories kit Use as directed 1 Kit 1        Clinical Impression     Clinical Impression: No diagnosis found.     Disposition: dc        This note was dictated utilizing voice recognition software which may lead to typographical errors. I apologize in advance if the situation occurs. If questions arise please do not hesitate to contact me or call our department.     Francisco J Crook MD  11:53 PM

## 2021-09-06 NOTE — ED NOTES
Pt discharged to home. Instructed to follow up with her PCP, to use caution with benzodiazepine due to sedating effect/respiratory depression/fall risk. Instructed to avoid ETOH/other narcotics and to return to ED for worsening/other concerns. Pt voiced her understanding.

## 2021-09-06 NOTE — ED NOTES
Pt remains calm/conversant without distress. Medication given per order. Pt given update on plan of care; pt voiced her understanding. Rails up x 2/call light in reach. Light remain dimmed for comfort.

## 2021-09-06 NOTE — ED NOTES
Pt asleep/rouses easily to voice. Affect remains calm/conversant, respirations regular/non labored/skin warm and dry. Pain 4/10.   No distress noted

## 2021-09-09 ENCOUNTER — PATIENT OUTREACH (OUTPATIENT)
Dept: OTHER | Age: 41
End: 2021-09-09

## 2021-09-09 NOTE — PROGRESS NOTES
[facility] f/u:  Telephone attempt to contact patient for Health Promotion and Risk Prevention. Left discreet message on voicemail with this CC contact information.       Next outreach is 9/15/21 for discussion f/u - ED Visit

## 2021-09-10 ENCOUNTER — TELEPHONE (OUTPATIENT)
Dept: NEUROLOGY | Age: 41
End: 2021-09-10

## 2021-09-10 RX ORDER — ALBUTEROL SULFATE 90 UG/1
1-2 AEROSOL, METERED RESPIRATORY (INHALATION)
Qty: 6.7 EACH | Refills: 2 | Status: SHIPPED | OUTPATIENT
Start: 2021-09-10 | End: 2021-10-24 | Stop reason: SDUPTHER

## 2021-09-15 ENCOUNTER — PATIENT OUTREACH (OUTPATIENT)
Dept: OTHER | Age: 41
End: 2021-09-15

## 2021-09-15 NOTE — PROGRESS NOTES
Final call made today to attempt to contact patient. Discreet message left on voicemail with contact information. I will STOP taking the medications listed below when I get home from the hospital:  None

## 2021-09-24 ENCOUNTER — VIRTUAL VISIT (OUTPATIENT)
Dept: FAMILY MEDICINE CLINIC | Age: 41
End: 2021-09-24
Payer: COMMERCIAL

## 2021-09-24 DIAGNOSIS — E66.1 CLASS 1 DRUG-INDUCED OBESITY WITH BODY MASS INDEX (BMI) OF 32.0 TO 32.9 IN ADULT, UNSPECIFIED WHETHER SERIOUS COMORBIDITY PRESENT: Primary | ICD-10-CM

## 2021-09-24 DIAGNOSIS — Q27.9 VASCULAR MALFORMATION: ICD-10-CM

## 2021-09-24 DIAGNOSIS — R51.9 HEADACHE DISORDER: ICD-10-CM

## 2021-09-24 DIAGNOSIS — M79.604 ACUTE PAIN OF RIGHT LOWER EXTREMITY: ICD-10-CM

## 2021-09-24 DIAGNOSIS — R79.89 POSITIVE D DIMER: ICD-10-CM

## 2021-09-24 PROCEDURE — 99213 OFFICE O/P EST LOW 20 MIN: CPT | Performed by: NURSE PRACTITIONER

## 2021-09-24 NOTE — PROGRESS NOTES
Eros Lewis is a 39 y.o. female who was seen by synchronous (real-time) audio-video technology on 9/24/2021 for Labs, Leg Pain, and Fatigue (left arm weakness)        Assessment & Plan:   Diagnoses and all orders for this visit:    1. 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 Headache disorder  -     REFERRAL TO DIETITIAN    3. Vascular malformation  -     REFERRAL TO VASCULAR SURGERY    4. Acute pain of right lower extremity  -     DUPLEX LOWER EXT VENOUS RIGHT; Future    5. Positive D dimer  -     DUPLEX LOWER EXT VENOUS RIGHT; Future            Subjective: The patient presents for an Audio-visual teleconference appointment for lab follow-up care. Patient is requesting referral to [facility] vascular disease center. Per the patient she was told by her neurologist that she had vascular malformations in the brain and was referred to a provider in [facility]. She also notes she was started on medication for headache which has improved her symptoms. In addition she would like a referral to dietitian as she was told avoid foods that are vasoconstrictors. Patient also has fatigue and left arm weakness. Patient is also concerned about her continuous right leg pain she was seen in the emergency room and had a D-dimer done which was 0.56 (elevated). Patient previously had a duplex of the right lower extremity back in April, 2021. Prior to Admission medications    Medication Sig Start Date End Date Taking? Authorizing Provider   albuterol (PROVENTIL HFA, VENTOLIN HFA, PROAIR HFA) 90 mcg/actuation inhaler Take 1-2 Puffs by inhalation every four (4) hours as needed for Wheezing. 9/10/21  Yes Jerri Lyle NP   acetaminophen (TYLENOL PO) Take  by mouth.    Yes Other, MD Randall   cyclobenzaprine (FLEXERIL) 5 mg tablet Take 1 Tablet by mouth three (3) times daily as needed for Muscle Spasm(s). 8/25/21  Yes Han Dickey NP   amitriptyline (ELAVIL) 10 mg tablet Take 10 mg at bedtime for a week then increase to 20 mg at bedtime. Indications: migraine prevention 8/23/21  Yes Mauricio Fernandez NP   gabapentin (Neurontin) 300 mg capsule Take 1 Capsule by mouth two (2) times a day. Max Daily Amount: 600 mg. 8/3/21  Yes Han Dickey NP   budesonide-formoteroL (SYMBICORT) 160-4.5 mcg/actuation HFAA Take 2 Puffs by inhalation every twelve (12) hours. Patient not taking: Reported on 9/24/2021 3/30/21   Han Dickey NP   Nebulizer & Compressor machine Use as directed  Patient not taking: Reported on 9/24/2021 3/30/17   Zen Solorio MD   Nebulizer Accessories kit Use as directed  Patient not taking: Reported on 9/24/2021 3/30/17   Zen Solorio MD     Patient Active Problem List   Diagnosis Code    Mild intermittent asthma J45.20    Obesity E66.9    Palpitations R00.2    Precordial pain R07.2    Severe obesity ([facility] [address] [address] E66.01    Asthma J45.909     Patient Active Problem List    Diagnosis Date Noted    Asthma 06/13/2019    Severe obesity ([facility] [address] 10/10/2018    Palpitations 07/26/2016    Precordial pain 07/26/2016    Mild intermittent asthma 07/07/2014    Obesity 07/07/2014     Current Outpatient Medications   Medication Sig Dispense Refill    albuterol (PROVENTIL HFA, VENTOLIN HFA, PROAIR HFA) 90 mcg/actuation inhaler Take 1-2 Puffs by inhalation every four (4) hours as needed for Wheezing. 6.7 Each 2    acetaminophen (TYLENOL PO) Take  by mouth.  cyclobenzaprine (FLEXERIL) 5 mg tablet Take 1 Tablet by mouth three (3) times daily as needed for Muscle Spasm(s). 30 Tablet 1    amitriptyline (ELAVIL) 10 mg tablet Take 10 mg at bedtime for a week then increase to 20 mg at bedtime. Indications: migraine prevention 60 Tablet 1    gabapentin (Neurontin) 300 mg capsule Take 1 Capsule by mouth two (2) times a day.  Max Daily Amount: 600 mg. 180 Capsule 1    budesonide-formoteroL (SYMBICORT) 160-4.5 mcg/actuation HFAA Take 2 Puffs by inhalation every twelve (12) hours. (Patient not taking: Reported on 2021) 10.2 Inhaler 2    Nebulizer & Compressor machine Use as directed (Patient not taking: Reported on 2021) 1 Each 0    Nebulizer Accessories kit Use as directed (Patient not taking: Reported on 2021) 1 Kit 1     Allergies   Allergen Reactions    Azo [Phenazopyridine] Hives    Iodinated Contrast Media Shortness of Breath     Other reaction(s): wheezing/sob     Past Medical History:   Diagnosis Date    Asthma     Chest pain     Chronic pain 6 months    painful menses    Fibroids     GERD (gastroesophageal reflux disease)     Headache disorder     Heart palpitations     Migraine     Neurological disorder     bleeding in head diagnosed      Past Surgical History:   Procedure Laterality Date    HX  SECTION  [id number]    with BTL    HX HYSTEROSCOPY WITH ENDOMETRIAL ABLATION      No period since       ROS    ROS:  History obtained from the patient intake forms which are reviewed with the patient  · General: negative for - chills, fever, weight changes or malaise  · HEENT: no sore throat, nasal congestion, vision problems or ear problems  · Respiratory: no cough, shortness of breath, or wheezing  · Cardiovascular: no chest pain, palpitations, or dyspnea on exertion  · Gastrointestinal: no abdominal pain, N/V, change in bowel habits, or black or bloody stools  · Musculoskeletal: RLE pain  · Neurological: headache  · Endo:  No polyuria or polydipsia. · : no hematuria, dysuria, frequency, hesitancy, or nocturia.     · Psychological: negative for - anxiety, depression, sleep disturbances, suicidal or homicidal ideations    Objective:     Patient-Reported Vitals 2021   Patient-Reported Weight 195lb        [INSTRUCTIONS:  \"[x]\" Indicates a positive item  \"[]\" Indicates a negative item  -- DELETE ALL ITEMS NOT EXAMINED]    Constitutional: [x] Appears well-developed and well-nourished [x] No apparent distress      [] Abnormal -     Mental status: [x] Alert and awake  [x] Oriented to person/place/time [x] Able to follow commands    [] Abnormal -     Eyes:   EOM    [x]  Normal    [] Abnormal -   Sclera  [x]  Normal    [] Abnormal -          Discharge [x]  None visible   [] Abnormal -     HENT: [x] Normocephalic, atraumatic  [] Abnormal -   [x] Mouth/Throat: Mucous membranes are moist    External Ears [x] Normal  [] Abnormal -    Neck: [x] No visualized mass [] Abnormal -     Pulmonary/Chest: [x] Respiratory effort normal   [x] No visualized signs of difficulty breathing or respiratory distress        [] Abnormal -      Musculoskeletal:   [x] Normal gait with no signs of ataxia         [x] Normal range of motion of neck        [] Abnormal -     Neurological:        [x] No Facial Asymmetry (Cranial nerve 7 motor function) (limited exam due to video visit)          [x] No gaze palsy        [] Abnormal -          Skin:        [x] No significant exanthematous lesions or discoloration noted on facial skin         [] Abnormal -            Psychiatric:       [x] Normal Affect [] Abnormal -        [x] No Hallucinations    Other pertinent observable physical exam findings:-        We discussed the expected course, resolution and complications of the diagnosis(es) in detail. Medication risks, benefits, costs, interactions, and alternatives were discussed as indicated. I advised her to contact the office if her condition worsens, changes or fails to improve as anticipated. She expressed understanding with the diagnosis(es) and plan. Luther Schaumann, was evaluated through a synchronous (real-time) audio-video encounter. The patient (or guardian if applicable) is aware that this is a billable service. Verbal consent to proceed has been obtained within the past 12 months.  The visit was conducted pursuant to the emergency declaration under the [facility], [address] [place] [address] waiver authority and the [employer] and [employer] General Act. Patient identification was verified, and a caregiver was present when appropriate. The patient was located in a state where the provider was credentialed to provide care.       Charlette Brown NP

## 2021-09-24 NOTE — PROGRESS NOTES
Floyd Rivas presents today for   Chief Complaint   Patient presents with    Labs    Leg Pain    Fatigue     left arm weakness       Is someone accompanying this pt? no    Is the patient using any DME equipment during OV? no    Depression Screening:  3 most recent PHQ Screens 9/24/2021   Little interest or pleasure in doing things Not at all   Feeling down, depressed, irritable, or hopeless Not at all   Total Score PHQ 2 0       Learning Assessment:  Learning Assessment 11/25/2015   PRIMARY LEARNER Patient   HIGHEST LEVEL OF EDUCATION - PRIMARY LEARNER  2 YEARS OF COLLEGE   BARRIERS PRIMARY LEARNER NONE   PRIMARY LANGUAGE ENGLISH   LEARNER PREFERENCE PRIMARY READING     VIDEOS     -   ANSWERED BY Patient   RELATIONSHIP SELF       Health Maintenance reviewed and discussed and ordered per Provider. Health Maintenance Due   Topic Date Due    Flu Vaccine (1) 09/01/2021   . Coordination of Care:  1. Have you been to the ER, urgent care clinic since your last visit? Hospitalized since your last visit? no    2. Have you seen or consulted any other health care providers outside of the [address] [place] [address] since your last visit? Include any pap smears or colon screening.  no

## 2021-10-04 ENCOUNTER — VIRTUAL VISIT (OUTPATIENT)
Dept: NEUROLOGY | Age: 41
End: 2021-10-04
Payer: COMMERCIAL

## 2021-10-04 ENCOUNTER — TELEPHONE (OUTPATIENT)
Dept: NEUROLOGY | Age: 41
End: 2021-10-04

## 2021-10-04 DIAGNOSIS — G44.219 EPISODIC TENSION-TYPE HEADACHE, NOT INTRACTABLE: ICD-10-CM

## 2021-10-04 DIAGNOSIS — Q28.3 BRAIN VASCULAR MALFORMATION: ICD-10-CM

## 2021-10-04 DIAGNOSIS — G56.03 BILATERAL CARPAL TUNNEL SYNDROME: Primary | ICD-10-CM

## 2021-10-04 PROCEDURE — 99213 OFFICE O/P EST LOW 20 MIN: CPT | Performed by: STUDENT IN AN ORGANIZED HEALTH CARE EDUCATION/TRAINING PROGRAM

## 2021-10-04 RX ORDER — AMITRIPTYLINE HYDROCHLORIDE 10 MG/1
TABLET, FILM COATED ORAL
Qty: 60 TABLET | Refills: 1 | OUTPATIENT
Start: 2021-10-04

## 2021-10-04 RX ORDER — AMITRIPTYLINE HYDROCHLORIDE 10 MG/1
10 TABLET, FILM COATED ORAL
Status: CANCELLED | OUTPATIENT
Start: 2021-10-04

## 2021-10-04 NOTE — TELEPHONE ENCOUNTER
Patient called and stated her AVS says she stopped taking amirtiptyline patient stated she did not stop taking it the appt today was for a refill. Please advise.

## 2021-10-04 NOTE — PROGRESS NOTES
Christi Felder is a 39 y.o. female on virtual visit today for follow-up on bilateral carpal tunnel syndrome and HAs.    1. Have you been to the ER, urgent care clinic since your last visit? Hospitalized since your last visit? No    2. Have you seen or consulted any other health care providers outside of the [address] [place] [address] since your last visit? Include any pap smears or colon screening. No    Mobile number [phone] will be used for today's visit.

## 2021-10-04 NOTE — PROGRESS NOTES
Melvin Mason is a 39 y.o. female who was seen by synchronous (real-time) audio-video technology on 10/4/2021 for Follow-up (CTS) and Headache        Assessment & Plan:   Diagnoses and all orders for this visit:    1. Bilateral carpal tunnel syndrome    2. Episodic tension-type headache, not intractable    3. Brain vascular malformation    Headache not currently severe. She will continue with as needed Tylenol. She will continue follow-up with hand surgery for evaluation of her bilateral carpal tunnel syndrome. Might need repeat MRI for the possible vascular formations within a year. We will see her in follow-up in 3 months time. Subjective:   A 40 yo female patient here for follow-up of headache and wrist pain. During her last visit in June 2021, she had an MRI of the brain which showed several 'low flow vascular malformations, either capillary telangiectasias, or cavernous angiomas;  multiplicity/features are not classic for either, but favor telangiectasias'. She was subsequently seen by Dr. Mandy Jose not see any note; patient was told that will be followed conservatively]. She continues to have intermittent head pain which is about the same. Came to the clinic about a month ago and was seen by AMPARO Fernandez. Was given amitriptyline; felt better and subsequently stopped the medicine. For her carpal tunnel, was also referred to hand surgery and will be seen next week. Continues to have the left arm numbness and weakness. Also has bilateral wrist pain. For pain, she takes Tylenol as needed. Currently, has no blurring of vision or diplopia. She is walking good. No significant weakness of the upper or lower extremities. Prior to Admission medications    Medication Sig Start Date End Date Taking? Authorizing Provider   albuterol (PROVENTIL HFA, VENTOLIN HFA, PROAIR HFA) 90 mcg/actuation inhaler Take 1-2 Puffs by inhalation every four (4) hours as needed for Wheezing.  9/10/21  Yes Al Kaiser NP   acetaminophen (TYLENOL PO) Take  by mouth. Yes Other, MD Randall   cyclobenzaprine (FLEXERIL) 5 mg tablet Take 1 Tablet by mouth three (3) times daily as needed for Muscle Spasm(s). 8/25/21  Yes Al Kaiser NP   gabapentin (Neurontin) 300 mg capsule Take 1 Capsule by mouth two (2) times a day. Max Daily Amount: 600 mg. 8/3/21  Yes Al Kaiser NP   budesonide-formoteroL (SYMBICORT) 160-4.5 mcg/actuation HFAA Take 2 Puffs by inhalation every twelve (12) hours. 3/30/21  Yes Al Kaiser NP   Nebulizer & Compressor machine Use as directed 3/30/17   Federico Arambula MD   Nebulizer Accessories kit Use as directed 3/30/17   Federico Arambula MD     Patient Active Problem List   Diagnosis Code    Mild intermittent asthma J45.20    Obesity E66.9    Palpitations R00.2    Precordial pain R07.2    Severe obesity ([address] E66.01    Asthma J45.909     Patient Active Problem List    Diagnosis Date Noted    Asthma 06/13/2019    Severe obesity ([address] 10/10/2018    Palpitations 07/26/2016    Precordial pain 07/26/2016    Mild intermittent asthma 07/07/2014    Obesity 07/07/2014     Current Outpatient Medications   Medication Sig Dispense Refill    albuterol (PROVENTIL HFA, VENTOLIN HFA, PROAIR HFA) 90 mcg/actuation inhaler Take 1-2 Puffs by inhalation every four (4) hours as needed for Wheezing. 6.7 Each 2    acetaminophen (TYLENOL PO) Take  by mouth.  cyclobenzaprine (FLEXERIL) 5 mg tablet Take 1 Tablet by mouth three (3) times daily as needed for Muscle Spasm(s). 30 Tablet 1    gabapentin (Neurontin) 300 mg capsule Take 1 Capsule by mouth two (2) times a day. Max Daily Amount: 600 mg. 180 Capsule 1    budesonide-formoteroL (SYMBICORT) 160-4.5 mcg/actuation HFAA Take 2 Puffs by inhalation every twelve (12) hours.  10.2 Inhaler 2    Nebulizer & Compressor machine Use as directed 1 Each 0    Nebulizer Accessories kit Use as directed 1 Kit 1     Allergies Allergen Reactions    Azo [Phenazopyridine] Hives    Iodinated Contrast Media Shortness of Breath     Other reaction(s): wheezing/sob     Past Medical History:   Diagnosis Date    Asthma     Chest pain     Chronic pain 6 months    painful menses    Fibroids     GERD (gastroesophageal reflux disease)     Headache disorder     Heart palpitations     Migraine     Neurological disorder     bleeding in head diagnosed      Past Surgical History:   Procedure Laterality Date    HX  SECTION  0614,1006    with BTL    HX HYSTEROSCOPY WITH ENDOMETRIAL ABLATION      No period since     Family History   Problem Relation Age of Onset    Hypertension Mother     Hypertension Father     Diabetes Father     Parkinson's Disease Father     Cancer Paternal Grandmother     MS Sister      Social History     Tobacco Use    Smoking status: Former Smoker     Types: Cigarettes     Quit date: 3/23/1998     Years since quittin.5    Smokeless tobacco: Former User    Tobacco comment: smoked a few as a teen, Vaped for a year   Substance Use Topics    Alcohol use: Yes     Comment: socially, 2 beer/weekend       Review of Systems   Constitutional: Negative for chills and fever. HENT: Negative for hearing loss and tinnitus. Eyes: Negative for blurred vision and double vision. Respiratory: Negative for cough and shortness of breath. Cardiovascular: Negative for chest pain and leg swelling. Musculoskeletal: Negative for back pain and neck pain. Skin: Negative for itching and rash. Neurological: Positive for dizziness (occasionally; better), sensory change and headaches (burning sensation). Negative for focal weakness and weakness.        Objective:     Patient-Reported Vitals 10/4/2021   Patient-Reported Weight 190lb        [INSTRUCTIONS:  \"[x]\" Indicates a positive item  \"[]\" Indicates a negative item  -- DELETE ALL ITEMS NOT EXAMINED]    Constitutional: [] Appears well-developed and well-nourished [x] No apparent distress      [] Abnormal -     Mental status: [x] Alert and awake  [x] Oriented      [x] Able to follow commands    [] Abnormal -     Eyes:   EOM    [x]  Normal    [] Abnormal -   Sclera  []  Normal    [] Abnormal -          Discharge []  None visible   [] Abnormal -     HENT: [x] Normocephalic, atraumatic  [] Abnormal -   [] Mouth/Throat: Mucous membranes are moist    External Ears [] Normal  [] Abnormal -    Neck: [] No visualized mass [] Abnormal -     Pulmonary/Chest: [x] Respiratory effort normal   [] No visualized signs of difficulty breathing or respiratory distress        [] Abnormal -      Musculoskeletal:   [x] Normal gait with no signs of ataxia         [x] Normal range of motion of neck        [] Abnormal -     Neurological:        [x] No Facial Asymmetry (Cranial nerve 7 motor function) (limited exam due to video visit)          [x] No gaze palsy        [] Abnormal -    - Normal gait. Skin:        [] No significant exanthematous lesions or discoloration noted on facial skin         [] Abnormal -            Psychiatric:       [x] Normal Affect [] Abnormal -        [] No Hallucinations    Other pertinent observable physical exam findings:-        We discussed the expected course, resolution and complications of the diagnosis(es) in detail. Medication risks, benefits, costs, interactions, and alternatives were discussed as indicated. I advised her to contact the office if her condition worsens, changes or fails to improve as anticipated. She expressed understanding with the diagnosis(es) and plan. Jorgito Son, was evaluated through a synchronous (real-time) audio-video encounter. The patient (or guardian if applicable) is aware that this is a billable service. Verbal consent to proceed has been obtained within the past 12 months.  The visit was conducted pursuant to the emergency declaration under the [address] [facility] [place], [address] waiver authority and the [employer] General Act. Patient identification was verified, and a caregiver was present when appropriate. The patient was located in a state where the provider was credentialed to provide care.       Sudeep Wiggins MD

## 2021-10-04 NOTE — TELEPHONE ENCOUNTER
Requested Prescriptions     Pending Prescriptions Disp Refills    amitriptyline (ELAVIL) 10 mg tablet 60 Tablet 1     Sig: Take 10 mg at bedtime for a week then increase to 20 mg at bedtime. Indications: migraine prevention     Medication was filled by AMPARO Fernandez; pt stated pharmacy told her there were no more refills but her bottle states she has one more refill; please advise.

## 2021-10-05 DIAGNOSIS — G44.219 EPISODIC TENSION-TYPE HEADACHE, NOT INTRACTABLE: Primary | ICD-10-CM

## 2021-10-05 RX ORDER — AMITRIPTYLINE HYDROCHLORIDE 10 MG/1
10 TABLET, FILM COATED ORAL
Qty: 30 TABLET | Refills: 5 | Status: SHIPPED | OUTPATIENT
Start: 2021-10-05 | End: 2021-11-04

## 2021-10-05 NOTE — TELEPHONE ENCOUNTER
Please advise on whether or not the patient should be on amitriptyline. If so please refill as appropriate.

## 2021-10-06 ENCOUNTER — OFFICE VISIT (OUTPATIENT)
Dept: VASCULAR SURGERY | Age: 41
End: 2021-10-06
Payer: COMMERCIAL

## 2021-10-06 VITALS
BODY MASS INDEX: 32.44 KG/M2 | OXYGEN SATURATION: 96 % | HEART RATE: 83 BPM | HEIGHT: 64 IN | SYSTOLIC BLOOD PRESSURE: 128 MMHG | WEIGHT: 190 LBS | DIASTOLIC BLOOD PRESSURE: 76 MMHG | RESPIRATION RATE: 17 BRPM

## 2021-10-06 DIAGNOSIS — M79.661 PAIN OF RIGHT CALF: Primary | ICD-10-CM

## 2021-10-06 DIAGNOSIS — M25.521 RIGHT ELBOW PAIN: ICD-10-CM

## 2021-10-06 PROCEDURE — 99203 OFFICE O/P NEW LOW 30 MIN: CPT | Performed by: NURSE PRACTITIONER

## 2021-10-06 NOTE — PROGRESS NOTES
Chief Complaint   Patient presents with    New Patient     Venous Malformation         Impression and Plan:  39 y.o. female with  Right arm and leg pain. Her symptomology seems atypical to peripheral arterial disease. However since she does have complaints of right calf pain that hinders how far she can walk and worsening paresthesia and right arm pain we will move forward with arterial studies to rule out any underlying arterial insufficiency. Patient should continue follow-up concerning her bilateral carpal tunnel syndrome. History and Physical    Josefa Sidhu is a 39y.o. year old female here with compliants of right arm and leg pain. Her right arm the pain is aching and tingling. At night it feels as though something is crawling up her arm. Pressure aggravates the pain. The patient did note that she has bilateral carpal tunnel syndrome. But the right arm pain is different. She denies arm fatigue and has strong ulnar and radial pulses which do not change in strength when the arm is raised above the head. Tylenol helps alleviate the pain. Her right leg has intermittent aching along her back calf. The leg pain started about a year ago. She denies any traumatic event. At times there is tingling and tightness that prevents her from walking as far she like. She states when she is on the treadmill the back of her calf begins to hurt. He denies low back pain. She quit smoking over a year ago. She did not say how long or how many cigarettes she smoked while smoking.       Past Medical History:   Diagnosis Date    Asthma     Chest pain     Chronic pain 6 months    painful menses    Fibroids     GERD (gastroesophageal reflux disease)     Headache disorder     Heart palpitations     Migraine     Neurological disorder     bleeding in head diagnosed 2021     Patient Active Problem List   Diagnosis Code    Mild intermittent asthma J45.20    Obesity E66.9    Palpitations R00.2    Precordial pain R07.2    Severe obesity (HCC) E66.01    Asthma J45.909     Past Surgical History:   Procedure Laterality Date    HX  SECTION  6707,0468    with BTL    HX HYSTEROSCOPY WITH ENDOMETRIAL ABLATION      No period since     Current Outpatient Medications   Medication Sig Dispense Refill    amitriptyline (ELAVIL) 10 mg tablet Take 1 Tablet by mouth nightly for 30 days. 30 Tablet 5    albuterol (PROVENTIL HFA, VENTOLIN HFA, PROAIR HFA) 90 mcg/actuation inhaler Take 1-2 Puffs by inhalation every four (4) hours as needed for Wheezing. 6.7 Each 2    acetaminophen (TYLENOL PO) Take  by mouth.  cyclobenzaprine (FLEXERIL) 5 mg tablet Take 1 Tablet by mouth three (3) times daily as needed for Muscle Spasm(s). 30 Tablet 1    gabapentin (Neurontin) 300 mg capsule Take 1 Capsule by mouth two (2) times a day. Max Daily Amount: 600 mg. 180 Capsule 1    budesonide-formoteroL (SYMBICORT) 160-4.5 mcg/actuation HFAA Take 2 Puffs by inhalation every twelve (12) hours.  10.2 Inhaler 2    Nebulizer & Compressor machine Use as directed 1 Each 0    Nebulizer Accessories kit Use as directed 1 Kit 1     Allergies   Allergen Reactions    Azo [Phenazopyridine] Hives    Iodinated Contrast Media Shortness of Breath     Other reaction(s): wheezing/sob     Social History     Socioeconomic History    Marital status:      Spouse name: Not on file    Number of children: Not on file    Years of education: Not on file    Highest education level: Not on file   Occupational History     Employer: [employer]   Tobacco Use    Smoking status: Former Smoker     Types: Cigarettes     Quit date: 3/23/1998     Years since quittin.5    Smokeless tobacco: Former User    Tobacco comment: smoked a few as a teen, Vaped for a year   Substance and Sexual Activity    Alcohol use: Yes     Comment: socially, 2 beer/weekend    Drug use: No    Sexual activity: Yes     Partners: Male     Birth control/protection: Surgical   Other Topics Concern     Service Yes     Comment:  [place]    Blood Transfusions No    Caffeine Concern No    Occupational Exposure No    Hobby Hazards No    Sleep Concern No    Stress Concern No    Weight Concern Yes     Comment: PT IS OVER WEIGHT    Special Diet No    Back Care No    Exercise Yes     Comment: 4 DAYS A WEEK    Bike Helmet No    Seat Belt Yes    Self-Exams Yes   Social History Narrative    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 Social Determinants of Health     Financial Resource Strain:     Difficulty of Paying Living Expenses:    Food Insecurity:     Worried About Running Out of Food in the Last Year:     [address] in the Last Year:    Transportation Needs:     Lack of Transportation (Medical):      Lack of Transportation (Non-Medical):    Physical Activity:     Days of Exercise per Week:     Minutes of Exercise per Session:    Stress:     Feeling of Stress :    Social Connections:     Frequency of Communication with Friends and Family:     Frequency of Social Gatherings with Friends and Family:     Attends Mosque Services:     Active Member of Clubs or Organizations:     Attends Club or Organization Meetings:     Marital Status:    Intimate Partner Violence:     Fear of Current or Ex-Partner:     Emotionally Abused:     Physically Abused:     Sexually Abused:       Family History   Problem Relation Age of Onset    Hypertension Mother     Hypertension Father     Diabetes Father     Parkinson's Disease Father     Cancer Paternal Grandmother     MS Sister        Review of Systems    General: negative for fever   Eyes: negative for vision loss   HENT: negative for cold symptoms   Respiratory negative for shortness of breath   Cardiac: negative for chest pain   Vascular negative for foot pain at night    Gastrointestinal: negative for abdominal pain   Genitourinary: negative for dysuria    Endocrine: negative for excessive thirst   Skin: negative for rash   Neurological: negative for paralysis   Psychiatric: negative for depression          Physical Exam:    Visit Vitals  /76   Pulse 83   Resp 17   Ht 5' 4\" (1.626 m)   Wt 190 lb (86.2 kg)   SpO2 96%   BMI 32.61 kg/m²      Constitutional:  Patient is well developed, well nourished, and not distressed. HEENT: atraumatic, normocephalic, wearing a mask. Eyes:   Cunjunctivae clear, no scleral icterus  Neck:   No JVD present. Cardiovascular:  Normal rate, regular rhythm, normal heart sounds. No murmur heard. Pulmonary/Chest: Effort normal .  Extremities: Normal range of motion. Neurological:  he  is alert and oriented x3 . Gait normal. Motor & sensory grossly intact in all 4 limbs. Psych: Appropriate mood and affect. Skin:  Skin is warm and dry. No ulcerations  Pulses: Right radial and ulnar +2 palpable pedal pulses bilaterally          The treatment plan was reviewed with the patient in detail. The patient voiced understanding of this plan and all questions and concerns were addressed. The patient agrees with this plan. We discussed the signs and symptoms that would require earlier attention or intervention. I appreciate the opportunity to participate in the care of your patient. I will be sure to keep you informed of any subsequent changes in the treatment plan. If you have any questions or concerns, please feel free to contact me.       Brisa VALDOVINOSSt. [facility]  Vascular Nurse [address]  [phone]

## 2021-10-06 NOTE — PROGRESS NOTES
1. Have you been to an emergency room or urgent care clinic since your last visit? [facility]- Chest Pain  Hospitalized since your last visit? If yes, where, when, and reason for visit? NO  2. Have you seen or consulted any other health care providers outside of the [facility] since your last visit including any procedures, health maintenance items. If yes, where, when and reason for visit?  NO

## 2021-10-12 ENCOUNTER — HOSPITAL ENCOUNTER (OUTPATIENT)
Dept: NUTRITION | Age: 41
Discharge: HOME | End: 2021-10-12
Payer: COMMERCIAL

## 2021-10-12 PROCEDURE — 97802 MEDICAL NUTRITION INDIV IN: CPT

## 2021-10-13 ENCOUNTER — OFFICE VISIT (OUTPATIENT)
Dept: ORTHOPEDIC SURGERY | Age: 41
End: 2021-10-13
Payer: COMMERCIAL

## 2021-10-13 VITALS
HEIGHT: 64 IN | HEART RATE: 81 BPM | BODY MASS INDEX: 36.02 KG/M2 | TEMPERATURE: 97.2 F | OXYGEN SATURATION: 100 % | WEIGHT: 211 LBS

## 2021-10-13 DIAGNOSIS — M79.661 PAIN OF RIGHT CALF: ICD-10-CM

## 2021-10-13 DIAGNOSIS — G56.03 BILATERAL CARPAL TUNNEL SYNDROME: Primary | ICD-10-CM

## 2021-10-13 DIAGNOSIS — M25.521 RIGHT ELBOW PAIN: ICD-10-CM

## 2021-10-13 PROCEDURE — 99214 OFFICE O/P EST MOD 30 MIN: CPT | Performed by: ORTHOPAEDIC SURGERY

## 2021-10-13 PROCEDURE — 20526 THER INJECTION CARP TUNNEL: CPT | Performed by: ORTHOPAEDIC SURGERY

## 2021-10-13 NOTE — PROGRESS NOTES
Josefina Sow is a 39 y.o. female right handed unspecified employment. Worker's Compensation and legal considerations: none filed. Vitals:    10/13/21 0903   Pulse: 81   Temp: 97.2 °F (36.2 °C)   TempSrc: Temporal   SpO2: 100%   Weight: 211 lb (95.7 kg)   Height: 5' 4\" (1.626 m)   PainSc:   8   PainLoc: Wrist           Chief Complaint   Patient presents with    Wrist Pain     Bilat         HPI: Patient presents today with complaints of bilateral hand numbness and tingling. She has recently had an EMG done which was positive for carpal tunnel. Date of onset: Indeterminate    Injury: No    Prior Treatment:  Yes: Comment: Nighttime bracing    Numbness/ Tingling: Yes: Comment: Bilateral hands      ROS: Review of Systems - General ROS: negative  Psychological ROS: negative  ENT ROS: negative  Allergy and Immunology ROS: negative  Hematological and Lymphatic ROS: negative  Respiratory ROS: no cough, shortness of breath, or wheezing  Cardiovascular ROS: no chest pain or dyspnea on exertion  Gastrointestinal ROS: no abdominal pain, change in bowel habits, or black or bloody stools  Musculoskeletal ROS: negative  Neurological ROS: positive for - numbness/tingling  Dermatological ROS: negative    Past Medical History:   Diagnosis Date    Asthma     Chest pain     Chronic pain 6 months    painful menses    Fibroids     GERD (gastroesophageal reflux disease)     Headache disorder     Heart palpitations     Migraine     Neurological disorder     bleeding in head diagnosed        Past Surgical History:   Procedure Laterality Date    HX  SECTION  85,8582    with BTL    HX HYSTEROSCOPY WITH ENDOMETRIAL ABLATION      No period since       Current Outpatient Medications   Medication Sig Dispense Refill    amitriptyline (ELAVIL) 10 mg tablet Take 1 Tablet by mouth nightly for 30 days.  30 Tablet 5    albuterol (PROVENTIL HFA, VENTOLIN HFA, PROAIR HFA) 90 mcg/actuation inhaler Take 1-2 Puffs by inhalation every four (4) hours as needed for Wheezing. 6.7 Each 2    acetaminophen (TYLENOL PO) Take  by mouth.  cyclobenzaprine (FLEXERIL) 5 mg tablet Take 1 Tablet by mouth three (3) times daily as needed for Muscle Spasm(s). 30 Tablet 1    gabapentin (Neurontin) 300 mg capsule Take 1 Capsule by mouth two (2) times a day. Max Daily Amount: 600 mg. 180 Capsule 1    budesonide-formoteroL (SYMBICORT) 160-4.5 mcg/actuation HFAA Take 2 Puffs by inhalation every twelve (12) hours. 10.2 Inhaler 2    Nebulizer & Compressor machine Use as directed 1 Each 0    Nebulizer Accessories kit Use as directed 1 Kit 1     Current Facility-Administered Medications   Medication Dose Route Frequency Provider Last Rate Last Admin    triamcinolone acetonide (KENALOG) 10 mg/mL injection 10 mg  10 mg Other ONCE Lewis Mujica, DO           Allergies   Allergen Reactions    Azo [Phenazopyridine] Hives    Iodinated Contrast Media Shortness of Breath     Other reaction(s): wheezing/sob           PE:     Physical Exam  Vitals and nursing note reviewed. Constitutional:       General: She is not in acute distress. Appearance: Normal appearance. She is not ill-appearing. Cardiovascular:      Pulses: Normal pulses. Pulmonary:      Effort: Pulmonary effort is normal. No respiratory distress. Musculoskeletal:         General: No swelling, tenderness, deformity or signs of injury. Normal range of motion. Cervical back: Normal range of motion. Right lower leg: No edema. Left lower leg: No edema. Skin:     General: Skin is warm and dry. Capillary Refill: Capillary refill takes less than 2 seconds. Findings: No bruising or erythema. Neurological:      General: No focal deficit present. Mental Status: She is alert and oriented to person, place, and time.    Psychiatric:         Mood and Affect: Mood normal.         Behavior: Behavior normal.            NEUROVASCULAR    Examination L R Examination L R   Carpal Comp. + + Pronator Comp. - -   Carpal Tinel + + Pronator Tinel - -   Phalen's - - Pronator Stress - -   Cubital Comp. - - Guyon Comp. - -   Cubital Tinel - - Guyon Tinel - -   Elbow Hyperflexion - - Adson's - -   Spurling's - - SC Comp. - -   PCB Median abn - - SC Tinel - -   Radial Tinel - - IC Comp. - -   Digital Tinel - - IC Tinel - -   Radial 2-Pt WNL WNL Ulnar 2-Pt WNL WNL     Radial Pulse: 2+  Capillary Refill: < 2 sec  [place]: Not Performed  [employer]: Not Performed        NCV & EMG Findings:  Evaluation of the left median sensory and the right median sensory nerves showed prolonged distal peak latency (L3.8, R4.3 ms) and decreased conduction velocity (Wrist-2nd Digit, L37, R33 m/s). All remaining nerves (as indicated in the following tables) were within normal limits. Left vs. Right side comparison data for the median motor nerve indicates abnormal L-R latency difference (0.9 ms). The ulnar motor nerve indicates abnormal L-R amplitude difference (27.4 %). The median sensory nerve indicates abnormal L-R latency difference (0.5 ms). All remaining left vs. right side differences were within normal limits.       All examined muscles (as indicated in the following table) showed no evidence of electrical instability.       Impression:  Bilaterally decreased median sensory latencies left greater than right, consistent with a Grade l carpel tunnel syndrome. Imaging:     None indicated        ICD-10-CM ICD-9-CM    1. Bilateral carpal tunnel syndrome  G56.03 354.0 INJECT CARPAL TUNNEL      triamcinolone acetonide (KENALOG) 10 mg/mL injection 10 mg         Plan:     Bilateral carpal tunnel injections and continue nighttime bracing. Follow-up and Dispositions    · Return if symptoms worsen or fail to improve.           Plan was reviewed with patient, who verbalized agreement and understanding of the plan    [address] PROGRESS NOTE        Chart reviewed for the following:   Lewis BUITRAGO DO, have reviewed the History, Physical and updated the Allergic reactions for [employer]     TIME OUT performed immediately prior to start of procedure:   Lewis BUITRAGO DO, have performed the following reviews on [employer] prior to the start of the procedure:            * Patient was identified by name and date of birth   * Agreement on procedure being performed was verified  * Risks and Benefits explained to the patient  * Procedure site verified and marked as necessary  * Patient was positioned for comfort  * Consent was signed and verified     Time: 09:16 AM      Date of procedure: 10/13/2021    Procedure performed by: Jennifer Jack DO    Provider assisted by: Estrella Frye MA    Patient assisted by: self    How tolerated by patient: tolerated the procedure well with no complications    Post Procedural Pain Scale: 0 - No Hurt    Comments: none    Procedure:  After consent was obtained, using sterile technique the bilateral carpal tunnels was prepped. Local anesthetic used: 1% lidocaine. Kenalog 5 mg X2 and was then injected and the needle withdrawn. The procedure was well tolerated. The patient is asked to continue to rest the area for a few more days before resuming regular activities. It may be more painful for the first 1-2 days. Watch for fever, or increased swelling or persistent pain in the joint. Call or return to clinic prn if such symptoms occur or there is failure to improve as anticipated.

## 2021-10-14 NOTE — PROGRESS NOTES
[address] [place] [address]     Nutrition Assessment  Medical Nutrition Therapy   Outpatient Initial Evaluation         Patient Name: Spring Rodrigues : 1980   Treatment Diagnosis: Elevated BMI with co morbidities. Referral Source: [facility]): 10/12/2021     Gender: female Age: 39 y.o. Ht: 64 in Wt:   lb  kg   BMI: 39 BMR   Male  BMR Female 1606     Past Medical History:  Asthma     Pertinent Medications:   Amitriptyline     Biochemical Data:   Lab Results   Component Value Date/Time    Hemoglobin A1c 5.6 2016 10:44 AM    Hemoglobin A1c (POC) 5.4 2015 09:40 AM     Lab Results   Component Value Date/Time    Sodium 139 2021 11:46 PM    Potassium 3.5 2021 11:46 PM    Chloride 107 2021 11:46 PM    CO2 25 2021 11:46 PM    Anion gap 7 2021 11:46 PM    Glucose 86 2021 11:46 PM    BUN 13 2021 11:46 PM    Creatinine 0.93 2021 11:46 PM    BUN/Creatinine ratio 14 2021 11:46 PM    GFR est AA >60 2021 11:46 PM    GFR est non-AA >60 2021 11:46 PM    Calcium 8.5 2021 11:46 PM    Bilirubin, total 0.3 2021 11:46 PM    Alk.  phosphatase 71 2021 11:46 PM    Protein, total 7.2 2021 11:46 PM    Albumin 3.4 2021 11:46 PM    Globulin 3.8 2021 11:46 PM    A-G Ratio 0.9 2021 11:46 PM    ALT (SGPT) 21 2021 11:46 PM    AST (SGOT) 24 2021 11:46 PM     Lab Results   Component Value Date/Time    Cholesterol, total 152 07/10/2021 07:26 AM    HDL Cholesterol 69 (H) 07/10/2021 07:26 AM    LDL-CHOLESTEROL 79 2018 09:27 AM    LDL, calculated 72.6 07/10/2021 07:26 AM    VLDL, calculated 9.4 2016 10:44 AM    Triglyceride 52 07/10/2021 07:26 AM    CHOL/HDL Ratio 2.4 2016 10:44 AM    Cholesterol/HDL ratio 2.6 2018 09:27 AM     Lab Results   Component Value Date/Time    ALT (SGPT) 2021 11:46 PM    AST (SGOT) 2021 11:46 PM    Alk. phosphatase 71 09/05/2021 11:46 PM    Bilirubin, total 0.3 09/05/2021 11:46 PM     Lab Results   Component Value Date/Time    Creatinine 0.93 09/05/2021 11:46 PM     Lab Results   Component Value Date/Time    BUN 13 09/05/2021 11:46 PM     No results found for: MCACR, MCA1, MCA2, MCA3, MCAU, MCAU2, MCALPOCT     Assessment:   Patient is a 39year old female who visits RD to learn how to eat healthy. She desires to learn of foods to avoid that would bring upon vasoconstriction. Food & Nutrition: Patient eats 3 times a day and snacks throughout the day. Patient is doing her best to drink 1 gallon of water a day. Breakfast choices may be fruit. Lunch may be leftovers such as spaghetti, sauce and meat. Dinner may be chicken wings, fried rice and chinese food. Estimate Needs   Calories: 2249-8560  Protein:  Carbs: <125 Fat: 60   Kcal/day  g/day  g/day  g/day                            Nutrition Diagnosis Overweight/obesity related to excessive energy intake as evidenced by a BMI of 36. Undesirable food choices as provided by diet history. Nutrition Intervention &  Education: Patient guided to avoid caffeine containing foods. Patient guided to avoid foods high in hydrogenated fats and salt. Handouts Provided: [x]  Carbohydrates  [x]  Protein  [x]  Non-starchy Vegatbles  [x]  Food Label  [x]  Meal and Snack Ideas  []  Food Journals []  Diabetes  []  Cholesterol  []  Sodium  []  Gen Nutr Guidelines  []  SBGM Guidelines  []  Others:   Information Reviewed with: Patient   Readiness to Change Stage: []  Pre-contemplative    []  Contemplative  []  Preparation               [x]  Action                  []  Maintenance   Potential Barriers to Learning: []  Decline in memory    []  Language barrier   []  Other:  []  Emotional                  []  Limited mobility  []  Lack of motivation     [] Vision, hearing or cognitive impairment   Expected Compliance: Good.      Nutritional Goal - To promote lifestyle changes to result in:    [x]  Weight loss  []  Improved diabetic control  []  Decreased cholesterol levels  []  Decreased blood pressure  []  Weight maintenance []  Preventing any interactions associated with food allergies  []  Adequate weight gain toward goal weight  [x]  Other:        Patient Goals:   Patient would like to eat a diet that is healthy and does not cause vasoconstriction. She would also like to lose weight. Dietitian Signature:  Heather Troy RD Date: 10/12/2021   Follow-up: 11/17/2021  9:00 am Time: 4:10 PM

## 2021-10-19 DIAGNOSIS — R51.9 HEADACHE DISORDER: ICD-10-CM

## 2021-10-20 LAB
IMMEDIATE ARM BP: 128 MMHG
IMMEDIATE LEFT ABI: 1.32
IMMEDIATE LEFT TIBIAL: 169 MMHG
IMMEDIATE RIGHT ABI: 1.58
IMMEDIATE RIGHT TIBIAL: 202 MMHG
LEFT ABI: 1.3
LEFT ARM BP: 120 MMHG
LEFT CALF PRESSURE: 167 MMHG
LEFT POSTERIOR TIBIAL: 161 MMHG
LEFT TBI: 0.98
LEFT TOE PRESSURE: 123 MMHG
RIGHT ABI: 1.4
RIGHT ARM BP: 126 MMHG
RIGHT CALF PRESSURE: 173 MMHG
RIGHT POSTERIOR TIBIAL: 169 MMHG
RIGHT TBI: 0.83
RIGHT TOE PRESSURE: 105 MMHG
VAS LEFT DORSALIS PEDIS BP: 164 MMHG
VAS RIGHT DORSALIS PEDIS BP: 176 MMHG

## 2021-10-21 RX ORDER — CYCLOBENZAPRINE HCL 5 MG
5 TABLET ORAL
Qty: 30 TABLET | Refills: 1 | Status: SHIPPED | OUTPATIENT
Start: 2021-10-21 | End: 2022-01-07 | Stop reason: SDUPTHER

## 2021-10-24 DIAGNOSIS — J45.41 MODERATE PERSISTENT ASTHMA WITH ACUTE EXACERBATION: ICD-10-CM

## 2021-10-30 RX ORDER — ALBUTEROL SULFATE 90 UG/1
1-2 AEROSOL, METERED RESPIRATORY (INHALATION)
Qty: 6.7 EACH | Refills: 2 | Status: SHIPPED | OUTPATIENT
Start: 2021-10-30 | End: 2022-01-07 | Stop reason: SDUPTHER

## 2021-11-10 ENCOUNTER — OFFICE VISIT (OUTPATIENT)
Dept: VASCULAR SURGERY | Age: 41
End: 2021-11-10
Payer: COMMERCIAL

## 2021-11-10 VITALS
OXYGEN SATURATION: 97 % | HEART RATE: 85 BPM | SYSTOLIC BLOOD PRESSURE: 138 MMHG | DIASTOLIC BLOOD PRESSURE: 86 MMHG | RESPIRATION RATE: 20 BRPM

## 2021-11-10 DIAGNOSIS — M79.661 PAIN OF RIGHT CALF: Primary | ICD-10-CM

## 2021-11-10 DIAGNOSIS — M25.521 RIGHT ELBOW PAIN: ICD-10-CM

## 2021-11-10 PROCEDURE — 99213 OFFICE O/P EST LOW 20 MIN: CPT | Performed by: NURSE PRACTITIONER

## 2021-11-10 NOTE — PROGRESS NOTES
Chief Complaint   Patient presents with    Leg Pain     calf pain          Impression and Plan:  39 y.o. female with  Right arm and leg pain. RTO prn  Patient should continue follow-up concerning her bilateral carpal tunnel syndrome. History and Physical    Alexandr Gayle is a 39y.o. year old female who returns for review of her WBI and HAILEY for compliants of right arm and leg pain. Her hailey with exercise and wbi indicate no arterial insufficiency. Her right arm the pain is aching and tingling. At night it feels as though something is crawling up her arm. Pressure aggravates the pain. The patient did note that she has bilateral carpal tunnel syndrome. But the right arm pain is different. She denies arm fatigue and has strong ulnar and radial pulses which do not change in strength when the arm is raised above the head. Tylenol helps alleviate the pain. Her right leg has intermittent aching along her back calf. The leg pain started about a year ago. She denies any traumatic event. At times there is tingling and tightness that prevents her from walking as far she like. She states when she is on the treadmill the back of her calf begins to hurt. He denies low back pain. She quit smoking over a year ago. She did not say how long or how many cigarettes she smoked while smoking.       Past Medical History:   Diagnosis Date    Asthma     Chest pain     Chronic pain 6 months    painful menses    Fibroids     GERD (gastroesophageal reflux disease)     Headache disorder     Heart palpitations     Migraine     Neurological disorder     bleeding in head diagnosed      Patient Active Problem List   Diagnosis Code    Mild intermittent asthma J45.20    Obesity E66.9    Palpitations R00.2    Precordial pain R07.2    Severe obesity (HCC) E66.01    Asthma J45.909     Past Surgical History:   Procedure Laterality Date    HX  SECTION  0503,6919    with BTL    HX HYSTEROSCOPY WITH ENDOMETRIAL ABLATION      No period since     Current Outpatient Medications   Medication Sig Dispense Refill    albuterol (PROVENTIL HFA, VENTOLIN HFA, PROAIR HFA) 90 mcg/actuation inhaler Take 1-2 Puffs by inhalation every four (4) hours as needed for Wheezing. 6.7 Each 2    cyclobenzaprine (FLEXERIL) 5 mg tablet Take 1 Tablet by mouth three (3) times daily as needed for Muscle Spasm(s). 30 Tablet 1    acetaminophen (TYLENOL PO) Take  by mouth.  gabapentin (Neurontin) 300 mg capsule Take 1 Capsule by mouth two (2) times a day. Max Daily Amount: 600 mg. 180 Capsule 1    budesonide-formoteroL (SYMBICORT) 160-4.5 mcg/actuation HFAA Take 2 Puffs by inhalation every twelve (12) hours.  10.2 Inhaler 2    Nebulizer & Compressor machine Use as directed 1 Each 0    Nebulizer Accessories kit Use as directed 1 Kit 1     Allergies   Allergen Reactions    Azo [Phenazopyridine] Hives    Iodinated Contrast Media Shortness of Breath     Other reaction(s): wheezing/sob     Social History     Socioeconomic History    Marital status:      Spouse name: Not on file    Number of children: Not on file    Years of education: Not on file    Highest education level: Not on file   Occupational History     Employer: [employer]   Tobacco Use    Smoking status: Former Smoker     Types: Cigarettes     Quit date: 3/23/1998     Years since quittin.6    Smokeless tobacco: Former User    Tobacco comment: smoked a few as a teen, Vaped for a year   Substance and Sexual Activity    Alcohol use: Yes     Comment: socially, 2 beer/weekend    Drug use: No    Sexual activity: Yes     Partners: Male     Birth control/protection: Surgical   Other Topics Concern     Service Yes     Comment:  [employer]    Blood Transfusions No    Caffeine Concern No    Occupational Exposure No    Hobby Hazards No    Sleep Concern No    Stress Concern No    Weight Concern Yes     Comment: PT IS OVER WEIGHT    Special Diet No    Back Care No    Exercise Yes     Comment: 4 DAYS A WEEK    Bike Helmet No    Seat Belt Yes    Self-Exams Yes   Social History Narrative    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 Social Determinants of Health     Financial Resource Strain:     Difficulty of Paying Living Expenses: Not on file   Food Insecurity:     Worried About Running Out of Food in the Last Year: Not on file    Angel of Food in the Last Year: Not on file   Transportation Needs:     Lack of Transportation (Medical): Not on file    Lack of Transportation (Non-Medical):  Not on file   Physical Activity:     Days of Exercise per Week: Not on file    Minutes of Exercise per Session: Not on file   Stress:     Feeling of Stress : Not on file   Social Connections:     Frequency of Communication with Friends and Family: Not on file    Frequency of Social Gatherings with Friends and Family: Not on file    Attends Restoration Services: Not on file    Active Member of [address] [place] [id number] or Organizations: Not on file    Attends Club or Organization Meetings: Not on file    Marital Status: Not on file   Intimate Partner Violence:     Fear of Current or Ex-Partner: Not on file    Emotionally Abused: Not on file    Physically Abused: Not on file    Sexually Abused: Not on file   Housing Stability:     Unable to Pay for Housing in the Last Year: Not on file    Number of [place] in the Last Year: Not on file    Unstable Housing in the Last Year: Not on file      Family History   Problem Relation Age of Onset    Hypertension Mother     Hypertension Father     Diabetes Father     Parkinson's Disease Father     Cancer Paternal Grandmother     Mult Sclerosis Sister        Review of Systems    General: negative for fever   Eyes: negative for vision loss   HENT: negative for cold symptoms   Respiratory negative for shortness of breath   Cardiac: negative for chest pain   Vascular negative for foot pain at night Gastrointestinal: negative for abdominal pain   Genitourinary: negative for dysuria    Endocrine: negative for excessive thirst   Skin: negative for rash   Neurological: negative for paralysis   Psychiatric: negative for depression          Physical Exam:    Visit Vitals  /86 (BP 1 Location: Left upper arm, BP Patient Position: Sitting, BP Cuff Size: Adult)   Pulse 85   Resp 20   SpO2 97%      Constitutional:  Patient is well developed, well nourished, and not distressed. HEENT: atraumatic, normocephalic, wearing a mask. Eyes:   Cunjunctivae clear, no scleral icterus  Neck:   No JVD present. Cardiovascular:  Normal rate, regular rhythm, normal heart sounds. No murmur heard. Pulmonary/Chest: Effort normal .  Extremities: Normal range of motion. Neurological:  he  is alert and oriented x3 . Gait normal. Motor & sensory grossly intact in all 4 limbs. Psych: Appropriate mood and affect. Skin:  Skin is warm and dry. No ulcerations  Pulses: Right radial and ulnar +2 palpable pedal pulses bilaterally          The treatment plan was reviewed with the patient in detail. The patient voiced understanding of this plan and all questions and concerns were addressed. The patient agrees with this plan. We discussed the signs and symptoms that would require earlier attention or intervention. I appreciate the opportunity to participate in the care of your patient. I will be sure to keep you informed of any subsequent changes in the treatment plan. If you have any questions or concerns, please feel free to contact me.       Blaire Luong [facility]  Vascular Nurse [address]  [phone]

## 2021-11-10 NOTE — PROGRESS NOTES
1. Have you been to an emergency room or urgent care clinic since your last visit? No     Hospitalized since your last visit? If yes, where, when, and reason for visit? No     2. Have you seen or consulted any other health care providers outside of the [facility] since your last visit including any procedures, health maintenance items. If yes, where, when and reason for visit?  No            3 most recent PHQ Screens 11/10/2021   Little interest or pleasure in doing things Not at all   Feeling down, depressed, irritable, or hopeless Not at all   Total Score PHQ 2 0

## 2021-11-16 ENCOUNTER — HOSPITAL ENCOUNTER (OUTPATIENT)
Dept: ULTRASOUND IMAGING | Age: 41
Discharge: HOME | End: 2021-11-16
Attending: NURSE PRACTITIONER
Payer: COMMERCIAL

## 2021-11-16 ENCOUNTER — HOSPITAL ENCOUNTER (OUTPATIENT)
Dept: MAMMOGRAPHY | Age: 41
Discharge: HOME | End: 2021-11-16
Attending: NURSE PRACTITIONER
Payer: COMMERCIAL

## 2021-11-16 DIAGNOSIS — R92.8 ABNORMAL MAMMOGRAM: ICD-10-CM

## 2021-11-16 PROCEDURE — 76642 ULTRASOUND BREAST LIMITED: CPT

## 2021-11-17 ENCOUNTER — HOSPITAL ENCOUNTER (OUTPATIENT)
Dept: NUTRITION | Age: 41
End: 2021-11-17

## 2021-11-30 NOTE — PROGRESS NOTES
Marjan Sterling is a 39 y.o. female presenting today for Arm Pain (under left arm pit) and Wheezing (patient needs nebulizer machine)  . Chief Complaint   Patient presents with    Arm Pain     under left arm pit    Wheezing     patient needs nebulizer machine       HPI:  Marjan Sterling presents to the office today for pain under left arm pit and Rt arm elbow pain. Pain started 3 weeks ago in Lt underarm area. Intermittent in nature. No swelling but feels tender. No lumps or discharges noted. She also reports dull pain in her Rt forearm and elbow. She is Rt handed and mostly uses her Rt arm at work. Denies swelling, numbness or tingling. Carpal tunnel: Diagnosed with bilateral carpal tunnel syndrome. Received steroid injections with some improvement. Asthma: Feels like she has been wheezing more and gets SOB with exertion. Uses rescue inhaler everyday. No night time awakenings. However if she ambulates, she gets winded very quickly. She is currently not on any steroid inhaler - ran out of it 2 months ago. Since then her symptoms have worsened. ROS  ROS:  History obtained from the patient intake forms which are reviewed with the patient  · General: negative for - chills, fever, weight changes or malaise  · HEENT: no sore throat, nasal congestion, vision problems or ear problems  · Respiratory: has SOB and wheezing  · Cardiovascular: no chest pain, palpitations. Reporting dyspnea on exertion  · Gastrointestinal: no abdominal pain, N/V, change in bowel habits, or black or bloody stools  · Musculoskeletal: rt elbow pain and Lt underarm pain  · Neurological: occassional headaches  · Endo:  No polyuria or polydipsia. · : no hematuria, dysuria, frequency, hesitancy, or nocturia.     · Psychological: negative for - anxiety, depression, sleep disturbances, suicidal or homicidal ideations        Allergies   Allergen Reactions    Azo [Phenazopyridine] Hives    Iodinated Contrast Media Shortness of Breath     Other reaction(s): wheezing/sob       PHQ Screening   3 most recent PHQ Screens 11/10/2021   Little interest or pleasure in doing things Not at all   Feeling down, depressed, irritable, or hopeless Not at all   Total Score PHQ 2 0       History  Past Medical History:   Diagnosis Date    Asthma     Chest pain     Chronic pain 6 months    painful menses    Fibroids     GERD (gastroesophageal reflux disease)     Headache disorder     Heart palpitations     Migraine     Neurological disorder     bleeding in head diagnosed        Past Surgical History:   Procedure Laterality Date    HX  SECTION  8531,7940    with BTL    HX HYSTEROSCOPY WITH ENDOMETRIAL ABLATION      No period since       Social History     Socioeconomic History    Marital status:      Spouse name: Not on file    Number of children: Not on file    Years of education: Not on file    Highest education level: Not on file   Occupational History     Employer: [employer]   Tobacco Use    Smoking status: Former Smoker     Types: Cigarettes     Quit date: 3/23/1998     Years since quittin.7    Smokeless tobacco: Former User    Tobacco comment: smoked a few as a teen, Vaped for a year   Substance and Sexual Activity    Alcohol use: Yes     Comment: socially, 2 beer/weekend    Drug use: No    Sexual activity: Yes     Partners: Male     Birth control/protection: Surgical   Other Topics Concern     Service Yes     Comment:  [employer]    Blood Transfusions No    Caffeine Concern No    Occupational Exposure No    Hobby Hazards No    Sleep Concern No    Stress Concern No    Weight Concern Yes     Comment: PT IS OVER WEIGHT    Special Diet No    Back Care No    Exercise Yes     Comment: 4 DAYS A WEEK    Bike Helmet No    Seat Belt Yes    Self-Exams Yes   Social History Narrative    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       Social Determinants of Health     Financial Resource Strain:     Difficulty of Paying Living Expenses: Not on file   Food Insecurity:     Worried About Running Out of Food in the Last Year: Not on file    Angel of Food in the Last Year: Not on file   Transportation Needs:     Lack of Transportation (Medical): Not on file    Lack of Transportation (Non-Medical): Not on file   Physical Activity:     Days of Exercise per Week: Not on file    Minutes of Exercise per Session: Not on file   Stress:     Feeling of Stress : Not on file   Social Connections:     Frequency of Communication with Friends and Family: Not on file    Frequency of Social Gatherings with Friends and Family: Not on file    Attends [employer] Services: Not on file    Active Member of [address] [place] [address] [employer] or Organizations: Not on file    Attends Club or Organization Meetings: Not on file    Marital Status: Not on file   Intimate Partner Violence:     Fear of Current or Ex-Partner: Not on file    Emotionally Abused: Not on file    Physically Abused: Not on file    Sexually Abused: Not on file   Housing Stability:     Unable to Pay for Housing in the Last Year: Not on file    Number of [place] in the Last Year: Not on file    Unstable Housing in the Last Year: Not on file       Current Outpatient Medications   Medication Sig Dispense Refill    budesonide-formoteroL (SYMBICORT) 160-4.5 mcg/actuation HFAA Take 2 Puffs by inhalation every twelve (12) hours. Indications: controller medication for asthma 10.2 g 2    albuterol (PROVENTIL HFA, VENTOLIN HFA, PROAIR HFA) 90 mcg/actuation inhaler Take 1-2 Puffs by inhalation every four (4) hours as needed for Wheezing. (Patient taking differently: Take 1-2 Puffs by inhalation every four (4) hours as needed for Wheezing.) 6.7 Each 2    cyclobenzaprine (FLEXERIL) 5 mg tablet Take 1 Tablet by mouth three (3) times daily as needed for Muscle Spasm(s).  (Patient taking differently: Take 5 mg by mouth three (3) times daily as needed for Muscle Spasm(s).) 30 Tablet 1    acetaminophen (TYLENOL PO) Take  by mouth.  gabapentin (Neurontin) 300 mg capsule Take 1 Capsule by mouth two (2) times a day. Max Daily Amount: 600 mg. 180 Capsule 1    Nebulizer & Compressor machine Use as directed 1 Each 0    Nebulizer Accessories kit Use as directed 1 Kit 1         Vitals:    12/01/21 1058   BP: 135/76   Pulse: 85   Resp: 16   Temp: 97.7 °F (36.5 °C)   TempSrc: Oral   Weight: 205 lb (93 kg)   Height: 5' 4\" (1.626 m)   PainSc:   6       Physical Exam  Vitals and nursing note reviewed. Constitutional:       General: She is not in acute distress. Appearance: Normal appearance. She is obese. She is not ill-appearing, toxic-appearing or diaphoretic. HENT:      Head: Normocephalic and atraumatic. Nose: Nose normal. No congestion or rhinorrhea. Mouth/Throat:      Mouth: Mucous membranes are moist.      Pharynx: Oropharynx is clear. No oropharyngeal exudate or posterior oropharyngeal erythema. Eyes:      General: No scleral icterus. Extraocular Movements: Extraocular movements intact. Conjunctiva/sclera: Conjunctivae normal.      Pupils: Pupils are equal, round, and reactive to light. Cardiovascular:      Rate and Rhythm: Normal rate and regular rhythm. Pulses: Normal pulses. Heart sounds: No murmur heard. No gallop. Pulmonary:      Effort: Pulmonary effort is normal. No respiratory distress. Breath sounds: Normal breath sounds. No wheezing or rales. Comments: Pt had used her rescue inhaler shortly before presenting to the office. Chest:      Chest wall: No tenderness. Breasts:      Right: No axillary adenopathy or supraclavicular adenopathy. Left: No axillary adenopathy or supraclavicular adenopathy. Abdominal:      General: Bowel sounds are normal. There is no distension. Palpations: Abdomen is soft. Tenderness: There is no abdominal tenderness. Musculoskeletal:         General: Normal range of motion. Cervical back: Normal range of motion. Right lower leg: No edema. Left lower leg: No edema. Comments: Tenderness at Rt elbow. No swelling, erythema or deformity noted. Lymphadenopathy:      Head:      Right side of head: No submental, submandibular, tonsillar, preauricular, posterior auricular or occipital adenopathy. Left side of head: No submental, submandibular, tonsillar, preauricular, posterior auricular or occipital adenopathy. Cervical: No cervical adenopathy. Right cervical: No superficial, deep or posterior cervical adenopathy. Left cervical: No superficial, deep or posterior cervical adenopathy. Upper Body:      Right upper body: No supraclavicular or axillary adenopathy. Left upper body: No supraclavicular or axillary adenopathy. Skin:     General: Skin is warm and dry. Comments: Lt underarm: Normal on inspection. No scars, marks, swelling, erythema or deformity noted. No tenderness. No lymph nodes palpated. Neurological:      General: No focal deficit present. Mental Status: She is alert and oriented to person, place, and time. Mental status is at baseline. Cranial Nerves: No cranial nerve deficit. Motor: No weakness. Gait: Gait normal.   Psychiatric:         Mood and Affect: Mood normal.         Behavior: Behavior normal.         Thought Content:  Thought content normal.         Judgment: Judgment normal.         Ancillary Procedure on 10/19/2021   Component Date Value Ref Range Status    Left arm BP 10/19/2021 121  mmHg Final    Left Prox Radial A BP 10/19/2021 130  mmHg Final    Left Prox Ulnar A BP 10/19/2021 122  mmHg Final    Left 1st Digit BP 10/19/2021 148  mmHg Final    Right arm BP 10/19/2021 115  mmHg Final    Right Prox Radial A BP 10/19/2021 132  mmHg Final    Right Prox Ulnar A BP 10/19/2021 123  mmHg Final    Right 1st Digit BP 10/19/2021 134  mmHg Final    Right WBI 10/19/2021 1.09   Final    Left WBI 10/19/2021 1.07   Final   Orders Only on 10/13/2021   Component Date Value Ref Range Status    Left arm BP 10/19/2021 120  mmHg Final    Left posterior tibial 10/19/2021 161  mmHg Final    Left dorsalis pedis BP 10/19/2021 164  mmHg Final    Left toe pressure 10/19/2021 123  mmHg Final    Left calf pressure 10/19/2021 167  mmHg Final    Right arm BP 10/19/2021 126  mmHg Final    Right posterior tibial 10/19/2021 169  mmHg Final    Right dorsalis pedis BP 10/19/2021 176  mmHg Final    Right toe pressure 10/19/2021 105  mmHg Final    Right calf pressure 10/19/2021 173  mmHg Final    Left HAILEY 10/19/2021 1.30   Final    Right HAILEY 10/19/2021 1.40   Final    Immediate arm BP 10/19/2021 128  mmHg Final    Immediate right tibial 10/19/2021 202  mmHg Final    Immediate left tibial 10/19/2021 169  mmHg Final    Immediate right HAILEY 10/19/2021 1.58   Final    Immediate left HAILEY 10/19/2021 1.32   Final    Left TBI 10/19/2021 0.98   Final    Right TBI 10/19/2021 0.83   Final   Admission on 09/05/2021, Discharged on 09/06/2021   Component Date Value Ref Range Status    Ventricular Rate 09/05/2021 74  BPM Final    Atrial Rate 09/05/2021 74  BPM Final    P-R Interval 09/05/2021 132  ms Final    QRS Duration 09/05/2021 70  ms Final    Q-T Interval 09/05/2021 354  ms Final    QTC Calculation (Bezet) 09/05/2021 392  ms Final    Calculated P Axis 09/05/2021 72  degrees Final    Calculated R Axis 09/05/2021 65  degrees Final    Calculated T Axis 09/05/2021 57  degrees Final    Diagnosis 09/05/2021    Final                    Value:Normal sinus rhythm  Normal ECG  When compared with ECG of 19-AUG-2021 00:47,  No significant change was found  Confirmed by Emmanuel Plummer ([id number]) on 9/6/2021 11:08:47 AM      WBC 09/05/2021 8.8  4.6 - 13.2 K/uL Final    RBC 09/05/2021 4.77  4.20 - 5.30 M/uL Final    HGB 09/05/2021 12.7  12.0 - 16.0 g/dL Final    HCT 09/05/2021 38.7  35.0 - 45.0 % Final    MCV 09/05/2021 81.1  78.0 - 100.0 FL Final    PLEASE NOTE NEW REFERENCE RANGE    MCH 09/05/2021 26.6  24.0 - 34.0 PG Final    MCHC 09/05/2021 32.8  31.0 - 37.0 g/dL Final    RDW 09/05/2021 13.6  11.6 - 14.5 % Final    PLATELET 64/46/5793 563  135 - 420 K/uL Final    MPV 09/05/2021 10.0  9.2 - 11.8 FL Final    NEUTROPHILS 09/05/2021 58  40 - 73 % Final    LYMPHOCYTES 09/05/2021 28  21 - 52 % Final    MONOCYTES 09/05/2021 7  3 - 10 % Final    EOSINOPHILS 09/05/2021 6* 0 - 5 % Final    BASOPHILS 09/05/2021 1  0 - 2 % Final    ABS. NEUTROPHILS 09/05/2021 5.1  1.8 - 8.0 K/UL Final    ABS. LYMPHOCYTES 09/05/2021 2.5  0.9 - 3.6 K/UL Final    ABS. MONOCYTES 09/05/2021 0.6  0.05 - 1.2 K/UL Final    ABS. EOSINOPHILS 09/05/2021 0.5* 0.0 - 0.4 K/UL Final    ABS. BASOPHILS 09/05/2021 0.1  0.0 - 0.1 K/UL Final    DF 09/05/2021 AUTOMATED    Final    Sodium 09/05/2021 139  136 - 145 mmol/L Final    Potassium 09/05/2021 3.5  3.5 - 5.5 mmol/L Final    Chloride 09/05/2021 107  100 - 111 mmol/L Final    CO2 09/05/2021 25  21 - 32 mmol/L Final    Anion gap 09/05/2021 7  3.0 - 18 mmol/L Final    Glucose 09/05/2021 86  74 - 99 mg/dL Final    BUN 09/05/2021 13  7.0 - 18 MG/DL Final    Creatinine 09/05/2021 0.93  0.6 - 1.3 MG/DL Final    BUN/Creatinine ratio 09/05/2021 14  12 - 20   Final    GFR est AA 09/05/2021 >60  >60 ml/min/1.73m2 Final    GFR est non-AA 09/05/2021 >60  >60 ml/min/1.73m2 Final    Comment: (NOTE)  Estimated GFR is calculated using the Modification of Diet in Renal   Disease (MDRD) Study equation, reported for both  Americans   (GFRAA) and non- Americans (GFRNA), and normalized to 1.73m2   body surface area. The physician must decide which value applies to   the patient. The MDRD study equation should only be used in   individuals age 25 or older.  It has not been validated for the   following: pregnant women, patients with serious comorbid conditions,   or on certain medications, or persons with extremes of body size,   muscle mass, or nutritional status.  Calcium 09/05/2021 8.5  8.5 - 10.1 MG/DL Final    Bilirubin, total 09/05/2021 0.3  0.2 - 1.0 MG/DL Final    ALT (SGPT) 09/05/2021 21  13 - 56 U/L Final    AST (SGOT) 09/05/2021 24  10 - 38 U/L Final    Alk. phosphatase 09/05/2021 71  45 - 117 U/L Final    Protein, total 09/05/2021 7.2  6.4 - 8.2 g/dL Final    Albumin 09/05/2021 3.4  3.4 - 5.0 g/dL Final    Globulin 09/05/2021 3.8  2.0 - 4.0 g/dL Final    A-G Ratio 09/05/2021 0.9  0.8 - 1.7   Final    Troponin-I, QT 09/05/2021 <0.02  0.0 - 0.045 NG/ML Final    Comment: The presence of detectable troponin above the reference range indicates myocardial injury which may be due to ischemia, myocarditis, trauma, etc.  Clinical correlation is necessary to establish the significance of this finding. Sequential testing is recommended to determine if the typical rise and fall of cTnI is demonstrated. Note:  Cardiac troponin I has a relatively long half life and may be present well after the CK MB has returned to baseline. The reference range is based on the 99th percentile of the referent population.       Lipase 09/05/2021 56* 73 - 393 U/L Final    Magnesium 09/05/2021 2.1  1.6 - 2.6 mg/dL Final    Color 09/05/2021 YELLOW    Final    Appearance 09/05/2021 CLEAR    Final    Specific gravity 09/05/2021 1.018  1.005 - 1.030   Final    pH (UA) 09/05/2021 6.5  5.0 - 8.0   Final    Protein 09/05/2021 Negative  NEG mg/dL Final    Glucose 09/05/2021 Negative  NEG mg/dL Final    Ketone 09/05/2021 Negative  NEG mg/dL Final    Bilirubin 09/05/2021 Negative  NEG   Final    Blood 09/05/2021 Negative  NEG   Final    Urobilinogen 09/05/2021 1.0  0.2 - 1.0 EU/dL Final    Nitrites 09/05/2021 Negative  NEG   Final    Leukocyte Esterase 09/05/2021 Negative  NEG   Final    HCG urine, QL 09/05/2021 Negative  NEG   Final    Test results should be confirmed using serum quantitative hCG when detection of pregnancy is critical and before performing any critical medical procedure.  D DIMER 09/05/2021 0.56* <0.46 ug/ml(FEU) Final    Comment: (NOTE)  A D-Dimer result less than 0.5 ug/mL FEU combined with a low clinical   pretest probability of DVT and/or PE has a negative predictive value   of %. The positive predictive value is 50% or less.  Troponin-I, QT 09/06/2021 <0.02  0.0 - 0.045 NG/ML Final    Comment: The presence of detectable troponin above the reference range indicates myocardial injury which may be due to ischemia, myocarditis, trauma, etc.  Clinical correlation is necessary to establish the significance of this finding. Sequential testing is recommended to determine if the typical rise and fall of cTnI is demonstrated. Note:  Cardiac troponin I has a relatively long half life and may be present well after the CK MB has returned to baseline. The reference range is based on the 99th percentile of the referent population. No results found for any visits on 12/01/21. Patient Care Team:  Patient Care Team:  Jurgen Andujar NP as PCP - General (Nurse Practitioner)  Jurgen Andujar NP as PCP - [facility]  Valerie Mascorro MD (Pulmonary Disease)  Timur Galan MD (Neurology)      Assessment / Plan:      ICD-10-CM ICD-9-CM    1. Moderate persistent asthma with acute exacerbation  J45.41 493.92 budesonide-formoteroL (SYMBICORT) 160-4.5 mcg/actuation HFAA   2. Elbow pain, right  M25.521 719.42      Asthma: Uncontrolled at this time. Symptoms worsened since she ran out of her steroid inhaler. Restart on Symbicort. Continue with albuterol inhaler at this time and can nebulize prn as needed. No evidence of active infection. Rt elbow pain: Likely tendinitis from overuse. Advised to rest her Rt arm, to take tylenol and use heating pads. Lt underarm pain: Nothing significant noted on exam. Advised to take tylenol.  Return if there are any changes. She had a repeat US Lt breast 2-3 weeks ago which was stable for the nodule - likely benign. Follow-up and Dispositions    · Return in about 6 months (around 6/1/2022). I asked the patient if she  had any questions and answered her  questions.   The patient stated that she understands the treatment plan and agrees with the treatment plan

## 2021-12-01 ENCOUNTER — OFFICE VISIT (OUTPATIENT)
Dept: FAMILY MEDICINE CLINIC | Age: 41
End: 2021-12-01
Payer: COMMERCIAL

## 2021-12-01 VITALS
HEIGHT: 64 IN | SYSTOLIC BLOOD PRESSURE: 135 MMHG | DIASTOLIC BLOOD PRESSURE: 76 MMHG | RESPIRATION RATE: 16 BRPM | HEART RATE: 85 BPM | WEIGHT: 205 LBS | BODY MASS INDEX: 35 KG/M2 | TEMPERATURE: 97.7 F

## 2021-12-01 DIAGNOSIS — M25.521 ELBOW PAIN, RIGHT: ICD-10-CM

## 2021-12-01 DIAGNOSIS — J45.41 MODERATE PERSISTENT ASTHMA WITH ACUTE EXACERBATION: Primary | ICD-10-CM

## 2021-12-01 PROCEDURE — 99213 OFFICE O/P EST LOW 20 MIN: CPT | Performed by: STUDENT IN AN ORGANIZED HEALTH CARE EDUCATION/TRAINING PROGRAM

## 2021-12-01 RX ORDER — NEBULIZER AND COMPRESSOR
EACH MISCELLANEOUS
Qty: 1 EACH | Refills: 0 | Status: SHIPPED | OUTPATIENT
Start: 2021-12-01

## 2021-12-01 RX ORDER — BUDESONIDE AND FORMOTEROL FUMARATE DIHYDRATE 160; 4.5 UG/1; UG/1
2 AEROSOL RESPIRATORY (INHALATION) EVERY 12 HOURS
Qty: 10.2 G | Refills: 2 | Status: SHIPPED | OUTPATIENT
Start: 2021-12-01 | End: 2022-03-31 | Stop reason: SDUPTHER

## 2021-12-01 NOTE — PROGRESS NOTES
Abimael Hawthorne presents today for   Chief Complaint   Patient presents with    Arm Pain     under left arm pit    Wheezing     patient needs nebulizer machine       Is someone accompanying this pt? no    Is the patient using any DME equipment during OV? no    Depression Screening:  3 most recent PHQ Screens 11/10/2021   Little interest or pleasure in doing things Not at all   Feeling down, depressed, irritable, or hopeless Not at all   Total Score PHQ 2 0       Learning Assessment:  Learning Assessment 10/6/2021   PRIMARY LEARNER Patient   HIGHEST LEVEL OF EDUCATION - PRIMARY LEARNER  -   BARRIERS PRIMARY LEARNER -   PRIMARY LANGUAGE ENGLISH   LEARNER PREFERENCE PRIMARY READING     DEMONSTRATION     -   ANSWERED BY patient   RELATIONSHIP SELF       Health Maintenance reviewed and discussed and ordered per Provider. Health Maintenance Due   Topic Date Due    Flu Vaccine (1) 09/01/2021   . Coordination of Care:  1. Have you been to the ER, urgent care clinic since your last visit? Hospitalized since your last visit? no    2. Have you seen or consulted any other health care providers outside of the [address] [place] [address] since your last visit? Include any pap smears or colon screening.  no

## 2021-12-23 ENCOUNTER — HOSPITAL ENCOUNTER (EMERGENCY)
Age: 41
Discharge: HOME | End: 2021-12-23
Attending: STUDENT IN AN ORGANIZED HEALTH CARE EDUCATION/TRAINING PROGRAM
Payer: COMMERCIAL

## 2021-12-23 ENCOUNTER — APPOINTMENT (OUTPATIENT)
Dept: ULTRASOUND IMAGING | Age: 41
End: 2021-12-23
Attending: STUDENT IN AN ORGANIZED HEALTH CARE EDUCATION/TRAINING PROGRAM
Payer: COMMERCIAL

## 2021-12-23 ENCOUNTER — APPOINTMENT (OUTPATIENT)
Dept: GENERAL RADIOLOGY | Age: 41
End: 2021-12-23
Attending: STUDENT IN AN ORGANIZED HEALTH CARE EDUCATION/TRAINING PROGRAM
Payer: COMMERCIAL

## 2021-12-23 VITALS
TEMPERATURE: 98.4 F | RESPIRATION RATE: 18 BRPM | WEIGHT: 210 LBS | BODY MASS INDEX: 35.85 KG/M2 | OXYGEN SATURATION: 100 % | HEIGHT: 64 IN | SYSTOLIC BLOOD PRESSURE: 135 MMHG | HEART RATE: 65 BPM | DIASTOLIC BLOOD PRESSURE: 88 MMHG

## 2021-12-23 DIAGNOSIS — R10.11 RUQ PAIN: ICD-10-CM

## 2021-12-23 DIAGNOSIS — R07.9 ACUTE CHEST PAIN: Primary | ICD-10-CM

## 2021-12-23 LAB
ALBUMIN SERPL-MCNC: 3.3 G/DL (ref 3.4–5)
ALBUMIN/GLOB SERPL: 1 {RATIO} (ref 0.8–1.7)
ALP SERPL-CCNC: 88 U/L (ref 45–117)
ALT SERPL-CCNC: 24 U/L (ref 13–56)
ANION GAP SERPL CALC-SCNC: 5 MMOL/L (ref 3–18)
APPEARANCE UR: CLEAR
AST SERPL-CCNC: 19 U/L (ref 10–38)
ATRIAL RATE: 80 BPM
BACTERIA URNS QL MICRO: NEGATIVE /HPF
BASOPHILS # BLD: 0.1 K/UL (ref 0–0.1)
BASOPHILS NFR BLD: 1 % (ref 0–2)
BILIRUB SERPL-MCNC: 0.3 MG/DL (ref 0.2–1)
BILIRUB UR QL: NEGATIVE
BUN SERPL-MCNC: 12 MG/DL (ref 7–18)
BUN/CREAT SERPL: 13 (ref 12–20)
CALCIUM SERPL-MCNC: 8.8 MG/DL (ref 8.5–10.1)
CALCULATED P AXIS, ECG09: 64 DEGREES
CALCULATED R AXIS, ECG10: 42 DEGREES
CALCULATED T AXIS, ECG11: 39 DEGREES
CHLORIDE SERPL-SCNC: 109 MMOL/L (ref 100–111)
CO2 SERPL-SCNC: 27 MMOL/L (ref 21–32)
COLOR UR: YELLOW
CREAT SERPL-MCNC: 0.92 MG/DL (ref 0.6–1.3)
DIAGNOSIS, 93000: NORMAL
DIFFERENTIAL METHOD BLD: ABNORMAL
EOSINOPHIL # BLD: 0.2 K/UL (ref 0–0.4)
EOSINOPHIL NFR BLD: 3 % (ref 0–5)
EPITH CASTS URNS QL MICRO: NORMAL /LPF (ref 0–5)
ERYTHROCYTE [DISTWIDTH] IN BLOOD BY AUTOMATED COUNT: 14.1 % (ref 11.6–14.5)
GLOBULIN SER CALC-MCNC: 3.4 G/DL (ref 2–4)
GLUCOSE SERPL-MCNC: 95 MG/DL (ref 74–99)
GLUCOSE UR STRIP.AUTO-MCNC: NEGATIVE MG/DL
HCG UR QL: NEGATIVE
HCT VFR BLD AUTO: 37 % (ref 35–45)
HGB BLD-MCNC: 12.2 G/DL (ref 12–16)
HGB UR QL STRIP: NEGATIVE
IMM GRANULOCYTES # BLD AUTO: 0.1 K/UL (ref 0–0.04)
IMM GRANULOCYTES NFR BLD AUTO: 1 % (ref 0–0.5)
KETONES UR QL STRIP.AUTO: NEGATIVE MG/DL
LEUKOCYTE ESTERASE UR QL STRIP.AUTO: ABNORMAL
LYMPHOCYTES # BLD: 2.4 K/UL (ref 0.9–3.6)
LYMPHOCYTES NFR BLD: 26 % (ref 21–52)
MAGNESIUM SERPL-MCNC: 1.7 MG/DL (ref 1.6–2.6)
MCH RBC QN AUTO: 26.9 PG (ref 24–34)
MCHC RBC AUTO-ENTMCNC: 33 G/DL (ref 31–37)
MCV RBC AUTO: 81.7 FL (ref 78–100)
MONOCYTES # BLD: 0.6 K/UL (ref 0.05–1.2)
MONOCYTES NFR BLD: 6 % (ref 3–10)
NEUTS SEG # BLD: 5.8 K/UL (ref 1.8–8)
NEUTS SEG NFR BLD: 64 % (ref 40–73)
NITRITE UR QL STRIP.AUTO: NEGATIVE
NRBC # BLD: 0 K/UL (ref 0–0.01)
NRBC BLD-RTO: 0 PER 100 WBC
P-R INTERVAL, ECG05: 138 MS
PH UR STRIP: 6.5 [PH] (ref 5–8)
PLATELET # BLD AUTO: 267 K/UL (ref 135–420)
PMV BLD AUTO: 9 FL (ref 9.2–11.8)
POTASSIUM SERPL-SCNC: 3.9 MMOL/L (ref 3.5–5.5)
PROT SERPL-MCNC: 6.7 G/DL (ref 6.4–8.2)
PROT UR STRIP-MCNC: NEGATIVE MG/DL
Q-T INTERVAL, ECG07: 358 MS
QRS DURATION, ECG06: 68 MS
QTC CALCULATION (BEZET), ECG08: 412 MS
RBC # BLD AUTO: 4.53 M/UL (ref 4.2–5.3)
RBC #/AREA URNS HPF: NEGATIVE /HPF (ref 0–5)
SODIUM SERPL-SCNC: 141 MMOL/L (ref 136–145)
SP GR UR REFRACTOMETRY: 1.01 (ref 1–1.03)
TROPONIN-HIGH SENSITIVITY: 5 NG/L (ref 0–54)
UROBILINOGEN UR QL STRIP.AUTO: 0.2 EU/DL (ref 0.2–1)
VENTRICULAR RATE, ECG03: 80 BPM
WBC # BLD AUTO: 9.1 K/UL (ref 4.6–13.2)
WBC URNS QL MICRO: NORMAL /HPF (ref 0–4)

## 2021-12-23 PROCEDURE — 80053 COMPREHEN METABOLIC PANEL: CPT

## 2021-12-23 PROCEDURE — 74011250636 HC RX REV CODE- 250/636: Performed by: STUDENT IN AN ORGANIZED HEALTH CARE EDUCATION/TRAINING PROGRAM

## 2021-12-23 PROCEDURE — 96374 THER/PROPH/DIAG INJ IV PUSH: CPT

## 2021-12-23 PROCEDURE — 81025 URINE PREGNANCY TEST: CPT

## 2021-12-23 PROCEDURE — 83735 ASSAY OF MAGNESIUM: CPT

## 2021-12-23 PROCEDURE — 81001 URINALYSIS AUTO W/SCOPE: CPT

## 2021-12-23 PROCEDURE — 99284 EMERGENCY DEPT VISIT MOD MDM: CPT

## 2021-12-23 PROCEDURE — 71045 X-RAY EXAM CHEST 1 VIEW: CPT

## 2021-12-23 PROCEDURE — 85025 COMPLETE CBC W/AUTO DIFF WBC: CPT

## 2021-12-23 PROCEDURE — 93005 ELECTROCARDIOGRAM TRACING: CPT

## 2021-12-23 PROCEDURE — 84484 ASSAY OF TROPONIN QUANT: CPT

## 2021-12-23 PROCEDURE — 76705 ECHO EXAM OF ABDOMEN: CPT

## 2021-12-23 PROCEDURE — 96375 TX/PRO/DX INJ NEW DRUG ADDON: CPT

## 2021-12-23 RX ORDER — ONDANSETRON 2 MG/ML
4 INJECTION INTRAMUSCULAR; INTRAVENOUS
Status: COMPLETED | OUTPATIENT
Start: 2021-12-23 | End: 2021-12-23

## 2021-12-23 RX ORDER — MORPHINE SULFATE 4 MG/ML
4 INJECTION INTRAVENOUS ONCE
Status: COMPLETED | OUTPATIENT
Start: 2021-12-23 | End: 2021-12-23

## 2021-12-23 RX ADMIN — MORPHINE SULFATE 4 MG: 4 INJECTION INTRAVENOUS at 00:52

## 2021-12-23 RX ADMIN — ONDANSETRON 4 MG: 2 INJECTION INTRAMUSCULAR; INTRAVENOUS at 00:52

## 2021-12-23 NOTE — ED NOTES
I have reviewed discharge instructions with the patient. The patient verbalized understanding. Patient armband removed and given to patient to take home.   Patient was informed of the privacy risks if armband lost or stolen  Current Discharge Medication List      patient with significant other to take her home

## 2021-12-23 NOTE — ED PROVIDER NOTES
Is a 14-year-old female with a history of asthma, and AV malformation of the brain. Patient presents today with primary complaint of right upper quadrant abdominal pain that began shortly after she ate at approximately 9 PM today that then developed into some associated chest pain and persistent nausea without vomiting. Patient denies any associated fever/chills, urinary complaints, other abdominal pain, diarrhea, or constipation.            Past Medical History:   Diagnosis Date    Asthma     Chest pain     Chronic pain 6 months    painful menses    Fibroids     GERD (gastroesophageal reflux disease)     Headache disorder     Heart palpitations     Migraine     Neurological disorder     bleeding in head diagnosed        Past Surgical History:   Procedure Laterality Date    HX  SECTION  2578,4930    with BTL    HX HYSTEROSCOPY WITH ENDOMETRIAL ABLATION      No period since         Family History:   Problem Relation Age of Onset    Hypertension Mother     Hypertension Father     Diabetes Father     Parkinson's Disease Father     Cancer Paternal Grandmother     Mult Sclerosis Sister        Social History     Socioeconomic History    Marital status:      Spouse name: Not on file    Number of children: Not on file    Years of education: Not on file    Highest education level: Not on file   Occupational History     Employer: [employer]   Tobacco Use    Smoking status: Former Smoker     Types: Cigarettes     Quit date: 3/23/1998     Years since quittin.7    Smokeless tobacco: Former User    Tobacco comment: smoked a few as a teen, Vaped for a year   Substance and Sexual Activity    Alcohol use: Yes     Comment: socially, 2 beer/weekend    Drug use: No    Sexual activity: Yes     Partners: Male     Birth control/protection: Surgical   Other Topics Concern     Service Yes     Comment:  [employer]    Blood Transfusions No    Caffeine Concern No    Occupational Exposure No    Hobby Hazards No    Sleep Concern No    Stress Concern No    Weight Concern Yes     Comment: PT IS OVER WEIGHT    Special Diet No    Back Care No    Exercise Yes     Comment: 4 DAYS A WEEK    Bike Helmet No    Seat Belt Yes    Self-Exams Yes   Social History Narrative    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 Social Determinants of Health     Financial Resource Strain:     Difficulty of Paying Living Expenses: Not on file   Food Insecurity:     Worried About Running Out of Food in the Last Year: Not on file    Angel of Food in the Last Year: Not on file   Transportation Needs:     Lack of Transportation (Medical): Not on file    Lack of Transportation (Non-Medical): Not on file   Physical Activity:     Days of Exercise per Week: Not on file    Minutes of Exercise per Session: Not on file   Stress:     Feeling of Stress : Not on file   Social Connections:     Frequency of Communication with Friends and Family: Not on file    Frequency of Social Gatherings with Friends and Family: Not on file    Attends Rastafarian Services: Not on file    Active Member of [address] [place] [address] or Organizations: Not on file    Attends Club or Organization Meetings: Not on file    Marital Status: Not on file   Intimate Partner Violence:     Fear of Current or Ex-Partner: Not on file    Emotionally Abused: Not on file    Physically Abused: Not on file    Sexually Abused: Not on file   Housing Stability:     Unable to Pay for Housing in the Last Year: Not on file    Number of [place] in the Last Year: Not on file    Unstable Housing in the Last Year: Not on file         ALLERGIES: Azo [phenazopyridine] and Iodinated contrast media    Review of Systems   Constitutional: Negative for chills and fever. HENT: Negative for rhinorrhea and sore throat. Eyes: Negative for discharge and redness. Respiratory: Negative for cough and shortness of breath.     Cardiovascular: Positive for chest pain. Negative for leg swelling. Gastrointestinal: Positive for abdominal pain. Negative for diarrhea, nausea and vomiting. Genitourinary: Negative for difficulty urinating and dysuria. Musculoskeletal: Negative for back pain and neck pain. Skin: Negative for rash and wound. Neurological: Negative for syncope, light-headedness and headaches. Vitals:    12/23/21 0023   BP: (!) 143/97   Pulse: 74   Resp: 18   Temp: 98.6 °F (37 °C)   SpO2: 100%   Weight: 95.3 kg (210 lb)   Height: 5' 4\" (1.626 m)            Physical Exam  Constitutional:       General: She is not in acute distress. Appearance: She is not ill-appearing, toxic-appearing or diaphoretic. HENT:      Head: Normocephalic and atraumatic. Right Ear: External ear normal.      Left Ear: External ear normal.      Nose: No congestion or rhinorrhea. Mouth/Throat:      Mouth: Mucous membranes are moist.      Pharynx: No oropharyngeal exudate or posterior oropharyngeal erythema. Eyes:      General:         Right eye: No discharge. Left eye: No discharge. Pupils: Pupils are equal, round, and reactive to light. Neck:      Vascular: No carotid bruit, hepatojugular reflux or JVD. Cardiovascular:      Rate and Rhythm: Normal rate and regular rhythm. Pulses:           Radial pulses are 2+ on the right side and 2+ on the left side. Heart sounds: No murmur heard. No systolic murmur is present. No diastolic murmur is present. No friction rub. No gallop. No S3 or S4 sounds. Pulmonary:      Effort: Pulmonary effort is normal. No tachypnea, accessory muscle usage or respiratory distress. Breath sounds: No stridor. No decreased breath sounds, wheezing, rhonchi or rales. Abdominal:      General: Abdomen is flat. There is no distension. Palpations: There is no fluid wave or mass. Tenderness: There is abdominal tenderness.  There is no right CVA tenderness, left CVA tenderness, guarding or rebound. Comments: Right upper quadrant tenderness with positive Summers sign   Musculoskeletal:         General: No swelling, tenderness, deformity or signs of injury. Cervical back: No rigidity or tenderness. Right lower leg: No edema. Left lower leg: No edema. Lymphadenopathy:      Cervical: No cervical adenopathy. Skin:     General: Skin is warm. Capillary Refill: Capillary refill takes less than 2 seconds. Coloration: Skin is not jaundiced or pale. Findings: No bruising, erythema, lesion or rash. Neurological:      General: No focal deficit present. Mental Status: She is alert and oriented to person, place, and time. Sensory: No sensory deficit. Motor: No weakness. Psychiatric:         Mood and Affect: Mood normal. Mood is not anxious. Behavior: Behavior is not agitated. MDM  Number of Diagnoses or Management Options  Diagnosis management comments: Presents with primary complaint of right upper quadrant pain that began after eating, top concern is for biliary pathology to include cholecystitis, will also evaluate for primary cardiac disease given patient's reported chest pain however this is less likely given patient's age and lack of medical comorbidities. Disposition pending work-up results. ED Course as of 12/23/21 0341   u Dec 23, 2021   9396 On reassessment patient reports near resolution of symptoms. Patient's work-up is reassuring with no evidence of acute cholecystitis, ACS, or significant metabolic/electrolyte derangement. Will discharge patient home to close outpatient follow-up.  [JK]      ED Course User Index  [JK] Axel Stearns MD       Procedures

## 2021-12-23 NOTE — DISCHARGE INSTRUCTIONS
Please contact your primary care doctor soon as possible for follow-up over the next 3 to 7 days. Develop any sudden change in your symptoms including sudden/severe pain, inability to tolerate any food/drink or any other sudden/severe change in condition please return immediately to emergency department further evaluation and treatment.

## 2022-01-07 DIAGNOSIS — J45.41 MODERATE PERSISTENT ASTHMA WITH ACUTE EXACERBATION: ICD-10-CM

## 2022-01-07 DIAGNOSIS — R51.9 HEADACHE DISORDER: ICD-10-CM

## 2022-01-07 DIAGNOSIS — R20.2 PARESTHESIA: ICD-10-CM

## 2022-01-12 RX ORDER — CYCLOBENZAPRINE HCL 5 MG
5 TABLET ORAL
Qty: 30 TABLET | Refills: 1 | Status: SHIPPED | OUTPATIENT
Start: 2022-01-12 | End: 2022-04-20 | Stop reason: SDUPTHER

## 2022-01-12 RX ORDER — GABAPENTIN 300 MG/1
300 CAPSULE ORAL 2 TIMES DAILY
Qty: 180 CAPSULE | Refills: 1 | Status: SHIPPED | OUTPATIENT
Start: 2022-01-12 | End: 2022-03-31 | Stop reason: SDUPTHER

## 2022-01-12 RX ORDER — ALBUTEROL SULFATE 90 UG/1
1-2 AEROSOL, METERED RESPIRATORY (INHALATION)
Qty: 6.7 EACH | Refills: 2 | Status: SHIPPED | OUTPATIENT
Start: 2022-01-12 | End: 2022-03-31 | Stop reason: SDUPTHER

## 2022-01-24 ENCOUNTER — VIRTUAL VISIT (OUTPATIENT)
Dept: NEUROLOGY | Age: 42
End: 2022-01-24

## 2022-01-24 DIAGNOSIS — G44.219 EPISODIC TENSION-TYPE HEADACHE, NOT INTRACTABLE: Primary | ICD-10-CM

## 2022-01-24 DIAGNOSIS — Q28.3 BRAIN VASCULAR MALFORMATION: ICD-10-CM

## 2022-01-24 DIAGNOSIS — G56.03 BILATERAL CARPAL TUNNEL SYNDROME: ICD-10-CM

## 2022-01-24 PROCEDURE — 99214 OFFICE O/P EST MOD 30 MIN: CPT | Performed by: STUDENT IN AN ORGANIZED HEALTH CARE EDUCATION/TRAINING PROGRAM

## 2022-01-24 NOTE — PROGRESS NOTES
Carlie Werner is a 39 y.o. female who was seen by synchronous (real-time) audio-video technology on 1/24/2022 for Follow-up        Assessment & Plan:   Diagnoses and all orders for this visit:    1. Episodic tension-type headache, not intractable    2. Brain vascular malformation    3. Bilateral carpal tunnel syndrome    Headache description is tension type. It is mild at this time. She will continue with as needed Tylenol. For her carpal tunnel syndrome, she will continue follow-up with hand surgery. Have advised her on  stroke symptoms as noted to go to the emergency room if any new symptoms. Will follow the vascular malformation with serial MRIs. If the headache severity increases, will consider prophylaxis. We will see her again in 4 months time        Subjective:   A 39years old female patient here for follow-up of headache, brain vascular malformations, and carpal tunnel. Last seen over a virtual encounter in October 2021. This is a virtual/video encounter. Continues to have the headache is a headache about 3 times per week. It is mild. Takes Tylenol. Headache is over the frontal and occipital areas; has a burning sensation and occasional throbbing. No associated nausea or vomiting. No change in her vision. For her carpal tunnel syndrome, she had a local steroid injection by hand surgery. She claims, the right wrist pain is getting worse despite the injection. No new strokelike symptoms. No new weakness of her extremities. No new sensory symptoms. No change in her speech. No difficulty walking. Has lower back pain which radiates to the right leg; no numbness. MRI of the lumbosacral spine from June 2021 has shown only mild degenerative changes with no significant foraminal or spinal canal stenosis. A 40 yo female patient here for follow-up of headache and wrist pain.   During her last visit in June 2021, she had an MRI of the brain which showed several 'low flow vascular malformations, either capillary telangiectasias, or cavernous angiomas;  multiplicity/features are not classic for either, but favor telangiectasias'. She was subsequently seen by Dr. Frankie Miller not see any note; patient was told that will be followed conservatively]. She continues to have intermittent head pain which is about the same. Came to the clinic about a month ago and was seen by AMPARO Fernandez. Was given amitriptyline; felt better and subsequently stopped the medicine. For her carpal tunnel, was also referred to hand surgery and will be seen next week. Continues to have the left arm numbness and weakness. Also has bilateral wrist pain. For pain, she takes Tylenol as needed. Currently, has no blurring of vision or diplopia. She is walking good. No significant weakness of the upper or lower extremities. Prior to Admission medications    Medication Sig Start Date End Date Taking? Authorizing Provider   gabapentin (Neurontin) 300 mg capsule Take 1 Capsule by mouth two (2) times a day. Max Daily Amount: 600 mg. 1/12/22  Yes Mark Nava NP   cyclobenzaprine (FLEXERIL) 5 mg tablet Take 1 Tablet by mouth three (3) times daily as needed for Muscle Spasm(s). 1/12/22  Yes Mark Nava NP   albuterol (PROVENTIL HFA, VENTOLIN HFA, PROAIR HFA) 90 mcg/actuation inhaler Take 1-2 Puffs by inhalation every four (4) hours as needed for Wheezing. 1/12/22  Yes Mark Nava NP   budesonide-formoteroL (SYMBICORT) 160-4.5 mcg/actuation HFAA Take 2 Puffs by inhalation every twelve (12) hours. Indications: controller medication for asthma 12/1/21  Yes Edward Woodruff MD   acetaminophen (TYLENOL PO) Take  by mouth.    Yes Other, MD Randall   Nebulizer & Compressor machine Use as directed 12/1/21   Edward Woodruff MD   Nebulizer Accessories kit Use as directed 3/30/17   Herbert Marie MD     Patient Active Problem List   Diagnosis Code    Mild intermittent asthma J45.20    Obesity E66.9  Palpitations R00.2    Precordial pain R07.2    Severe obesity ([facility]) E66.01    Asthma J45.909     Patient Active Problem List    Diagnosis Date Noted    Asthma 2019    Severe obesity ([address] 10/10/2018    Palpitations 2016    Precordial pain 2016    Mild intermittent asthma 2014    Obesity 2014     Current Outpatient Medications   Medication Sig Dispense Refill    gabapentin (Neurontin) 300 mg capsule Take 1 Capsule by mouth two (2) times a day. Max Daily Amount: 600 mg. 180 Capsule 1    cyclobenzaprine (FLEXERIL) 5 mg tablet Take 1 Tablet by mouth three (3) times daily as needed for Muscle Spasm(s). 30 Tablet 1    albuterol (PROVENTIL HFA, VENTOLIN HFA, PROAIR HFA) 90 mcg/actuation inhaler Take 1-2 Puffs by inhalation every four (4) hours as needed for Wheezing. 6.7 Each 2    budesonide-formoteroL (SYMBICORT) 160-4.5 mcg/actuation HFAA Take 2 Puffs by inhalation every twelve (12) hours. Indications: controller medication for asthma 10.2 g 2    acetaminophen (TYLENOL PO) Take  by mouth.       Nebulizer & Compressor machine Use as directed 1 Each 0    Nebulizer Accessories kit Use as directed 1 Kit 1     Allergies   Allergen Reactions    Azo [Phenazopyridine] Hives    Iodinated Contrast Media Shortness of Breath     Other reaction(s): wheezing/sob     Past Medical History:   Diagnosis Date    Asthma     Chest pain     Chronic pain 6 months    painful menses    Fibroids     GERD (gastroesophageal reflux disease)     Headache disorder     Heart palpitations     Migraine     Neurological disorder     bleeding in head diagnosed      Past Surgical History:   Procedure Laterality Date    HX  SECTION  5501,5185    with BTL    HX HYSTEROSCOPY WITH ENDOMETRIAL ABLATION      No period since     Family History   Problem Relation Age of Onset    Hypertension Mother     Hypertension Father     Diabetes Father     Parkinson's Disease Father     Cancer Paternal Grandmother     Mult Sclerosis Sister      Social History     Tobacco Use    Smoking status: Former Smoker     Types: Cigarettes     Quit date: 3/23/1998     Years since quittin.8    Smokeless tobacco: Former User    Tobacco comment: smoked a few as a teen, Vaped for a year   Substance Use Topics    Alcohol use: Yes     Comment: socially, 2 beer/weekend       Review of Systems   Constitutional: Negative for chills, fever and weight loss. HENT: Negative for hearing loss and tinnitus. Eyes: Negative for blurred vision and double vision. Respiratory: Negative for cough and shortness of breath. Cardiovascular: Positive for leg swelling (ankles sometimes). Negative for chest pain. Gastrointestinal: Negative for nausea and vomiting. Genitourinary: Negative for dysuria, frequency and urgency. Musculoskeletal: Positive for back pain (More numb; involves the right more than the left; radiates to gerber RLE). Negative for neck pain. Skin: Negative for itching and rash. Neurological: Positive for tingling (hands; R>L), sensory change, focal weakness (right hand) and headaches (burning sensation). Negative for dizziness, seizures and weakness. Endo/Heme/Allergies: Does not bruise/bleed easily. Psychiatric/Behavioral: Negative for depression. The patient is not nervous/anxious.         Objective:     Patient-Reported Vitals 2022   Patient-Reported Weight -   Patient-Reported Height 54        [INSTRUCTIONS:  \"[x]\" Indicates a positive item  \"[]\" Indicates a negative item  -- DELETE ALL ITEMS NOT EXAMINED]    Constitutional: [] Appears well-developed and well-nourished [x] No apparent distress      [] Abnormal -     Mental status: [x] Alert and awake  [x] Oriented      [x] Able to follow commands    [] Abnormal -     Eyes:   EOM    [x]  Normal    [] Abnormal -   Sclera  []  Normal    [] Abnormal -          Discharge []  None visible   [] Abnormal -     HENT: [x] Normocephalic, atraumatic  [] Abnormal -   [] Mouth/Throat: Mucous membranes are moist    External Ears [] Normal  [] Abnormal -    Neck: [] No visualized mass [] Abnormal -     Pulmonary/Chest: [x] Respiratory effort normal   [] No visualized signs of difficulty breathing or respiratory distress        [] Abnormal -      Musculoskeletal:   [x] Normal gait with no signs of ataxia         [x] Normal range of motion of neck        [] Abnormal -     Neurological:        [x] No Facial Asymmetry (Cranial nerve 7 motor function) (limited exam due to video visit)          [x] No gaze palsy        [] Abnormal -      Mental status: Awake, alert, oriented to the day, month, and year; follows simple and complex commands.   Speech and languge: fluent, coherent, and comprehension intact  CN: EOMI,  no facial asymmetry noted, moves head from side to side with no difficulty, intact shoulder shrug, tongue is midline. Motor: no pronator drift, moves arms and legs symmetrically, normal gait.    Coordination: Intact rapid alternating movements, able to touch her nose with her index finger and stretch out her arms with no difficulty  Gait: Normal        Skin:        [] No significant exanthematous lesions or discoloration noted on facial skin         [] Abnormal -            Psychiatric:       [x] Normal Affect [] Abnormal -        [] No Hallucinations    Other pertinent observable physical exam findings:-        We discussed the expected course, resolution and complications of the diagnosis(es) in detail. Medication risks, benefits, costs, interactions, and alternatives were discussed as indicated. I advised her to contact the office if her condition worsens, changes or fails to improve as anticipated. She expressed understanding with the diagnosis(es) and plan. Enzo Guillen, was evaluated through a synchronous (real-time) audio-video encounter. The patient (or guardian if applicable) is aware that this is a billable service.  Verbal consent to proceed has been obtained within the past 12 months. The visit was conducted pursuant to the emergency declaration under the [address] [place] [address] and the [employer] General Act. Patient identification was verified, and a caregiver was present when appropriate. The patient was located in a state where the provider was credentialed to provide care.       Avinash Carrillo MD

## 2022-01-24 NOTE — PROGRESS NOTES
Lucille Durham is a 39 y.o. female on virtual visit today for follow-up. 1. Have you been to the ER, urgent care clinic since your last visit? Hospitalized since your last visit? Yes Reason for visit: [facility] ED 12/23/21 chest pain    2. Have you seen or consulted any other health care providers outside of the [address] [place] [address] since your last visit? Include any pap smears or colon screening. No    Mobile number [phone] will be used for today's visit.

## 2022-02-04 ENCOUNTER — VIRTUAL VISIT (OUTPATIENT)
Dept: FAMILY MEDICINE CLINIC | Age: 42
End: 2022-02-04
Payer: COMMERCIAL

## 2022-02-04 ENCOUNTER — NURSE TRIAGE (OUTPATIENT)
Dept: OTHER | Facility: CLINIC | Age: 42
End: 2022-02-04

## 2022-02-04 DIAGNOSIS — R60.0 LOWER EXTREMITY EDEMA: ICD-10-CM

## 2022-02-04 DIAGNOSIS — R07.9 CHEST PAIN, UNSPECIFIED TYPE: Primary | ICD-10-CM

## 2022-02-04 PROCEDURE — 99212 OFFICE O/P EST SF 10 MIN: CPT | Performed by: NURSE PRACTITIONER

## 2022-02-04 RX ORDER — FUROSEMIDE 20 MG/1
TABLET ORAL
Qty: 30 TABLET | Refills: 0 | Status: SHIPPED | OUTPATIENT
Start: 2022-02-04 | End: 2022-07-26

## 2022-02-04 NOTE — PROGRESS NOTES
Lico Lanza presents today for   Chief Complaint   Patient presents with    Leg Pain    Foot Swelling       Virtual/telephone visit    Depression Screening:  3 most recent PHQ Screens 2/4/2022   Little interest or pleasure in doing things Not at all   Feeling down, depressed, irritable, or hopeless Not at all   Total Score PHQ 2 0       Learning Assessment:  Learning Assessment 10/6/2021   PRIMARY LEARNER Patient   HIGHEST LEVEL OF EDUCATION - PRIMARY LEARNER  -   BARRIERS PRIMARY LEARNER -   PRIMARY LANGUAGE ENGLISH   LEARNER PREFERENCE PRIMARY READING     DEMONSTRATION     -   ANSWERED BY patient   RELATIONSHIP SELF       Health Maintenance reviewed and discussed and ordered per Provider. Health Maintenance Due   Topic Date Due    Pneumococcal 0-64 years (1 of 2 - PPSV23) Never done   . Coordination of Care:  1. Have you been to the ER, urgent care clinic since your last visit? Hospitalized since your last visit? no    2. Have you seen or consulted any other health care providers outside of the [address] [place] [address] since your last visit? Include any pap smears or colon screening.  no

## 2022-02-04 NOTE — TELEPHONE ENCOUNTER
Received call from Agnieszka at [facility] with [employer]. Subjective: Caller states \"leg pain and ankle swelling over last 2 weeks. \"     Current Symptoms: bilateral pain in legs at night radiates to groin area, feet and ankle swelling both, worse on left side. Intermittent numbess and tingling to helena feet, on gabapentin for neuropathy. Onset: 2 weeks ago; gradual, worsening    Associated Symptoms: reduced activity; difficulty sleeping d/t discomfort    Pain Severity: 7/10; aching; intermittent    Temperature: Denies      What has been tried: keeping feet elevated, and will go down. But swelling comes back at night. Recommended disposition: See PCP within 3 Days. Advised if unable to get an appointment to go to nearest [facility]. Care advice provided, patient verbalizes understanding; denies any other questions or concerns; instructed to call back for any new or worsening symptoms. Writer provided warm transfer to Mo at [facility] for appointment scheduling    Attention Provider: Thank you for allowing me to participate in the care of your patient. The patient was connected to triage in response to information provided to the [facility]. Please do not respond through this encounter as the response is not directed to a shared pool.     Reason for Disposition   [1] MODERATE pain (e.g., interferes with normal activities, limping) AND [2] present > 3 days    Protocols used: LEG PAIN-ADULT-AH

## 2022-02-04 NOTE — PROGRESS NOTES
Skyla Zhang is a 39 y.o. female who was seen by synchronous (real-time) audio-video technology on 2/4/2022 for Leg Pain and Foot Swelling    Assessment & Plan:   Diagnoses and all orders for this visit:    1. Chest pain, unspecified type  -     NT-PRO BNP; Future    2. Lower extremity edema  -     Comp. Stocking,Knee,Regular,Lrg misc; 20-30 mm hg  -     ECHO ADULT COMPLETE; Future  -     furosemide (LASIX) 20 mg tablet; Take 1 tablet by mouth every other day            Subjective:     ]The patient presents for an Audio-visual teleconference appointment for extremity edema. She is complaining of swelling to her lower extremity. Per the patient she elevates her feet at home and will and has limits her sodium intake. She notices lower extremity swelling daily. The swelling has been reoccurring x1 month. She is able to press lower leg with 1 finger and it causes indentation in her skin. She notes that she had some chest pain prior to the EKG. She denies any chest pain today patient also has intermittent dyspnea associated with asthma. Prior to Admission medications    Medication Sig Start Date End Date Taking? Authorizing Provider   Comp. Stocking,Knee,Regular,Lrg misc 20-30 mm hg 2/4/22  Yes Carmencita Al NP   furosemide (LASIX) 20 mg tablet Take 1 tablet by mouth every other day 2/4/22  Yes Carmencita Al NP   gabapentin (Neurontin) 300 mg capsule Take 1 Capsule by mouth two (2) times a day. Max Daily Amount: 600 mg. 1/12/22  Yes Carmencita Al NP   cyclobenzaprine (FLEXERIL) 5 mg tablet Take 1 Tablet by mouth three (3) times daily as needed for Muscle Spasm(s). 1/12/22  Yes Carmencita Al NP   albuterol (PROVENTIL HFA, VENTOLIN HFA, PROAIR HFA) 90 mcg/actuation inhaler Take 1-2 Puffs by inhalation every four (4) hours as needed for Wheezing.  1/12/22  Yes Carmencita Al NP   budesonide-formoteroL (SYMBICORT) 160-4.5 mcg/actuation HFAA Take 2 Puffs by inhalation every twelve (12) hours. Indications: controller medication for asthma 12/1/21  Yes Hendrick Meckel, MD   Nebulizer & Compressor machine Use as directed 12/1/21  Yes Hendrick Meckel, MD   acetaminophen (TYLENOL PO) Take  by mouth. Yes Other, MD Rnadall   Nebulizer Accessories kit Use as directed 3/30/17  Yes Gaby Paulino MD     Patient Active Problem List   Diagnosis Code    Mild intermittent asthma J45.20    Obesity E66.9    Palpitations R00.2    Precordial pain R07.2    Severe obesity ([address] E66.01    Asthma J45.909     Patient Active Problem List    Diagnosis Date Noted    Asthma 06/13/2019    Severe obesity ([address] 10/10/2018    Palpitations 07/26/2016    Precordial pain 07/26/2016    Mild intermittent asthma 07/07/2014    Obesity 07/07/2014     Current Outpatient Medications   Medication Sig Dispense Refill    Comp. Stocking,Knee,Regular,Lrg misc 20-30 mm hg 1 Each 1    furosemide (LASIX) 20 mg tablet Take 1 tablet by mouth every other day 30 Tablet 0    gabapentin (Neurontin) 300 mg capsule Take 1 Capsule by mouth two (2) times a day. Max Daily Amount: 600 mg. 180 Capsule 1    cyclobenzaprine (FLEXERIL) 5 mg tablet Take 1 Tablet by mouth three (3) times daily as needed for Muscle Spasm(s). 30 Tablet 1    albuterol (PROVENTIL HFA, VENTOLIN HFA, PROAIR HFA) 90 mcg/actuation inhaler Take 1-2 Puffs by inhalation every four (4) hours as needed for Wheezing. 6.7 Each 2    budesonide-formoteroL (SYMBICORT) 160-4.5 mcg/actuation HFAA Take 2 Puffs by inhalation every twelve (12) hours. Indications: controller medication for asthma 10.2 g 2    Nebulizer & Compressor machine Use as directed 1 Each 0    acetaminophen (TYLENOL PO) Take  by mouth.       Nebulizer Accessories kit Use as directed 1 Kit 1     Allergies   Allergen Reactions    Azo [Phenazopyridine] Hives    Iodinated Contrast Media Shortness of Breath     Other reaction(s): wheezing/sob       ROS  ROS:  History obtained from the patient intake forms which are reviewed with the patient  · General: negative for - chills, fever, weight changes or malaise  · HEENT: no sore throat, nasal congestion, vision problems or ear problems  · Respiratory: no cough, shortness of breath, or wheezing  · Cardiovascular: lower extremity edema  · Gastrointestinal: no abdominal pain, N/V, change in bowel habits, or black or bloody stools  · Musculoskeletal: no back pain, joint pain, joint stiffness, muscle pain or muscle weakness  · Neurological: no numbness, tingling, headache or dizziness  · Endo:  No polyuria or polydipsia. · : no hematuria, dysuria, frequency, hesitancy, or nocturia.     · Psychological: negative for - anxiety, depression, sleep disturbances, suicidal or homicidal ideations    Objective:     Patient-Reported Vitals 1/24/2022   Patient-Reported Weight -   Patient-Reported Height 54        [INSTRUCTIONS:  \"[x]\" Indicates a positive item  \"[]\" Indicates a negative item  -- DELETE ALL ITEMS NOT EXAMINED]    Constitutional: [x] Appears well-developed and well-nourished [x] No apparent distress      [] Abnormal -     Mental status: [x] Alert and awake  [x] Oriented to person/place/time [x] Able to follow commands    [] Abnormal -     Eyes:   EOM    [x]  Normal    [] Abnormal -   Sclera  [x]  Normal    [] Abnormal -          Discharge [x]  None visible   [] Abnormal -     HENT: [x] Normocephalic, atraumatic  [] Abnormal -   [x] Mouth/Throat: Mucous membranes are moist    External Ears [x] Normal  [] Abnormal -    Neck: [x] No visualized mass [] Abnormal -     Pulmonary/Chest: [x] Respiratory effort normal   [x] No visualized signs of difficulty breathing or respiratory distress        [] Abnormal -      Musculoskeletal:   [x] Normal gait with no signs of ataxia         [x] Normal range of motion of neck        [] Abnormal -     Neurological:        [x] No Facial Asymmetry (Cranial nerve 7 motor function) (limited exam due to video visit)          [x] No gaze palsy        [] Abnormal -          Skin:        [x] No significant exanthematous lesions or discoloration noted on facial skin         [] Abnormal -pitting edema           Psychiatric:       [x] Normal Affect [] Abnormal -        [x] No Hallucinations    Other pertinent observable physical exam findings:-        We discussed the expected course, resolution and complications of the diagnosis(es) in detail. Medication risks, benefits, costs, interactions, and alternatives were discussed as indicated. I advised her to contact the office if her condition worsens, changes or fails to improve as anticipated. She expressed understanding with the diagnosis(es) and plan. Rashad Claudio, was evaluated through a synchronous (real-time) audio-video encounter. The patient (or guardian if applicable) is aware that this is a billable service, which includes applicable co-pays. Verbal consent to proceed has been obtained. The visit was conducted pursuant to the emergency declaration under the [address] [place] [address] authority and the [employer] General Act. Patient identification was verified, and a caregiver was present when appropriate. The patient was located at home in a state where the provider was licensed to provide care.       Gagandeep Amaya NP

## 2022-02-07 ENCOUNTER — HOSPITAL ENCOUNTER (OUTPATIENT)
Dept: LAB | Age: 42
Discharge: HOME | End: 2022-02-07

## 2022-02-07 LAB — XX-LABCORP SPECIMEN COL,LCBCF: NORMAL

## 2022-02-07 PROCEDURE — 99001 SPECIMEN HANDLING PT-LAB: CPT

## 2022-02-08 LAB — NT-PROBNP SERPL-MCNC: 21 PG/ML (ref 0–130)

## 2022-03-02 ENCOUNTER — HOSPITAL ENCOUNTER (OUTPATIENT)
Dept: NON INVASIVE DIAGNOSTICS | Age: 42
Discharge: HOME | End: 2022-03-02
Attending: NURSE PRACTITIONER
Payer: COMMERCIAL

## 2022-03-02 VITALS
BODY MASS INDEX: 35.85 KG/M2 | SYSTOLIC BLOOD PRESSURE: 135 MMHG | DIASTOLIC BLOOD PRESSURE: 88 MMHG | HEIGHT: 64 IN | WEIGHT: 210 LBS

## 2022-03-02 DIAGNOSIS — R60.0 LOWER EXTREMITY EDEMA: ICD-10-CM

## 2022-03-02 LAB
ECHO AO ROOT DIAM: 2.9 CM
ECHO AO ROOT INDEX: 1.45 CM/M2
ECHO LA VOL 2C: 34 ML (ref 22–52)
ECHO LA VOL 4C: 35 ML (ref 22–52)
ECHO LA VOLUME AREA LENGTH: 37 ML
ECHO LA VOLUME INDEX A2C: 17 ML/M2 (ref 16–34)
ECHO LA VOLUME INDEX A4C: 18 ML/M2 (ref 16–34)
ECHO LA VOLUME INDEX AREA LENGTH: 19 ML/M2 (ref 16–34)
ECHO LV E' LATERAL VELOCITY: 13 CM/S
ECHO LV FRACTIONAL SHORTENING: 35 % (ref 28–44)
ECHO LV INTERNAL DIMENSION DIASTOLE INDEX: 2.3 CM/M2
ECHO LV INTERNAL DIMENSION DIASTOLIC: 4.6 CM (ref 3.9–5.3)
ECHO LV INTERNAL DIMENSION SYSTOLIC INDEX: 1.5 CM/M2
ECHO LV INTERNAL DIMENSION SYSTOLIC: 3 CM
ECHO LV IVSD: 1 CM (ref 0.6–0.9)
ECHO LV MASS 2D: 158.8 G (ref 67–162)
ECHO LV MASS INDEX 2D: 79.4 G/M2 (ref 43–95)
ECHO LV POSTERIOR WALL DIASTOLIC: 1 CM (ref 0.6–0.9)
ECHO LV RELATIVE WALL THICKNESS RATIO: 0.43
ECHO LVOT AREA: 3.1 CM2
ECHO LVOT DIAM: 2 CM
ECHO LVOT MEAN GRADIENT: 3 MMHG
ECHO LVOT PEAK GRADIENT: 6 MMHG
ECHO LVOT PEAK VELOCITY: 1.3 M/S
ECHO LVOT STROKE VOLUME INDEX: 41.4 ML/M2
ECHO LVOT SV: 82.9 ML
ECHO LVOT VTI: 26.4 CM
ECHO MV A VELOCITY: 0.94 M/S
ECHO MV E DECELERATION TIME (DT): 144.1 MS
ECHO MV E VELOCITY: 1.08 M/S
ECHO MV E/A RATIO: 1.15
ECHO MV E/E' LATERAL: 8.31
ECHO PULMONARY ARTERY SYSTOLIC PRESSURE (PASP): 26 MMHG
ECHO TV REGURGITANT MAX VELOCITY: 2.1 M/S
ECHO TV REGURGITANT PEAK GRADIENT: 18 MMHG

## 2022-03-02 PROCEDURE — 93306 TTE W/DOPPLER COMPLETE: CPT

## 2022-03-16 ENCOUNTER — OFFICE VISIT (OUTPATIENT)
Dept: ORTHOPEDIC SURGERY | Age: 42
End: 2022-03-16
Payer: COMMERCIAL

## 2022-03-16 VITALS
DIASTOLIC BLOOD PRESSURE: 80 MMHG | HEIGHT: 64 IN | HEART RATE: 75 BPM | TEMPERATURE: 97.3 F | WEIGHT: 228 LBS | SYSTOLIC BLOOD PRESSURE: 116 MMHG | OXYGEN SATURATION: 100 % | BODY MASS INDEX: 38.93 KG/M2

## 2022-03-16 DIAGNOSIS — G56.03 BILATERAL CARPAL TUNNEL SYNDROME: ICD-10-CM

## 2022-03-16 DIAGNOSIS — G56.01 RIGHT CARPAL TUNNEL SYNDROME: Primary | ICD-10-CM

## 2022-03-16 PROCEDURE — 99214 OFFICE O/P EST MOD 30 MIN: CPT | Performed by: ORTHOPAEDIC SURGERY

## 2022-03-16 NOTE — PROGRESS NOTES
Ericka Iyer is a 39 y.o. female right handed unspecified employment. Worker's Compensation and legal considerations: none filed. Vitals:    22 [id number] 22 [id number]   BP:  116/80   Pulse: 75    Temp: 97.3 °F (36.3 °C)    TempSrc: Temporal    SpO2: 100%    Weight: 228 lb (103.4 kg)    Height: 5' 4\" (1.626 m)    PainSc:   8    PainLoc: Wrist            Chief Complaint   Patient presents with    Hand Pain     Kushal Carpal Tunnel Syndrome Follow up    Wrist Pain     RT wrist pain        HPI: Patient presents today for follow-up of right much worse than left carpal tunnel syndrome. She previously had injections that only lasted a few weeks. She would like to set up surgery for the right side. Initial HPI: Patient presents today with complaints of bilateral hand numbness and tingling. She has recently had an EMG done which was positive for carpal tunnel.     Date of onset: Indeterminate    Injury: No    Prior Treatment:  Yes: Comment: Nighttime bracing    Numbness/ Tingling: Yes: Comment: Bilateral hands      ROS: Review of Systems - General ROS: negative  Psychological ROS: negative  ENT ROS: negative  Allergy and Immunology ROS: negative  Hematological and Lymphatic ROS: negative  Respiratory ROS: no cough, shortness of breath, or wheezing  Cardiovascular ROS: no chest pain or dyspnea on exertion  Gastrointestinal ROS: no abdominal pain, change in bowel habits, or black or bloody stools  Musculoskeletal ROS: negative  Neurological ROS: positive for - numbness/tingling  Dermatological ROS: negative    Past Medical History:   Diagnosis Date    Asthma     Chest pain     Chronic pain 6 months    painful menses    Fibroids     GERD (gastroesophageal reflux disease)     Headache disorder     Heart palpitations     Migraine     Neurological disorder     bleeding in head diagnosed        Past Surgical History:   Procedure Laterality Date    HX  SECTION      with BTL    HX HYSTEROSCOPY WITH ENDOMETRIAL ABLATION  2012    No period since       Current Outpatient Medications   Medication Sig Dispense Refill    Comp. Stocking,Knee,Regular,Lrg misc 20-30 mm hg 1 Each 1    furosemide (LASIX) 20 mg tablet Take 1 tablet by mouth every other day 30 Tablet 0    gabapentin (Neurontin) 300 mg capsule Take 1 Capsule by mouth two (2) times a day. Max Daily Amount: 600 mg. 180 Capsule 1    cyclobenzaprine (FLEXERIL) 5 mg tablet Take 1 Tablet by mouth three (3) times daily as needed for Muscle Spasm(s). 30 Tablet 1    albuterol (PROVENTIL HFA, VENTOLIN HFA, PROAIR HFA) 90 mcg/actuation inhaler Take 1-2 Puffs by inhalation every four (4) hours as needed for Wheezing. 6.7 Each 2    budesonide-formoteroL (SYMBICORT) 160-4.5 mcg/actuation HFAA Take 2 Puffs by inhalation every twelve (12) hours. Indications: controller medication for asthma 10.2 g 2    acetaminophen (TYLENOL PO) Take  by mouth.  Nebulizer & Compressor machine Use as directed (Patient not taking: Reported on 3/16/2022) 1 Each 0    Nebulizer Accessories kit Use as directed (Patient not taking: Reported on 3/16/2022) 1 Kit 1       Allergies   Allergen Reactions    Azo [Phenazopyridine] Hives    Iodinated Contrast Media Shortness of Breath     Other reaction(s): wheezing/sob           PE:     Physical Exam  Vitals and nursing note reviewed. Constitutional:       General: She is not in acute distress. Appearance: Normal appearance. She is not ill-appearing, toxic-appearing or diaphoretic. HENT:      Head: Normocephalic and atraumatic. Nose: Nose normal.      Mouth/Throat:      Mouth: Mucous membranes are moist.   Eyes:      Extraocular Movements: Extraocular movements intact. Pupils: Pupils are equal, round, and reactive to light. Cardiovascular:      Pulses: Normal pulses. Pulmonary:      Effort: Pulmonary effort is normal. No respiratory distress. Abdominal:      General: Abdomen is flat.  There is no distension. Musculoskeletal:         General: No swelling, tenderness, deformity or signs of injury. Normal range of motion. Cervical back: Normal range of motion. Right lower leg: No edema. Left lower leg: No edema. Skin:     General: Skin is warm and dry. Capillary Refill: Capillary refill takes less than 2 seconds. Findings: No bruising or erythema. Neurological:      General: No focal deficit present. Mental Status: She is alert and oriented to person, place, and time. Psychiatric:         Mood and Affect: Mood normal.         Behavior: Behavior normal.            NEUROVASCULAR    Examination L R Examination L R   Carpal Comp. + + Pronator Comp. - -   Carpal Tinel + ++ Pronator Tinel - -   Phalen's - - Pronator Stress - -   Cubital Comp. - - Guyon Comp. - -   Cubital Tinel - - Guyon Tinel - -   Elbow Hyperflexion - - Adson's - -   Spurling's - - SC Comp. - -   PCB Median abn - - SC Tinel - -   Radial Tinel - - IC Comp. - -   Digital Tinel - - IC Tinel - -   Radial 2-Pt WNL WNL Ulnar 2-Pt WNL WNL     Radial Pulse: 2+  Capillary Refill: < 2 sec  [employer]: Not Performed  [employer]: Not Performed        NCV & EMG Findings:  Evaluation of the left median sensory and the right median sensory nerves showed prolonged distal peak latency (L3.8, R4.3 ms) and decreased conduction velocity (Wrist-2nd Digit, L37, R33 m/s). All remaining nerves (as indicated in the following tables) were within normal limits. Left vs. Right side comparison data for the median motor nerve indicates abnormal L-R latency difference (0.9 ms). The ulnar motor nerve indicates abnormal L-R amplitude difference (27.4 %). The median sensory nerve indicates abnormal L-R latency difference (0.5 ms).   All remaining left vs. right side differences were within normal limits.       All examined muscles (as indicated in the following table) showed no evidence of electrical instability.       Impression:  Bilaterally decreased median sensory latencies left greater than right, consistent with a Grade l carpel tunnel syndrome. Imaging:     None indicated        ICD-10-CM ICD-9-CM    1. Right carpal tunnel syndrome  G56.01 354.0 SCHEDULE SURGERY   2. Bilateral carpal tunnel syndrome  G56.03 354.0          Plan:     Schedule right carpal tunnel release    The patient was counseled at length about the risks of win Covid-19 during their perioperative period and any recovery window from their procedure. The patient was made aware that win Covid-19  may worsen their prognosis for recovering from their procedure and lend to a higher morbidity and/or mortality risk. All material risks, benefits, and reasonable alternatives including postponing the procedure were discussed. The patient does  wish to proceed with the procedure at this time. This procedure has been fully reviewed with the patient and written informed consent has been obtained. Follow-up and Dispositions    · Return for postop.           Plan was reviewed with patient, who verbalized agreement and understanding of the plan

## 2022-03-17 ENCOUNTER — DOCUMENTATION ONLY (OUTPATIENT)
Dept: ORTHOPEDIC SURGERY | Age: 42
End: 2022-03-17

## 2022-03-17 NOTE — PROGRESS NOTES
Alea faxed over [employer] and Andela Eval Form to [facility]. Form faxed to [facility] to be placed in forms bin or completion. Blank copy scanned in to [facility].  Release of Medical Records needs signature

## 2022-03-18 ENCOUNTER — DOCUMENTATION ONLY (OUTPATIENT)
Dept: ORTHOPEDIC SURGERY | Age: 42
End: 2022-03-18

## 2022-03-18 PROBLEM — E66.01 SEVERE OBESITY (HCC): Status: ACTIVE | Noted: 2018-10-10

## 2022-03-20 PROBLEM — J45.909 ASTHMA: Status: ACTIVE | Noted: 2019-06-13

## 2022-03-31 ENCOUNTER — VIRTUAL VISIT (OUTPATIENT)
Dept: FAMILY MEDICINE CLINIC | Age: 42
End: 2022-03-31
Payer: COMMERCIAL

## 2022-03-31 DIAGNOSIS — G89.29 CHRONIC JOINT PAIN: Primary | ICD-10-CM

## 2022-03-31 DIAGNOSIS — J45.41 MODERATE PERSISTENT ASTHMA WITH ACUTE EXACERBATION: ICD-10-CM

## 2022-03-31 DIAGNOSIS — M25.50 CHRONIC JOINT PAIN: Primary | ICD-10-CM

## 2022-03-31 DIAGNOSIS — R20.2 PARESTHESIA: ICD-10-CM

## 2022-03-31 PROCEDURE — 99212 OFFICE O/P EST SF 10 MIN: CPT | Performed by: NURSE PRACTITIONER

## 2022-03-31 NOTE — PROGRESS NOTES
Vonnie Zaragoza is a 39 y.o. female who was seen by synchronous (real-time) audio-video technology on 3/31/2022 for Arm Pain (right arm pain)        Assessment & Plan:   Diagnoses and all orders for this visit:    1. Chronic joint pain  -     REFERRAL TO RHEUMATOLOGY    2. Paresthesia  -     gabapentin (Neurontin) 300 mg capsule; Take 1 Capsule by mouth two (2) times a day. Max Daily Amount: 600 mg.    3. Moderate persistent asthma with acute exacerbation  -     budesonide-formoteroL (SYMBICORT) 160-4.5 mcg/actuation HFAA; Take 2 Puffs by inhalation every twelve (12) hours. Indications: controller medication for asthma  -     albuterol (PROVENTIL HFA, VENTOLIN HFA, PROAIR HFA) 90 mcg/actuation inhaler; Take 1-2 Puffs by inhalation every four (4) hours as needed for Wheezing. Subjective: The patient presents for an Audio-visual teleconference appointment for for chronic joint pain. The pain has been ongoing for several months. She believes the pain is secondary to her rheumatoid disease. She is requesting referral to rheumatologist.    She also carries a medical diagnosis for numbness and tingling in her extremities and back. Is previously been prescribed gabapentin. She exhausted her medication and is requesting a new prescription. Prior to Admission medications    Medication Sig Start Date End Date Taking? Authorizing Provider   Comp. Stocking,Knee,Regular,Lrg misc 20-30 mm hg 2/4/22  Yes Isrrael Luke NP   furosemide (LASIX) 20 mg tablet Take 1 tablet by mouth every other day 2/4/22  Yes Isrrael Luke NP   gabapentin (Neurontin) 300 mg capsule Take 1 Capsule by mouth two (2) times a day. Max Daily Amount: 600 mg. 1/12/22  Yes Isrrael Luke NP   cyclobenzaprine (FLEXERIL) 5 mg tablet Take 1 Tablet by mouth three (3) times daily as needed for Muscle Spasm(s).  1/12/22  Yes Isrrael Luke NP   albuterol (PROVENTIL HFA, VENTOLIN HFA, PROAIR HFA) 90 mcg/actuation inhaler Take 1-2 Puffs by inhalation every four (4) hours as needed for Wheezing. 1/12/22  Yes Lucy Francis NP   budesonide-formoteroL (SYMBICORT) 160-4.5 mcg/actuation HFAA Take 2 Puffs by inhalation every twelve (12) hours. Indications: controller medication for asthma 12/1/21  Yes Vinton Mcardle, MD   acetaminophen (TYLENOL PO) Take  by mouth. Yes Other, MD Randall   Nebulizer & Compressor machine Use as directed  Patient not taking: Reported on 3/16/2022 12/1/21   Vinton Mcardle, MD   Nebulizer Accessories kit Use as directed  Patient not taking: Reported on 3/16/2022 3/30/17   Haylee Rodriguez MD     Patient Active Problem List   Diagnosis Code    Mild intermittent asthma J45.20    Obesity E66.9    Palpitations R00.2    Precordial pain R07.2    Severe obesity ([address] E66.01    Asthma J45.909     Patient Active Problem List    Diagnosis Date Noted    Asthma 06/13/2019    Severe obesity ([address] 10/10/2018    Palpitations 07/26/2016    Precordial pain 07/26/2016    Mild intermittent asthma 07/07/2014    Obesity 07/07/2014     Current Outpatient Medications   Medication Sig Dispense Refill    gabapentin (Neurontin) 300 mg capsule Take 1 Capsule by mouth two (2) times a day. Max Daily Amount: 600 mg. 180 Capsule 1    budesonide-formoteroL (SYMBICORT) 160-4.5 mcg/actuation HFAA Take 2 Puffs by inhalation every twelve (12) hours. Indications: controller medication for asthma 10.2 g 2    albuterol (PROVENTIL HFA, VENTOLIN HFA, PROAIR HFA) 90 mcg/actuation inhaler Take 1-2 Puffs by inhalation every four (4) hours as needed for Wheezing. 6.7 Each 2    Comp. Stocking,Knee,Regular,Lrg misc 20-30 mm hg 1 Each 1    furosemide (LASIX) 20 mg tablet Take 1 tablet by mouth every other day 30 Tablet 0    cyclobenzaprine (FLEXERIL) 5 mg tablet Take 1 Tablet by mouth three (3) times daily as needed for Muscle Spasm(s). 30 Tablet 1    acetaminophen (TYLENOL PO) Take  by mouth.       traMADoL (ULTRAM) 50 mg tablet Take 1 Tablet by mouth every six (6) hours as needed for Pain for up to 3 days. Max Daily Amount: 200 mg. 12 Tablet 0    diclofenac EC (VOLTAREN) 75 mg EC tablet Take 1 Tablet by mouth two (2) times daily (with meals) for 10 days. 20 Tablet 0    Nebulizer & Compressor machine Use as directed (Patient not taking: Reported on 3/16/2022) 1 Each 0    Nebulizer Accessories kit Use as directed (Patient not taking: Reported on 3/16/2022) 1 Kit 1     Allergies   Allergen Reactions    Azo [Phenazopyridine] Hives    Iodinated Contrast Media Shortness of Breath     Other reaction(s): wheezing/sob     Past Medical History:   Diagnosis Date    Asthma     Chest pain     Chronic pain 6 months    painful menses    Fibroids     GERD (gastroesophageal reflux disease)     Headache disorder     Heart palpitations     Migraine     Neurological disorder     bleeding in head diagnosed 2021       ROS    ROS:  History obtained from the patient intake forms which are reviewed with the patient  · General: negative for - chills, fever, weight changes or malaise  · HEENT: no sore throat, nasal congestion, vision problems or ear problems  · Respiratory: no cough, shortness of breath, or wheezing  · Cardiovascular: no chest pain, palpitations, or dyspnea on exertion  · Gastrointestinal: no abdominal pain, N/V, change in bowel habits, or black or bloody stools  · Musculoskeletal:chronic joint pain. · Neurological: +numbness, tingling, intermittent headache  · Endo:  No polyuria or polydipsia. · : no hematuria, dysuria, frequency, hesitancy, or nocturia.     · Psychological: negative for - anxiety, depression, sleep disturbances, suicidal or homicidal ideations    Objective:     Patient-Reported Vitals 1/24/2022   Patient-Reported Weight -   Patient-Reported Height 54        [INSTRUCTIONS:  \"[x]\" Indicates a positive item  \"[]\" Indicates a negative item  -- DELETE ALL ITEMS NOT EXAMINED]    Constitutional: [x] Appears well-developed and well-nourished [x] No apparent distress      [] Abnormal -     Mental status: [x] Alert and awake  [x] Oriented to person/place/time [x] Able to follow commands    [] Abnormal -     Eyes:   EOM    [x]  Normal    [] Abnormal -   Sclera  [x]  Normal    [] Abnormal -          Discharge [x]  None visible   [] Abnormal -     HENT: [x] Normocephalic, atraumatic  [] Abnormal -   [x] Mouth/Throat: Mucous membranes are moist    External Ears [x] Normal  [] Abnormal -    Neck: [x] No visualized mass [] Abnormal -     Pulmonary/Chest: [x] Respiratory effort normal   [x] No visualized signs of difficulty breathing or respiratory distress        [] Abnormal -      Musculoskeletal:   [x] Normal gait with no signs of ataxia         [x] Normal range of motion of neck        [] Abnormal -     Neurological:        [x] No Facial Asymmetry (Cranial nerve 7 motor function) (limited exam due to video visit)          [x] No gaze palsy        [] Abnormal -          Skin:        [x] No significant exanthematous lesions or discoloration noted on facial skin         [] Abnormal -            Psychiatric:       [x] Normal Affect [] Abnormal -        [x] No Hallucinations    Other pertinent observable physical exam findings:-        We discussed the expected course, resolution and complications of the diagnosis(es) in detail. Medication risks, benefits, costs, interactions, and alternatives were discussed as indicated. I advised her to contact the office if her condition worsens, changes or fails to improve as anticipated. She expressed understanding with the diagnosis(es) and plan. Shun Hoyos, was evaluated through a synchronous (real-time) audio-video encounter. The patient (or guardian if applicable) is aware that this is a billable service, which includes applicable co-pays. Verbal consent to proceed has been obtained.  The visit was conducted pursuant to the emergency declaration under the [address] and the National Emergencies Act, [address] [facility] waiver authority and the [employer] General Act. Patient identification was verified, and a caregiver was present when appropriate. The patient was located at home in a state where the provider was licensed to provide care.       Kary Saenz NP

## 2022-03-31 NOTE — PROGRESS NOTES
Ellard Lefort presents today for   Chief Complaint   Patient presents with    Arm Pain     right arm pain       Virtual/telephone visit    Depression Screening:  3 most recent PHQ Screens 3/31/2022   Little interest or pleasure in doing things Not at all   Feeling down, depressed, irritable, or hopeless Not at all   Total Score PHQ 2 0       Learning Assessment:  Learning Assessment 10/6/2021   PRIMARY LEARNER Patient   HIGHEST LEVEL OF EDUCATION - PRIMARY LEARNER  -   BARRIERS PRIMARY LEARNER -   PRIMARY LANGUAGE ENGLISH   LEARNER PREFERENCE PRIMARY READING     DEMONSTRATION     -   ANSWERED BY patient   RELATIONSHIP SELF       Health Maintenance reviewed and discussed and ordered per Provider. Health Maintenance Due   Topic Date Due    Pneumococcal 0-64 years (1 of 2 - PPSV23) Never done   . Coordination of Care:  1. Have you been to the ER, urgent care clinic since your last visit? Hospitalized since your last visit? no    2. Have you seen or consulted any other health care providers outside of the [address] [place] [address] since your last visit? Include any pap smears or colon screening.  no

## 2022-04-01 RX ORDER — ALBUTEROL SULFATE 90 UG/1
1-2 AEROSOL, METERED RESPIRATORY (INHALATION)
Qty: 6.7 EACH | Refills: 2 | Status: SHIPPED | OUTPATIENT
Start: 2022-04-01

## 2022-04-01 RX ORDER — GABAPENTIN 300 MG/1
300 CAPSULE ORAL 2 TIMES DAILY
Qty: 180 CAPSULE | Refills: 1 | Status: SHIPPED | OUTPATIENT
Start: 2022-04-01

## 2022-04-01 RX ORDER — BUDESONIDE AND FORMOTEROL FUMARATE DIHYDRATE 160; 4.5 UG/1; UG/1
2 AEROSOL RESPIRATORY (INHALATION) EVERY 12 HOURS
Qty: 10.2 G | Refills: 2 | Status: SHIPPED | OUTPATIENT
Start: 2022-04-01

## 2022-04-11 DIAGNOSIS — G56.01 RIGHT CARPAL TUNNEL SYNDROME: Primary | ICD-10-CM

## 2022-04-11 DIAGNOSIS — Z98.890 S/P CARPAL TUNNEL RELEASE: ICD-10-CM

## 2022-04-11 RX ORDER — DICLOFENAC SODIUM 75 MG/1
75 TABLET, DELAYED RELEASE ORAL 2 TIMES DAILY WITH MEALS
Qty: 20 TABLET | Refills: 0 | Status: SHIPPED | OUTPATIENT
Start: 2022-04-11 | End: 2022-04-21

## 2022-04-11 RX ORDER — TRAMADOL HYDROCHLORIDE 50 MG/1
50 TABLET ORAL
Qty: 12 TABLET | Refills: 0 | Status: SHIPPED | OUTPATIENT
Start: 2022-04-11 | End: 2022-04-12 | Stop reason: ALTCHOICE

## 2022-04-13 ENCOUNTER — OFFICE VISIT (OUTPATIENT)
Dept: FAMILY MEDICINE CLINIC | Age: 42
End: 2022-04-13
Payer: COMMERCIAL

## 2022-04-13 VITALS
HEIGHT: 64 IN | BODY MASS INDEX: 39.27 KG/M2 | SYSTOLIC BLOOD PRESSURE: 132 MMHG | DIASTOLIC BLOOD PRESSURE: 70 MMHG | WEIGHT: 230 LBS | OXYGEN SATURATION: 97 % | RESPIRATION RATE: 16 BRPM | HEART RATE: 76 BPM | TEMPERATURE: 97 F

## 2022-04-13 DIAGNOSIS — R21 RASH IN ADULT: Primary | ICD-10-CM

## 2022-04-13 PROCEDURE — 99213 OFFICE O/P EST LOW 20 MIN: CPT | Performed by: NURSE PRACTITIONER

## 2022-04-13 NOTE — PROGRESS NOTES
Gretchen Germain presents today for   Chief Complaint   Patient presents with    Rash     chest    Mass     under right arm       Is someone accompanying this pt? no    Is the patient using any DME equipment during OV? no    Depression Screening:  3 most recent PHQ Screens 3/31/2022   Little interest or pleasure in doing things Not at all   Feeling down, depressed, irritable, or hopeless Not at all   Total Score PHQ 2 0       Learning Assessment:  Learning Assessment 10/6/2021   PRIMARY LEARNER Patient   HIGHEST LEVEL OF EDUCATION - PRIMARY LEARNER  -   BARRIERS PRIMARY LEARNER -   PRIMARY LANGUAGE ENGLISH   LEARNER PREFERENCE PRIMARY READING     DEMONSTRATION     -   ANSWERED BY patient   RELATIONSHIP SELF       Health Maintenance reviewed and discussed and ordered per Provider. Health Maintenance Due   Topic Date Due    Pneumococcal 0-64 years (1 - PCV) Never done   . Coordination of Care:  1. Have you been to the ER, urgent care clinic since your last visit? Hospitalized since your last visit? no    2. Have you seen or consulted any other health care providers outside of the [address] [place] [address] since your last visit? Include any pap smears or colon screening.  no

## 2022-04-13 NOTE — PROGRESS NOTES
Ranjith London is a 39 y.o. female presenting today for Rash (chest) and Rash (under right arm)  . Chief Complaint   Patient presents with    Rash     chest    Rash     under right arm       HPI:  Ranjith London presents to the office today for complaints of a rash to the chest wall in the right axilla. The rash was pruritic but nonerythematous. She believes the rash is associated with her wound and sauna suit which help with weight loss. She used [employer] ointment on the chest wall and the pruritis improves. Review of Systems   Constitutional: Negative for malaise/fatigue. Respiratory: Negative for cough and shortness of breath. Cardiovascular: Negative for chest pain, palpitations and leg swelling. Gastrointestinal: Negative for abdominal pain, nausea and vomiting. Genitourinary: Negative for dysuria. Musculoskeletal: Negative for back pain and myalgias. Skin: Positive for itching and rash. Neurological: Negative for dizziness and headaches.        Allergies   Allergen Reactions    Azo [Phenazopyridine] Hives    Iodinated Contrast Media Shortness of Breath     Other reaction(s): wheezing/sob       PHQ Screening   3 most recent PHQ Screens 2022   Little interest or pleasure in doing things Not at all   Feeling down, depressed, irritable, or hopeless Not at all   Total Score PHQ 2 0       History  Past Medical History:   Diagnosis Date    Asthma     Chest pain     Chronic pain 6 months    painful menses    Fibroids     GERD (gastroesophageal reflux disease)     Headache disorder     Heart palpitations     Migraine     Neurological disorder     bleeding in head diagnosed        Past Surgical History:   Procedure Laterality Date    HX  SECTION  4860,8369    with BTL    HX HYSTEROSCOPY WITH ENDOMETRIAL ABLATION      No period since       Social History     Socioeconomic History    Marital status:      Spouse name: Not on file    Number of children: Not on file    Years of education: Not on file    Highest education level: Not on file   Occupational History     Employer: [employer]   Tobacco Use    Smoking status: Former Smoker     Types: Cigarettes     Quit date: 3/23/1998     Years since quittin.0    Smokeless tobacco: Former User    Tobacco comment: smoked a few as a teen, Vaped for a year   Vaping Use    Vaping Use: Never used   Substance and Sexual Activity    Alcohol use: Yes     Comment: socially, 2 beer/weekend    Drug use: No    Sexual activity: Yes     Partners: Male     Birth control/protection: Surgical   Other Topics Concern     Service Yes     Comment:  [employer]    Blood Transfusions No    Caffeine Concern No    Occupational Exposure No    Hobby Hazards No    Sleep Concern No    Stress Concern No    Weight Concern Yes     Comment: PT IS OVER WEIGHT    Special Diet No    Back Care No    Exercise Yes     Comment: 4 DAYS A WEEK    Bike Helmet No    Seat Belt Yes    Self-Exams Yes   Social History Narrative    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 Social Determinants of Health     Financial Resource Strain:     Difficulty of Paying Living Expenses: Not on file   Food Insecurity:     Worried About Running Out of Food in the Last Year: Not on file    Angel of Food in the Last Year: Not on file   Transportation Needs:     Lack of Transportation (Medical): Not on file    Lack of Transportation (Non-Medical):  Not on file   Physical Activity:     Days of Exercise per Week: Not on file    Minutes of Exercise per Session: Not on file   Stress:     Feeling of Stress : Not on file   Social Connections:     Frequency of Communication with Friends and Family: Not on file    Frequency of Social Gatherings with Friends and Family: Not on file    Attends Yarsani Services: Not on file    Active Member of Clubs or Organizations: Not on file    Attends Club or Organization Meetings: Not on file   Jason Marital Status: Not on file   Intimate Partner Violence:     Fear of Current or Ex-Partner: Not on file    Emotionally Abused: Not on file    Physically Abused: Not on file    Sexually Abused: Not on file   Housing Stability:     Unable to Pay for Housing in the Last Year: Not on file    Number of [place] in the Last Year: Not on file    Unstable Housing in the Last Year: Not on file       Current Outpatient Medications   Medication Sig Dispense Refill    HYDROcodone-acetaminophen (Norco) 7.5-325 mg per tablet Take 1 Tablet by mouth every six (6) hours as needed for Pain for up to 3 days. Max Daily Amount: 4 Tablets. 12 Tablet 0    diclofenac EC (VOLTAREN) 75 mg EC tablet Take 1 Tablet by mouth two (2) times daily (with meals) for 10 days. 20 Tablet 0    gabapentin (Neurontin) 300 mg capsule Take 1 Capsule by mouth two (2) times a day. Max Daily Amount: 600 mg. 180 Capsule 1    budesonide-formoteroL (SYMBICORT) 160-4.5 mcg/actuation HFAA Take 2 Puffs by inhalation every twelve (12) hours. Indications: controller medication for asthma 10.2 g 2    albuterol (PROVENTIL HFA, VENTOLIN HFA, PROAIR HFA) 90 mcg/actuation inhaler Take 1-2 Puffs by inhalation every four (4) hours as needed for Wheezing. 6.7 Each 2    Comp. Stocking,Knee,Regular,Lrg misc 20-30 mm hg 1 Each 1    furosemide (LASIX) 20 mg tablet Take 1 tablet by mouth every other day 30 Tablet 0    cyclobenzaprine (FLEXERIL) 5 mg tablet Take 1 Tablet by mouth three (3) times daily as needed for Muscle Spasm(s).  30 Tablet 1    Nebulizer & Compressor machine Use as directed (Patient not taking: Reported on 3/16/2022) 1 Each 0    Nebulizer Accessories kit Use as directed (Patient not taking: Reported on 3/16/2022) 1 Kit 1         Vitals:    04/13/22 0904 04/13/22 0938   BP: (!) 145/78 132/70   Pulse: 76    Resp: 16    Temp: 97 °F (36.1 °C)    TempSrc: Oral    SpO2: 97%    Weight: 230 lb (104.3 kg)    Height: 5' 4\" (1.626 m)    PainSc:   0 - No pain        Physical Exam  Vitals and nursing note reviewed. Constitutional:       Appearance: Normal appearance. HENT:      Head: Normocephalic. Cardiovascular:      Rate and Rhythm: Normal rate and regular rhythm. Pulses: Normal pulses. Heart sounds: Normal heart sounds. Pulmonary:      Effort: Pulmonary effort is normal.      Breath sounds: Normal breath sounds. Skin:     General: Skin is warm and dry. Coloration: Skin is not jaundiced. Findings: Rash present. No erythema. Comments: Non-erythematous, mild pruritic, macular rash to the chest wall (versicolor)    Non-erythematous, mild pruritic papular rash to the right axilla   Neurological:      Mental Status: She is alert.          Hospital Outpatient Visit on 03/02/2022   Component Date Value Ref Range Status    IVSd 03/02/2022 1.0* 0.6 - 0.9 cm Final    LVIDd 03/02/2022 4.6  3.9 - 5.3 cm Final    LVIDs 03/02/2022 3.0  cm Final    LVOT Diameter 03/02/2022 2.0  cm Final    LVPWd 03/02/2022 1.0* 0.6 - 0.9 cm Final    LVOT SV 03/02/2022 82.9  ml Final    LVOT Peak Gradient 03/02/2022 6  mmHg Final    LVOT Mean Gradient 03/02/2022 3  mmHg Final    LVOT Peak Velocity 03/02/2022 1.3  m/s Final    LVOT VTI 03/02/2022 26.4  cm Final    LA Volume A/L 03/02/2022 37  mL Final    LA Volume 2C 03/02/2022 34  22 - 52 mL Final    LA Volume 4C 03/02/2022 35  22 - 52 mL Final    MV A Velocity 03/02/2022 0.94  m/s Final    MV E Wave Deceleration Time 03/02/2022 144.1  ms Final    MV E Velocity 03/02/2022 1.08  m/s Final    LV E' Lateral Velocity 03/02/2022 13  cm/s Final    TR Peak Gradient 03/02/2022 18  mmHg Final    TR Max Velocity 03/02/2022 2.10  m/s Final    Aortic Root 03/02/2022 2.9  cm Final    Fractional Shortening 2D 03/02/2022 35  28 - 44 % Final    LVIDd Index 03/02/2022 2.30  cm/m2 Final    LVIDs Index 03/02/2022 1.50  cm/m2 Final    LV RWT Ratio 03/02/2022 0.43   Final    LV Mass 2D 03/02/2022 158.8  67 - 162 g Final    LV Mass 2D Index 03/02/2022 79.4  43 - 95 g/m2 Final    MV E/A 03/02/2022 1.15   Final    E/E' Lateral 03/02/2022 8.31   Final    LA Volume Index A/L 03/02/2022 19  16 - 34 mL/m2 Final    LVOT Stroke Volume Index 03/02/2022 41.4  mL/m2 Final    LVOT Area 03/02/2022 3.1  cm2 Final    LA Volume Index 2C 03/02/2022 17  16 - 34 mL/m2 Final    LA Volume Index 4C 03/02/2022 18  16 - 34 mL/m2 Final    Ao Root Index 03/02/2022 1.45  cm/m2 Final    PASP 03/02/2022 26  mmHg Final   Hospital Outpatient Visit on 02/07/2022   Component Date Value Ref Range Status    [facility]. 02/07/2022 Specimens collected/sent to [facility]. Please direct inquiries to ([phone]). Final   Virtual Visit on 02/04/2022   Component Date Value Ref Range Status    PROBNP 02/07/2022 21  0 - 130 pg/mL Final    Comment: The following cut-points have been suggested for the  use of proBNP for the diagnostic evaluation of heart  failure (HF) in patients with acute dyspnea:  Modality                     Age           Optimal Cut                             (years)            Point  ------------------------------------------------------  Diagnosis (rule in HF)        <50            450 pg/mL                            50 - 75            900 pg/mL                                >75           1800 pg/mL  Exclusion (rule out HF)  Age independent     300 pg/mL         No results found for any visits on 04/13/22. Patient Care Team:  Patient Care Team:  Lorenzo Foss NP as PCP - General (Nurse Practitioner)  Lorenzo Foss NP as PCP - REHABILITATION [facility]  Oscar Plummer MD (Pulmonary Disease)  Joseph Miller MD (Neurology)      Assessment / Plan:      ICD-10-CM ICD-9-CM    1. Rash in adult  R21 782.1 REFERRAL TO DERMATOLOGY         Follow-up and Dispositions    · Return in about 3 months (around 7/13/2022).          I asked the patient if she  had any questions and answered her questions. The patient stated that she understands the treatment plan and agrees with the treatment plan    This document was created with a voice activated dictation system and may contain transcription errors.

## 2022-04-20 DIAGNOSIS — R51.9 HEADACHE DISORDER: ICD-10-CM

## 2022-04-20 LAB
CHOLEST SERPL-MCNC: 187 MG/DL
GLUCOSE SERPL-MCNC: 80 MG/DL (ref 74–99)
HDLC SERPL-MCNC: 65 MG/DL (ref 40–60)
LDLC SERPL CALC-MCNC: 73.4 MG/DL (ref 0–100)
TRIGL SERPL-MCNC: 243 MG/DL (ref ?–150)

## 2022-04-22 ENCOUNTER — TELEPHONE (OUTPATIENT)
Dept: FAMILY MEDICINE CLINIC | Age: 42
End: 2022-04-22

## 2022-04-22 DIAGNOSIS — R51.9 CHRONIC NONINTRACTABLE HEADACHE, UNSPECIFIED HEADACHE TYPE: Primary | ICD-10-CM

## 2022-04-22 DIAGNOSIS — M79.2 NERVE PAIN: ICD-10-CM

## 2022-04-22 DIAGNOSIS — G89.29 CHRONIC NONINTRACTABLE HEADACHE, UNSPECIFIED HEADACHE TYPE: Primary | ICD-10-CM

## 2022-04-23 ENCOUNTER — APPOINTMENT (OUTPATIENT)
Dept: GENERAL RADIOLOGY | Age: 42
End: 2022-04-23
Attending: EMERGENCY MEDICINE
Payer: COMMERCIAL

## 2022-04-23 ENCOUNTER — HOSPITAL ENCOUNTER (EMERGENCY)
Age: 42
Discharge: HOME | End: 2022-04-23
Attending: EMERGENCY MEDICINE
Payer: COMMERCIAL

## 2022-04-23 ENCOUNTER — APPOINTMENT (OUTPATIENT)
Dept: VASCULAR SURGERY | Age: 42
End: 2022-04-23
Attending: EMERGENCY MEDICINE
Payer: COMMERCIAL

## 2022-04-23 VITALS
RESPIRATION RATE: 20 BRPM | OXYGEN SATURATION: 99 % | SYSTOLIC BLOOD PRESSURE: 100 MMHG | HEART RATE: 75 BPM | WEIGHT: 215 LBS | TEMPERATURE: 98 F | BODY MASS INDEX: 36.7 KG/M2 | HEIGHT: 64 IN | DIASTOLIC BLOOD PRESSURE: 70 MMHG

## 2022-04-23 DIAGNOSIS — M25.561 ACUTE PAIN OF RIGHT KNEE: ICD-10-CM

## 2022-04-23 DIAGNOSIS — J45.20 MILD INTERMITTENT ASTHMA WITHOUT COMPLICATION: Primary | ICD-10-CM

## 2022-04-23 LAB
ALBUMIN SERPL-MCNC: 3.6 G/DL (ref 3.4–5)
ALBUMIN/GLOB SERPL: 0.8 {RATIO} (ref 0.8–1.7)
ALP SERPL-CCNC: 105 U/L (ref 45–117)
ALT SERPL-CCNC: 93 U/L (ref 13–56)
ANION GAP SERPL CALC-SCNC: 6 MMOL/L (ref 3–18)
APTT PPP: 27.8 SEC (ref 23–36.4)
AST SERPL-CCNC: 57 U/L (ref 10–38)
BASOPHILS # BLD: 0.1 K/UL (ref 0–0.1)
BASOPHILS NFR BLD: 1 % (ref 0–2)
BILIRUB SERPL-MCNC: 0.2 MG/DL (ref 0.2–1)
BUN SERPL-MCNC: 14 MG/DL (ref 7–18)
BUN/CREAT SERPL: 16 (ref 12–20)
CALCIUM SERPL-MCNC: 9.5 MG/DL (ref 8.5–10.1)
CHLORIDE SERPL-SCNC: 106 MMOL/L (ref 100–111)
CO2 SERPL-SCNC: 26 MMOL/L (ref 21–32)
CREAT SERPL-MCNC: 0.89 MG/DL (ref 0.6–1.3)
DIFFERENTIAL METHOD BLD: ABNORMAL
EOSINOPHIL # BLD: 0.3 K/UL (ref 0–0.4)
EOSINOPHIL NFR BLD: 3 % (ref 0–5)
ERYTHROCYTE [DISTWIDTH] IN BLOOD BY AUTOMATED COUNT: 14.4 % (ref 11.6–14.5)
GLOBULIN SER CALC-MCNC: 4.7 G/DL (ref 2–4)
GLUCOSE SERPL-MCNC: 105 MG/DL (ref 74–99)
HCT VFR BLD AUTO: 45 % (ref 35–45)
HGB BLD-MCNC: 14.5 G/DL (ref 12–16)
IMM GRANULOCYTES # BLD AUTO: 0 K/UL (ref 0–0.04)
IMM GRANULOCYTES NFR BLD AUTO: 0 % (ref 0–0.5)
INR PPP: 0.9 (ref 0.8–1.2)
LYMPHOCYTES # BLD: 3 K/UL (ref 0.9–3.6)
LYMPHOCYTES NFR BLD: 29 % (ref 21–52)
MCH RBC QN AUTO: 26.2 PG (ref 24–34)
MCHC RBC AUTO-ENTMCNC: 32.2 G/DL (ref 31–37)
MCV RBC AUTO: 81.2 FL (ref 78–100)
MONOCYTES # BLD: 0.7 K/UL (ref 0.05–1.2)
MONOCYTES NFR BLD: 7 % (ref 3–10)
NEUTS SEG # BLD: 6.2 K/UL (ref 1.8–8)
NEUTS SEG NFR BLD: 61 % (ref 40–73)
NRBC # BLD: 0 K/UL (ref 0–0.01)
NRBC BLD-RTO: 0 PER 100 WBC
PLATELET # BLD AUTO: 307 K/UL (ref 135–420)
PMV BLD AUTO: 9.6 FL (ref 9.2–11.8)
POTASSIUM SERPL-SCNC: 3.8 MMOL/L (ref 3.5–5.5)
PROT SERPL-MCNC: 8.3 G/DL (ref 6.4–8.2)
PROTHROMBIN TIME: 11.9 SEC (ref 11.5–15.2)
RBC # BLD AUTO: 5.54 M/UL (ref 4.2–5.3)
SODIUM SERPL-SCNC: 138 MMOL/L (ref 136–145)
WBC # BLD AUTO: 10.3 K/UL (ref 4.6–13.2)

## 2022-04-23 PROCEDURE — 85610 PROTHROMBIN TIME: CPT

## 2022-04-23 PROCEDURE — 85025 COMPLETE CBC W/AUTO DIFF WBC: CPT

## 2022-04-23 PROCEDURE — 74011000250 HC RX REV CODE- 250

## 2022-04-23 PROCEDURE — 94640 AIRWAY INHALATION TREATMENT: CPT

## 2022-04-23 PROCEDURE — 93005 ELECTROCARDIOGRAM TRACING: CPT

## 2022-04-23 PROCEDURE — 85730 THROMBOPLASTIN TIME PARTIAL: CPT

## 2022-04-23 PROCEDURE — 80053 COMPREHEN METABOLIC PANEL: CPT

## 2022-04-23 PROCEDURE — 99285 EMERGENCY DEPT VISIT HI MDM: CPT

## 2022-04-23 PROCEDURE — 93971 EXTREMITY STUDY: CPT

## 2022-04-23 PROCEDURE — 71045 X-RAY EXAM CHEST 1 VIEW: CPT

## 2022-04-23 PROCEDURE — 74011250637 HC RX REV CODE- 250/637: Performed by: EMERGENCY MEDICINE

## 2022-04-23 RX ORDER — DEXAMETHASONE 6 MG/1
TABLET ORAL EVERY 12 HOURS
COMMUNITY
End: 2022-07-26

## 2022-04-23 RX ORDER — ALBUTEROL SULFATE 0.83 MG/ML
SOLUTION RESPIRATORY (INHALATION)
Status: COMPLETED
Start: 2022-04-23 | End: 2022-04-23

## 2022-04-23 RX ORDER — HYDROCODONE BITARTRATE AND ACETAMINOPHEN 5; 325 MG/1; MG/1
1 TABLET ORAL
Status: COMPLETED | OUTPATIENT
Start: 2022-04-23 | End: 2022-04-23

## 2022-04-23 RX ORDER — ALBUTEROL SULFATE 90 UG/1
2 AEROSOL, METERED RESPIRATORY (INHALATION)
Qty: 1 EACH | Refills: 1 | Status: SHIPPED | OUTPATIENT
Start: 2022-04-23

## 2022-04-23 RX ORDER — ALBUTEROL SULFATE 0.83 MG/ML
2.5 SOLUTION RESPIRATORY (INHALATION)
Status: COMPLETED | OUTPATIENT
Start: 2022-04-23 | End: 2022-04-23

## 2022-04-23 RX ADMIN — ALBUTEROL SULFATE 2.5 MG: 2.5 SOLUTION RESPIRATORY (INHALATION) at 19:23

## 2022-04-23 RX ADMIN — ALBUTEROL SULFATE 2.5 MG: 0.83 SOLUTION RESPIRATORY (INHALATION) at 19:23

## 2022-04-23 RX ADMIN — HYDROCODONE BITARTRATE AND ACETAMINOPHEN 1 TABLET: 5; 325 TABLET ORAL at 21:42

## 2022-04-23 NOTE — ED PROVIDER NOTES
HPI patient 51-year-old female who presents to ER with 2 complaints. Pain 1: Patient complained of being short of breath secondary to her asthma flaring up. No fever chills chest pains. Complaint #2: Patient complained having pain in her right calf and behind her right knee x3 to 4 days. Patient is status post right wrist surgery 2 weeks. No other complaints.         Past Medical History:   Diagnosis Date    Arthritis     knees hurt    Asthma     Chest pain     Chronic pain 6 months    painful menses    Fibroids     GERD (gastroesophageal reflux disease)     Headache disorder     Heart palpitations     Migraine     Neurological disorder     bleeding in head diagnosed        Past Surgical History:   Procedure Laterality Date    HX  SECTION  3094,5559    with BTL    HX GYN      endometrial ablation    HX HYSTEROSCOPY WITH ENDOMETRIAL ABLATION      No period since         Family History:   Problem Relation Age of Onset    Hypertension Mother     Hypertension Father     Diabetes Father     Parkinson's Disease Father     Cancer Paternal Grandmother     Mult Sclerosis Sister        Social History     Socioeconomic History    Marital status:      Spouse name: Not on file    Number of children: Not on file    Years of education: Not on file    Highest education level: Not on file   Occupational History     Employer: [employer]   Tobacco Use    Smoking status: Former Smoker     Types: Cigarettes     Quit date: 3/23/1998     Years since quittin.1    Smokeless tobacco: Former User    Tobacco comment: smoked a few as a teen, Vaped for a year   Vaping Use    Vaping Use: Never used   Substance and Sexual Activity    Alcohol use: Yes     Comment: socially, 2 beer/weekend    Drug use: No    Sexual activity: Yes     Partners: Male     Birth control/protection: Surgical   Other Topics Concern     Service Yes     Comment:  [employer]    Blood Transfusions No  Caffeine Concern No    Occupational Exposure No    Hobby Hazards No    Sleep Concern No    Stress Concern No    Weight Concern Yes     Comment: PT IS OVER WEIGHT    Special Diet No    Back Care No    Exercise Yes     Comment: 4 DAYS A WEEK    Bike Helmet No    Seat Belt Yes    Self-Exams Yes   Social History Narrative    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 Social Determinants of Health     Financial Resource Strain:     Difficulty of Paying Living Expenses: Not on file   Food Insecurity:     Worried About Running Out of Food in the Last Year: Not on file    Angel of Food in the Last Year: Not on file   Transportation Needs:     Lack of Transportation (Medical): Not on file    Lack of Transportation (Non-Medical): Not on file   Physical Activity:     Days of Exercise per Week: Not on file    Minutes of Exercise per Session: Not on file   Stress:     Feeling of Stress : Not on file   Social Connections:     Frequency of Communication with Friends and Family: Not on file    Frequency of Social Gatherings with Friends and Family: Not on file    Attends Jewish Services: Not on file    Active Member of [address] [place] [address] or Organizations: Not on file    Attends Club or Organization Meetings: Not on file    Marital Status: Not on file   Intimate Partner Violence:     Fear of Current or Ex-Partner: Not on file    Emotionally Abused: Not on file    Physically Abused: Not on file    Sexually Abused: Not on file   Housing Stability:     Unable to Pay for Housing in the Last Year: Not on file    Number of [place] in the Last Year: Not on file    Unstable Housing in the Last Year: Not on file         ALLERGIES: Azo [phenazopyridine], Iodinated contrast media, and Tramadol    Review of Systems   Constitutional: Negative. HENT: Negative. Facial swelling: faint. Eyes: Negative. Respiratory: Positive for wheezing. Cardiovascular: Negative.     Gastrointestinal: Negative. Endocrine: Negative. Genitourinary: Negative. Musculoskeletal: Positive for arthralgias (right: right posterior and posterior knee pain). Skin: Negative. Allergic/Immunologic: Negative. Neurological: Negative. Hematological: Negative. Psychiatric/Behavioral: Negative. All other systems reviewed and are negative. Vitals:    04/23/22 1645   BP: (!) 154/98   Pulse: 85   Resp: 22   Temp: 98.5 °F (36.9 °C)   SpO2: 99%   Weight: 97.5 kg (215 lb)   Height: 5' 4\" (1.626 m)            Physical Exam  Vitals and nursing note reviewed. Constitutional:       General: She is not in acute distress. Appearance: She is well-developed. She is not diaphoretic. HENT:      Head: Normocephalic. Right Ear: External ear normal.      Left Ear: External ear normal.      Mouth/Throat:      Pharynx: No oropharyngeal exudate. Eyes:      General: No scleral icterus. Right eye: No discharge. Left eye: No discharge. Conjunctiva/sclera: Conjunctivae normal.      Pupils: Pupils are equal, round, and reactive to light. Neck:      Thyroid: No thyromegaly. Vascular: No JVD. Trachea: No tracheal deviation. Cardiovascular:      Rate and Rhythm: Normal rate and regular rhythm. Heart sounds: Normal heart sounds. No murmur heard. No friction rub. No gallop. Pulmonary:      Effort: Pulmonary effort is normal. No respiratory distress. Breath sounds: Normal breath sounds. No stridor. No wheezing or rales. Chest:      Chest wall: No tenderness. Abdominal:      General: Bowel sounds are normal. There is no distension. Palpations: Abdomen is soft. There is no mass. Tenderness: There is no abdominal tenderness. There is no guarding or rebound. Musculoskeletal:         General: No tenderness. Normal range of motion. Cervical back: Normal range of motion and neck supple.       Comments: RIGHT KNEE: no edema, no palpable cord, normal pulses and sensory. Lymphadenopathy:      Cervical: No cervical adenopathy. Skin:     General: Skin is warm and dry. Coloration: Skin is not pale. Findings: No erythema or rash. Neurological:      Mental Status: She is alert and oriented to person, place, and time. Cranial Nerves: No cranial nerve deficit. Motor: No abnormal muscle tone. Coordination: Coordination normal.      Deep Tendon Reflexes: Reflexes normal.          MDM  Number of Diagnoses or Management Options     Amount and/or Complexity of Data Reviewed  Clinical lab tests: ordered and reviewed  Tests in the radiology section of CPT®: ordered and reviewed      ED Course as of 04/23/22 2216   Sat Apr 23, 2022   [address] [facility] [place]T VENOUS RIGHT [KB]      ED Course User Index  [KB] Racquel Feng MD       Procedures    Dif Dx: DVT, muscular skeletal pain, arthritis, asthma, pneumonia, viral syndrome, allergies    Hospital course: Continue albuterol treatment wheezing shortness of breath resolved. Patient Doppler study of the right leg was negative for DVT. -Reviewed by me    Dx: Asthma, knee pain    Disp: Orthopedic surgeon in 2 to 3 days. Return ER as needed. Follow-up PCP today for her as a follow-up. ROS: Albuterol inhaler. Dictation disclaimer:  Please note that this dictation was completed with [employer], the computer voice recognition software. Quite often unanticipated grammatical, syntax, homophones, and other interpretive errors are inadvertently transcribed by the computer software. Please disregard these errors. Please excuse any errors that have escaped final proofreading.

## 2022-04-23 NOTE — ED NOTES
Patient remains A&Ox4 resp even and unlabored, NAD noted or indicated. VSS Rn to continue to monitor and maintain safety.

## 2022-04-23 NOTE — ED TRIAGE NOTES
Has been sob for a couple of days now however she rant out of her me yesterday thus sob is getting worse. She had surgery on her right wrist on April 11, she feels as though SOB started after that. Vaccinated for covid    Pain in right calf muscle. Dr. Lambert Huang was her surgeon. She has not been back for her follow up because it is next week.

## 2022-04-23 NOTE — ED NOTES
Assumed care of pt , Neb tx given, pt stated she ran out of her MDI yesterday, usual dose is BID     Visit Vitals  /73   Pulse 75   Temp 98.5 °F (36.9 °C)   Resp 22   Ht 5' 4\" (1.626 m)   Wt 97.5 kg (215 lb)   SpO2 99%   BMI 36.90 kg/m²     Active Ambulatory Problems     Diagnosis Date Noted    Mild intermittent asthma 07/07/2014    Obesity 07/07/2014    Palpitations 07/26/2016    Precordial pain 07/26/2016    Severe obesity ([address] 10/10/2018    Asthma 06/13/2019     Resolved Ambulatory Problems     Diagnosis Date Noted    No Resolved Ambulatory Problems     Past Medical History:   Diagnosis Date    Arthritis     Chest pain     Chronic pain 6 months    Fibroids     GERD (gastroesophageal reflux disease)     Headache disorder     Heart palpitations     Migraine     Neurological disorder      No current facility-administered medications on file prior to encounter. Current Outpatient Medications on File Prior to Encounter   Medication Sig Dispense Refill    dexAMETHasone (DECADRON) 6 mg tablet Take  by mouth every twelve (12) hours. Unsure of dosage, took the last one this am.  This was a prescription for post surgery.  gabapentin (Neurontin) 300 mg capsule Take 1 Capsule by mouth two (2) times a day. Max Daily Amount: 600 mg. 180 Capsule 1    budesonide-formoteroL (SYMBICORT) 160-4.5 mcg/actuation HFAA Take 2 Puffs by inhalation every twelve (12) hours. Indications: controller medication for asthma 10.2 g 2    albuterol (PROVENTIL HFA, VENTOLIN HFA, PROAIR HFA) 90 mcg/actuation inhaler Take 1-2 Puffs by inhalation every four (4) hours as needed for Wheezing. 6.7 Each 2    furosemide (LASIX) 20 mg tablet Take 1 tablet by mouth every other day 30 Tablet 0    cyclobenzaprine (FLEXERIL) 5 mg tablet Take 1 Tablet by mouth three (3) times daily as needed for Muscle Spasm(s). 30 Tablet 1    Comp. Stocking,Knee,Regular,Lrg misc 20-30 mm hg 1 Each 1    Nebulizer & Compressor machine Use as directed (Patient not taking: Reported on 3/16/2022) 1 Each 0    Nebulizer Accessories kit Use as directed (Patient not taking: Reported on 3/16/2022) 1 Kit 1

## 2022-04-24 LAB
ATRIAL RATE: 78 BPM
CALCULATED P AXIS, ECG09: 74 DEGREES
CALCULATED R AXIS, ECG10: 86 DEGREES
CALCULATED T AXIS, ECG11: 71 DEGREES
DIAGNOSIS, 93000: NORMAL
P-R INTERVAL, ECG05: 128 MS
Q-T INTERVAL, ECG07: 346 MS
QRS DURATION, ECG06: 66 MS
QTC CALCULATION (BEZET), ECG08: 394 MS
VENTRICULAR RATE, ECG03: 78 BPM

## 2022-04-24 NOTE — PROGRESS NOTES
Discharge instructions given to patient. Follow up information provided. Prescriptions given to patient.

## 2022-04-24 NOTE — ED NOTES
Pt expressed concerns about wanting test results or she is ready to leave, Dr. Gene Garcia made aware

## 2022-04-25 ENCOUNTER — PATIENT OUTREACH (OUTPATIENT)
Dept: OTHER | Age: 42
End: 2022-04-25

## 2022-04-25 ENCOUNTER — OFFICE VISIT (OUTPATIENT)
Dept: ORTHOPEDIC SURGERY | Age: 42
End: 2022-04-25
Payer: COMMERCIAL

## 2022-04-25 VITALS
RESPIRATION RATE: 16 BRPM | WEIGHT: 228 LBS | TEMPERATURE: 98 F | HEIGHT: 64 IN | BODY MASS INDEX: 38.93 KG/M2 | HEART RATE: 74 BPM | OXYGEN SATURATION: 97 %

## 2022-04-25 DIAGNOSIS — Z98.890 S/P CARPAL TUNNEL RELEASE: ICD-10-CM

## 2022-04-25 DIAGNOSIS — M54.16 LUMBAR RADICULOPATHY: ICD-10-CM

## 2022-04-25 DIAGNOSIS — G56.01 RIGHT CARPAL TUNNEL SYNDROME: Primary | ICD-10-CM

## 2022-04-25 PROCEDURE — 99024 POSTOP FOLLOW-UP VISIT: CPT | Performed by: ORTHOPAEDIC SURGERY

## 2022-04-25 NOTE — PROGRESS NOTES
Cynthia Stuart is a 39 y.o. female right handed unspecified employment. Worker's Compensation and legal considerations: none filed. Vitals:    04/25/22 0847   Pulse: 74   Resp: 16   Temp: 98 °F (36.7 °C)   TempSrc: Temporal   SpO2: 97%   Weight: 228 lb (103.4 kg)   Height: 5' 4\" (1.626 m)   PainSc:   0 - No pain   PainLoc: Wrist           Chief Complaint   Patient presents with    Wrist Pain     right wrist pain       HPI: Patient presents today 2 weeks status post right carpal tunnel release. She reports no numbness. She reports some tenderness around the hand though improving. Preop HPI: Patient presents today for follow-up of right much worse than left carpal tunnel syndrome. She previously had injections that only lasted a few weeks. She would like to set up surgery for the right side. Initial HPI: Patient presents today with complaints of bilateral hand numbness and tingling. She has recently had an EMG done which was positive for carpal tunnel.     Date of onset: Indeterminate    Injury: No    Prior Treatment:  Yes: Comment: right carpal tunnel release    Numbness/ Tingling: Yes: Comment: Bilateral hands      ROS: Review of Systems - General ROS: negative  Psychological ROS: negative  ENT ROS: negative  Allergy and Immunology ROS: negative  Hematological and Lymphatic ROS: negative  Respiratory ROS: no cough, shortness of breath, or wheezing  Cardiovascular ROS: no chest pain or dyspnea on exertion  Gastrointestinal ROS: no abdominal pain, change in bowel habits, or black or bloody stools  Musculoskeletal ROS: negative  Neurological ROS: positive for - numbness/tingling  Dermatological ROS: negative    Past Medical History:   Diagnosis Date    Arthritis     knees hurt    Asthma     Chest pain     Chronic pain 6 months    painful menses    Fibroids     GERD (gastroesophageal reflux disease)     Headache disorder     Heart palpitations     Migraine     Neurological disorder     bleeding in head diagnosed        Past Surgical History:   Procedure Laterality Date    HX  SECTION  7601,9520    with BTL    HX GYN      endometrial ablation    HX HYSTEROSCOPY WITH ENDOMETRIAL ABLATION      No period since    HX WRIST FRACTURE TX      Right Carpal Tunnel Release       Current Outpatient Medications   Medication Sig Dispense Refill    albuterol (Proventil HFA) 90 mcg/actuation inhaler Take 2 Puffs by inhalation every four (4) hours as needed for Wheezing. 1 Each 1    gabapentin (Neurontin) 300 mg capsule Take 1 Capsule by mouth two (2) times a day. Max Daily Amount: 600 mg. 180 Capsule 1    budesonide-formoteroL (SYMBICORT) 160-4.5 mcg/actuation HFAA Take 2 Puffs by inhalation every twelve (12) hours. Indications: controller medication for asthma 10.2 g 2    albuterol (PROVENTIL HFA, VENTOLIN HFA, PROAIR HFA) 90 mcg/actuation inhaler Take 1-2 Puffs by inhalation every four (4) hours as needed for Wheezing. 6.7 Each 2    Comp. Stocking,Knee,Regular,Lrg misc 20-30 mm hg 1 Each 1    furosemide (LASIX) 20 mg tablet Take 1 tablet by mouth every other day 30 Tablet 0    cyclobenzaprine (FLEXERIL) 5 mg tablet Take 1 Tablet by mouth three (3) times daily as needed for Muscle Spasm(s). 30 Tablet 1    Nebulizer & Compressor machine Use as directed 1 Each 0    Nebulizer Accessories kit Use as directed 1 Kit 1    dexAMETHasone (DECADRON) 6 mg tablet Take  by mouth every twelve (12) hours. Unsure of dosage, took the last one this am.  This was a prescription for post surgery. (Patient not taking: Reported on 2022)         Allergies   Allergen Reactions    Azo [Phenazopyridine] Hives    Iodinated Contrast Media Shortness of Breath     Other reaction(s): wheezing/sob    Tramadol Hives           PE:     Physical Exam  Vitals and nursing note reviewed. Constitutional:       General: She is not in acute distress. Appearance: Normal appearance.  She is not ill-appearing, toxic-appearing or diaphoretic. Cardiovascular:      Pulses: Normal pulses. Pulmonary:      Effort: Pulmonary effort is normal. No respiratory distress. Abdominal:      General: Abdomen is flat. There is no distension. Musculoskeletal:         General: No swelling, tenderness, deformity or signs of injury. Normal range of motion. Cervical back: Normal range of motion. Right lower leg: No edema. Left lower leg: No edema. Skin:     General: Skin is warm and dry. Capillary Refill: Capillary refill takes less than 2 seconds. Findings: No bruising or erythema. Neurological:      General: No focal deficit present. Mental Status: She is alert and oriented to person, place, and time. Psychiatric:         Mood and Affect: Mood normal.         Behavior: Behavior normal.            Right hand: Incision clean dry and intact with no drainage breakdown erythema or any signs of infection. Neurovascularly intact distally. Range of motion full. NCV & EMG Findings:  Evaluation of the left median sensory and the right median sensory nerves showed prolonged distal peak latency (L3.8, R4.3 ms) and decreased conduction velocity (Wrist-2nd Digit, L37, R33 m/s). All remaining nerves (as indicated in the following tables) were within normal limits. Left vs. Right side comparison data for the median motor nerve indicates abnormal L-R latency difference (0.9 ms). The ulnar motor nerve indicates abnormal L-R amplitude difference (27.4 %). The median sensory nerve indicates abnormal L-R latency difference (0.5 ms). All remaining left vs. right side differences were within normal limits.       All examined muscles (as indicated in the following table) showed no evidence of electrical instability.       Impression:  Bilaterally decreased median sensory latencies left greater than right, consistent with a Grade l carpel tunnel syndrome. Imaging:     None indicated        ICD-10-CM ICD-9-CM    1.  Right carpal tunnel syndrome  G56.01 354.0    2. S/P carpal tunnel release  Z98.890 V45.89    3. Lumbar radiculopathy  M54.16 724.4 REFERRAL TO PHYSICIAL MEDICINE REHAB         Plan:     Discussed range of motion exercises, scar care, and wound care. Referral to spine center for possible lumbar radiculopathy. Follow-up and Dispositions    · Return if symptoms worsen or fail to improve.           Plan was reviewed with patient, who verbalized agreement and understanding of the plan

## 2022-04-25 NOTE — PROGRESS NOTES
Verified  and address for HIPAA security. Introduced [employer] for patient. Patient does not identify any Care Management needs at this time and declines services. *Spoke with patient who reports that she is doing better. Patient states Asthma symptoms have resolved. Was given a prescription for an Albuterol Inhaler which she filled. Was referred by her Hand Surgeon to a Spine Specialist for possible Sciatica - Lumbar Radiculopathy. Has appointment at the end of May. Patient states that she is doing well enough that additional support is not needed. Patient had no other questions or concerns. Contact information provided and reminded her that I can be reached if any future needs arise.

## 2022-04-25 NOTE — LETTER
NOTIFICATION RETURN TO WORK / SCHOOL    4/25/2022 9:24 AM    Ms. Lora Lewis  [address] [address]      To Whom It May Concern:    Lora Lewis is currently under the care of [address] [place] [address] Paul Samuels. Patient was treated and evaluated today in our office. Patient can return to work May 9th, 2022 on a Full Duty work status without restrictions. If there are questions or concerns please have the patient contact our office.         Sincerely,      Mandi Leos, DO

## 2022-04-28 ENCOUNTER — PATIENT MESSAGE (OUTPATIENT)
Dept: NEUROLOGY | Age: 42
End: 2022-04-28

## 2022-04-29 RX ORDER — CYCLOBENZAPRINE HCL 5 MG
5 TABLET ORAL
Qty: 30 TABLET | Refills: 1 | Status: SHIPPED | OUTPATIENT
Start: 2022-04-29

## 2022-05-09 ENCOUNTER — TELEPHONE (OUTPATIENT)
Dept: FAMILY MEDICINE CLINIC | Age: 42
End: 2022-05-09

## 2022-05-09 DIAGNOSIS — R92.8 ABNORMAL MAMMOGRAM: Primary | ICD-10-CM

## 2022-06-08 ENCOUNTER — HOSPITAL ENCOUNTER (OUTPATIENT)
Dept: ULTRASOUND IMAGING | Age: 42
Discharge: HOME | End: 2022-06-08
Attending: NURSE PRACTITIONER
Payer: COMMERCIAL

## 2022-06-08 ENCOUNTER — HOSPITAL ENCOUNTER (OUTPATIENT)
Dept: MAMMOGRAPHY | Age: 42
Discharge: HOME | End: 2022-06-08
Attending: NURSE PRACTITIONER
Payer: COMMERCIAL

## 2022-06-08 DIAGNOSIS — R92.8 ABNORMAL MAMMOGRAM: ICD-10-CM

## 2022-06-08 PROCEDURE — 76642 ULTRASOUND BREAST LIMITED: CPT

## 2022-06-08 PROCEDURE — 77062 BREAST TOMOSYNTHESIS BI: CPT

## 2022-07-14 ENCOUNTER — APPOINTMENT (OUTPATIENT)
Dept: CT IMAGING | Age: 42
End: 2022-07-14
Attending: PHYSICIAN ASSISTANT
Payer: COMMERCIAL

## 2022-07-14 ENCOUNTER — APPOINTMENT (OUTPATIENT)
Dept: GENERAL RADIOLOGY | Age: 42
End: 2022-07-14
Attending: PHYSICIAN ASSISTANT
Payer: COMMERCIAL

## 2022-07-14 ENCOUNTER — HOSPITAL ENCOUNTER (EMERGENCY)
Age: 42
Discharge: HOME | End: 2022-07-14
Attending: STUDENT IN AN ORGANIZED HEALTH CARE EDUCATION/TRAINING PROGRAM
Payer: COMMERCIAL

## 2022-07-14 VITALS
RESPIRATION RATE: 18 BRPM | WEIGHT: 230 LBS | OXYGEN SATURATION: 100 % | HEART RATE: 72 BPM | BODY MASS INDEX: 39.27 KG/M2 | DIASTOLIC BLOOD PRESSURE: 83 MMHG | SYSTOLIC BLOOD PRESSURE: 124 MMHG | TEMPERATURE: 97.8 F | HEIGHT: 64 IN

## 2022-07-14 DIAGNOSIS — R51.9 NONINTRACTABLE HEADACHE, UNSPECIFIED CHRONICITY PATTERN, UNSPECIFIED HEADACHE TYPE: Primary | ICD-10-CM

## 2022-07-14 DIAGNOSIS — R42 DIZZY: ICD-10-CM

## 2022-07-14 LAB
ALBUMIN SERPL-MCNC: 4 G/DL (ref 3.4–5)
ALBUMIN/GLOB SERPL: 1.1 {RATIO} (ref 0.8–1.7)
ALP SERPL-CCNC: 99 U/L (ref 45–117)
ALT SERPL-CCNC: 48 U/L (ref 13–56)
ANION GAP SERPL CALC-SCNC: 8 MMOL/L (ref 3–18)
AST SERPL-CCNC: 52 U/L (ref 10–38)
ATRIAL RATE: 77 BPM
BASOPHILS # BLD: 0.1 K/UL (ref 0–0.1)
BASOPHILS NFR BLD: 1 % (ref 0–2)
BILIRUB SERPL-MCNC: 0.2 MG/DL (ref 0.2–1)
BNP SERPL-MCNC: 10 PG/ML (ref 0–450)
BUN SERPL-MCNC: 15 MG/DL (ref 7–18)
BUN/CREAT SERPL: 18 (ref 12–20)
CALCIUM SERPL-MCNC: 8.9 MG/DL (ref 8.5–10.1)
CALCULATED P AXIS, ECG09: 63 DEGREES
CALCULATED R AXIS, ECG10: 38 DEGREES
CALCULATED T AXIS, ECG11: 40 DEGREES
CHLORIDE SERPL-SCNC: 105 MMOL/L (ref 100–111)
CO2 SERPL-SCNC: 27 MMOL/L (ref 21–32)
CREAT SERPL-MCNC: 0.85 MG/DL (ref 0.6–1.3)
DIAGNOSIS, 93000: NORMAL
DIFFERENTIAL METHOD BLD: ABNORMAL
EOSINOPHIL # BLD: 0.2 K/UL (ref 0–0.4)
EOSINOPHIL NFR BLD: 3 % (ref 0–5)
ERYTHROCYTE [DISTWIDTH] IN BLOOD BY AUTOMATED COUNT: 15 % (ref 11.6–14.5)
FLUAV RNA SPEC QL NAA+PROBE: NOT DETECTED
FLUBV RNA SPEC QL NAA+PROBE: NOT DETECTED
GLOBULIN SER CALC-MCNC: 3.6 G/DL (ref 2–4)
GLUCOSE SERPL-MCNC: 86 MG/DL (ref 74–99)
HCG SERPL QL: NEGATIVE
HCT VFR BLD AUTO: 41.3 % (ref 35–45)
HGB BLD-MCNC: 13.3 G/DL (ref 12–16)
IMM GRANULOCYTES # BLD AUTO: 0 K/UL (ref 0–0.04)
IMM GRANULOCYTES NFR BLD AUTO: 0 % (ref 0–0.5)
LYMPHOCYTES # BLD: 2.1 K/UL (ref 0.9–3.6)
LYMPHOCYTES NFR BLD: 25 % (ref 21–52)
MAGNESIUM SERPL-MCNC: 2.1 MG/DL (ref 1.6–2.6)
MCH RBC QN AUTO: 26.2 PG (ref 24–34)
MCHC RBC AUTO-ENTMCNC: 32.2 G/DL (ref 31–37)
MCV RBC AUTO: 81.3 FL (ref 78–100)
MONOCYTES # BLD: 0.6 K/UL (ref 0.05–1.2)
MONOCYTES NFR BLD: 7 % (ref 3–10)
NEUTS SEG # BLD: 5.6 K/UL (ref 1.8–8)
NEUTS SEG NFR BLD: 64 % (ref 40–73)
NRBC # BLD: 0 K/UL (ref 0–0.01)
NRBC BLD-RTO: 0 PER 100 WBC
P-R INTERVAL, ECG05: 126 MS
PLATELET # BLD AUTO: 284 K/UL (ref 135–420)
PMV BLD AUTO: 9.4 FL (ref 9.2–11.8)
POTASSIUM SERPL-SCNC: 4.3 MMOL/L (ref 3.5–5.5)
PROT SERPL-MCNC: 7.6 G/DL (ref 6.4–8.2)
Q-T INTERVAL, ECG07: 358 MS
QRS DURATION, ECG06: 72 MS
QTC CALCULATION (BEZET), ECG08: 405 MS
RBC # BLD AUTO: 5.08 M/UL (ref 4.2–5.3)
SARS-COV-2, COV2: NOT DETECTED
SODIUM SERPL-SCNC: 140 MMOL/L (ref 136–145)
TROPONIN-HIGH SENSITIVITY: 4 NG/L (ref 0–54)
VENTRICULAR RATE, ECG03: 77 BPM
WBC # BLD AUTO: 8.6 K/UL (ref 4.6–13.2)

## 2022-07-14 PROCEDURE — 96361 HYDRATE IV INFUSION ADD-ON: CPT

## 2022-07-14 PROCEDURE — 84703 CHORIONIC GONADOTROPIN ASSAY: CPT

## 2022-07-14 PROCEDURE — 96374 THER/PROPH/DIAG INJ IV PUSH: CPT

## 2022-07-14 PROCEDURE — 80053 COMPREHEN METABOLIC PANEL: CPT

## 2022-07-14 PROCEDURE — 93005 ELECTROCARDIOGRAM TRACING: CPT

## 2022-07-14 PROCEDURE — 71046 X-RAY EXAM CHEST 2 VIEWS: CPT

## 2022-07-14 PROCEDURE — 99285 EMERGENCY DEPT VISIT HI MDM: CPT

## 2022-07-14 PROCEDURE — 83735 ASSAY OF MAGNESIUM: CPT

## 2022-07-14 PROCEDURE — 87636 SARSCOV2 & INF A&B AMP PRB: CPT

## 2022-07-14 PROCEDURE — 96375 TX/PRO/DX INJ NEW DRUG ADDON: CPT

## 2022-07-14 PROCEDURE — 74011250636 HC RX REV CODE- 250/636: Performed by: PHYSICIAN ASSISTANT

## 2022-07-14 PROCEDURE — 85025 COMPLETE CBC W/AUTO DIFF WBC: CPT

## 2022-07-14 PROCEDURE — 84484 ASSAY OF TROPONIN QUANT: CPT

## 2022-07-14 PROCEDURE — 70450 CT HEAD/BRAIN W/O DYE: CPT

## 2022-07-14 PROCEDURE — 83880 ASSAY OF NATRIURETIC PEPTIDE: CPT

## 2022-07-14 RX ORDER — DIPHENHYDRAMINE HYDROCHLORIDE 50 MG/ML
12.5 INJECTION, SOLUTION INTRAMUSCULAR; INTRAVENOUS
Status: DISCONTINUED | OUTPATIENT
Start: 2022-07-14 | End: 2022-07-14 | Stop reason: HOSPADM

## 2022-07-14 RX ORDER — KETOROLAC TROMETHAMINE 10 MG/1
10 TABLET, FILM COATED ORAL
Qty: 20 TABLET | Refills: 0 | Status: SHIPPED | OUTPATIENT
Start: 2022-07-14 | End: 2022-07-26

## 2022-07-14 RX ORDER — MECLIZINE HYDROCHLORIDE 25 MG/1
25 TABLET ORAL
Qty: 20 TABLET | Refills: 0 | Status: SHIPPED | OUTPATIENT
Start: 2022-07-14 | End: 2022-07-24

## 2022-07-14 RX ORDER — KETOROLAC TROMETHAMINE 15 MG/ML
15 INJECTION, SOLUTION INTRAMUSCULAR; INTRAVENOUS
Status: COMPLETED | OUTPATIENT
Start: 2022-07-14 | End: 2022-07-14

## 2022-07-14 RX ORDER — PROCHLORPERAZINE EDISYLATE 5 MG/ML
10 INJECTION INTRAMUSCULAR; INTRAVENOUS
Status: COMPLETED | OUTPATIENT
Start: 2022-07-14 | End: 2022-07-14

## 2022-07-14 RX ADMIN — KETOROLAC TROMETHAMINE 15 MG: 15 INJECTION, SOLUTION INTRAMUSCULAR; INTRAVENOUS at 13:43

## 2022-07-14 RX ADMIN — PROCHLORPERAZINE EDISYLATE 10 MG: 5 INJECTION INTRAMUSCULAR; INTRAVENOUS at 13:43

## 2022-07-14 RX ADMIN — SODIUM CHLORIDE 1000 ML: 9 INJECTION, SOLUTION INTRAVENOUS at 12:45

## 2022-07-14 NOTE — ED NOTES
I performed a brief evaluation, including history and physical, of the patient here in triage and I have determined that pt will need further treatment and evaluation from the main side ER physician. I have placed initial orders to help in expediting patients care.      July 14, 2022 at 11:56 AM - MISSY Pena        Visit Vitals  /83 (BP 1 Location: Right lower arm, BP Patient Position: At rest)   Pulse 72   Temp 97.8 °F (36.6 °C)   Resp 18   Ht 5' 4\" (1.626 m)   Wt 104.3 kg (230 lb)   SpO2 100%   BMI 39.48 kg/m²

## 2022-07-14 NOTE — Clinical Note
[address] [place] [address] [address] [facility] EMERGENCY DEPT  [id number] [address] [phone]    Work/School Note    Date: 7/14/2022    To Whom It May concern:    Daxa Herman was seen and treated today in the emergency room by the following provider(s):  Attending Provider: Roberto Fuentes MD  Physician Assistant: Breck Hodgkin is excused from work/school on 7/14/2022 through 7/16/2022. She is medically clear to return to work/school on 7/17/2022.          Sincerely,          MISSY Llamas

## 2022-07-14 NOTE — ED TRIAGE NOTES
PT IRP Initial Evaluation    SUBJECTIVE: Subjective: Pt agreeable to treatment session, \"Maybe tomorrow will be better.\" (08/08/18 1330)  Subjective/Objective Comments: Pt ends session supine in bed with all needs met, call light near, alarm intact.  (08/08/18 1330)    OBJECTIVE:  Precautions  Other Precautions: fall precautions, s/p left frontoparietal crani and tumor resection  (08/08/18 1100)  See below for current functional status overview.  See PT flowsheet for full details regarding the PT therapy provided.    ASSESSMENT:   The patient presents to the Inpatient Rehabilitation program for PT s/p left frontoparietal craniotomy and tumor resection. Pathology indicative of grade 1 meningioma.  The patient presents with impairments in cognition/memory, attention, coordination, activity tolerance, motor planning, and insight into deficits, and the patient is currently functioning at min assist level.   The patient needs to be at a supervision to modified independent level level for safe return to prior living situation.  The patient will benefit from continued skilled PT/OT to address these deficits with the discharge goals of safe return home with spouse at modified independent level.  The prognosis for goal achievement is good.    The patient and family member was educated on the role of PT in IRP and the IRP Discharge Folder.  The plan of care and goals were discussed and they verbalize understanding and agreement.        Clinical Presentation: evolving clinical presentation with changing characteristics     EDUCATION:   On this date, education was provided to patient regarding  diagnosis considerations pertaining to rehab, bed mobility, transfers and ambulation  The response to education was/were: Needs reinforcement    PLAN:   Continue skilled PT, including the following Treatment/Interventions: Functional transfer training;Strengthening;Endurance training;Cognitive reorientation;Patient/Family training;Bed  Pt states, \"I've been really dizzy and lightheaded today, and I've never had that happen before so it scared me. \" mobility;Equipment eval/education;Gait training;Stairs retraining;Safety Education;Neuromuscular re-education (08/08/18 1100)   PT Frequency: 7 days/week (08/08/18 1100), Frequency Comments: 60 min/day at least 5x/week (08/08/18 1100)    Treatment Plan for Next Session: IRF KOREY: item retrieval, stair assessment. Progress ambulation without device, standing balance and strengthening, visual scanning, MURO  Additional Plan Considerations: FIM until 8/9       RECOMMENDATIONS FOR DISCHARGE:  Recommendation for Discharge: PT: Intensive therapy program    PT/OT Mobility Equipment for Discharge: continue to assess (08/08/18 1100)  PT/OT ADL Equipment for Discharge: continue to assess (08/08/18 0710)    Co-morbidities:   Patient Active Problem List   Diagnosis   • Aortic valve disorders   • S/P ascending aortic aneurysm repair   • Hyperlipidemia   • Hypothyroidism   • Meningioma (CMS/HCC)   • HTN (hypertension)   • DJD L knee   • Chondrocalcinosis L knee   • Fx R distal radius -- ORIF [employer] DVR plate 3-   • Dupuytren's contracture -- bilateral       FUNCTIONAL DATA OVERVIEW LAST 24 HOURS  Bed Mobility   Bed Mobility  Rolling to the Right: Modified Independent (08/08/18 1100)  Rolling to the Left: Modified Independent (08/08/18 1100)  Supine to Sit: Modified Independent (08/08/18 1330)  Sit to Supine: Modified Independent (08/08/18 1330)  Bed Mobility Comments: HOB elevated, completes with ease (08/08/18 1330)    Transfers  Transfers  Sit to Stand: Supervision (Supv) (08/08/18 1330)  Stand to Sit: Supervision (Supv) (08/08/18 1330)  Stand Pivot Transfers: Touching/Steadying Assistance (08/08/18 1330)  Car Transfers: Supervision (Supv) (08/08/18 1330)  Assistive Device/: 1 Person;Gait Belt (08/08/18 1330)  Transfer Comments 1: supervision for safety with sit<>stand, requires light min assist for balance with turns due to mild unsteadiness  (08/08/18 1330)    Gait  Gait  Gait Assistance: Minimal Assist  (Min) (08/08/18 1330)  Assistive Device/: 1 Person;Gait Belt (08/08/18 1330)  Ambulation Distance (Feet): 200 Feet (75) (08/08/18 1330)  IRP Gait: Yes, the patient is walking (08/08/18 1100)  Walk 10 feet: Supervision (Supv) (08/08/18 1100)  Walk 50 feet with 2 turns: Minimal Assist (Min) (08/08/18 1100)  Walk 150 feet: Minimal Assist (Min) (08/08/18 1330)  Walk 10 feet on uneven or sloping surfaces: Minimal Assist (Min) (08/08/18 1330)  Pattern: Shuffle (08/08/18 1100)  Ambulation Surface: Tile (08/08/18 1100)  Gait Comments 1: ambulates with min assist for balance and right UE hand hold assist per patient preference, mild unsteadiness, intermittantly reaches out for hallway railing, slow mandeep (08/08/18 1330),      Stairs  Stairs Mobility  IRP Stairs: Yes (08/08/18 1330)  Stairs Mobility Comments: Declined due to fatigue (08/08/18 1330)    Wheelchair Mobility       Balance  Balance  Sitting - Static: Independent (08/08/18 1100)  Sitting - Dynamic: Independent (08/08/18 1100)  Standing - Static: Supervision (Supv) (08/08/18 1100)  Standing - Dynamic (eyes open): Minimal Assist (Min) (08/08/18 1100)  Balance Comments #1: unsupported standing balance requires close supervision to min assist for balance due to left inattention, unsteadiness (08/08/18 1100)    Neuromuscular Re-education

## 2022-07-14 NOTE — ED PROVIDER NOTES
EMERGENCY DEPARTMENT HISTORY AND PHYSICAL EXAM    Date: 7/14/2022  Patient Name: Emely Cole    History of Presenting Illness     Chief Complaint   Patient presents with    Chest Pain    Dizziness         History Provided By: Patient    Chief Complaint: Lightheaded, headache  Duration: This morning  Timing: Acute  Location: Head  Quality: Aching  Severity: Moderate  Modifying Factors: Worse when trying to work  Associated Symptoms: none       Additional History (Context): Emely Cole is a 43 y.o. female with a history of migraines and asthma who presents today for issues listed above. Patient reports she woke up this morning and had a headache, states she took Tylenol but reports that did not help. Reports that she tried to go to work but when she got there she felt so bad that she has to go home. 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 Denies any known sick contacts but again works in the hospital.  Denies any concern for pregnancy. PCP: None    Current Facility-Administered Medications   Medication Dose Route Frequency Provider Last Rate Last Admin    diphenhydrAMINE (BENADRYL) injection 12.5 mg  12.5 mg IntraVENous NOW Rebeka Amin PA         Current Outpatient Medications   Medication Sig Dispense Refill    ketorolac (TORADOL) 10 mg tablet Take 1 Tablet by mouth every six (6) hours as needed for Pain. 20 Tablet 0    meclizine (ANTIVERT) 25 mg tablet Take 1 Tablet by mouth three (3) times daily as needed for Dizziness for up to 10 days. 20 Tablet 0    cyclobenzaprine (FLEXERIL) 5 mg tablet Take 1 Tablet by mouth three (3) times daily as needed for Muscle Spasm(s). 30 Tablet 1    dexAMETHasone (DECADRON) 6 mg tablet Take  by mouth every twelve (12) hours. Unsure of dosage, took the last one this am.  This was a prescription for post surgery.  (Patient not taking: Reported on 4/25/2022)      albuterol (Proventil HFA) 90 mcg/actuation inhaler Take 2 Puffs by inhalation every four (4) hours as needed for Wheezing. 1 Each 1    gabapentin (Neurontin) 300 mg capsule Take 1 Capsule by mouth two (2) times a day. Max Daily Amount: 600 mg. 180 Capsule 1    budesonide-formoteroL (SYMBICORT) 160-4.5 mcg/actuation HFAA Take 2 Puffs by inhalation every twelve (12) hours. Indications: controller medication for asthma 10.2 g 2    albuterol (PROVENTIL HFA, VENTOLIN HFA, PROAIR HFA) 90 mcg/actuation inhaler Take 1-2 Puffs by inhalation every four (4) hours as needed for Wheezing. 6.7 Each 2    Comp. Stocking,Knee,Regular,Lrg misc 20-30 mm hg 1 Each 1    furosemide (LASIX) 20 mg tablet Take 1 tablet by mouth every other day 30 Tablet 0    Nebulizer & Compressor machine Use as directed 1 Each 0    Nebulizer Accessories kit Use as directed 1 Kit 1       Past History     Past Medical History:  Past Medical History:   Diagnosis Date    Arthritis     knees hurt    Asthma     Chest pain     Chronic pain 6 months    painful menses    Fibroids     GERD (gastroesophageal reflux disease)     Headache disorder     Heart palpitations     Migraine     Neurological disorder     bleeding in head diagnosed        Past Surgical History:  Past Surgical History:   Procedure Laterality Date    HX  SECTION  7101,7931    with BTL    HX GYN      endometrial ablation    HX HYSTEROSCOPY WITH ENDOMETRIAL ABLATION      No period since    HX WRIST FRACTURE TX      Right Carpal Tunnel Release       Family History:  Family History   Problem Relation Age of Onset    Hypertension Mother     Hypertension Father     Diabetes Father     Parkinson's Disease Father     Cancer Paternal Grandmother     Mult Sclerosis Sister        Social History:  Social History     Tobacco Use    Smoking status: Former Smoker     Types: Cigarettes     Quit date: 3/23/1998     Years since quittin.3    Smokeless tobacco: Former User    Tobacco comment: smoked a few as a teen, Vaped for a year   Vaping Use    Vaping Use: Never used   Substance Use Topics    Alcohol use: Yes     Comment: socially, 2 beer/weekend    Drug use: No       Allergies: Allergies   Allergen Reactions    Azo [Phenazopyridine] Hives    Iodinated Contrast Media Shortness of Breath     Other reaction(s): wheezing/sob    Tramadol Hives         Review of Systems   Review of Systems   Constitutional: Negative for chills and fever. HENT: Negative for congestion, rhinorrhea and sore throat. Respiratory: Negative for cough and shortness of breath. Cardiovascular: Negative for chest pain. Gastrointestinal: Negative for abdominal pain, blood in stool, constipation, diarrhea, nausea and vomiting. Genitourinary: Negative for dysuria, frequency and hematuria. Musculoskeletal: Negative for back pain and myalgias. Skin: Negative for rash and wound. Neurological: Positive for light-headedness and headaches. Negative for dizziness. All other systems reviewed and are negative. All Other Systems Negative  Physical Exam     Vitals:    07/14/22 1152   BP: 124/83   Pulse: 72   Resp: 18   Temp: 97.8 °F (36.6 °C)   SpO2: 100%   Weight: 104.3 kg (230 lb)   Height: 5' 4\" (1.626 m)     Physical Exam  Vitals and nursing note reviewed. Constitutional:       General: She is not in acute distress. Appearance: She is well-developed. She is not diaphoretic. HENT:      Head: Normocephalic and atraumatic. Eyes:      Conjunctiva/sclera: Conjunctivae normal.   Cardiovascular:      Rate and Rhythm: Normal rate and regular rhythm. Heart sounds: Normal heart sounds. Pulmonary:      Effort: Pulmonary effort is normal. No respiratory distress. Breath sounds: Normal breath sounds. Chest:      Chest wall: No tenderness. Abdominal:      General: Bowel sounds are normal. There is no distension. Palpations: Abdomen is soft. Tenderness:  There is no abdominal tenderness. There is no guarding or rebound. Musculoskeletal:         General: No deformity. Cervical back: Normal range of motion and neck supple. Skin:     General: Skin is warm and dry. Neurological:      General: No focal deficit present. Mental Status: She is alert and oriented to person, place, and time. GCS: GCS eye subscore is 4. GCS verbal subscore is 5. GCS motor subscore is 6. Cranial Nerves: Cranial nerves are intact. Sensory: Sensation is intact. Motor: Motor function is intact. Coordination: Coordination is intact. Gait: Gait is intact. Gait normal.      Deep Tendon Reflexes: Reflexes are normal and symmetric. Diagnostic Study Results     Labs -     Recent Results (from the past 12 hour(s))   EKG, 12 LEAD, INITIAL    Collection Time: 07/14/22 11:49 AM   Result Value Ref Range    Ventricular Rate 77 BPM    Atrial Rate 77 BPM    P-R Interval 126 ms    QRS Duration 72 ms    Q-T Interval 358 ms    QTC Calculation (Bezet) 405 ms    Calculated P Axis 63 degrees    Calculated R Axis 38 degrees    Calculated T Axis 40 degrees    Diagnosis       Normal sinus rhythm  Normal ECG  When compared with ECG of 23-APR-2022 16:44,  T wave amplitude has decreased in Anterolateral leads  Confirmed by Luiz Lanes ([id number]) on 7/14/2022 12:28:01 PM     CBC WITH AUTOMATED DIFF    Collection Time: 07/14/22 12:00 PM   Result Value Ref Range    WBC 8.6 4.6 - 13.2 K/uL    RBC 5.08 4.20 - 5.30 M/uL    HGB 13.3 12.0 - 16.0 g/dL    HCT 41.3 35.0 - 45.0 %    MCV 81.3 78.0 - 100.0 FL    MCH 26.2 24.0 - 34.0 PG    MCHC 32.2 31.0 - 37.0 g/dL    RDW 15.0 (H) 11.6 - 14.5 %    PLATELET 087 778 - 039 K/uL    MPV 9.4 9.2 - 11.8 FL    NRBC 0.0 0  WBC    ABSOLUTE NRBC 0.00 0.00 - 0.01 K/uL    NEUTROPHILS 64 40 - 73 %    LYMPHOCYTES 25 21 - 52 %    MONOCYTES 7 3 - 10 %    EOSINOPHILS 3 0 - 5 %    BASOPHILS 1 0 - 2 %    IMMATURE GRANULOCYTES 0 0.0 - 0.5 %    ABS. NEUTROPHILS 5.6 1.8 - 8.0 K/UL    ABS. LYMPHOCYTES 2.1 0.9 - 3.6 K/UL    ABS. MONOCYTES 0.6 0.05 - 1.2 K/UL    ABS. EOSINOPHILS 0.2 0.0 - 0.4 K/UL    ABS. BASOPHILS 0.1 0.0 - 0.1 K/UL    ABS. IMM. GRANS. 0.0 0.00 - 0.04 K/UL    DF AUTOMATED     METABOLIC PANEL, COMPREHENSIVE    Collection Time: 07/14/22 12:00 PM   Result Value Ref Range    Sodium 140 136 - 145 mmol/L    Potassium 4.3 3.5 - 5.5 mmol/L    Chloride 105 100 - 111 mmol/L    CO2 27 21 - 32 mmol/L    Anion gap 8 3.0 - 18 mmol/L    Glucose 86 74 - 99 mg/dL    BUN 15 7.0 - 18 MG/DL    Creatinine 0.85 0.6 - 1.3 MG/DL    BUN/Creatinine ratio 18 12 - 20      GFR est AA >60 >60 ml/min/1.73m2    GFR est non-AA >60 >60 ml/min/1.73m2    Calcium 8.9 8.5 - 10.1 MG/DL    Bilirubin, total 0.2 0.2 - 1.0 MG/DL    ALT (SGPT) 48 13 - 56 U/L    AST (SGOT) 52 (H) 10 - 38 U/L    Alk. phosphatase 99 45 - 117 U/L    Protein, total 7.6 6.4 - 8.2 g/dL    Albumin 4.0 3.4 - 5.0 g/dL    Globulin 3.6 2.0 - 4.0 g/dL    A-G Ratio 1.1 0.8 - 1.7     TROPONIN-HIGH SENSITIVITY    Collection Time: 07/14/22 12:00 PM   Result Value Ref Range    Troponin-High Sensitivity 4 0 - 54 ng/L   NT-PRO BNP    Collection Time: 07/14/22 12:00 PM   Result Value Ref Range    NT pro-BNP 10 0 - 450 PG/ML   MAGNESIUM    Collection Time: 07/14/22 12:00 PM   Result Value Ref Range    Magnesium 2.1 1.6 - 2.6 mg/dL   HCG QL SERUM    Collection Time: 07/14/22 12:00 PM   Result Value Ref Range    HCG, Ql. Negative NEG     COVID-19 WITH INFLUENZA A/B    Collection Time: 07/14/22 12:40 PM   Result Value Ref Range    SARS-CoV-2 by PCR Not detected NOTD      Influenza A by PCR Not detected NOTD      Influenza B by PCR Not detected NOTD         Radiologic Studies -   CT HEAD WO CONT   Final Result      No acute abnormalities. XR CHEST PA LAT   Final Result      No acute findings. CT Results  (Last 48 hours)               07/14/22 1303  CT HEAD WO CONT Final result    Impression:      No acute abnormalities. Narrative:  CT Of The Head Without Contrast       CPT CODE:  83799       CLINICAL HISTORY: Dizziness and lightheadedness. .       TECHNIQUE: 5 mm helical scan obtained of the head. All CT scans at this   facility are performed using dose optimization techniques as appropriate to a   performed exam, to include automated exposure control, adjustment of the mA   and/or kV according to patient's size (including appropriate matching for site   specific examinations), or use of iterative reconstruction technique. COMPARISON: 7/23/2021       FINDINGS:        No midline shift, mass effect or abnormal intra-axial or extraaxial fluid   collection or hydrocephalus is seen. No hemorrhage identified. No mass lesion identified. No acute infarction identified. Paranasal sinuses, mastoid air cells and middle ears are all normally aerated. CXR Results  (Last 48 hours)               07/14/22 1213  XR CHEST PA LAT Final result    Impression:      No acute findings. Narrative:  EXAMINATION: Chest 2 views       INDICATION: Dizziness, lightheaded       COMPARISON: 4/23/2022       FINDINGS: Frontal and lateral views of the chest. Mediastinal silhouette and   pulmonary vasculature unremarkable. No consolidation. No evidence of   pneumothorax. No acute osseous findings. Medical Decision Making   I am the first provider for this patient. I reviewed the vital signs, available nursing notes, past medical history, past surgical history, family history and social history. Vital Signs-Reviewed the patient's vital signs.       Records Reviewed: Nursing Notes and Old Medical Records     Procedures: None   Procedures    Provider Notes (Medical Decision Making):     Differential: Electrolyte abnormality, pregnancy, ACS, arrhythmia, pneumonia, viral illness, vertigo, CVA, TIA, migraine headache, cluster headache, tension headache    Plan: Order full work-up to include labs, CT of head, COVID screen, fluids and pain medication. 2:54 PM  Have discussed reassuring work-up with patient. Have discussed concerns for viral illness versus migraine. Will discharge home with pain medication and meclizine. Have encouraged rest hydration home isolation. Have advised primary care follow-up. Will discharge home. MED RECONCILIATION:  Current Facility-Administered Medications   Medication Dose Route Frequency    diphenhydrAMINE (BENADRYL) injection 12.5 mg  12.5 mg IntraVENous NOW     Current Outpatient Medications   Medication Sig    ketorolac (TORADOL) 10 mg tablet Take 1 Tablet by mouth every six (6) hours as needed for Pain.  meclizine (ANTIVERT) 25 mg tablet Take 1 Tablet by mouth three (3) times daily as needed for Dizziness for up to 10 days.  cyclobenzaprine (FLEXERIL) 5 mg tablet Take 1 Tablet by mouth three (3) times daily as needed for Muscle Spasm(s).  dexAMETHasone (DECADRON) 6 mg tablet Take  by mouth every twelve (12) hours. Unsure of dosage, took the last one this am.  This was a prescription for post surgery. (Patient not taking: Reported on 4/25/2022)    albuterol (Proventil HFA) 90 mcg/actuation inhaler Take 2 Puffs by inhalation every four (4) hours as needed for Wheezing.  gabapentin (Neurontin) 300 mg capsule Take 1 Capsule by mouth two (2) times a day. Max Daily Amount: 600 mg.  budesonide-formoteroL (SYMBICORT) 160-4.5 mcg/actuation HFAA Take 2 Puffs by inhalation every twelve (12) hours. Indications: controller medication for asthma    albuterol (PROVENTIL HFA, VENTOLIN HFA, PROAIR HFA) 90 mcg/actuation inhaler Take 1-2 Puffs by inhalation every four (4) hours as needed for Wheezing.  Comp. Stocking,Knee,Regular,Lrg misc 20-30 mm hg    furosemide (LASIX) 20 mg tablet Take 1 tablet by mouth every other day    Nebulizer & Compressor machine Use as directed    Nebulizer Accessories kit Use as directed       Disposition:  Home     DISCHARGE NOTE:   Pt has been reexamined. Patient has no new complaints, changes, or physical findings. Care plan outlined and precautions discussed. Results of workup were reviewed with the patient. All medications were reviewed with the patient. All of pt's questions and concerns were addressed. Patient was instructed and agrees to follow up with PCP as well as to return to the ED upon further deterioration. Patient is ready to go home. Follow-up Information     Follow up With Specialties Details Why Contact Info    [facility] EMERGENCY DEPT Emergency Medicine  As needed [address]  [phone]    [address] [place] [address]  Schedule an appointment as soon as possible for a visit   [address] [place]  [phone]          Discharge Medication List as of 7/14/2022  2:44 PM      START taking these medications    Details   ketorolac (TORADOL) 10 mg tablet Take 1 Tablet by mouth every six (6) hours as needed for Pain., Normal, Disp-20 Tablet, R-0      meclizine (ANTIVERT) 25 mg tablet Take 1 Tablet by mouth three (3) times daily as needed for Dizziness for up to 10 days. , Normal, Disp-20 Tablet, R-0         CONTINUE these medications which have NOT CHANGED    Details   cyclobenzaprine (FLEXERIL) 5 mg tablet Take 1 Tablet by mouth three (3) times daily as needed for Muscle Spasm(s). , Normal, Disp-30 Tablet, R-1      dexAMETHasone (DECADRON) 6 mg tablet Take  by mouth every twelve (12) hours. Unsure of dosage, took the last one this am.  This was a prescription for post surgery. , Historical Med      !! albuterol (Proventil HFA) 90 mcg/actuation inhaler Take 2 Puffs by inhalation every four (4) hours as needed for Wheezing., Print, Disp-1 Each, R-1      gabapentin (Neurontin) 300 mg capsule Take 1 Capsule by mouth two (2) times a day.  Max Daily Amount: 600 mg., Normal, Disp-180 Capsule, R-1      budesonide-formoteroL (SYMBICORT) 160-4.5 mcg/actuation HFAA Take 2 Puffs by inhalation every twelve (12) hours. Indications: controller medication for asthma, Normal, Disp-10.2 g, R-2      !! albuterol (PROVENTIL HFA, VENTOLIN HFA, PROAIR HFA) 90 mcg/actuation inhaler Take 1-2 Puffs by inhalation every four (4) hours as needed for Wheezing., Normal, Disp-6.7 Each, R-2      Comp. Stocking,Knee,Regular,Lrg misc 20-30 mm hg, Print, Disp-1 Each, R-1      furosemide (LASIX) 20 mg tablet Take 1 tablet by mouth every other day, Normal, Disp-30 Tablet, R-0      Nebulizer & Compressor machine Use as directed, Normal, Disp-1 Each, R-0      Nebulizer Accessories kit Use as directed, Print, Disp-1 Kit, R-1       !! - Potential duplicate medications found. Please discuss with provider. Diagnosis     Clinical Impression:   1. Nonintractable headache, unspecified chronicity pattern, unspecified headache type    2. Dizzy          \"Please note that this dictation was completed with [employer], the [employer] voice recognition software. Quite often unanticipated grammatical, syntax, homophones, and other interpretive errors are inadvertently transcribed by the computer software. Please disregard these errors. Please excuse any errors that have escaped final proofreading. \"

## 2022-07-14 NOTE — Clinical Note
[address] [place] [address] Dr [facility] EMERGENCY DEPT  [id number] [address] [phone]    Work/School Note    Date: 7/14/2022    To Whom It May concern:    Spencer Price was seen and treated today in the emergency room by the following provider(s):  Attending Provider: Harish Mac MD  Physician Assistant: Hannah Mora is excused from work/school on 7/14/2022 through 7/16/2022. She is medically clear to return to work/school on 7/17/2022.          Sincerely,          MISSY Recio

## 2022-07-20 ENCOUNTER — TELEPHONE (OUTPATIENT)
Dept: SURGERY | Age: 42
End: 2022-07-20

## 2022-07-26 ENCOUNTER — OFFICE VISIT (OUTPATIENT)
Dept: SURGERY | Age: 42
End: 2022-07-26
Payer: COMMERCIAL

## 2022-07-26 VITALS
DIASTOLIC BLOOD PRESSURE: 100 MMHG | RESPIRATION RATE: 16 BRPM | HEIGHT: 64 IN | WEIGHT: 235 LBS | OXYGEN SATURATION: 98 % | SYSTOLIC BLOOD PRESSURE: 160 MMHG | HEART RATE: 74 BPM | BODY MASS INDEX: 40.12 KG/M2 | TEMPERATURE: 98.1 F

## 2022-07-26 DIAGNOSIS — K21.9 GASTROESOPHAGEAL REFLUX DISEASE, UNSPECIFIED WHETHER ESOPHAGITIS PRESENT: ICD-10-CM

## 2022-07-26 DIAGNOSIS — Z01.818 PREOP EXAMINATION: ICD-10-CM

## 2022-07-26 DIAGNOSIS — E66.01 OBESITY, CLASS III, BMI 40-49.9 (MORBID OBESITY) (HCC): Primary | ICD-10-CM

## 2022-07-26 DIAGNOSIS — J45.20 MILD INTERMITTENT ASTHMA WITHOUT COMPLICATION: ICD-10-CM

## 2022-07-26 PROCEDURE — 99205 OFFICE O/P NEW HI 60 MIN: CPT | Performed by: SURGERY

## 2022-07-26 NOTE — PROGRESS NOTES
Bariatric Surgery Consultation    CC: Consultation regarding surgical treatment options for morbid obesity and related comorbidities  Subjective: The patient is a 43 y.o. obese  female with a Body mass index is 40.34 kg/m². . They have been considering surgery for some time now. The patient presents to the clinic today to discuss surgical weight loss options. They have made multiple attempts at weight loss over the years without success. They have tried low-carb, low calorie, high-protein diets. Unfortunately, none of these have lead to meaningful, sustained weight loss. They have attended the bariatric seminar before the visit. DIET:   4-5 small meals per day; has cut out processed foods/sugary foods, and has started using protein shakes    Denies nausea, vomiting, dysphagia  Reports reflux; intermittently wakes up in middle of night with heartburn; triggered by late eating and certain trigger foods; 2 episodes of symptoms per week; self-limiting and hasn't tried medications so far. Scheduled to see GI at [facility] for reflux symptoms. EXERCISE:   Walking regimen, resistance/aerobic exercises for around 4 hours per week. Sleep Apnea assessment:    STOPBANG questionnaire     Do you Snore loudly? no  Do you often feel Tired, fatigued, or sleepy during the daytime? no  Has anyone Observed you stop breathing during your sleep? no  Are you being treated for high blood Pressure? no  BMI more than 35 kg/m2?  yes  Age over 48years old? no  Neck Circumference >16 inches? no  Gender male? no  ______________________________________     SCORE: 1     If YES to 0 - 2, low risk of sleep apnea  If YES to 3 - 4 intermediate risk of having sleep apnea  If YES to 5 - 8 high risk of having sleep apnea (or 2 + BMI 35 or Neck > 17\" or Male)     Pre op weight: 235  EBW: 104  Goal Weight Calc Women: 151.8  Wt loss to date: 0     Patient Active Problem List    Diagnosis Date Noted    GERD (gastroesophageal reflux disease) 2022    Asthma 2019    Severe obesity ([facility] [address] 10/10/2018    Palpitations 2016    Precordial pain 2016    Mild intermittent asthma 2014    Morbid obesity due to excess calories ([facility] [address] 2014      Past Medical History:   Diagnosis Date    Arthritis     knees hurt    Asthma     Chest pain     Chronic pain 6 months    painful menses    Fibroids     GERD (gastroesophageal reflux disease)     Headache disorder     Heart palpitations     Migraine     Neurological disorder     bleeding in head diagnosed      Past Surgical History:   Procedure Laterality Date    HX  SECTION  6682,5105    with BTL    HX GYN      endometrial ablation    HX HYSTEROSCOPY WITH ENDOMETRIAL ABLATION      No period since    HX WRIST FRACTURE TX      Right Carpal Tunnel Release      Social History     Tobacco Use    Smoking status: Former     Types: Cigarettes     Quit date: 3/23/1998     Years since quittin.3    Smokeless tobacco: Former    Tobacco comments:     smoked a few as a teen, Vaped for a year   Substance Use Topics    Alcohol use: Yes     Comment: socially, 2 beer/weekend      Family History   Problem Relation Age of Onset    Hypertension Mother     Hypertension Father     Diabetes Father     Parkinson's Disease Father     Cancer Paternal Grandmother     Mult Sclerosis Sister       Prior to Admission medications    Medication Sig Start Date End Date Taking? Authorizing Provider   ketorolac (TORADOL) 10 mg tablet Take 1 Tablet by mouth every six (6) hours as needed for Pain.  22  Yes Valerie Amin PA   cyclobenzaprine (FLEXERIL) 5 mg tablet Take 1 Tablet by mouth three (3) times daily as needed for Muscle Spasm(s). 22  Yes Bird Edmond NP   albuterol (Proventil HFA) 90 mcg/actuation inhaler Take 2 Puffs by inhalation every four (4) hours as needed for Wheezing. 22  Yes Fabio Mott MD   gabapentin (Neurontin) 300 mg capsule Take 1 Capsule by mouth two (2) times a day. Max Daily Amount: 600 mg. 4/1/22  Yes Marta Barrios NP   budesonide-formoteroL (SYMBICORT) 160-4.5 mcg/actuation HFAA Take 2 Puffs by inhalation every twelve (12) hours. Indications: controller medication for asthma 4/1/22  Yes Marta Barrios NP   albuterol (PROVENTIL HFA, VENTOLIN HFA, PROAIR HFA) 90 mcg/actuation inhaler Take 1-2 Puffs by inhalation every four (4) hours as needed for Wheezing. 4/1/22  Yes Marta Barrios NP   Comp. Stocking,Knee,Regular,Lrg misc 20-30 mm hg 2/4/22  Yes Marta Barrios NP   Nebulizer & Compressor machine Use as directed 12/1/21  Yes Nitin Pedroza MD   Nebulizer Accessories kit Use as directed 3/30/17  Yes Blaise Freitas MD     Allergies   Allergen Reactions    Azo [Phenazopyridine] Hives    Iodinated Contrast Media Shortness of Breath     Other reaction(s): wheezing/sob    Tramadol Hives        Review of Systems:    Constitutional: No fever or chills  Neurologic: No headache  Eyes: No scleral icterus or irritated eyes  Nose: No nasal pain or drainage  Mouth: No oral lesions or sore throat  Cardiac: No palpations or chest pain  Pulmonary: No cough or shortness of breath  Gastrointestinal: No nausea, emesis, diarrhea, or constipation.  REFLUX symptoms  Genitourinary: No dysuria  Musculoskeletal: No muscle or joint tenderness  Skin: No rashes or lesions  Psychiatric: No anxiety or depressed mood    Pertinent positives: see HPI      Objective:     Physical Exam:  Visit Vitals  BP (!) 160/100   Pulse 74   Temp 98.1 °F (36.7 °C) (Temporal)   Resp 16   Ht 5' 4\" (1.626 m)   Wt 106.6 kg (235 lb)   SpO2 98%   BMI 40.34 kg/m²     General: No acute distress, conversant  Eyes: PERRLA, no scleral icterus  HENT: Normocephalic without oral lesions  Neck: Trachea midline  Cardiac: Normal pulse rate and rhythm, no edema, capillary refill normal 1 second  Pulmonary: Symmetric chest rise with normal effort, clear to auscultation though mildly obscured by body habitus  GI: Soft, NT, ND, no hernia, no splenomegaly  Skin: Warm without rash  Extremities: No edema or joint stiffness, moves all extremities symmetrically, steady gait  Psych: Appropriate mood and affect    Assessment:     Morbid obesity with comorbidities  Plan:   The patient presents today with severe obesity and obesity related risks/comorbidities as detailed above. The patient has made multiple unsuccessful attempts at weight loss as detailed above. The patient desires surgical weight loss for a better chance of lifelong weight control and control of co-morbidities. They have attended the bariatric seminar and meet the qualifications for surgery based on the NIH criteria. We have discussed the various surgical options including the sleeve gastrectomy, gastric bypass, duodenal switch/modified duodenal switch. We also discussed non operative alternatives. The patient is currently interested in the sleeve gastrectomy. I believe they are a good candidate for this procedure. They will need to a/an UGI, EGD with [facility] to evaluate their anatomy pre operatively. H pylori antigen/breath test ordered as well. We have discussed the possible complications of bariatric surgery which include but are not limited to failed weight loss, weight regain, malnutrition, leak, bleed, stricture, gastric ulcer, gastric fistula, gastric bleed, gallstones, new or worsening gastric reflux, nausea, emesis, internal hernia, abdominal wall hernia, gastric perforation, need for revision / conversion / or reversal, pregnancy complications and loss, intestinal ischemia, post operative skin complications, possible thinning of their hair, bowel obstruction, dumping syndrome, wound infection, blood clots (DVT, mesenteric thrombus, pulmonary embolism), increased addictive tendency, risk of anesthesia, and death.      The patient understands this is a life altering decision and will require compliance to the program for the remainder of their life in order to be monitored to avoid complication and ensure successful, sustained weight control. They will be placed on a lifelong low carbohydrate and low sugar diet, exercise, and vitamin regimen and will require frequent blood draws and office visits to ensure adequate nutrition and program compliance. Visits and follow up will be in compliance with the guidelines set forth by [facility]. I have specifically mentioned the need to avoid all personal and second-hand tobacco exposure, systemic steroids, and NSAIDS after any bariatric surgery to help avoid the above listed complications. The patient has expressed understanding of the above and would like to enroll in the program. The patient will be submitted for medical and psychological clearance along with establishing with out dietician and joining the pre / post operative support group. They will be screened for depression and sleep apnea and treated pre operatively if needed. After successful completion of the preoperative regimen the patient will be submitted for insurance approval and pending this will be scheduled for surgery. Tests/clearances ordered:  CBC, CMP, A1c, H pylori, Vitamin D, CXR, EKG, UGI, EGD with [facility]  Nutrition, support group, PCP clearance, psychological clearance    All questions from the patient have been answered and they have demonstrated appropriate understanding of the process. RESULTS:   Vitamin D insufficiency, script sent to pharmacy  UGI:  IMPRESSION     Gastroesophageal reflux; otherwise, unremarkable double contrast upper GI.        Signed By: Amelia Seaman MD [facility]  Bariatric and General Surgeon  [address] [place] [address] [facility]    July 26, 2022

## 2022-07-26 NOTE — PROGRESS NOTES
Sherri Linder is a 43 y.o. female  Chief Complaint   Patient presents with    New Patient     Bariatric consult      Pt ID confirmed    Weight Loss Metrics 7/26/2022 7/26/2022 7/14/2022 4/25/2022 4/23/2022 4/13/2022 3/16/2022   Pre op / Initial Wt 235 - - - - - -   Today's Wt - 235 lb 230 lb 228 lb 215 lb 230 lb 228 lb   BMI - 40.34 kg/m2 39.48 kg/m2 39.14 kg/m2 36.9 kg/m2 39.48 kg/m2 39.14 kg/m2   Ideal Body Wt 131 - - - - - -   Excess Body Wt 104 - - - - - -   Goal Wt 152 - - - - - -   Wt loss to date 0 - - - - - -   % Wt Loss 0 - - - - - -   80% EBW 83.2 - - - - - -       Body mass index is 40.34 kg/m². Ms. Chastity El has been given the following recommendations today due to her elevated BP reading: patient was advised to follow up with her PCP.

## 2022-07-28 ENCOUNTER — TRANSCRIBE ORDER (OUTPATIENT)
Dept: SCHEDULING | Age: 42
End: 2022-07-28

## 2022-07-28 DIAGNOSIS — R94.5 ABNORMAL LIVER FUNCTION: ICD-10-CM

## 2022-07-28 DIAGNOSIS — K21.9 GERD (GASTROESOPHAGEAL REFLUX DISEASE): Primary | ICD-10-CM

## 2022-07-28 DIAGNOSIS — E66.9 OBESITY: ICD-10-CM

## 2022-08-10 ENCOUNTER — DOCUMENTATION ONLY (OUTPATIENT)
Dept: BARIATRICS/WEIGHT MGMT | Age: 42
End: 2022-08-10

## 2022-08-10 ENCOUNTER — HOSPITAL ENCOUNTER (OUTPATIENT)
Dept: BARIATRICS/WEIGHT MGMT | Age: 42
Discharge: HOME | End: 2022-08-10

## 2022-08-10 NOTE — PROGRESS NOTES
[address] Loss Paul RODRICK Olga [address] [facility] [address], [address]    Patient's Name: Margoth Ballard   Age: 43 y.o. YOB: 1980   Sex: female        Insurance:              Session: 1 of  3  Surgeon:  Dr. Heather Cochran    Height: 5 f 4   Current Weight:    235      Lbs. BMI:    Pounds Lost since last month: 0               Pounds Gained since last month: 0      Starting Weight: 235    Previous Months Weight: 235  Overall Pounds Lost: 0   Overall Pounds Gained: 0    Do you smoke? None    Alcohol intake:  Number of drinks at a time:  2 drinks at a time if she goes out with her   Number of times a week:     Class Guidelines    Guidelines are reviewed with patient at the start of every class. 1. Patient understands that weight loss trial classes must be consecutive. Patient understands if they miss a class, it is their responsibility to contact me to reschedule class. I will reach out to patient after their first no show. 2.  Patient understands the expectations that weight maintenance/weight loss is expected during the classes. Failure to demonstrate changes may result in one extra month of weight loss trial, followed by going back to see the surgeon. Patient understands that they CAN NOT gain any weight during the weight loss trial.  Gaining weight will result in extra classes. 3. Patient is also instructed to be doing their labs, blood work, psych visit, support group and any other test that the surgeon has used while they are working on their weight loss trial.  4.  Patient was instructed to bring their blue binder to every class and appointment. Other Pertinent Information:     Changes Made Since Last Class: Eating 5 small melas and focusing more on protien    Eating Habits and Behaviors    Today in class, we reviewed the key diet principles. I have talked to patient about pushing the fluid and working towards 64 ounces per day.   We focused on following a low-calorie diet. Patient was instructed to count their carbohydrates and try to keep their daily intake under 75 grams per day and try to keep their daily protein at 80 grams per day. Patient was given examples of carbohydrates in starches. Patient was encouraged to focus on meat and vegetables and begin cutting carbohydrates out. We talked about foods that are protein-based and how to incorporate those into their meals. I also reviewed with patient the importance of eating 3 meals per day and suggestions were made for breakfast items. Patient's current diet habits include: Patient is eating 5 small  meals per day. Meals focus on: mixture of protein and vegetables. Patient is encouraged to fill up on protein first, then vegetables, and work on cutting back on the carbohydrates. Portions are: smaller plate. Patient is encouraged to use a smaller plate to help cut down on portion sizes. Patient is eating fast food: 3 times a week. Patient is eating carry out: 0 times. Patient is eating at a sit down restaurant: 3 times a month. Patient is eating bread, rice, and pasta:  bread daily, but not eating a lot of other carbohydrates. Patient is snacking on handful of mixed nuts. This is being done 2 times a day. Patient is eating sweets: before starting this, it was daily. This has been reduced. Patient is drinking 64 ounces of water, 0 ounces of soda, 0 ounces of sweet tea, not often for fruit juices, 0 ounces of caffeine. Patient is encouraged to continue to cut out liquid calories and aim for achieving 64 ounces of fluid per day. Physical Activity/Exercise    Comments: We talked about exercise. Patient was given reasons of why exercise is so important and how that can help with their long-term success. I have encouraged patient to get a support system to help with the activity. For activity, patient is 60 minutes daily. Wakes up at 3 am to do it.   We have talked about goals, which include starting off walking and building upon that. Patient is also encouraged to add in some light weights to get some strength training. Behavior Modification       Comments:  During today's lesson, I gave a presentation called The 100-200 Calorie \"Mindless Margin. \"  The goal is to make modest daily 100-200 calorie reductions in certain things that the body won't notice. One, 100-200 calorie change and would will look 10-20 pounds in one year. An example could be cutting soda. Patient was given a check off list and was encouraged to come up with 1-3 100 calories changes they could make. The check off list is a daily tracker to see if these goals are being met. Goals that patient wants to work on includes:  1. Continue working on the diet  2.  Not eat after 7 pm.      Valentina Morrow, MS RD  8/10/2022

## 2022-08-16 ENCOUNTER — HOSPITAL ENCOUNTER (OUTPATIENT)
Dept: GENERAL RADIOLOGY | Age: 42
Discharge: HOME | End: 2022-08-16
Attending: SURGERY
Payer: COMMERCIAL

## 2022-08-16 DIAGNOSIS — K21.9 GASTROESOPHAGEAL REFLUX DISEASE, UNSPECIFIED WHETHER ESOPHAGITIS PRESENT: ICD-10-CM

## 2022-08-16 PROCEDURE — 74246 X-RAY XM UPR GI TRC 2CNTRST: CPT

## 2022-08-16 PROCEDURE — 74011000250 HC RX REV CODE- 250: Performed by: SURGERY

## 2022-08-16 RX ADMIN — ANTACID/ANTIFLATULENT 4 G: 380; 550; 10; 10 GRANULE, EFFERVESCENT ORAL at 09:42

## 2022-08-16 RX ADMIN — BARIUM SULFATE 135 ML: 980 POWDER, FOR SUSPENSION ORAL at 09:22

## 2022-08-16 RX ADMIN — BARIUM SULFATE 176 G: 960 POWDER, FOR SUSPENSION ORAL at 09:22

## 2022-08-29 ENCOUNTER — HOSPITAL ENCOUNTER (OUTPATIENT)
Dept: LAB | Age: 42
Discharge: HOME | End: 2022-08-29
Payer: COMMERCIAL

## 2022-08-29 DIAGNOSIS — E66.01 OBESITY, CLASS III, BMI 40-49.9 (MORBID OBESITY) (HCC): ICD-10-CM

## 2022-08-29 DIAGNOSIS — Z01.818 PREOP EXAMINATION: ICD-10-CM

## 2022-08-29 LAB
25(OH)D3 SERPL-MCNC: 24.8 NG/ML (ref 30–100)
ATRIAL RATE: 75 BPM
CALCULATED P AXIS, ECG09: 57 DEGREES
CALCULATED R AXIS, ECG10: 45 DEGREES
CALCULATED T AXIS, ECG11: 29 DEGREES
DIAGNOSIS, 93000: NORMAL
EST. AVERAGE GLUCOSE BLD GHB EST-MCNC: 111 MG/DL
HBA1C MFR BLD: 5.5 % (ref 4.2–5.6)
P-R INTERVAL, ECG05: 124 MS
Q-T INTERVAL, ECG07: 354 MS
QRS DURATION, ECG06: 66 MS
QTC CALCULATION (BEZET), ECG08: 395 MS
TSH SERPL DL<=0.05 MIU/L-ACNC: 2.02 UIU/ML (ref 0.36–3.74)
VENTRICULAR RATE, ECG03: 75 BPM

## 2022-08-29 PROCEDURE — 93005 ELECTROCARDIOGRAM TRACING: CPT

## 2022-08-29 PROCEDURE — 83036 HEMOGLOBIN GLYCOSYLATED A1C: CPT

## 2022-08-29 PROCEDURE — 82306 VITAMIN D 25 HYDROXY: CPT

## 2022-08-29 PROCEDURE — 36415 COLL VENOUS BLD VENIPUNCTURE: CPT

## 2022-08-29 PROCEDURE — 84443 ASSAY THYROID STIM HORMONE: CPT

## 2022-08-29 RX ORDER — ASPIRIN 325 MG
50000 TABLET, DELAYED RELEASE (ENTERIC COATED) ORAL
Qty: 12 CAPSULE | Refills: 0 | Status: SHIPPED | OUTPATIENT
Start: 2022-08-29 | End: 2022-08-31 | Stop reason: SDUPTHER

## 2022-08-31 DIAGNOSIS — E66.01 OBESITY, CLASS III, BMI 40-49.9 (MORBID OBESITY) (HCC): Primary | ICD-10-CM

## 2022-08-31 DIAGNOSIS — E55.9 VITAMIN D DEFICIENCY: ICD-10-CM

## 2022-08-31 RX ORDER — ASPIRIN 325 MG
50000 TABLET, DELAYED RELEASE (ENTERIC COATED) ORAL
Qty: 12 CAPSULE | Refills: 0 | Status: SHIPPED | OUTPATIENT
Start: 2022-08-31

## 2022-09-14 ENCOUNTER — HOSPITAL ENCOUNTER (OUTPATIENT)
Dept: BARIATRICS/WEIGHT MGMT | Age: 42
Discharge: HOME | End: 2022-09-14

## 2022-09-14 ENCOUNTER — DOCUMENTATION ONLY (OUTPATIENT)
Dept: BARIATRICS/WEIGHT MGMT | Age: 42
End: 2022-09-14

## 2022-09-14 ENCOUNTER — HOSPITAL ENCOUNTER (OUTPATIENT)
Dept: LAB | Age: 42
Discharge: HOME | End: 2022-09-14
Payer: COMMERCIAL

## 2022-09-14 DIAGNOSIS — Z01.818 PREOPERATIVE TESTING: ICD-10-CM

## 2022-09-14 DIAGNOSIS — Z01.818 PREOPERATIVE TESTING: Primary | ICD-10-CM

## 2022-09-14 PROCEDURE — 83013 H PYLORI (C-13) BREATH: CPT

## 2022-09-14 NOTE — PROGRESS NOTES
[address] Loss Paul Espinoza [address] [facility], [address]    Patient's Name: Josefa Sidhu   Age: 43 y.o. YOB: 1980   Sex: female    Date:   9/14/2022    Insurance:              Session: 3 of  3  Surgeon:  Dr Arsen Nuñez    Height: 5 f 4   Weight:    231      Lbs. BMI: 40.0   Pounds Lost since last month: 4                Pounds Gained since last month: 0    Starting Weight: 235     Previous Months Weight: 235  Overall Pounds Lost: 4   Overall Pounds Gained: 0    Smoking:  None    Alcohol intake:  Number of drinks at a time:  1 drink occasionally  Number of times a week:     Patient has attended the required bariatric support group. Class Guidelines    Guidelines are reviewed with patient at the start of every class. 1. Patient understands that weight loss trial classes must be consecutive. Patient understands if they miss a class, it is their responsibility to contact me to reschedule class. I will reach out to patient after their first no show. 2.  Patient understands the expectations that weight maintenance/weight loss is expected during the classes. Failure to demonstrate changes may result in one extra month of weight loss trial, followed by going back to see the surgeon. 3. Patient is also instructed to be doing their labs, blood work, psych visit, support group and any other test that the surgeon has used while they are working on their weight loss trial.    Other Pertinent Information:     Changes Made Since Last Class: None    Eating Habits and Behaviors      During today's class, we continued to focus on the key diet principles. Patient was instructed to follow a low carbohydrate diet, focusing on meat and vegetables. Patient was instructed to stop liquid calories and aim for 64 ounces of water per day.  We focused on focusing in on bigger problem areas to start making changes to, such as reducing fast food intake, reducing carbonated beverages/soda intake, decreasing carbohydrates intake daily, etc. We reviewed protein shakes and high protein yogurts to chose, as well. During today's class, we also talked about how to read a label. Patient was given information on: The benefits of reading a label, which allowed one to compare the nutritional value of similar products and make healthy food decisions. The ingredient list, which can help to determine if the food is heathy or something that fits into the diet. The importance of reading the serving size and making sure to apply that to the portion size that they are consuming. Patient was also educated on carbohydrates. I talked to patient about: The function of carbohydrates. Foods that carbohydrate-heavy. Patient was given the guidelines to keep their carbohydrates less than 75 grams per day in the pre-op phase. Patient was also given ideas of low carb swaps, which include zucchini noodles, spaghetti squash, or cauliflower rice. Lower carbohydrate fruit options were discussed. Discussed lower carb swaps to use instead of potato chips. Patient's dietary habits include: Patient is eating 3 meals per day. Meals are made up of protein, salad. Portions are:  smaller plates. Patient's frequency of fast food is: 3 x a month   Patient's frequency of carry out is: None  Patient's intake of sit down: 2 x a month  Patient is eating carbohydrates: 2 x a week  Patient is snacking on apples, grapes, oranges. This is being done: 2 x a day. I have talked to patient about some lower carbohydrate snack choices that focused more protein. Patient is drinking 64 ounces of fluid per day. Fluid intake is make up of: water only. Patient is encouraged to focus on non-caloric, non-caffeine, non-carbonated liquids. Physical Activity/Exercise     Comments:     Currently for exercise, patient exercise tapes every morning.   I have talked to patient about some suggestions to start an exercise routine. Patient is encouraged to purchase a pedometer and use this to track her steps. I have made some suggestions to patient of ways to incorporate exercise in with a busy lifestyle. We also talked about You Tube videos that can be used for an exercise routine. Behavior Modification  Comments:  Behavior modifications were discussed with the patient. Some of those behavior modifications include:  Emphasized the importance of eating slowly, not eating and drinking meals at the same time. Taking 20-30 minutes to eat a meal.  Patient is taking 64 minutes to eat a meal.  I have encouraged patient to follow journal, which may be done by paper or tracking it an cam, such as My Fitness Pal or [employer]. [address] [place]. Patient understands the importance of following through with these behaviors following surgery to aid in long term weight loss. Tips and advice were given on how to start implementing these into the patient's life. Goal patient has set for next month:  Continue with key diet principles  2.       Mj Salcido Barry [address] RD  9/14/2022

## 2022-09-15 LAB — UREA BREATH TEST QL: NEGATIVE

## 2022-09-16 ENCOUNTER — HOSPITAL ENCOUNTER (OUTPATIENT)
Dept: ULTRASOUND IMAGING | Age: 42
Discharge: HOME | End: 2022-09-16
Attending: STUDENT IN AN ORGANIZED HEALTH CARE EDUCATION/TRAINING PROGRAM
Payer: COMMERCIAL

## 2022-09-16 DIAGNOSIS — R94.5 ABNORMAL LIVER FUNCTION: ICD-10-CM

## 2022-09-16 DIAGNOSIS — E66.9 OBESITY: ICD-10-CM

## 2022-09-16 DIAGNOSIS — K21.9 GERD (GASTROESOPHAGEAL REFLUX DISEASE): ICD-10-CM

## 2022-09-16 PROCEDURE — 76705 ECHO EXAM OF ABDOMEN: CPT

## 2022-09-19 ENCOUNTER — DOCUMENTATION ONLY (OUTPATIENT)
Dept: PULMONOLOGY | Age: 42
End: 2022-09-19

## 2022-09-19 ENCOUNTER — OFFICE VISIT (OUTPATIENT)
Dept: SURGERY | Age: 42
End: 2022-09-19
Payer: COMMERCIAL

## 2022-09-19 VITALS
HEART RATE: 96 BPM | WEIGHT: 233 LBS | SYSTOLIC BLOOD PRESSURE: 134 MMHG | HEIGHT: 64 IN | OXYGEN SATURATION: 99 % | BODY MASS INDEX: 39.78 KG/M2 | DIASTOLIC BLOOD PRESSURE: 82 MMHG | RESPIRATION RATE: 16 BRPM | TEMPERATURE: 98.6 F

## 2022-09-19 DIAGNOSIS — I10 HYPERTENSION, UNSPECIFIED TYPE: ICD-10-CM

## 2022-09-19 DIAGNOSIS — Z71.89 ENCOUNTER FOR PRE-BARIATRIC SURGERY COUNSELING AND EDUCATION: Primary | ICD-10-CM

## 2022-09-19 DIAGNOSIS — K21.9 GASTROESOPHAGEAL REFLUX DISEASE, UNSPECIFIED WHETHER ESOPHAGITIS PRESENT: ICD-10-CM

## 2022-09-19 DIAGNOSIS — E66.9 OBESITY (BMI 30-39.9): ICD-10-CM

## 2022-09-19 PROCEDURE — 99213 OFFICE O/P EST LOW 20 MIN: CPT | Performed by: NURSE PRACTITIONER

## 2022-09-19 RX ORDER — HYDROCHLOROTHIAZIDE 12.5 MG/1
12.5 TABLET ORAL DAILY
COMMUNITY

## 2022-09-19 RX ORDER — MONTELUKAST SODIUM 10 MG/1
10 TABLET ORAL DAILY
COMMUNITY

## 2022-09-19 NOTE — PROGRESS NOTES
Pt called office today to cancel upcoming appointments with Dr. Marian Peterson. She stated that she was able to get seen sooner somewhere else. All upcoming appts in our office cancelled.

## 2022-09-19 NOTE — Clinical Note
Saw for mid-trial. She wants sleeve, and in your order sheet you said EGD with [facility]. We have [facility] notes, but they did not do an EGD or recommended one as her reflux symptoms weren't daily and it has improved with conservative treatment. Did you still want an EGD?

## 2022-09-19 NOTE — Clinical Note
Saw for midtrial today, BMI is 39.9. She does have comorbidities of HTN and GERD, is she going to be okay for surgery or will she have to have a BMI of 40?

## 2022-09-19 NOTE — PROGRESS NOTES
Bariatric Preoperative Progress Note    Subjective:     Melvin Mason is a 43 y.o. female who presents today for followup of their candidacy for bariatric surgery. Since last seen, Melvin Mason has been working through our bariatric program towards gastric sleeve with Dr. Kamran Oconnell. Consult Weight: 235lbs  Today's Weight: 233lbs    Past Medical History:   Diagnosis Date    Arthritis     knees hurt    Asthma     Chest pain     Chronic pain 6 months    painful menses    Fibroids     GERD (gastroesophageal reflux disease)     Headache disorder     Heart palpitations     Migraine     Neurological disorder     brain vasular malformations       Past Surgical History:   Procedure Laterality Date    HX  SECTION  8402,[id number]    with BTL    HX GYN      endometrial ablation    HX HYSTEROSCOPY WITH ENDOMETRIAL ABLATION      No period since    HX WRIST FRACTURE TX      Right Carpal Tunnel Release       Current Outpatient Medications   Medication Sig Dispense Refill    hydroCHLOROthiazide (HYDRODIURIL) 12.5 mg tablet Take 12.5 mg by mouth daily. montelukast (Singulair) 10 mg tablet Take 10 mg by mouth daily. cholecalciferol (VITAMIN D3) (50,000 UNITS /1250 MCG) capsule Take 1 Capsule by mouth every seven (7) days. Indications: vitamin D deficiency (high dose therapy) 12 Capsule 0    cyclobenzaprine (FLEXERIL) 5 mg tablet Take 1 Tablet by mouth three (3) times daily as needed for Muscle Spasm(s). 30 Tablet 1    albuterol (Proventil HFA) 90 mcg/actuation inhaler Take 2 Puffs by inhalation every four (4) hours as needed for Wheezing. 1 Each 1    gabapentin (Neurontin) 300 mg capsule Take 1 Capsule by mouth two (2) times a day. Max Daily Amount: 600 mg. 180 Capsule 1    budesonide-formoteroL (SYMBICORT) 160-4.5 mcg/actuation HFAA Take 2 Puffs by inhalation every twelve (12) hours.  Indications: controller medication for asthma 10.2 g 2    albuterol (PROVENTIL HFA, VENTOLIN HFA, PROAIR HFA) 90 mcg/actuation inhaler Take 1-2 Puffs by inhalation every four (4) hours as needed for Wheezing. 6.7 Each 2    Comp. Stocking,Knee,Regular,Lrg misc 20-30 mm hg 1 Each 1    Nebulizer & Compressor machine Use as directed (Patient not taking: Reported on 2022) 1 Each 0    Nebulizer Accessories kit Use as directed (Patient not taking: Reported on 2022) 1 Kit 1       Allergies   Allergen Reactions    Azo [Phenazopyridine] Hives    Iodinated Contrast Media Shortness of Breath     Other reaction(s): wheezing/sob    Tramadol Hives       ROS:  Review of Systems   Constitutional:  Negative for malaise/fatigue and weight loss. Respiratory: Negative. Cardiovascular:  Positive for leg swelling. Negative for chest pain and palpitations. Gastrointestinal:  Positive for heartburn (improved since last visit). Negative for abdominal pain, constipation, diarrhea, nausea and vomiting. Neurological:  Positive for headaches. Negative for dizziness and tingling. Objective:     Physical Exam:  Visit Vitals  /82 (BP Patient Position: Sitting)   Pulse 96   Temp 98.6 °F (37 °C) (Temporal)   Resp 16   Ht 5' 4\" (1.626 m)   Wt 105.7 kg (233 lb)   SpO2 99%   BMI 39.99 kg/m²       Physical Exam  Vitals and nursing note reviewed. Constitutional:       Appearance: She is obese. HENT:      Head: Normocephalic and atraumatic. Pulmonary:      Effort: Pulmonary effort is normal.   Musculoskeletal:         General: Normal range of motion. Neurological:      General: No focal deficit present. Mental Status: She is alert and oriented to person, place, and time. Psychiatric:         Mood and Affect: Mood normal.         Behavior: Behavior normal.       Studies to date:    Labs: significant for vitamin D insufficiency, supplementation prescribed.    H pylori negative    EK2022  Normal sinus rhythm   Normal ECG   When compared with ECG of 2022 11:49,   No significant change was found     CXR: 2022  No acute findings. UGI: 8/16/2022  Gastroesophageal reflux; otherwise, unremarkable double contrast upper GI.     RUQ US: 9/16/2022  Suspect mild hepatic steatosis. .    Nutritional evaluation:  Completed 2 of 3    Psychiatric evaluation: Approved by Dr. Nitin Dallas on 8/31/2022    Support Group: Yes    PCP Clearance: Not yet received    EGD with [facility]: Not performed. Assessment:   Abimael Hawthorne is a 43 y.o. female who is progressing through the bariatric preoperative evaluation. At this time, she is an appropriate candidate for weight loss surgery pending below requirements. Plan:   -Complete remainder of preop evaluation including last WLT class, and PCP clearance. Will confirm with Dr. Mandy Phillips is EGD is still needed since [facility] did not recommend.   -Patient communicates understanding that the expectation is to lose or maintain weight during WLT. Weight gain may result in delay or cancellation of surgery.   -Follow up once has completed entirety of weight loss workup to determine next steps.       Lucho Quesada, AMPARO

## 2022-09-22 ENCOUNTER — HOSPITAL ENCOUNTER (OUTPATIENT)
Dept: LAB | Age: 42
Discharge: HOME | End: 2022-09-22
Payer: COMMERCIAL

## 2022-09-22 LAB
BASOPHILS # BLD: 0 K/UL (ref 0–0.1)
BASOPHILS NFR BLD: 0 % (ref 0–2)
DIFFERENTIAL METHOD BLD: ABNORMAL
EOSINOPHIL # BLD: 0 K/UL (ref 0–0.4)
EOSINOPHIL NFR BLD: 0 % (ref 0–5)
ERYTHROCYTE [DISTWIDTH] IN BLOOD BY AUTOMATED COUNT: 13.8 % (ref 11.6–14.5)
HCT VFR BLD AUTO: 39.3 % (ref 35–45)
HGB BLD-MCNC: 12.4 G/DL (ref 12–16)
IMM GRANULOCYTES # BLD AUTO: 0.1 K/UL (ref 0–0.04)
IMM GRANULOCYTES NFR BLD AUTO: 1 % (ref 0–0.5)
LYMPHOCYTES # BLD: 1.9 K/UL (ref 0.9–3.6)
LYMPHOCYTES NFR BLD: 12 % (ref 21–52)
MCH RBC QN AUTO: 25.5 PG (ref 24–34)
MCHC RBC AUTO-ENTMCNC: 31.6 G/DL (ref 31–37)
MCV RBC AUTO: 80.7 FL (ref 78–100)
MONOCYTES # BLD: 0.6 K/UL (ref 0.05–1.2)
MONOCYTES NFR BLD: 4 % (ref 3–10)
NEUTS SEG # BLD: 12.8 K/UL (ref 1.8–8)
NEUTS SEG NFR BLD: 83 % (ref 40–73)
NRBC # BLD: 0 K/UL (ref 0–0.01)
NRBC BLD-RTO: 0 PER 100 WBC
PLATELET # BLD AUTO: 297 K/UL (ref 135–420)
PMV BLD AUTO: 10.4 FL (ref 9.2–11.8)
RBC # BLD AUTO: 4.87 M/UL (ref 4.2–5.3)
WBC # BLD AUTO: 15.4 K/UL (ref 4.6–13.2)

## 2022-09-22 PROCEDURE — 85025 COMPLETE CBC W/AUTO DIFF WBC: CPT

## 2022-09-22 PROCEDURE — 86003 ALLG SPEC IGE CRUDE XTRC EA: CPT

## 2022-09-22 PROCEDURE — 36415 COLL VENOUS BLD VENIPUNCTURE: CPT

## 2022-09-22 PROCEDURE — 82785 ASSAY OF IGE: CPT

## 2022-09-27 LAB
A ALTERNATA IGE QN: 0.29 KU/L
A FUMIGATUS IGE QN: <0.1 KU/L
AMER ROACH IGE QN: <0.1 KU/L
AMER SYCAMORE IGE QN: <0.1 KU/L
BAHIA GRASS IGE QN: <0.1 KU/L
BERMUDA GRASS IGE QN: <0.1 KU/L
BOXELDER IGE QN: <0.1 KU/L
C HERBARUM IGE QN: 0.11 KU/L
CAT DANDER IGG QN: 1.39 KU/L
CLASS DESCRIPTION, 600268: ABNORMAL
COMMON RAGWEED IGE QN: <0.1 KU/L
D FARINAE IGE QN: <0.1 KU/L
D PTERONYSS IGE QN: <0.1 KU/L
DEPRECATED IGE QN: <0.1 KU/L
DOG DANDER IGE QN: 41.6 KU/L
ENGL PLANTAIN IGE QN: <0.1 KU/L
IGE SERPL-ACNC: 222 IU/ML (ref 6–495)
JOHNSON GRASS IGE QN: <0.1 KU/L
M RACEMOSUS IGE QN: <0.1 KU/L
MT JUNIPER IGE QN: 0.11 KU/L
MUGWORT IGE QN: <0.1 KU/L
NETTLE IGE QN: <0.1 KU/L
P NOTATUM IGE QN: <0.1 KU/L
S BOTRYOSUM IGE QN: 0.16 KU/L
SHEEP SORREL IGE QN: <0.1 KU/L
SWEET GUM IGE QN: <0.1 KU/L
TIMOTHY IGE QN: <0.1 KU/L
WHITE BIRCH IGE QN: <0.1 KU/L
WHITE ELM IGG QN: <0.1 KU/L
WHITE HICKORY IGE QN: <0.1 KU/L
WHITE MULBERRY IGE QN: <0.1 KU/L
WHITE OAK IGE QN: <0.1 KU/L

## 2022-10-12 ENCOUNTER — DOCUMENTATION ONLY (OUTPATIENT)
Dept: BARIATRICS/WEIGHT MGMT | Age: 42
End: 2022-10-12

## 2022-10-12 ENCOUNTER — HOSPITAL ENCOUNTER (OUTPATIENT)
Dept: BARIATRICS/WEIGHT MGMT | Age: 42
Discharge: HOME | End: 2022-10-12

## 2022-10-12 NOTE — PROGRESS NOTES
[address] Loss [place] RODRICK Ol[place] [address] [facility], [address]    Patient's Name: Otoniel Lu   Age: 43 y.o. YOB: 1980   Sex: female      Insurance:              Session: 3 of  3  Surgeon:  Dr. Juli Dorman    Height: 5 f 4   Weight:    231      Lbs. BMI:    Pounds Lost since last month: 0                 Pounds Gained since last month: 0    Starting Weight: 235     Previous Months Weight: 231  Overall Pounds Lost: 4   Overall Pounds Gained: 0    Patient has attended a support group meeting. Do you smoke? None    Alcohol intake:  Number of drinks at a time:  3 x a month  Number of times a week:     Class Guidelines    Guidelines are reviewed with patient at the start of every class. 1. Patient understands that weight loss trial classes must be consecutive. Patient understands if they miss a class, it is their responsibility to contact me to reschedule class. I will reach out to patient after their first no show. 2.  Patient understands the expectations that weight maintenance/weight loss is expected during the classes. Failure to demonstrate changes may result in one extra month of weight loss trial, followed by going back to see the surgeon. 3. Patient is also instructed to be doing their labs, blood work, psych visit, support group and any other test that the surgeon has used while they are working on their weight loss trial.  4. Patient is instructed to bring their education binder to all classes. Changes Made Since Last Class:     Eating Habits and Behaviors      Today we reviewed key diet principles. We talked about patient following a low calorie/low carbohydrate diet while they are in weight loss trials. To achieve this, patient is encouraged to avoid liquid calories, including alcohol, soda, sweet tea, and fruit juices. Patient can cut carbohydrates by trying to stick to meat and vegetables.   Patient can also eat eggs, cheese, and good fat, while trying to eliminate starches, such as pasta, rice, crackers, chips, popcorn. I also gave a power point that included 21 Ways to Stay on Track with a Healthy Lifestyle. Some of the food-related suggestions included drinking plenty of water or calorie-free beverages prior to their meal.  Patient is encouraged to to fill up on protein first, which is the ultimate fill-me up food. We talked about the importance of eating breakfast and the effects that can happen if one skips meals, which includes eating larger portions, snacking more, and decreasing their metabolism. With the suggestions in the power point, patient will be able to decrease their calories and carbohydrate intake. Patient's dietary habits include:    - Patient is eating 4 small meals per day. I have emphasized the importance of trying to eat within 1 hour of waking and having a cut off time in the evening. Meals are made up of mixture of protein, vegetables, and carbohydrates. Addressed to patient that the focus should be on protein and vegetables. Protein will help to burn more calories and keep them mast throughout the day. Portions are:  regular plate. I have encouraged patient to use a smaller plate to measure their portions. Patient's frequency of fast food is: not at all   Patient's frequency of carry out is: 1 x a month  Patient's intake of sit down: 1 x a month  We talked in class about the importance of planning ahead and trying to do more meal prep at home, so intake of eating out can be decreased. Patient is eating carbohydrates: eat a piece of toast with breakfast  Patient was instructed to cut out starches and keep under 75 grams of carbohydrates per day. Reviewed what portions of carbohydrates are  Patient is snacking on almonds, apples, protien bar. This is being done: 2 x  day. I have talked to patient about some lower carbohydrate snack choices that focused more protein. Patient's sweet intake is: 1 x a week or less. We talked about label reading and cutting out simple sugar. Patient is drinking 64 ounces of fluid per day. Fluid intake is make up of: water. I have encouraged patient to cut out liquid calories and focus on non-caloric, non-carbonated drinks. Physical Activity/Exercise    Comments: We talked about the importance of establishing a work out routine. Patient is currently doing daily exercise for activity. Goals have been set. Behavior Modification       Comments:   Some of the behavior tips that were included in the power point, include being choosy about night time snacking. Patient was encouraged to make the TV a no eating zone and not eat after 7 pm.  Patient is also encouraged to keep a food journal.      One of the other things we talked about during class is whether or not patient has a support system. Goals set by Registered Dietitian:  Drink 30 minutes per day.     Mj Wu Barry 87 RD  10/12/2022

## 2022-10-12 NOTE — PROGRESS NOTES
Nutrition Evaluation    Patient's Name: Shayy Cee   Age: 43 y.o. YOB: 1980   Sex: female    Height: 5 f 4 Weight: 231 BMI:    Starting Weight:  235        Smoking Status:  None  Alcohol Intake:  Number of Drinks at a Time: None  Number of Times a Week: None    Changes made during classes include:  Eating breakfast  Protein drink and almonds for snack  Trying to eat more salad    Summary:  I feel that Shayy Cee has demonstrated appropriate diet changes and is ready to move forward with surgery. Patient has been briefed on the importance of the protein drinks, vitamins, and the transition of the diet stages. Patient understands that the long-term diet will focus on protein and vegetables. Patient understand the effects of carbohydrates after surgery and what reactive hypoglycemia is. Patient is aware that they will be attending pre-op class 2 weeks before surgery and will get more detailed information on the post-op diet guidelines. Patient will see me again at 6 weeks post-op. At this 6 week visit, RD will assess how patient is tolerating soft protein and advance to vegetables, if tolerating soft protein without difficulty. Patient will also see RD again at 9 months post-op. This visit will assess patient's compliance with current protocol, including diet, vitamins, protein shakes, and exercise. Post-op diet guidelines will be reinforced. RD is available for questions and to meet with patient outside of the 6 week and 9 month post-op visit. We spent a lot of time talking about the vitamins. Patient understands the importance of being compliant with the diet protocol and the complications and risks that can occur if they are non-compliant with the nutritional protocol. Patient has attended at least one support group.     Candidate for surgery: Yes  Re-evaluation Date:     Procedure:  Gastric  Sleeve    Jamaal Hess MS RD  10/12/2022

## 2022-11-07 ENCOUNTER — OFFICE VISIT (OUTPATIENT)
Dept: SURGERY | Age: 42
End: 2022-11-07
Payer: COMMERCIAL

## 2022-11-07 VITALS
HEIGHT: 64 IN | DIASTOLIC BLOOD PRESSURE: 74 MMHG | WEIGHT: 234 LBS | TEMPERATURE: 98.3 F | OXYGEN SATURATION: 97 % | RESPIRATION RATE: 18 BRPM | BODY MASS INDEX: 39.95 KG/M2 | SYSTOLIC BLOOD PRESSURE: 116 MMHG | HEART RATE: 72 BPM

## 2022-11-07 DIAGNOSIS — K76.0 NAFLD (NONALCOHOLIC FATTY LIVER DISEASE): ICD-10-CM

## 2022-11-07 DIAGNOSIS — Z01.818 PREOP EXAMINATION: ICD-10-CM

## 2022-11-07 DIAGNOSIS — E66.01 SEVERE OBESITY (HCC): Primary | ICD-10-CM

## 2022-11-07 DIAGNOSIS — J45.20 MILD INTERMITTENT ASTHMA WITHOUT COMPLICATION: ICD-10-CM

## 2022-11-07 PROCEDURE — 99213 OFFICE O/P EST LOW 20 MIN: CPT | Performed by: SURGERY

## 2022-11-07 NOTE — PROGRESS NOTES
Bariatric Surgery Progress Note    CC: Preop appointment for bariatric surgery  Subjective:     Patient presents for a preop appointment for bariatric surgery having completed the insurance-mandated and clinically-relevant clearances/counseling. H pylori negative     EK2022  Normal sinus rhythm   Normal ECG   When compared with ECG of 2022 11:49,   No significant change was found      CXR: 2022  No acute findings. UGI: 2022  Gastroesophageal reflux; otherwise, unremarkable double contrast upper GI.   EGD normal ([facility] note)     RUQ US: 2022  Suspect mild hepatic steatosis. María Elena Miller     Psychiatric evaluation: Approved by Dr. Gonzalez Min on 2022     Support Group: Yes    PCP and pulmonary clearances in chart (REVIEW PREOP)  Psych clearance in chart    Previous relevant surgeries: BTL, C-sections    Patient Active Problem List    Diagnosis Date Noted    NAFLD (nonalcoholic fatty liver disease) 2022    GERD (gastroesophageal reflux disease) 2022    Asthma 2019    Severe obesity ([address] 10/10/2018    Palpitations 2016    Precordial pain 2016    Mild intermittent asthma 2014    Morbid obesity due to excess calories ([address] 2014      Past Medical History:   Diagnosis Date    Arthritis     knees hurt    Asthma     Chest pain     Chronic pain 6 months    painful menses    Fibroids     GERD (gastroesophageal reflux disease)     Headache disorder     Heart palpitations     Migraine     NAFLD (nonalcoholic fatty liver disease) 2022    Neurological disorder     brain vasular malformations     Past Surgical History:   Procedure Laterality Date    HX  SECTION  3357,1317    with BTL    HX GYN      endometrial ablation    HX HYSTEROSCOPY WITH ENDOMETRIAL ABLATION      No period since    HX WRIST FRACTURE TX      Right Carpal Tunnel Release      Social History     Tobacco Use    Smoking status: Former     Types: Cigarettes     Quit date: 3/23/1998 Years since quittin.6    Smokeless tobacco: Never    Tobacco comments:     smoked a few as a teen, Vaped for a year   Substance Use Topics    Alcohol use: Yes     Comment: socially, 2 beer/weekend      Family History   Problem Relation Age of Onset    Hypertension Mother     Hypertension Father     Diabetes Father     Parkinson's Disease Father     Cancer Paternal Grandmother     Mult Sclerosis Sister       Prior to Admission medications    Medication Sig Start Date End Date Taking? Authorizing Provider   hydroCHLOROthiazide (HYDRODIURIL) 12.5 mg tablet Take 12.5 mg by mouth daily. Yes Provider, Historical   montelukast (SINGULAIR) 10 mg tablet Take 10 mg by mouth daily. Yes Provider, Historical   cholecalciferol (VITAMIN D3) (50,000 UNITS /1250 MCG) capsule Take 1 Capsule by mouth every seven (7) days. Indications: vitamin D deficiency (high dose therapy) 22  Yes Aidee Payan NP   cyclobenzaprine (FLEXERIL) 5 mg tablet Take 1 Tablet by mouth three (3) times daily as needed for Muscle Spasm(s). 22  Yes Samuel Bernal NP   albuterol (Proventil HFA) 90 mcg/actuation inhaler Take 2 Puffs by inhalation every four (4) hours as needed for Wheezing. 22  Yes Bartley Lennox, MD   gabapentin (Neurontin) 300 mg capsule Take 1 Capsule by mouth two (2) times a day. Max Daily Amount: 600 mg. 22  Yes Samuel Bernal NP   budesonide-formoteroL (SYMBICORT) 160-4.5 mcg/actuation HFAA Take 2 Puffs by inhalation every twelve (12) hours. Indications: controller medication for asthma 22  Yes Samuel Bernal NP   albuterol (PROVENTIL HFA, VENTOLIN HFA, PROAIR HFA) 90 mcg/actuation inhaler Take 1-2 Puffs by inhalation every four (4) hours as needed for Wheezing.  22  Yes Samuel Bernal NP     Allergies   Allergen Reactions    Azo [Phenazopyridine] Hives    Iodinated Contrast Media Shortness of Breath     Other reaction(s): wheezing/sob    Tramadol Hives Review of Systems:    Constitutional: No fever or chills  Neurologic: No headache  Eyes: No scleral icterus or irritated eyes  Nose: No nasal pain or drainage  Mouth: No oral lesions or sore throat  Cardiac: No palpations or chest pain  Pulmonary: No cough or shortness of breath  Gastrointestinal: No nausea, emesis, diarrhea, or constipation  Genitourinary: No dysuria  Musculoskeletal: No muscle or joint tenderness  Skin: No rashes or lesions  Psychiatric: No anxiety or depressed mood      Objective:     Physical Exam:  Visit Vitals  /74   Pulse 72   Temp 98.3 °F (36.8 °C) (Temporal)   Resp 18   Ht 5' 4\" (1.626 m)   Wt 106.1 kg (234 lb)   SpO2 97%   BMI 40.17 kg/m²     General: No acute distress, conversant  Eyes: PERRLA, no scleral icterus  HENT: Normocephalic without oral lesions  Neck: Trachea midline without LAD  Cardiac: Normal pulse rate and rhythm  Pulmonary: Symmetric chest rise with normal effort  GI: Soft, NT, ND, no hernia, no splenomegaly  Skin: Warm without rash  Extremities: No edema or joint stiffness  Psych: Appropriate mood and affect    Assessment:     Morbid obesity with comorbidities  Plan:   The patient and I had further discussion after their successful supervised weight loss, medical, dietary, and psychiatric clearance. Preoperative upper GI/EGD reviewed. The patient elects to proceed with sleeve gastrectomy. We have reviewed the bariatric risk calculator and they understand the risks of surgery for their individual risk factors. At this point they are cleared for surgery from my point of view and will be submitted for insurance approval.    Patient will be consented for: sleeve gastrectomy.     We have discussed the possible complications of bariatric surgery which include but are not limited to failed weight loss, weight regain, malnutrition, leak, bleed, stricture, gastric ulcer, gastric fistula, gastric bleed, esophageal injury, gallstones, new or worsening gastric reflux, nausea, emesis, internal hernia, abdominal wall hernia, gastric perforation, need for revision / conversion / or reversal, pregnancy complications and loss, intestinal ischemia, post operative skin complications, possible thinning of their hair, bowel obstruction, dumping syndrome, wound infection, blood clots (DVT, mesenteric thrombus, pulmonary embolism), increased addictive tendency, risk of anesthesia, MI, stroke, and death. They expressed complete understanding and had no further questions. The patient understands this is a life altering decision and will require compliance to the program for the remainder of their life in order to be monitored to avoid complication and ensure successful, sustained weight control. They will be placed on a lifelong low carbohydrate and low sugar diet, exercise, and vitamin regimen and will require frequent blood draws and office visits to ensure adequate nutrition and program compliance. Visits and follow up will be in compliance with the guidelines set forth by [facility]. I have specifically mentioned the need to avoid all personal and second-hand tobacco exposure, systemic steroids, and NSAIDS after any bariatric surgery to help avoid the above listed complications. The patient has expressed understanding of the above and would like to proceed with surgery.       Signed By: Perla Nuñez MD [facility]  Bariatric and General Surgeon  [facility]    November 7, 2022

## 2022-11-10 ENCOUNTER — TELEPHONE (OUTPATIENT)
Dept: NEUROLOGY | Age: 42
End: 2022-11-10

## 2022-11-10 RX ORDER — AMITRIPTYLINE HYDROCHLORIDE 10 MG/1
TABLET, FILM COATED ORAL
COMMUNITY
Start: 2022-09-25 | End: 2022-11-10 | Stop reason: SDUPTHER

## 2022-11-10 RX ORDER — AMITRIPTYLINE HYDROCHLORIDE 10 MG/1
10 TABLET, FILM COATED ORAL
Qty: 30 TABLET | Refills: 0 | Status: SHIPPED | OUTPATIENT
Start: 2022-11-10 | End: 2022-12-10

## 2022-12-01 NOTE — PERIOP NOTES
PRE-SURGICAL INSTRUCTIONS        Patient's Name:  Alix ELLIS Date:  12/1/2022      Surgery Date:  12/21/2022                Do NOT eat or drink anything, including candy, gum, or ice chips after midnight on 12/21/2022, unless you have specific instructions from your surgeon or anesthesia provider to do so. You may brush your teeth before coming to the hospital.  No smoking 24 hours prior to the day of surgery. No alcohol 24 hours prior to the day of surgery. No recreational drugs for one week prior to the day of surgery. Leave all valuables, including money/purse, at home. Remove all jewelry, nail polish, acrylic nails, and makeup (including mascara); no lotions powders, deodorant, or perfume/cologne/after shave on the skin. Follow instruction for Hibiclens washes and CHG wipes from surgeon's office. Glasses/contact lenses and dentures may be worn to the hospital.  They will be removed prior to surgery. Call your doctor if symptoms of a cold or illness develop within 24-48 hours prior to your surgery. 11.  If you are having an outpatient procedure, please make arrangements for a responsible ADULT TO [address] [place] [address] and stay with you for 24 hours after your surgery. 12. ONE VISITOR in the hospital at this time for outpatient procedures. Exceptions may be made for surgical admissions, per nursing unit guidelines      Special Instructions:      Bring list of CURRENT medications. Bring inhaler. Bring any pertinent legal medical records. Take these medications the morning of surgery with a sip of water:  PER SURGEON   Follow physician instructions about stopping anticoagulants. On the day of surgery, come in the main entrance of [facility]. Let the  at the desk know you are there for surgery. A staff member will come escort you to the surgical area on the second floor.     If you have any questions or concerns, please do not hesitate to call:     (Prior to the day of surgery) [facility] department:  [phone]   (Day of surgery) Pre-Op department:  [phone]    These surgical instructions were reviewed with Jesus Dillon during the [facility] phone call.

## 2022-12-05 ENCOUNTER — DOCUMENTATION ONLY (OUTPATIENT)
Dept: BARIATRICS/WEIGHT MGMT | Age: 42
End: 2022-12-05

## 2022-12-05 NOTE — PROGRESS NOTES
CLINICAL NUTRITION PRE-OPERATIVE EDUCATION    Patient's Name: Samantha Dyson   Age: 43 y.o. YOB: 1980   Sex: female    Education & Materials Provided:   - Soft Protein Diet Shopping List  -  Supplemental Resource Guide: MVI, B12, Calcium Citrate, Vitamin D, Vitamin B1,   and iron recommendations  - Protein Supplement Information  - Fluid Requirements/ No Straws  - No Caffeine or Carbonation   - No alcohol              - No Snacks or No Concentrated Sweets     - Exercising   - Support System at [facility] of Support Group meetings. Support System after surgery includes: x     - Key Diet Principles            - Addressed Current Habits/Changes to Make   - Patient has been educated on the liquid diet to begin 1 week prior to surgery. Patient understands the transition of the diet. Attendance of support group: Yes    Summary:  Patient has completed the required amount of visits with the Registered Dietitian. During these nutrition visits, we focused on dietary changes, behavior changes, and the importance of establishing an exercise routine. The diet protocol that patient was prescribed emphasized on low carbohydrates (less than 100 grams per day prior to surgery and 60-80 grams of protein per day). At today's session, patient was educated on the post-op diet protocol. Patient understands the importance of keeping total fat and sugar less than 3 grams per serving. Patient is aware of the transition of the diet stages and is aware that they will be on clear liquids for 7days, followed by soft protein for 5 weeks. Patient understands the body needs ~ 60-70 grams of protein per day. During the liquid phase, patient will need 60 grams of protein from shakes. Once eating soft protein, patient will only need 1 shake per day. Patient is aware of the importance of the vitamins and protein drinks. We spent a lot of time talking about the vitamins.   Patient understands the importance of being compliant with the diet protocol and the complications and risks that can occur if they are non-compliant with the nutritional protocol and non-compliant with the vitamins. Patient has also been educated on the pre-op liquid diet for 1 week. Patient understands that failure to comply in pre-op liquid diet could result in surgery being canceled. Patient's 6 week post-op nutrition appointment has been scheduled. At this 6 week visit, RD will assess how patient is tolerating soft protein and advance to vegetables, if tolerating soft protein without difficulty. Patient will also see RD again at 9 months post-op. This visit will assess patient's compliance with current protocol, including diet, vitamins, protein shakes, and exercise. Post-op diet guidelines will be reinforced. RD is available for questions and to meet with patient outside of the 6 week and 9 month post-op visit. Ok to proceed.      Candidate for surgery: Yes  Re-evaluation Date:     Mj Conrad 87 RD  12/5/2022

## 2022-12-06 ENCOUNTER — HOSPITAL ENCOUNTER (OUTPATIENT)
Dept: BARIATRICS/WEIGHT MGMT | Age: 42
Discharge: HOME | End: 2022-12-06

## 2022-12-06 ENCOUNTER — DOCUMENTATION ONLY (OUTPATIENT)
Dept: SURGERY | Age: 42
End: 2022-12-06

## 2022-12-07 NOTE — PROGRESS NOTES
PRE-OPERATIVE EDUCATION    Patient's Name: Tory Royal   Age: 43 y.o.   YOB: 1980   Sex: female      Education Provided During Class:   -Surgery Options as well as advantages & disadvantages  -Required follow up visits after surgery with surgeon, dietician, and NP as well as labs with these visits  -Review of Patient Consent for Bariatric Surgery  -Discussion regarding risks and possible complications of surgery  -Review of 10 day pre-op diet and why this is important  -Medications to stop before surgery (blood thinners, estrogen containing products, and NSAIDs)  -Lifelong avoidance of NSAIDs  -Smoking cessation and impacts to healing after surgery  -Avoidance of pregnancy for 12-18 months after surgery  -Avoidance of alcohol for first 12 months after surgery  -What to pack for the hospital  -NPO after midnight before surgery (can brush teeth and can take requested medications with small sip water)  -Report to hospital 2 hours before surgery time and where to report to  -What to wear to the hospital  -Review what happens before and during surgery and average surgery time  -What happens and average time in recovery area  -When transferred to floor what will happen next (up walking within 2-3 hours and why this is important), importance of using IC and preventing pneumonia.  -Pain and nausea management after surgery  -Goals for post-op day 1 and for discharge    Adele Llamas NP

## 2022-12-15 NOTE — TELEPHONE ENCOUNTER
Dr. Rodriguez, please see refill request for patient of Dr. Magan Iyer, thank you! Requested Prescriptions     Pending Prescriptions Disp Refills    fluticasone-vilanterol (BREO ELLIPTA) 100-25 mcg/dose inhaler 1 Inhaler 5     Sig: Take 1 Puff by inhalation daily. [Never] : never [TextBox_4] : Mr. Louis is 25 year old male with history of BJORN on CPAP who now presents to office for pulmonary clearance for bariatric surgery.\par \par His chief concern is pulmonary clearance. \par \par Patient reports he is scheduled for bariatric surgery in 3/2023 with Dr. Davy Gillette at [facility]. He denies any history of bronchitis, pneumonia or any pulmonary issues. \par \par Patient reports he was diagnosed with BJORN 1 year ago and received RedMed Device 6 month ago. He reports he used CPAP device for few weeks and felt benefit from using the device.  He states he has not been using the device lately.  Patient reports his current mask is uncomfortable, he uses a full face mask currently.  Patient's current DME is [employer].  Patient does not have a copy of his sleep study, he states he know that sleep study was done by ENT in the [place].\par Patient's sleep symptoms were snoring, daytime sleepiness, and nonrestorative sleep.\par \par Patient reports he is COVID vaccinated.  He reports COVID infection in 2020 with mild symptoms.\par \par Patient denies fever, chills, shortness of breath at rest or exertion, cough, wheezing, nasal congestion, runny nose or heartburn/reflux.\par \par \par

## 2022-12-20 ENCOUNTER — ANESTHESIA EVENT (OUTPATIENT)
Dept: SURGERY | Age: 42
DRG: 621 | End: 2022-12-20
Payer: COMMERCIAL

## 2022-12-21 ENCOUNTER — HOSPITAL ENCOUNTER (INPATIENT)
Age: 42
LOS: 1 days | Discharge: HOME | DRG: 621 | End: 2022-12-22
Attending: SURGERY | Admitting: SURGERY
Payer: COMMERCIAL

## 2022-12-21 ENCOUNTER — ANESTHESIA (OUTPATIENT)
Dept: SURGERY | Age: 42
DRG: 621 | End: 2022-12-21
Payer: COMMERCIAL

## 2022-12-21 DIAGNOSIS — G89.18 ACUTE POST-OPERATIVE PAIN: Primary | ICD-10-CM

## 2022-12-21 PROBLEM — Z98.84 S/P LAPAROSCOPIC SLEEVE GASTRECTOMY: Status: ACTIVE | Noted: 2022-12-21

## 2022-12-21 LAB — HCG UR QL: NEGATIVE

## 2022-12-21 PROCEDURE — 74011000250 HC RX REV CODE- 250: Performed by: SURGERY

## 2022-12-21 PROCEDURE — 0DB64Z3 EXCISION OF STOMACH, PERCUTANEOUS ENDOSCOPIC APPROACH, VERTICAL: ICD-10-PCS | Performed by: SURGERY

## 2022-12-21 PROCEDURE — 77030035278 HC STPLR SEAL ENDOWR INTU -B: Performed by: SURGERY

## 2022-12-21 PROCEDURE — 74011250636 HC RX REV CODE- 250/636: Performed by: NURSE ANESTHETIST, CERTIFIED REGISTERED

## 2022-12-21 PROCEDURE — 77030040259 HC STPLR DEV SUREFORM DVNCI INTU -F: Performed by: SURGERY

## 2022-12-21 PROCEDURE — 74011250637 HC RX REV CODE- 250/637: Performed by: SURGERY

## 2022-12-21 PROCEDURE — 43775 LAP SLEEVE GASTRECTOMY: CPT | Performed by: SURGERY

## 2022-12-21 PROCEDURE — 8E0W4CZ ROBOTIC ASSISTED PROCEDURE OF TRUNK REGION, PERCUTANEOUS ENDOSCOPIC APPROACH: ICD-10-PCS | Performed by: SURGERY

## 2022-12-21 PROCEDURE — 2709999900 HC NON-CHARGEABLE SUPPLY: Performed by: SURGERY

## 2022-12-21 PROCEDURE — 74011000250 HC RX REV CODE- 250: Performed by: NURSE ANESTHETIST, CERTIFIED REGISTERED

## 2022-12-21 PROCEDURE — 77030018836 HC SOL IRR NACL ICUM -A: Performed by: SURGERY

## 2022-12-21 PROCEDURE — 77030041460 HC APL VISTASEAL J&J -B: Performed by: SURGERY

## 2022-12-21 PROCEDURE — 77030033188 HC TBNG FLTRD BIIFUR DISP CNMD -C: Performed by: SURGERY

## 2022-12-21 PROCEDURE — 76210000016 HC OR PH I REC 1 TO 1.5 HR: Performed by: SURGERY

## 2022-12-21 PROCEDURE — 77030041523 HC SEALNT FIBRN VITASEAL J&J -E: Performed by: SURGERY

## 2022-12-21 PROCEDURE — 77030035489 HC REDUCR CANN ENDOWR INTU -C: Performed by: SURGERY

## 2022-12-21 PROCEDURE — 77030040503 HC DRN WND PENRS MDII -A: Performed by: SURGERY

## 2022-12-21 PROCEDURE — 74011250636 HC RX REV CODE- 250/636: Performed by: ANESTHESIOLOGY

## 2022-12-21 PROCEDURE — 74011250636 HC RX REV CODE- 250/636: Performed by: SURGERY

## 2022-12-21 PROCEDURE — 77030003578 HC NDL INSUF VERES AMR -B: Performed by: SURGERY

## 2022-12-21 PROCEDURE — 77030035279 HC SEAL VSL ENDOWR XI INTU -I2: Performed by: SURGERY

## 2022-12-21 PROCEDURE — 77030035277 HC OBTRTR BLDELSS DISP INTU -B: Performed by: SURGERY

## 2022-12-21 PROCEDURE — 77030040922 HC BLNKT HYPOTHRM STRY -A: Performed by: SURGERY

## 2022-12-21 PROCEDURE — 77030022704 HC SUT VLOC COVD -B: Performed by: SURGERY

## 2022-12-21 PROCEDURE — 77030010031 HC SCIS ENDOSC MPLR J&J -C: Performed by: SURGERY

## 2022-12-21 PROCEDURE — 77030020703 HC SEAL CANN DISP INTU -B: Performed by: SURGERY

## 2022-12-21 PROCEDURE — 65270000029 HC RM PRIVATE

## 2022-12-21 PROCEDURE — 81025 URINE PREGNANCY TEST: CPT

## 2022-12-21 PROCEDURE — 88307 TISSUE EXAM BY PATHOLOGIST: CPT

## 2022-12-21 PROCEDURE — 77030040260 HC STPLR RELD SUREFORM DVNCI INTU -C: Performed by: SURGERY

## 2022-12-21 PROCEDURE — C9290 INJ, BUPIVACAINE LIPOSOME: HCPCS | Performed by: SURGERY

## 2022-12-21 PROCEDURE — 77030002933 HC SUT MCRYL J&J -A: Performed by: SURGERY

## 2022-12-21 PROCEDURE — 77030010507 HC ADH SKN DERMBND J&J -B: Performed by: SURGERY

## 2022-12-21 PROCEDURE — 77030040361 HC SLV COMPR DVT MDII -B: Performed by: SURGERY

## 2022-12-21 PROCEDURE — 77030016151 HC PROTCTR LNS DFOG COVD -B: Performed by: SURGERY

## 2022-12-21 PROCEDURE — 77030008683 HC TU ET CUF COVD -A: Performed by: ANESTHESIOLOGY

## 2022-12-21 PROCEDURE — 74011000250 HC RX REV CODE- 250: Performed by: ANESTHESIOLOGY

## 2022-12-21 PROCEDURE — 76010000934 HC OR TIME 1 TO 1.5HR INTENSV - TIER 2: Performed by: SURGERY

## 2022-12-21 PROCEDURE — 76060000033 HC ANESTHESIA 1 TO 1.5 HR: Performed by: SURGERY

## 2022-12-21 PROCEDURE — 77030013079 HC BLNKT BAIR HGGR 3M -A: Performed by: ANESTHESIOLOGY

## 2022-12-21 PROCEDURE — 77030003666 HC NDL SPINAL BD -A: Performed by: SURGERY

## 2022-12-21 PROCEDURE — 77030002912 HC SUT ETHBND J&J -A: Performed by: SURGERY

## 2022-12-21 RX ORDER — HYOSCYAMINE SULFATE 0.12 MG/1
0.12 TABLET SUBLINGUAL
Status: DISCONTINUED | OUTPATIENT
Start: 2022-12-21 | End: 2022-12-22 | Stop reason: HOSPADM

## 2022-12-21 RX ORDER — FENTANYL CITRATE 50 UG/ML
INJECTION, SOLUTION INTRAMUSCULAR; INTRAVENOUS AS NEEDED
Status: DISCONTINUED | OUTPATIENT
Start: 2022-12-21 | End: 2022-12-21 | Stop reason: HOSPADM

## 2022-12-21 RX ORDER — SCOLOPAMINE TRANSDERMAL SYSTEM 1 MG/1
1 PATCH, EXTENDED RELEASE TRANSDERMAL
Status: DISCONTINUED | OUTPATIENT
Start: 2022-12-21 | End: 2022-12-22 | Stop reason: HOSPADM

## 2022-12-21 RX ORDER — HYDROCORTISONE SODIUM SUCCINATE 100 MG/2ML
25 INJECTION, POWDER, FOR SOLUTION INTRAMUSCULAR; INTRAVENOUS ONCE
Status: ACTIVE | OUTPATIENT
Start: 2022-12-21 | End: 2022-12-21

## 2022-12-21 RX ORDER — MIDAZOLAM HYDROCHLORIDE 1 MG/ML
INJECTION, SOLUTION INTRAMUSCULAR; INTRAVENOUS AS NEEDED
Status: DISCONTINUED | OUTPATIENT
Start: 2022-12-21 | End: 2022-12-21 | Stop reason: HOSPADM

## 2022-12-21 RX ORDER — FENTANYL CITRATE 50 UG/ML
50 INJECTION, SOLUTION INTRAMUSCULAR; INTRAVENOUS
Status: DISCONTINUED | OUTPATIENT
Start: 2022-12-21 | End: 2022-12-21 | Stop reason: HOSPADM

## 2022-12-21 RX ORDER — ENOXAPARIN SODIUM 100 MG/ML
40 INJECTION SUBCUTANEOUS
Status: COMPLETED | OUTPATIENT
Start: 2022-12-21 | End: 2022-12-21

## 2022-12-21 RX ORDER — IPRATROPIUM BROMIDE AND ALBUTEROL SULFATE 2.5; .5 MG/3ML; MG/3ML
3 SOLUTION RESPIRATORY (INHALATION)
Status: COMPLETED | OUTPATIENT
Start: 2022-12-21 | End: 2022-12-21

## 2022-12-21 RX ORDER — ALBUTEROL SULFATE 0.83 MG/ML
2.5 SOLUTION RESPIRATORY (INHALATION)
Status: DISCONTINUED | OUTPATIENT
Start: 2022-12-21 | End: 2022-12-22 | Stop reason: HOSPADM

## 2022-12-21 RX ORDER — ROCURONIUM BROMIDE 10 MG/ML
INJECTION, SOLUTION INTRAVENOUS AS NEEDED
Status: DISCONTINUED | OUTPATIENT
Start: 2022-12-21 | End: 2022-12-21 | Stop reason: HOSPADM

## 2022-12-21 RX ORDER — INSULIN LISPRO 100 [IU]/ML
INJECTION, SOLUTION INTRAVENOUS; SUBCUTANEOUS ONCE
Status: DISCONTINUED | OUTPATIENT
Start: 2022-12-21 | End: 2022-12-21 | Stop reason: HOSPADM

## 2022-12-21 RX ORDER — DIPHENHYDRAMINE HYDROCHLORIDE 50 MG/ML
25 INJECTION, SOLUTION INTRAMUSCULAR; INTRAVENOUS
Status: DISCONTINUED | OUTPATIENT
Start: 2022-12-21 | End: 2022-12-22 | Stop reason: HOSPADM

## 2022-12-21 RX ORDER — LIDOCAINE HYDROCHLORIDE 20 MG/ML
INJECTION, SOLUTION EPIDURAL; INFILTRATION; INTRACAUDAL; PERINEURAL AS NEEDED
Status: DISCONTINUED | OUTPATIENT
Start: 2022-12-21 | End: 2022-12-21 | Stop reason: HOSPADM

## 2022-12-21 RX ORDER — HYDROCORTISONE SODIUM SUCCINATE 100 MG/2ML
100 INJECTION, POWDER, FOR SOLUTION INTRAMUSCULAR; INTRAVENOUS ONCE
Status: COMPLETED | OUTPATIENT
Start: 2022-12-21 | End: 2022-12-21

## 2022-12-21 RX ORDER — FENTANYL CITRATE 50 UG/ML
25 INJECTION, SOLUTION INTRAMUSCULAR; INTRAVENOUS AS NEEDED
Status: DISCONTINUED | OUTPATIENT
Start: 2022-12-21 | End: 2022-12-21 | Stop reason: HOSPADM

## 2022-12-21 RX ORDER — KETOROLAC TROMETHAMINE 15 MG/ML
15 INJECTION, SOLUTION INTRAMUSCULAR; INTRAVENOUS
Status: DISCONTINUED | OUTPATIENT
Start: 2022-12-21 | End: 2022-12-22 | Stop reason: HOSPADM

## 2022-12-21 RX ORDER — PROPOFOL 10 MG/ML
INJECTION, EMULSION INTRAVENOUS AS NEEDED
Status: DISCONTINUED | OUTPATIENT
Start: 2022-12-21 | End: 2022-12-21 | Stop reason: HOSPADM

## 2022-12-21 RX ORDER — ENOXAPARIN SODIUM 100 MG/ML
40 INJECTION SUBCUTANEOUS EVERY 24 HOURS
Status: DISCONTINUED | OUTPATIENT
Start: 2022-12-22 | End: 2022-12-22 | Stop reason: HOSPADM

## 2022-12-21 RX ORDER — INDOCYANINE GREEN AND WATER 25 MG
2.5 KIT INJECTION
Status: DISCONTINUED | OUTPATIENT
Start: 2022-12-21 | End: 2022-12-21

## 2022-12-21 RX ORDER — HYDROMORPHONE HYDROCHLORIDE 1 MG/ML
1 INJECTION, SOLUTION INTRAMUSCULAR; INTRAVENOUS; SUBCUTANEOUS
Status: DISCONTINUED | OUTPATIENT
Start: 2022-12-21 | End: 2022-12-22 | Stop reason: HOSPADM

## 2022-12-21 RX ORDER — ACETAMINOPHEN 325 MG/1
650 TABLET ORAL EVERY 6 HOURS
Status: DISCONTINUED | OUTPATIENT
Start: 2022-12-21 | End: 2022-12-22 | Stop reason: HOSPADM

## 2022-12-21 RX ORDER — NALOXONE HYDROCHLORIDE 0.4 MG/ML
0.4 INJECTION, SOLUTION INTRAMUSCULAR; INTRAVENOUS; SUBCUTANEOUS AS NEEDED
Status: DISCONTINUED | OUTPATIENT
Start: 2022-12-21 | End: 2022-12-22 | Stop reason: HOSPADM

## 2022-12-21 RX ORDER — ONDANSETRON 2 MG/ML
INJECTION INTRAMUSCULAR; INTRAVENOUS AS NEEDED
Status: DISCONTINUED | OUTPATIENT
Start: 2022-12-21 | End: 2022-12-21 | Stop reason: HOSPADM

## 2022-12-21 RX ORDER — APREPITANT 40 MG/1
40 CAPSULE ORAL ONCE
Status: COMPLETED | OUTPATIENT
Start: 2022-12-21 | End: 2022-12-21

## 2022-12-21 RX ORDER — MAGNESIUM SULFATE 100 %
4 CRYSTALS MISCELLANEOUS AS NEEDED
Status: DISCONTINUED | OUTPATIENT
Start: 2022-12-21 | End: 2022-12-21 | Stop reason: HOSPADM

## 2022-12-21 RX ORDER — SODIUM CHLORIDE, SODIUM LACTATE, POTASSIUM CHLORIDE, CALCIUM CHLORIDE 600; 310; 30; 20 MG/100ML; MG/100ML; MG/100ML; MG/100ML
125 INJECTION, SOLUTION INTRAVENOUS CONTINUOUS
Status: DISCONTINUED | OUTPATIENT
Start: 2022-12-21 | End: 2022-12-22 | Stop reason: HOSPADM

## 2022-12-21 RX ORDER — SODIUM CHLORIDE 0.9 % (FLUSH) 0.9 %
5-40 SYRINGE (ML) INJECTION AS NEEDED
Status: DISCONTINUED | OUTPATIENT
Start: 2022-12-21 | End: 2022-12-21 | Stop reason: HOSPADM

## 2022-12-21 RX ORDER — OXYCODONE HYDROCHLORIDE 5 MG/1
5 TABLET ORAL
Status: DISCONTINUED | OUTPATIENT
Start: 2022-12-21 | End: 2022-12-22 | Stop reason: HOSPADM

## 2022-12-21 RX ORDER — SUCCINYLCHOLINE CHLORIDE 20 MG/ML
INJECTION INTRAMUSCULAR; INTRAVENOUS AS NEEDED
Status: DISCONTINUED | OUTPATIENT
Start: 2022-12-21 | End: 2022-12-21 | Stop reason: HOSPADM

## 2022-12-21 RX ORDER — LIDOCAINE HYDROCHLORIDE 10 MG/ML
0.1 INJECTION, SOLUTION EPIDURAL; INFILTRATION; INTRACAUDAL; PERINEURAL AS NEEDED
Status: DISCONTINUED | OUTPATIENT
Start: 2022-12-21 | End: 2022-12-21 | Stop reason: HOSPADM

## 2022-12-21 RX ORDER — BUPIVACAINE HYDROCHLORIDE 2.5 MG/ML
INJECTION, SOLUTION EPIDURAL; INFILTRATION; INTRACAUDAL AS NEEDED
Status: DISCONTINUED | OUTPATIENT
Start: 2022-12-21 | End: 2022-12-21 | Stop reason: HOSPADM

## 2022-12-21 RX ORDER — ONDANSETRON 2 MG/ML
4 INJECTION INTRAMUSCULAR; INTRAVENOUS
Status: DISCONTINUED | OUTPATIENT
Start: 2022-12-21 | End: 2022-12-22 | Stop reason: HOSPADM

## 2022-12-21 RX ORDER — SODIUM CHLORIDE, SODIUM LACTATE, POTASSIUM CHLORIDE, CALCIUM CHLORIDE 600; 310; 30; 20 MG/100ML; MG/100ML; MG/100ML; MG/100ML
25 INJECTION, SOLUTION INTRAVENOUS CONTINUOUS
Status: DISCONTINUED | OUTPATIENT
Start: 2022-12-21 | End: 2022-12-21 | Stop reason: HOSPADM

## 2022-12-21 RX ORDER — SODIUM CHLORIDE 0.9 % (FLUSH) 0.9 %
5-40 SYRINGE (ML) INJECTION EVERY 8 HOURS
Status: DISCONTINUED | OUTPATIENT
Start: 2022-12-21 | End: 2022-12-21 | Stop reason: HOSPADM

## 2022-12-21 RX ORDER — GLYCOPYRROLATE 0.2 MG/ML
INJECTION INTRAMUSCULAR; INTRAVENOUS AS NEEDED
Status: DISCONTINUED | OUTPATIENT
Start: 2022-12-21 | End: 2022-12-21 | Stop reason: HOSPADM

## 2022-12-21 RX ORDER — HYDROCORTISONE SODIUM SUCCINATE 100 MG/2ML
12.5 INJECTION, POWDER, FOR SOLUTION INTRAMUSCULAR; INTRAVENOUS EVERY 12 HOURS
Status: DISPENSED | OUTPATIENT
Start: 2022-12-21 | End: 2022-12-22

## 2022-12-21 RX ORDER — ACETAMINOPHEN 650 MG/1
SUPPOSITORY RECTAL AS NEEDED
Status: DISCONTINUED | OUTPATIENT
Start: 2022-12-21 | End: 2022-12-21 | Stop reason: HOSPADM

## 2022-12-21 RX ADMIN — FENTANYL CITRATE 50 MCG: 50 INJECTION, SOLUTION INTRAMUSCULAR; INTRAVENOUS at 11:58

## 2022-12-21 RX ADMIN — FENTANYL CITRATE 50 MCG: 50 INJECTION, SOLUTION INTRAMUSCULAR; INTRAVENOUS at 12:08

## 2022-12-21 RX ADMIN — ONDANSETRON 4 MG: 2 INJECTION INTRAMUSCULAR; INTRAVENOUS at 21:15

## 2022-12-21 RX ADMIN — FAMOTIDINE 20 MG: 10 INJECTION, SOLUTION INTRAVENOUS at 09:56

## 2022-12-21 RX ADMIN — SODIUM CHLORIDE, POTASSIUM CHLORIDE, SODIUM LACTATE AND CALCIUM CHLORIDE 125 ML/HR: 600; 310; 30; 20 INJECTION, SOLUTION INTRAVENOUS at 19:42

## 2022-12-21 RX ADMIN — SODIUM CHLORIDE, SODIUM LACTATE, POTASSIUM CHLORIDE, AND CALCIUM CHLORIDE 25 ML/HR: 600; 310; 30; 20 INJECTION, SOLUTION INTRAVENOUS at 10:03

## 2022-12-21 RX ADMIN — SODIUM CHLORIDE, POTASSIUM CHLORIDE, SODIUM LACTATE AND CALCIUM CHLORIDE 125 ML/HR: 600; 310; 30; 20 INJECTION, SOLUTION INTRAVENOUS at 18:19

## 2022-12-21 RX ADMIN — LIDOCAINE HYDROCHLORIDE 100 MG: 20 INJECTION, SOLUTION EPIDURAL; INFILTRATION; INTRACAUDAL; PERINEURAL at 10:27

## 2022-12-21 RX ADMIN — APREPITANT 40 MG: 40 CAPSULE ORAL at 09:53

## 2022-12-21 RX ADMIN — ROCURONIUM BROMIDE 10 MG: 50 INJECTION INTRAVENOUS at 10:27

## 2022-12-21 RX ADMIN — FENTANYL CITRATE 100 MCG: 50 INJECTION, SOLUTION INTRAMUSCULAR; INTRAVENOUS at 10:27

## 2022-12-21 RX ADMIN — FENTANYL CITRATE 25 MCG: 50 INJECTION, SOLUTION INTRAMUSCULAR; INTRAVENOUS at 12:56

## 2022-12-21 RX ADMIN — FENTANYL CITRATE 50 MCG: 50 INJECTION, SOLUTION INTRAMUSCULAR; INTRAVENOUS at 10:42

## 2022-12-21 RX ADMIN — GLYCOPYRROLATE 0.2 MG: 0.2 INJECTION INTRAMUSCULAR; INTRAVENOUS at 10:16

## 2022-12-21 RX ADMIN — SUGAMMADEX 200 MG: 100 INJECTION, SOLUTION INTRAVENOUS at 11:26

## 2022-12-21 RX ADMIN — MIDAZOLAM HYDROCHLORIDE 2 MG: 2 INJECTION, SOLUTION INTRAMUSCULAR; INTRAVENOUS at 10:16

## 2022-12-21 RX ADMIN — PROPOFOL 200 MG: 10 INJECTION, EMULSION INTRAVENOUS at 10:27

## 2022-12-21 RX ADMIN — FENTANYL CITRATE 50 MCG: 50 INJECTION, SOLUTION INTRAMUSCULAR; INTRAVENOUS at 10:48

## 2022-12-21 RX ADMIN — KETOROLAC TROMETHAMINE 15 MG: 15 INJECTION, SOLUTION INTRAMUSCULAR; INTRAVENOUS at 14:37

## 2022-12-21 RX ADMIN — ACETAMINOPHEN 650 MG: 325 TABLET, FILM COATED ORAL at 14:37

## 2022-12-21 RX ADMIN — CEFAZOLIN SODIUM 2 G: 1 INJECTION, POWDER, FOR SOLUTION INTRAMUSCULAR; INTRAVENOUS at 10:16

## 2022-12-21 RX ADMIN — HYDROCORTISONE SODIUM SUCCINATE 12.5 MG: 100 INJECTION, POWDER, FOR SOLUTION INTRAMUSCULAR; INTRAVENOUS at 21:04

## 2022-12-21 RX ADMIN — ROCURONIUM BROMIDE 40 MG: 50 INJECTION INTRAVENOUS at 10:33

## 2022-12-21 RX ADMIN — OXYCODONE HYDROCHLORIDE 5 MG: 5 TABLET ORAL at 18:22

## 2022-12-21 RX ADMIN — ARFORMOTEROL TARTRATE: 15 SOLUTION RESPIRATORY (INHALATION) at 21:06

## 2022-12-21 RX ADMIN — ACETAMINOPHEN 650 MG: 325 TABLET, FILM COATED ORAL at 21:05

## 2022-12-21 RX ADMIN — SUCCINYLCHOLINE CHLORIDE 100 MG: 20 INJECTION, SOLUTION INTRAMUSCULAR; INTRAVENOUS at 10:27

## 2022-12-21 RX ADMIN — ONDANSETRON 4 MG: 2 INJECTION INTRAMUSCULAR; INTRAVENOUS at 11:24

## 2022-12-21 RX ADMIN — ENOXAPARIN SODIUM 40 MG: 100 INJECTION SUBCUTANEOUS at 10:09

## 2022-12-21 RX ADMIN — OXYCODONE HYDROCHLORIDE 5 MG: 5 TABLET ORAL at 22:16

## 2022-12-21 RX ADMIN — FENTANYL CITRATE 25 MCG: 50 INJECTION, SOLUTION INTRAMUSCULAR; INTRAVENOUS at 12:23

## 2022-12-21 RX ADMIN — IPRATROPIUM BROMIDE AND ALBUTEROL SULFATE 3 ML: .5; 2.5 SOLUTION RESPIRATORY (INHALATION) at 09:49

## 2022-12-21 RX ADMIN — HYDROCORTISONE SODIUM SUCCINATE 100 MG: 100 INJECTION, POWDER, FOR SOLUTION INTRAMUSCULAR; INTRAVENOUS at 09:49

## 2022-12-21 NOTE — OP NOTES
Operative Report    Patient: Jonelle Baez MRN: [id number]  SSN: [id number]    YOB: 1980  Age: 43 y.o. Sex: female       Date of Surgery: 12/21/2022     Preoperative Diagnosis: Morbid obesity ([address] [E66.01]  Essential hypertension [I10]  Gastroesophageal reflux disease, unspecified whether esophagitis present [K21.9]  Mild intermittent asthma, unspecified whether complicated [C28.07]     Postoperative Diagnosis: Morbid obesity ([address] [E66.01]  Essential hypertension [I10]  Gastroesophageal reflux disease, unspecified whether esophagitis present [K21.9]  Mild intermittent asthma, unspecified whether complicated [Y96.34]     Surgeon(s) and Role:     * Analilia Sepulveda MD - Primary    Anesthesia: General     Procedure: Procedure(s):  ROBOTIC ASSISTED SLEEVE GASTRECTOMY WITH POSSIBLE LIVER WEDGE BIOPSY, POSSIBLE HIATAL HERNIA REPAIR   Laparoscopic sleeve gastrectomy with robotic assist    Procedure in Detail: Jonelle Baez was identified in the pre-operative holding area. Informed consent was obtained after a complete discussion of risks, benefits and alternatives to surgery were had with the patient. The patient was brought back to the operating room and placed under general endotracheal anesthesia in the supine position on the operating room table. SCDs were applied. Preop VTE prophylaxis as well as all other multimodal analgesia, GI prophylaxis, etc were verified. The patient was then prepped and draped in the usual sterile fashion after being appropriately padded and secured to the table. A timeout was performed verifying patient identity, planned procedure, medications, and all other pertinent aspects of the case. An incision was made at Coburn's point and a Verress needle was introduced into the peritoneal cavity. Saline drop test showed no blood on aspiration and free flow of saline into the peritoneal cavity. The peritoneal cavity was insufflated to 15mmHg without incident.  An 8mm optical trocar was introduced, visualizing the layers of the abdominal wall on entry. No evidence of visceral injury was noted from trocar or verress needle placement. An 8mm trocar was placed above and to the left of the umbilicus, a 88OB trocar to the right of the umbilicus, and an 8mm trocar placed in the left lateral abdomen, all under vision of the laparoscope. A LIZ block was performed under laparoscopic visualization bilaterally. We then made an incision in the epigastrium and inserted the [facility] retractor. The left lobe of liver was elevated. The robot device was docked. We began by identifying 5 cm proximal to the pylorus to ensure our dissection end point. Using the ultrasonic nehal we took down the omentum on the greater curvature of the stomach including the short gastrics at the fundus. Dissection was continued up to the left jomar and angle of His. No hiatal hernia was appreciated. We ensured posterior mobilization over the pancreas. Next, we inserted the 40F calibration tube and manipulated it into the antrum. Suction was placed. We then used multiple appropriately sized stapler loads to begin the gastrectomy at the proximal antrum, careful to not narrow the angularis incisura, leaving a 1cm distance off the gastroesophageal junction. We removed the calibration tube. We ensured hemostasis. The specimen was removed. We removed the [facility] retractor. We removed the trocars under direct visualization. The dermis was closed with 4-0 Monocryl followed by Dermabond for the skin. At the end of the procedure all instrument, needle, and sponge counts were correct. I was present and scrubbed throughout the entirety of the case. The patient awoke from anesthesia and was extubated without complication and sent to PACU in stable condition.       Estimated Blood Loss:  minimal    Tourniquet Time: * No tourniquets in log *      Implants: * No implants in log *            Specimens:   ID Type Source Tests Collected by Time Destination   1 : PARTIAL GASTRECTOMY Preservative Stomach  Ashley Veras MD 12/21/2022 1126 Pathology           Drains: None                Complications: None    Counts: Sponge and needle counts were correct times two.     Signed By:  Koko Miranda MD     December 21, 2022

## 2022-12-21 NOTE — ANESTHESIA PREPROCEDURE EVALUATION
Relevant Problems   No relevant active problems       Anesthetic History   No history of anesthetic complications            Review of Systems / Medical History  Patient summary reviewed and pertinent labs reviewed    Pulmonary            Asthma : poorly controlled       Neuro/Psych         Headaches    Comments: Pt with recent diagnosis of venous malformation intracerebral, two spots. Recommended to avoid vasoconstrictors like cold medicines, Afrin spray etc.  No defects as yet.  No TIA or CVA Cardiovascular                  Exercise tolerance: >4 METS     GI/Hepatic/Renal     GERD: well controlled      Liver disease     Endo/Other        Morbid obesity and arthritis     Other Findings            Physical Exam    Airway  Mallampati: II  TM Distance: 4 - 6 cm  Neck ROM: normal range of motion   Mouth opening: Normal     Cardiovascular    Rhythm: regular  Rate: normal         Dental  No notable dental hx       Pulmonary  Breath sounds clear to auscultation               Abdominal  GI exam deferred       Other Findings            Anesthetic Plan    ASA: 3  Anesthesia type: general          Induction: Intravenous  Anesthetic plan and risks discussed with: Patient

## 2022-12-21 NOTE — PERIOP NOTES
Assumed care of pt from OR via bed. Attached to monitor. VSS. OR, MAR and anesthesia report appreciated. Will cont to monitor.

## 2022-12-21 NOTE — ANESTHESIA POSTPROCEDURE EVALUATION
Procedure(s):  ROBOTIC ASSISTED SLEEVE GASTRECTOMY. general    Anesthesia Post Evaluation      Multimodal analgesia: multimodal analgesia used between 6 hours prior to anesthesia start to PACU discharge  Patient location during evaluation: PACU  Patient participation: complete - patient participated  Level of consciousness: awake and alert  Pain management: adequate  Airway patency: patent  Anesthetic complications: no  Cardiovascular status: acceptable  Respiratory status: acceptable  Hydration status: acceptable  Post anesthesia nausea and vomiting:  none  Final Post Anesthesia Temperature Assessment:  Normothermia (36.0-37.5 degrees C)      INITIAL Post-op Vital signs:   Vitals Value Taken Time   /92 12/21/22 1204   Temp 36.6 °C (97.8 °F) 12/21/22 1150   Pulse 103 12/21/22 1206   Resp 19 12/21/22 1206   SpO2 100 % 12/21/22 1206   Vitals shown include unvalidated device data.

## 2022-12-21 NOTE — PROGRESS NOTES
Received pt from PACU. No s/s of any pain or distress noted. Started on ice chips, scds in place, care plan reviewed, fluids started, medication administered. Call bell within reach and bed is in the lowest position.  Will continue to monitor

## 2022-12-21 NOTE — PERIOP NOTES
1400:   TRANSFER - OUT REPORT:    Verbal report given to Andressa(name) on [facility]  being transferred to 2 Surgical (unit) for routine post - op       Report consisted of patients Situation, Background, Assessment and   Recommendations(SBAR). Information from the following report(s) SBAR, Kardex, Procedure Summary, and MAR was reviewed with the receiving nurse. Lines:   Peripheral IV 12/21/22 Posterior;Right Hand (Active)   Site Assessment Clean, dry, & intact 12/21/22 0921   Phlebitis Assessment 0 12/21/22 0921   Infiltration Assessment 0 12/21/22 0921   Dressing Status Clean, dry, & intact 12/21/22 0921   Dressing Type Tape;Transparent 12/21/22 0921   Hub Color/Line Status Pink 12/21/22 0921   Alcohol Cap Used No 12/21/22 5637        Opportunity for questions and clarification was provided.       Patient transported with:   Hospital Transporter

## 2022-12-21 NOTE — H&P
Patient presents for a preop appointment for bariatric surgery having completed the insurance-mandated and clinically-relevant clearances/counseling. H pylori negative     EK2022  Normal sinus rhythm   Normal ECG   When compared with ECG of 2022 11:49,   No significant change was found      CXR: 2022  No acute findings. UGI: 2022  Gastroesophageal reflux; otherwise, unremarkable double contrast upper GI.   EGD normal (GLST note)     RUQ US: 2022  Suspect mild hepatic steatosis. Rosemary Haque      Psychiatric evaluation: Approved by Dr. Karissa Mckenna on 2022     Support Group: Yes     PCP and pulmonary clearances in chart (REVIEW PREOP)  Psych clearance in chart     Previous relevant surgeries: BTL, C-sections          Patient Active Problem List     Diagnosis Date Noted    NAFLD (nonalcoholic fatty liver disease) 2022    GERD (gastroesophageal reflux disease) 2022    Asthma 2019    Severe obesity ([address] 10/10/2018    Palpitations 2016    Precordial pain 2016    Mild intermittent asthma 2014    Morbid obesity due to excess calories ([address] 2014           Past Medical History:   Diagnosis Date    Arthritis       knees hurt    Asthma      Chest pain      Chronic pain 6 months     painful menses    Fibroids      GERD (gastroesophageal reflux disease)      Headache disorder      Heart palpitations      Migraine      NAFLD (nonalcoholic fatty liver disease) 2022    Neurological disorder       brain vasular malformations            Past Surgical History:   Procedure Laterality Date    HX  SECTION   9626,5898     with BTL    HX GYN         endometrial ablation    HX HYSTEROSCOPY WITH ENDOMETRIAL ABLATION        No period since    HX WRIST FRACTURE TX         Right Carpal Tunnel Release      Social History            Tobacco Use    Smoking status: Former       Types: Cigarettes       Quit date: 3/23/1998       Years since quittin.6 Smokeless tobacco: Never    Tobacco comments:       smoked a few as a teen, Vaped for a year   Substance Use Topics    Alcohol use: Yes       Comment: socially, 2 beer/weekend            Family History   Problem Relation Age of Onset    Hypertension Mother      Hypertension Father      Diabetes Father      Parkinson's Disease Father      Cancer Paternal Grandmother      Mult Sclerosis Sister                Prior to Admission medications    Medication Sig Start Date End Date Taking? Authorizing Provider   hydroCHLOROthiazide (HYDRODIURIL) 12.5 mg tablet Take 12.5 mg by mouth daily. Yes Provider, Historical   montelukast (SINGULAIR) 10 mg tablet Take 10 mg by mouth daily. Yes Provider, Historical   cholecalciferol (VITAMIN D3) (50,000 UNITS /1250 MCG) capsule Take 1 Capsule by mouth every seven (7) days. Indications: vitamin D deficiency (high dose therapy) 8/31/22   Yes Opal Becerra NP   cyclobenzaprine (FLEXERIL) 5 mg tablet Take 1 Tablet by mouth three (3) times daily as needed for Muscle Spasm(s). 4/29/22   Yes Filipe Swan NP   albuterol (Proventil HFA) 90 mcg/actuation inhaler Take 2 Puffs by inhalation every four (4) hours as needed for Wheezing. 4/23/22   Yes Mariposa Woodall MD   gabapentin (Neurontin) 300 mg capsule Take 1 Capsule by mouth two (2) times a day. Max Daily Amount: 600 mg. 4/1/22   Yes Filipe Swan NP   budesonide-formoteroL (SYMBICORT) 160-4.5 mcg/actuation HFAA Take 2 Puffs by inhalation every twelve (12) hours. Indications: controller medication for asthma 4/1/22   Yes Filipe Swan NP   albuterol (PROVENTIL HFA, VENTOLIN HFA, PROAIR HFA) 90 mcg/actuation inhaler Take 1-2 Puffs by inhalation every four (4) hours as needed for Wheezing.  4/1/22   Yes Filipe Swan NP            Allergies   Allergen Reactions    Azo [Phenazopyridine] Hives    Iodinated Contrast Media Shortness of Breath       Other reaction(s): wheezing/sob    Tramadol Hives Review of Systems:    Constitutional: No fever or chills  Neurologic: No headache  Eyes: No scleral icterus or irritated eyes  Nose: No nasal pain or drainage  Mouth: No oral lesions or sore throat  Cardiac: No palpations or chest pain  Pulmonary: No cough or shortness of breath  Gastrointestinal: No nausea, emesis, diarrhea, or constipation  Genitourinary: No dysuria  Musculoskeletal: No muscle or joint tenderness  Skin: No rashes or lesions  Psychiatric: No anxiety or depressed mood        Objective:      Physical Exam:  Visit Vitals  /74   Pulse 72   Temp 98.3 °F (36.8 °C) (Temporal)   Resp 18   Ht 5' 4\" (1.626 m)   Wt 106.1 kg (234 lb)   SpO2 97%   BMI 40.17 kg/m²      General: No acute distress, conversant  Eyes: PERRLA, no scleral icterus  HENT: Normocephalic without oral lesions  Neck: Trachea midline without LAD  Cardiac: Normal pulse rate and rhythm  Pulmonary: Symmetric chest rise with normal effort  GI: Soft, NT, ND, no hernia, no splenomegaly  Skin: Warm without rash  Extremities: No edema or joint stiffness  Psych: Appropriate mood and affect     Assessment:      Morbid obesity with comorbidities  Plan:   The patient and I had further discussion after their successful supervised weight loss, medical, dietary, and psychiatric clearance. Preoperative upper GI/EGD reviewed. The patient elects to proceed with sleeve gastrectomy. We have reviewed the bariatric risk calculator and they understand the risks of surgery for their individual risk factors.

## 2022-12-22 VITALS
HEIGHT: 64 IN | OXYGEN SATURATION: 99 % | TEMPERATURE: 98 F | SYSTOLIC BLOOD PRESSURE: 117 MMHG | HEART RATE: 68 BPM | RESPIRATION RATE: 16 BRPM | WEIGHT: 235 LBS | BODY MASS INDEX: 40.12 KG/M2 | DIASTOLIC BLOOD PRESSURE: 80 MMHG

## 2022-12-22 LAB
ERYTHROCYTE [DISTWIDTH] IN BLOOD BY AUTOMATED COUNT: 16.3 % (ref 11.6–14.5)
HCT VFR BLD AUTO: 33.5 % (ref 35–45)
HGB BLD-MCNC: 11 G/DL (ref 12–16)
MCH RBC QN AUTO: 25.8 PG (ref 24–34)
MCHC RBC AUTO-ENTMCNC: 32.8 G/DL (ref 31–37)
MCV RBC AUTO: 78.5 FL (ref 78–100)
NRBC # BLD: 0 K/UL (ref 0–0.01)
NRBC BLD-RTO: 0 PER 100 WBC
PLATELET # BLD AUTO: 272 K/UL (ref 135–420)
PMV BLD AUTO: 10.1 FL (ref 9.2–11.8)
RBC # BLD AUTO: 4.27 M/UL (ref 4.2–5.3)
WBC # BLD AUTO: 9.3 K/UL (ref 4.6–13.2)

## 2022-12-22 PROCEDURE — 2709999900 HC NON-CHARGEABLE SUPPLY

## 2022-12-22 PROCEDURE — 74011250637 HC RX REV CODE- 250/637: Performed by: SURGERY

## 2022-12-22 PROCEDURE — 94640 AIRWAY INHALATION TREATMENT: CPT

## 2022-12-22 PROCEDURE — 74011000250 HC RX REV CODE- 250: Performed by: SURGERY

## 2022-12-22 PROCEDURE — 36415 COLL VENOUS BLD VENIPUNCTURE: CPT

## 2022-12-22 PROCEDURE — 85027 COMPLETE CBC AUTOMATED: CPT

## 2022-12-22 PROCEDURE — C9113 INJ PANTOPRAZOLE SODIUM, VIA: HCPCS | Performed by: SURGERY

## 2022-12-22 PROCEDURE — 74011250636 HC RX REV CODE- 250/636: Performed by: SURGERY

## 2022-12-22 PROCEDURE — 94761 N-INVAS EAR/PLS OXIMETRY MLT: CPT

## 2022-12-22 RX ORDER — ONDANSETRON 4 MG/1
4 TABLET, ORALLY DISINTEGRATING ORAL
Qty: 12 TABLET | Refills: 0 | Status: SHIPPED | OUTPATIENT
Start: 2022-12-22

## 2022-12-22 RX ORDER — OXYCODONE HYDROCHLORIDE 5 MG/1
5 TABLET ORAL
Qty: 10 TABLET | Refills: 0 | Status: SHIPPED | OUTPATIENT
Start: 2022-12-22 | End: 2022-12-25

## 2022-12-22 RX ADMIN — ONDANSETRON 4 MG: 2 INJECTION INTRAMUSCULAR; INTRAVENOUS at 09:58

## 2022-12-22 RX ADMIN — SODIUM CHLORIDE, POTASSIUM CHLORIDE, SODIUM LACTATE AND CALCIUM CHLORIDE 125 ML/HR: 600; 310; 30; 20 INJECTION, SOLUTION INTRAVENOUS at 02:15

## 2022-12-22 RX ADMIN — ACETAMINOPHEN 650 MG: 325 TABLET, FILM COATED ORAL at 11:36

## 2022-12-22 RX ADMIN — ENOXAPARIN SODIUM 40 MG: 100 INJECTION SUBCUTANEOUS at 09:54

## 2022-12-22 RX ADMIN — ARFORMOTEROL TARTRATE: 15 SOLUTION RESPIRATORY (INHALATION) at 08:02

## 2022-12-22 RX ADMIN — OXYCODONE HYDROCHLORIDE 5 MG: 5 TABLET ORAL at 02:12

## 2022-12-22 RX ADMIN — OXYCODONE HYDROCHLORIDE 5 MG: 5 TABLET ORAL at 06:10

## 2022-12-22 RX ADMIN — SODIUM CHLORIDE 40 MG: 9 INJECTION, SOLUTION INTRAMUSCULAR; INTRAVENOUS; SUBCUTANEOUS at 09:54

## 2022-12-22 RX ADMIN — ACETAMINOPHEN 650 MG: 325 TABLET, FILM COATED ORAL at 02:12

## 2022-12-22 RX ADMIN — ONDANSETRON 4 MG: 2 INJECTION INTRAMUSCULAR; INTRAVENOUS at 03:15

## 2022-12-22 NOTE — DISCHARGE INSTRUCTIONS
DISCHARGE SUMMARY from Nurse    PATIENT INSTRUCTIONS:    After general anesthesia or intravenous sedation, for 24 hours or while taking prescription Narcotics:  Limit your activities  Do not drive and operate hazardous machinery  Do not make important personal or business decisions  Do  not drink alcoholic beverages  If you have not urinated within 8 hours after discharge, please contact your surgeon on call. Report the following to your surgeon:  Excessive pain, swelling, redness or odor of or around the surgical area  Temperature over 100.5  Nausea and vomiting lasting longer than 4 hours or if unable to take medications  Any signs of decreased circulation or nerve impairment to extremity: change in color, persistent  numbness, tingling, coldness or increase pain  Any questions    What to do at Home:  Recommended activity: Activity as tolerated,     If you experience any of the following symptoms Refer to [facility], [facility], please follow up with Kolby Hinson. *  Please give a list of your current medications to your Primary Care Provider. *  Please update this list whenever your medications are discontinued, doses are      changed, or new medications (including over-the-counter products) are added. *  Please carry medication information at all times in case of emergency situations. These are general instructions for a healthy lifestyle:    No smoking/ No tobacco products/ Avoid exposure to second hand smoke  Surgeon General's Warning:  Quitting smoking now greatly reduces serious risk to your health.     Obesity, smoking, and sedentary lifestyle greatly increases your risk for illness    A healthy diet, regular physical exercise & weight monitoring are important for maintaining a healthy lifestyle    You may be retaining fluid if you have a history of heart failure or if you experience any of the following symptoms:  Weight gain of 3 pounds or more overnight or 5 pounds in a week, increased swelling in our hands or feet or shortness of breath while lying flat in bed. Please call your doctor as soon as you notice any of these symptoms; do not wait until your next office visit. Patient armband removed and shredded   MyChart Activation    Thank you for requesting access to [employer]. Please follow the instructions below to securely access and download your online medical record. [employer] allows you to send messages to your doctor, view your test results, renew your prescriptions, schedule appointments, and more. How Do I Sign Up? In your internet browser, go to [email or web address]  Click on the First Time User? Click Here link in the Sign In box. You will be redirect to the New Member Sign Up page. Enter your [employer] Access Code exactly as it appears below. You will not need to use this code after youve completed the sign-up process. If you do not sign up before the expiration date, you must request a new code. [employer] Access Code: Activation code not generated  Current [employer] Status: Active (This is the date your [employer] access code will )    Enter the last four digits of your Social Security Number (xxxx) and Date of Birth (mm/dd/yyyy) as indicated and click Submit. You will be taken to the next sign-up page. Create a [employer] ID. This will be your [employer] login ID and cannot be changed, so think of one that is secure and easy to remember. Create a [employer] password. You can change your password at any time. Enter your Password Reset Question and Answer. This can be used at a later time if you forget your password. Enter your e-mail address. You will receive e-mail notification when new information is available in [address] Click Sign Up. You can now view and download portions of your medical record. Click the [employer] link to download a portable copy of your medical information.     Additional Information    If you have questions, please visit the Frequently Asked Questions section of the [employer] website at [email or web address] Remember, [employer] is NOT to be used for urgent needs. For medical emergencies, dial 911. The discharge information has been reviewed with the {PATIENT PARENT GUARDIAN:99816}. The {PATIENT PARENT GUARDIAN:83179} verbalized understanding. Discharge medications reviewed with the {[employer] meds reviewed SDPX:97915} and appropriate educational materials and side effects teaching were provided.   ___________________________________________________________________________________________________________________________________

## 2022-12-22 NOTE — ROUTINE PROCESS
Bedside shift change report given to Lavell Ramirez (oncoming nurse) by Ranjith Holloway (offgoing nurse). Report included the following information SBAR, Kardex, and MAR.

## 2022-12-22 NOTE — PROGRESS NOTES
Care Manager met with patient. D/C order noted for today. Orders reviewed. No needs identified at this time. Care Manager remains available if needed. Patient stated her daughter will  her at discharge. Care Management Interventions  PCP Verified by CM:  Yes  Palliative Care Criteria Met (RRAT>21 & CHF Dx)?: No  Mode of Transport at Discharge:  (Family)  Transition of Care Consult (CM Consult): Discharge Planning  Support Systems: Spouse/Significant Other, Child(anaid) (Daughter )  Confirm Follow Up Transport: Family  Discharge Location  Patient Expects to be Discharged to[de-identified] [address]    Care Management Group

## 2022-12-22 NOTE — PROGRESS NOTES
Discharge teaching completed at bedside with patient Opportunity provided for clarifying questions. IV removed . ID removed and shredded. Patient discharged via wheelchair.

## 2022-12-22 NOTE — PROGRESS NOTES
Problem: Pain  Goal: *Control of Pain  Outcome: Progressing Towards Goal  Goal: *PALLIATIVE CARE:  Alleviation of Pain  Outcome: Progressing Towards Goal     Problem: Patient Education: Go to Patient Education Activity  Goal: Patient/Family Education  Outcome: Progressing Towards Goal     Problem: Falls - Risk of  Goal: *Absence of Falls  Description: Document Cristy Rosenberg Fall Risk and appropriate interventions in the flowsheet.   Outcome: Progressing Towards Goal  Note: Fall Risk Interventions:  Mobility Interventions: Bed/chair exit alarm         Medication Interventions: Bed/chair exit alarm, Evaluate medications/consider consulting pharmacy, Patient to call before getting OOB    Elimination Interventions: Bed/chair exit alarm, Call light in reach

## 2022-12-22 NOTE — DISCHARGE SUMMARY
Bariatric Surgery Discharge Progress Note    Admission Date: 12/21/2022    Discharge Date: 12/22/2022    Preoperative Diagnosis: Morbid obesity ([facility] [E66.01]  Essential hypertension [I10]  Gastroesophageal reflux disease, unspecified whether esophagitis present [K21.9]  Mild intermittent asthma, unspecified whether complicated [E38.04]      Postoperative Diagnosis: Morbid obesity ([facility] [E66.01]  Essential hypertension [I10]  Gastroesophageal reflux disease, unspecified whether esophagitis present [K21.9]  Mild intermittent asthma, unspecified whether complicated [I72.93]      Procedure: Procedure(s):  Laparoscopic sleeve gastrectomy with robotic assist    Postop Complications: none    Hospital Course:  Patient was admitted on 12/21/2022 for scheduled bariatric surgery. Operation was without significant complication. Patient admitted to the floor postoperatively, monitored as per protocol. Diet sequentially advanced beginning POD 1, pain medications transitioned to oral during the hospital course. Currently the patient is afebrile, vital signs stable, tolerating a clear liquid diet with protein supplementation, voiding spontaneously, ambulatory with adequate pain control with oral medications and clear surgical sites without evidence of infection. Discharge Diet:  Clear Liquid Bariatric Diet for 7 days, then soft moist protein diet for 5 weeks    Discharge Medications:  Current Discharge Medication List        START taking these medications    Details   oxyCODONE IR (ROXICODONE) 5 mg immediate release tablet Take 1 Tablet by mouth every six (6) hours as needed for Pain for up to 3 days. Max Daily Amount: 20 mg.  Qty: 10 Tablet, Refills: 0  Start date: 12/22/2022, End date: 12/25/2022    Associated Diagnoses: Acute post-operative pain      ondansetron (ZOFRAN ODT) 4 mg disintegrating tablet Take 1 Tablet by mouth every eight (8) hours as needed for Nausea or Vomiting.   Qty: 12 Tablet, Refills: 0  Start date: 12/22/2022           CONTINUE these medications which have NOT CHANGED    Details   montelukast (SINGULAIR) 10 mg tablet Take 10 mg by mouth daily. cholecalciferol (VITAMIN D3) (50,000 UNITS /1250 MCG) capsule Take 1 Capsule by mouth every seven (7) days. Indications: vitamin D deficiency (high dose therapy)  Qty: 12 Capsule, Refills: 0    Associated Diagnoses: Obesity, Class III, BMI 40-49.9 (morbid obesity) ([facility] Vitamin D deficiency      cyclobenzaprine (FLEXERIL) 5 mg tablet Take 1 Tablet by mouth three (3) times daily as needed for Muscle Spasm(s). Qty: 30 Tablet, Refills: 1    Associated Diagnoses: Headache disorder      !! albuterol (Proventil HFA) 90 mcg/actuation inhaler Take 2 Puffs by inhalation every four (4) hours as needed for Wheezing. Qty: 1 Each, Refills: 1      gabapentin (Neurontin) 300 mg capsule Take 1 Capsule by mouth two (2) times a day. Max Daily Amount: 600 mg. Qty: 180 Capsule, Refills: 1    Associated Diagnoses: Paresthesia      budesonide-formoteroL (SYMBICORT) 160-4.5 mcg/actuation HFAA Take 2 Puffs by inhalation every twelve (12) hours. Indications: controller medication for asthma  Qty: 10.2 g, Refills: 2    Associated Diagnoses: Moderate persistent asthma with acute exacerbation      !! albuterol (PROVENTIL HFA, VENTOLIN HFA, PROAIR HFA) 90 mcg/actuation inhaler Take 1-2 Puffs by inhalation every four (4) hours as needed for Wheezing. Qty: 6.7 Each, Refills: 2    Associated Diagnoses: Moderate persistent asthma with acute exacerbation       !! - Potential duplicate medications found. Please discuss with provider.         STOP taking these medications       hydroCHLOROthiazide (HYDRODIURIL) 12.5 mg tablet Comments:   Reason for Stopping:                 Bariatric Chewable vitamins, 2 orally daily for life  Calcium Citrate 1500 mg orally daily for life  Vitamin B12 1000 micrograms sublingual daily for life  Vitamin D3 5,000 units orally daily for life   Vitamin B1 100 mg orally daily for life    Discharge disposition: home    Discharge condition: stable      Local wound care with daily showers, keep wounds clean and dry     Activity: as desired, no lifting, pushing, or pulling greater than 15lbs or situps for 30 days     Special Instructions:            - No driving until activity is not influenced by incisional pain and off narcotics            - No bath or hot tub until wounds are healed            - Check pulse and temperature twice daily for 10 days            - Notify [facility] for a Temp >100.5 or Pulse>115     Follow-up with your surgeon in 2 weeks, call office for appointment [phone] ([facility]) [facility])

## 2022-12-22 NOTE — ROUTINE PROCESS
Patient was educated on all discharge instructions below. He/she understood and was provided a copy. He/she knows who to call for any issues post discharge. Hydration  Hydration is your NUMBER ONE priority. Dehydration is the most common reason for readmission to the hospital. Dehydration occurs when  your body does not get enough fluid to keep it functioning at its best. Your body also requires fluid  to burn its stored fat calories for energy. Carry a bottle of water with you all day, even when you are away from home; remind yourself to  drink even if you dont feel thirsty. Drinking 64 ounces of fluid is your daily goal. You can tell if  youre getting enough fluid if youre making clear, light-colored urine five to 10 times per day. Signs of dehydration can be thirst, headache, hard stools or dizziness upon sitting or standing up. You should contact your surgeons office if you are unable to drink enough fluid to stay hydrated. --   [facility] after Surgery   No lifting over 15 pounds for four weeks. No driving while taking the pain medication (about seven to 10 days). No tub baths, swimming or hot tubs until incisions are healed (about two weeks). You may shower. Clean incisions daily /gently with soap and check incisions for signs of infection:  -- Redness around incision. -- Swelling at site. -- Drainage with an foul odor (pus). -- Increase tenderness around incision. Take your temperature and resting pulse in the morning and evening. Record on tracking form  given to you. Call if your temperature is greater than 101 or your pulse rate is greater than 115. Please contact your surgeon if you are having excessive abdominal pain (that lasts longer than  four hours and does not improve with prescribed pain medication), vomiting or shortness of breath. Get up and move -- do not sit in one place for more than an hour.    You need to WALK (EXERCISE) for 30 minutes per day. -- Walking around your house does not count. -- Bike, treadmill and elliptical are OK. -- NO weight lifting or sit ups. If constipated take an adult dose of Miralax (available over the counter). Contact the doctors  office if Miralax doesnt help. You may swallow pills starting the day after surgery as long as they fit inside this [place]:   Continue to use your incentive spirometer (breathing machine) for the next couple of weeks to  help prevent pneumonia. [address]  Temperature/Heart Rate Log  Take your temperature and heart rate (pulse) twice a day for 14 days. Take both in the morning and  evening at about the same time each day (when you wake up and before you go to bed when you  are relaxed). Please contact your doctors office if your:   Temperature is higher than 101 degrees. Heart rate (pulse) is higher than 115 beats per minute  (normal heart rate is 60 to 100 beats per minute). How to Take Your Heart Rate (Pulse)   Turn your left hand so that your palm is face-up. With the index and middle fingers of your right  hand, draw a line from the base of your thumb to  just below the crease in your wrist. Your fingers  should nestle just to the left of the large tendon that  pops up when you bend your wrist toward you. Dont press too hard, that will make the pulse go  away. Use gentle pressure. Wait. It can take several seconds -- and several micro-adjustments in the placement of your two  fingers on your wrist -- to find your pulse. Just keep moving your fingers down or up your wrist  in small increments (and pausing for a few seconds) until you find it. How to Take Your Pulse Rate   Find a watch with a second hand and place it on your right wrist or on the table next  to your left hand. After finding your pulse, count the number of beats for 20 seconds.    Multiply by three to get your heart rate, or beats per minute  (or just count for 60 seconds for a math-free option). Normal, resting heart rate is about 60 to 100 beats per minute. Lovenox Self Injection Guide  Prepare  Step 1: Wash and dry your hands thoroughly. Step 2: Sit or lie in a comfortable position and choose an area  on the right or left side of the abdomen at least two inches away  from the belly button. Step 3: Clean the injection site with an alcohol swab and let dry. Inject  Step 4: Remove the needle cap by pulling it straight off the syringe and  discard it in a sharps . Step 5: With your other hand, pinch an inch of the cleansed area to  make a fold in the skin. Insert the full length of the needle straight  down -- at a 90? angle -- into the fold of skin. [address]  Step 6: Press the plunger with your thumb until the syringe is empty. Then pull the needle straight out and release the skin fold. Dispose  Step 7: Point the needle down and away from yourself and others,  and then push down on the plunger to activate the safety shield. Step 8: Place the used syringe in the sharps . Do NOT expel the air bubble from the syringe before the injection. Administration should be alternated between the left and right abdominal wall. The whole length  of the needle should be introduced into a skin fold held between the thumb and forefinger; the  skin fold should be held throughout the injection. To minimize bruising, do not rub the injection  site after completion of the injection. Questions about LOVENOX? Call [phone]    9. DIET AND LIFESTYLE  Key Diet Principles Following Bariatric Surgery   Begin each meal with soft moist high protein foods (i.e. chicken, turkey, yogurt, tuna, eggs,  cottage cheese, other fish and seafood). Consume a minimum of 64 ounces of fluid each day to prevent dehydration. No straws. No food and fluid together.  Stop drinking 30 minutes before a meal. You may begin fluids again  30 minutes after you finish a meal.   Eat very slowly and chew all foods completely. Keep portions small. No simple sugars or high fat foods. No carbonated beverages. No caffeine. Eat three meals per day. No skipping. Avoid snacking between meals. No alcohol. No smoking. Two Flintstones Complete Chewable vitamins each day. Take one in the morning and one at night.   1,500 milligrams Calcium Citrate per day in separate dosages. Vitamin D 3: 5,000 IU taken per day. Vitamin B-12: Take 1,000 micrograms sublingually daily. Iron: 60 milligrams per day from [facility] [employer]. Protein supplements of your choice. Must be low sugar (0 to 3 grams), low fat (0 to 3 grams) and  provide at least 35 to 40 grams of protein each day. You need 60 to 70 grams of protein (food  and supplements) each day. Minimum of 30 minutes of physical activity daily. Do not take NSAIDS . Do not take Steroids without your surgeons permission. Your Priorities After Surgery  ? Fluid: 64 ounces of fluid per day. ? Protein: 60 to 70 grams of protein per day. ? Walk every day. Clear Liquid Diet  One week of clear liquids: minimum of 64 ounces of fluid per day. Fluid:   Zero calorie liquids. No caffeine. No carbonation. No sugary drinks. No alcohol. No straws. Food   Protein drinks. Less than 3 grams of sugar and  3 grams of fat per serving. Protein drink should provide you with  60 to 70 grams of protein. Soft Protein Diet  Five weeks of soft protein (1 ounce for soft protein, 3 ounces of yogurt/cottage cheese). Three meals per day and 1 protein shake. Protein shakes should provide you with 30 grams of  protein on the soft protein diet. Slow transition:   First week on soft protein diet -- focus on yogurt, cottage cheese, eggs, vegetarian refried beans,  black beans, kidney beans and white beans.  (NO BAKED BEANS.)   Second through fourth week on soft protein diet -- focus on yogurt, cottage cheese, eggs,  canned tuna, canned chicken, tilapia and fish (needs to be soft enough to be cut up with a fork). Fifth week on soft protein diet -- focus on yogurt, cottage cheese, eggs, canned tuna, canned  chicken, tilapia, fish, salmon, chicken breast or turkey. Fluid is your #1 Priority! Continue clear liquids between meals. You will need 64 ounces of fluid per day. Fluids that you can have include:   Water. Zero calorie liquids. You will need to sip throughout the day and should therefore have a water bottle with you at all  times! No liquids with your meals. Stop 30 minutes before a meal and wait 30 minutes after a meal.ugary drinks. No alco  Protein  You will need 60 to 70 grams of protein per day. 60 to 70 grams of protein shakes when on the clear liquid diet (two to three shakes per day). 30 to 50 grams of protein shakes when on the soft protein diet (one shake per day). Eat Three Times Per Day  You will need to eat three times per day. My planned times are:  _________________________________________________________  _________________________________________________________  _________________________________________________________  Nausea, Vomiting, Stomach Pain  If you have problems with nausea, vomiting or stomach pain, try:   Eating slowly: 20 to 30 minutes per meal.   Chewing food thoroughly: 20 to 30 chews before food is swallowed. Small portions: measure portions in medicine cup. Stopping before feeling full. AVOIDING SUGAR and FRIED FOOD: sugar will cause dumping syndrome and lead to weight gain. Exercise  I will need to get a minimum of 30 minutes of exercise per day or 150 minutes of exercise per week. Walking, swimming, biking or elliptical.   Find something you enjoy! Vitamins  After surgery, you will need to take the following vitamins for the rest of your life -- FOREVER. Vitamin D 3: 5,000 IU per day. Calcium Citrate: 1,500 milligrams, taken separately.    Flintstones Complete: two per day, taken separately. Sublingual Vitamin B-12: 1,000 micrograms daily. Iron for menstruating women or patients with a history of low iron: 65 milligrams daily. We recommend going to [facility] and purchasing iron from there. The lemon-lime has 60 milligrams. This iron is better absorbed than over-the-counter iron. Clear Liquid Log  Getting your fluid in is top priority during this week. Fluids (MINIUM of 64 ounces per day):  ? 8 oz. ? 8 oz. ? 8 oz. ? 8 oz. ? 8 oz. ? 8 oz. ? 8 oz. ? 8 oz. ? 8 oz. ? 8 oz. ? 8 oz. ? 8 oz. Flintstones Complete Chewable: ? a.m. ? p.m. Calcium Citrate (1,500 milligrams/day):  Pill form  ? Two crushed pills (morning) ? Two crushed pills (afternoon) ? Two crushed pills (evening)  OR Upcal D (powder)  ? One pack/scoop ? One pack/scoop ? One pack/scoop  OR Celebrate Chewable Vitamins (500 mg each) or Bariatric Advantage Chewables (500 mg)  ? One chewable (morning) ? One chewable (afternoon) ? One chewable (evening)  OR Liquid Calcium Citrate  ? 1 tbsp. Calcium Citrate ? 1 tbsp. Calcium Citrate ? 1 tbsp. Calcium Citrate  Vitamin D3: ? 5,000 IU daily. Vitamin B-12: ? 1,000 micrograms per day. Iron (menstruating women or patients with a history of low iron):  ? 60 milligrams per day from [employer]. Protein drinks (protein drinks should be under 3 grams of sugar and 3 grams of fat). Protein shake (60 grams per day): ? a.m. ? p.m. Exercise: ? 30 to 40 minutes per day. Bariatric Soft and Moist Diet Shopping List   alcium Citrate (1,500 milligrams/day):  Pill form  ? Two crushed pills (morning) ? Two crushed pills (afternoon) ? Two crushed pills (evening)  OR Upcal D (powder)  ? One pack/scoop ? One pack/scoop ? One pack/scoop  OR Celebrate Chewable Vitamins (500 mg each) or Bariatric Advantage Chewables (500 mg)  ? One chewable (morning) ? One chewable (afternoon) ? One chewable (evening)  OR Liquid Calcium Citrate  ? 1 tbsp. Calcium Citrate ? 1 tbsp.  Calcium Citrate ? 1 tbsp. Calcium Citrate  Vitamin D3: ? 5,000 IU daily  Vitamin B-12: ? 1,000 micrograms per day  Iron (menstruating women or  patients with a history of low iron):  ? 60 milligrams per day  from [facility] [employer]  Protein drinks (protein drinks should be under  3 grams of sugar and 3 grams of fat). Protein shake (30 to 40 grams per day):  ? a.m. ? p.m. Exercise: ? 30 to 40 minutes per  Educated on Diet Progression    Maksim Sood Gastric Bypass and Sleeve Dietary Progression    Patient Name:   Date of Surgery: Ice Chips start once admitted on floor. Begin Bariatric Clear Liquid Diet on:     Clear Liquid Diet: 64 oz. of fluid per day  Low calorie, low sugar, non-carbonated beverages  Water, Crystal Light, Propel Water, Sugar Free Jell-O, Sugar Free Popsicles, Bouillon  Start protein supplement during this stage. (60-70 grams per day)  Getting your fluid in and staying hydrated is your #1 priority! The clear liquid diet will last for 7 days. Begin Bariatric Soft and Moist on: This stage of the diet will last for 5 weeks, unless otherwise instructed by your surgeon. Begin:  1 week post-op   End:  6 weeks post-op (or when you follow up with the Registered Dietitian)    Soft, moist, high protein foods: 3 meals per day plus protein supplements. Portions should emphasize on soft protein. Portions will be a MAXIMUM of:   1 ounce of solid food   2-3 ounces of cottage cheese and yogurt. Protein supplements should be between meals and provide 30-40 grams per day during soft protein diet. Continue to get 64 ounces of fluid in per day. Protein foods that are ok on the Soft and Moist Diet include:  Slow transition:  1st week on soft protein should focus on:  Yogurt, cottage cheese, eggs  2nd -4th  week on soft protein diet should focus on: yogurt, cottage cheese, eggs, canned tuna, canned chicken, tilapia, fish (needs to be soft enough to be cut up with a fork)  5th week on soft protein diet should focus on: Yogurt, cottage cheese, eggs, canned tuna, canned chicken, tilapia, fish, salmon, chicken breast, or turkey. Remember to continue to get 64 ounces of fluid daily on ALL Stages. To be advanced to Bariatric Maintenance Stage of the bariatric diet, follow up with the dietitian 6 weeks post-op, around:         For any additional questions, please refer to your blue binder that was provided to you at the start of the bariatric program.

## 2022-12-22 NOTE — PROGRESS NOTES
conducted an initial consultation and Spiritual Assessment for Jonelle Baez, who is a 43 y.o.,female. Patients Primary Language is: [place]. According to the patients EMR Congregational Affiliation is: [facility].     The reason the Patient came to the hospital is:   Patient Active Problem List    Diagnosis Date Noted    S/P laparoscopic sleeve gastrectomy 12/21/2022    NAFLD (nonalcoholic fatty liver disease) 11/07/2022    GERD (gastroesophageal reflux disease) 07/26/2022    Asthma 06/13/2019    Severe obesity ([address] 10/10/2018    Palpitations 07/26/2016    Precordial pain 07/26/2016    Mild intermittent asthma 07/07/2014    Morbid obesity due to excess calories ([facility] 07/07/2014        The  provided the following Interventions:  Initiated a relationship of care and support. Provided information about Spiritual Care Services. Chart reviewed. The following outcomes where achieved:  Patient is not interested at this time in completing an Advance Medical Directive. Patient expressed gratitude for 's visit. Assessment:  Patient does not have any Yazidi/cultural needs that will affect patients preferences in health care. There are no spiritual or Yazidi issues which require intervention at this time. Plan:  Chaplains will continue to follow and will provide pastoral care on an as needed/requested basis.  recommends bedside caregivers page  on duty if patient shows signs of acute spiritual or emotional distress.     [address]  [phone])

## 2022-12-22 NOTE — PROGRESS NOTES
Ambulating through  the hallway often   Tolerating ice chips   Incision intact  Pain is control  Complained of headache  Pt dose not want to try Toradol anymore. Pt stated she should not take any NSAIDS due to past medical history. Fluids are infusing  Care plan reviewed   Scds are in use              Bedside and Verbal shift change report given to Joni Cheadle, RN (oncoming nurse) by Florentino Rosas RN (offgoing nurse). Report included the following information SBAR, Kardex, Intake/Output and MAR.

## 2022-12-22 NOTE — PROGRESS NOTES
Surgery Progress Note    12/22/2022    Admit Date: 12/21/2022    Subjective:     Pt reports abd pain but is managed with current plan. Nausea overnight but improved this morning, denies vomiting and has been tolerating PO well. She has been ambulating in the halls. Objective:     Blood pressure 110/71, pulse 63, temperature 98.3 °F (36.8 °C), resp. rate 16, height 5' 4\" (1.626 m), weight 106.6 kg (235 lb), SpO2 100 %. 12/22 0701 - 12/22 1900  In: -   Out: 200 [Urine:200]    12/20 1901 - 12/22 0700  In: 1799.2 [P.O.:600;  I.V.:1199.2]  Out: 800 [Urine:800]    EXAM: GENERAL: alert, pleasant, no distress   HEART: regular rate and rhythm   LUNGS: clear to auscultation   ABDOMEN:  Soft, obese, appropriate incisional tenderness, +BS, non-distended, surgical incisions clean, dry, no erythema or drainage   EXTREMITIES: warm, well perfused    Data Review    Recent Results (from the past 24 hour(s))   HCG URINE, QL. - POC    Collection Time: 12/21/22  8:57 AM   Result Value Ref Range    Pregnancy test,urine (POC) Negative NEG     CBC W/O DIFF    Collection Time: 12/22/22  3:34 AM   Result Value Ref Range    WBC 9.3 4.6 - 13.2 K/uL    RBC 4.27 4.20 - 5.30 M/uL    HGB 11.0 (L) 12.0 - 16.0 g/dL    HCT 33.5 (L) 35.0 - 45.0 %    MCV 78.5 78.0 - 100.0 FL    MCH 25.8 24.0 - 34.0 PG    MCHC 32.8 31.0 - 37.0 g/dL    RDW 16.3 (H) 11.6 - 14.5 %    PLATELET 064 456 - 081 K/uL    MPV 10.1 9.2 - 11.8 FL    NRBC 0.0 0  WBC    ABSOLUTE NRBC 0.00 0.00 - 0.01 K/uL       Assessment:   José Yoon is a 43 y.o. female,  day 1 status post Laparoscopic sleeve gastrectomy with robotic assist   Condition: good    Plan:   -Ambulate every four hours  -Pain managed prior to d/c   -Nausea managed prior to d/c   -Advance to Clear liquid Gastric Bypass Diet, if able to tolerate clear liquid diet (with pro shake) 4oz per hour for 3 hours prior to d/c    If patient continues to progress d/c home later today     Pedro Junior NP  8:25 AM  12/22/2022

## 2022-12-27 ENCOUNTER — PATIENT OUTREACH (OUTPATIENT)
Dept: OTHER | Age: 42
End: 2022-12-27

## 2022-12-27 ENCOUNTER — TELEPHONE (OUTPATIENT)
Dept: SURGERY | Age: 42
End: 2022-12-27

## 2022-12-27 NOTE — PROGRESS NOTES
Initial HPRP:   Patient on report as discharged from [facility] IP Visit 12/22/22 for ROBOTIC ASSISTED SLEEVE GASTRECTOMY  . Initial attempt to contact patient for transitions of care. Left discreet message on voicemail with this Care Coordinator's contact information. Next Outreach 1/4/22. f/u - Post OP

## 2022-12-30 ENCOUNTER — OFFICE VISIT (OUTPATIENT)
Dept: SURGERY | Age: 42
End: 2022-12-30
Payer: COMMERCIAL

## 2022-12-30 VITALS
TEMPERATURE: 98 F | OXYGEN SATURATION: 99 % | WEIGHT: 227 LBS | HEART RATE: 73 BPM | RESPIRATION RATE: 16 BRPM | SYSTOLIC BLOOD PRESSURE: 134 MMHG | DIASTOLIC BLOOD PRESSURE: 80 MMHG | BODY MASS INDEX: 38.76 KG/M2 | HEIGHT: 64 IN

## 2022-12-30 DIAGNOSIS — Z98.84 BARIATRIC SURGERY STATUS: Primary | ICD-10-CM

## 2022-12-30 DIAGNOSIS — Z13.21 MALNUTRITION SCREEN: ICD-10-CM

## 2022-12-30 DIAGNOSIS — J45.20 MILD INTERMITTENT ASTHMA WITHOUT COMPLICATION: ICD-10-CM

## 2022-12-30 DIAGNOSIS — E66.01 MORBID OBESITY DUE TO EXCESS CALORIES (HCC): ICD-10-CM

## 2022-12-30 DIAGNOSIS — K21.9 GASTROESOPHAGEAL REFLUX DISEASE, UNSPECIFIED WHETHER ESOPHAGITIS PRESENT: ICD-10-CM

## 2022-12-30 DIAGNOSIS — K76.0 NAFLD (NONALCOHOLIC FATTY LIVER DISEASE): ICD-10-CM

## 2022-12-30 DIAGNOSIS — K91.2 POSTSURGICAL MALABSORPTION: ICD-10-CM

## 2022-12-30 PROCEDURE — 99024 POSTOP FOLLOW-UP VISIT: CPT | Performed by: SURGERY

## 2022-12-30 RX ORDER — IBUPROFEN 200 MG
CAPSULE ORAL DAILY
COMMUNITY

## 2022-12-30 RX ORDER — URSODIOL 200 MG/1
200 CAPSULE ORAL SEE ADMIN INSTRUCTIONS
Qty: 90 EACH | Refills: 5 | Status: SHIPPED | OUTPATIENT
Start: 2022-12-30

## 2022-12-30 RX ORDER — MULTIVIT-MIN/IRON/FOLIC ACID/K 45-800-120
CAPSULE ORAL
COMMUNITY

## 2022-12-30 NOTE — PROGRESS NOTES
Bariatric Surgery Post Op Progress Note    CC: follow up r-LSG  Subjective:     Yolanda Alex  is a 43 y.o. female who presents for follow-up after r-LSG. . She has lost a total of 7 pounds since surgery. Body mass index is 38.96 kg/m². .     Path benign    Denies f/c/cp/sob/peripheral swelling    60+ g protein per day  64 oz of fluids per day    Nausea: Yes, related to MVI  Vomiting: No  Dysphagia: No  Reflux: No  Exercise: Yes  MVI: Yes  Calcium citrate: Yes    4 tablets of opiate medication taken postop. Changes in their medical history and medications have been reviewed.     Comorbid conditions: asthma, GERD, asthma, NAFLD    Patient Active Problem List   Diagnosis Code    Mild intermittent asthma J45.20    Morbid obesity due to excess calories (HCC) E66.01    Palpitations R00.2    Precordial pain R07.2    Severe obesity (HCC) E66.01    Asthma J45.909    GERD (gastroesophageal reflux disease) K21.9    NAFLD (nonalcoholic fatty liver disease) K76.0    S/P laparoscopic sleeve gastrectomy Z98.84     Past Medical History:   Diagnosis Date    Arthritis     knees hurt    Asthma     Chest pain     Chronic pain 6 months    painful menses    Dental disorder     Fibroids     GERD (gastroesophageal reflux disease)     Headache disorder     Heart palpitations     Hypertension 2022    Migraine     NAFLD (nonalcoholic fatty liver disease) 2022    Neurological disorder     brain vasular malformations     Past Surgical History:   Procedure Laterality Date    HX  SECTION  5091,8626    with BTL    HX GASTRIC BYPASS  2022    HX GYN      endometrial ablation    HX HYSTEROSCOPY WITH ENDOMETRIAL ABLATION      No period since    HX ORTHOPAEDIC      HX WRIST FRACTURE TX      Right Carpal Tunnel Release       Objective:   Physical Exam:  Visit Vitals  /80   Pulse 73   Temp 98 °F (36.7 °C)   Resp 16   Ht 5' 4\" (1.626 m)   Wt 103 kg (227 lb)   SpO2 99%   BMI 38.96 kg/m²     General: No acute distress, conversant  Eyes: PERRLA, no scleral icterus  HENT: Normocephalic without oral lesions  Neck: Trachea midline without LAD  Cardiac: Normal pulse rate and rhythm  Pulmonary: Symmetric chest rise with normal effort  GI: Soft, NT, ND, no hernia, no splenomegaly  Skin: Warm without rash  Extremities: No edema or joint stiffness  Psych: Appropriate mood and affect    Assessment:     History of Morbid obesity, status post r-LSG    Plan:     Encouraged to adhere to the post operative diet and exercise program.  Advised lifelong vitamin supplementation. Continue routine labs and follow ups.   Attend support group  Follow-up per protocol    Dain Stern MD [facility]  Bariatric and General Surgeon  [facility]

## 2022-12-30 NOTE — PROGRESS NOTES
Samantha Dyson is a 43 y.o. female (: 1980) presenting to address:    Chief Complaint   Patient presents with    Post OP Follow Up     Sleeve 22       Medication list and allergies have been reviewed with Samantha Dyson and updated as of today's date. I have gone over all Medical, Surgical and Social History with Samantha Dyson and updated/added the information accordingly. 1. Have you been to the ER, urgent care clinic since your last visit? Hospitalized since your last visit? No    2. Have you seen or consulted any other health care providers outside of the [address] [place] [address] since your last visit? Include any pap smears or colon screening.  No

## 2023-01-04 ENCOUNTER — PATIENT OUTREACH (OUTPATIENT)
Dept: OTHER | Age: 43
End: 2023-01-04

## 2023-01-04 NOTE — PROGRESS NOTES
DORIS f/u:  Telephone attempt to contact patient for Transition of Care Services. Second left discreet message on [employer] with this CC contact information. Will follow for one month for transitions of care needs. Will send UTR letter via Mail. Next outreach is 1/18/23 for discussion f/u - Post OP       Chart Review: 12/30/22 Lena Johnson MD -General Surgery  Patient presents for follow-up after r-LSG. . She has lost a total of 7 pounds since surgery. Body mass index is 38.96 kg/m². .      Path benign     Denies f/c/cp/sob/peripheral swelling     60+ g protein per day  64 oz of fluids per day     Nausea: Yes, related to MVI  Vomiting: No  Dysphagia: No  Reflux: No  Exercise: Yes  MVI: Yes  Calcium citrate: Yes     4 tablets of opiate medication taken postop. Plan:  Encouraged to adhere to the post operative diet and exercise program.  Advised lifelong vitamin supplementation. Continue routine labs and follow ups. Attend support group  Follow-up per protocol  Return in about 3 months (around 3/30/2023) for with nurse practitioner and lab review.   3/30/2023  @ 8:30 AM

## 2023-01-18 ENCOUNTER — PATIENT OUTREACH (OUTPATIENT)
Dept: OTHER | Age: 43
End: 2023-01-18

## 2023-01-25 DIAGNOSIS — Z98.84 BARIATRIC SURGERY STATUS: Primary | ICD-10-CM

## 2023-01-25 DIAGNOSIS — R11.0 NAUSEA: ICD-10-CM

## 2023-01-25 RX ORDER — ONDANSETRON 4 MG/1
4 TABLET, ORALLY DISINTEGRATING ORAL
Qty: 30 TABLET | Refills: 0 | Status: SHIPPED | OUTPATIENT
Start: 2023-01-25

## 2023-02-01 ENCOUNTER — HOSPITAL ENCOUNTER (OUTPATIENT)
Dept: BARIATRICS/WEIGHT MGMT | Age: 43
Discharge: HOME | End: 2023-02-01

## 2023-02-01 ENCOUNTER — DOCUMENTATION ONLY (OUTPATIENT)
Dept: BARIATRICS/WEIGHT MGMT | Age: 43
End: 2023-02-01

## 2023-02-01 NOTE — PROGRESS NOTES
[facility]  POST-OP NUTRITION FOLLOW UP    Patient's Name: Lynsey Jasmine  YOB: 1980  Surgery Date: 22      Procedure: Gastric Sleeve    Surgeon: Dr. Shiva Whitney    Height: 5 f 4     Pre-Op Weight: 235     Current Weight: 212  Weight Lost: 23    BMI:  36.5  % EBW lost: 28%    Attendance of support group:   When:  Why not:     Complications  Readmittance: None  Reoperations: None  Complications: Nausea  IV Fluids: None  ER Trips: None    Problem Areas:   Nausea: Yes. Vomiting: None   Dumping Syndrome: None  Inadequate Protein: None, patient states she is getting one shake per day. Inadequate Fluids: Close, patient states she is closer to 60 ounces of fluid. Food Intolerance:   Hunger: None  Constipation:  Yes. Patient states she is taking a stool softener    Eating 3 Meals/Day: No.  Maybe states she is only eating 2 x a day  Portion Size at Meals: 1 ounce     Protein from Food:     Foods being consumed:  Breakfast: Time:7:30 am:  Poor tolerance to eggs. States these make her nauseated. Lunch: Time: 1:30-2:00 pm:   Grilled chicken or 1 ounce of tuna  Dinner:  Time: 8:00 pm:  States she may not eat dinner and doesn't have an appetite. In-between eating:     Length of time for meals: 25 minutes    food/fluids: 30/30    Fluids: 60 oz/day   Types of Fluids:     MVI: Bariatric Pro Care    Number/Day: 1 x a day   Taken Separately:     Vitamin B1: Included in MVI  Dosin mg    Calcium: chewable    Calcium Dosing: tid    Taken Separately: yes    Vitamin B12: included in MVI   Vitamin B12 Dosin mcg    Vitamin D: included in MVI and calcium     Vitamin D dosin IU    Iron: yes    Iron dosin mg     Protein Supplement: Premier     Grams of Protein: 30-60   Mixed with:      Splitting Protein Drink in /2:    Timing of Protein Drinks:   Patient is taking 7 days a week.     Exercise: Patient states she doing daily cardio for 45 minutes      Comments:    Patient is 6 weeks post op. She complains of some nausea. She is sometimes missing her meals. I have talked to her about ketosis and the importance of fueling her body. We talked about some food choices that may be more tolerable for her. She is also encouraged to split her protein drink up throughout the day. She is taking her vitamins as instructed. Overall doing well, just continue to work in fueling her body and eating 3 x a day. Will monitor her progress. Patient was educated on the importance of eating meat and vegetables only. I have talked with patient about the effects of carbohydrates, not only from a weight management perspective, but also what effects it could have on their blood sugar and what reactive hypoglycemia is. Diet Follow Up:  9 month class scheduled for: Patient will call to schedule this.       Quillian Gosselin, [address]    2/1/2023

## 2023-02-02 ENCOUNTER — PATIENT OUTREACH (OUTPATIENT)
Dept: OTHER | Age: 43
End: 2023-02-02

## 2023-02-20 ENCOUNTER — TELEPHONE (OUTPATIENT)
Age: 43
End: 2023-02-20

## 2023-02-23 DIAGNOSIS — G44.219 EPISODIC TENSION-TYPE HEADACHE, NOT INTRACTABLE: Primary | ICD-10-CM

## 2023-02-23 RX ORDER — AMITRIPTYLINE HYDROCHLORIDE 10 MG/1
10 TABLET, FILM COATED ORAL NIGHTLY
Qty: 30 TABLET | Refills: 1 | Status: SHIPPED | OUTPATIENT
Start: 2023-02-23 | End: 2023-03-25

## 2023-02-23 NOTE — TELEPHONE ENCOUNTER
Spoke with patient, made aware of need for follow-up. Patient states, \"I don't need to see him until May. He is the one that said I need a follow-up MRI. I was hoping to get my MRI so he will have it when I come. \" Patient is requesting medication refill as well. Please advise.

## 2023-03-07 NOTE — PROGRESS NOTES
Chief Complaint   Patient presents with    Weight Management     monthly follow up     Nursing Note for Medical Monitoring in the [facility]      Vince Singer is a 44 y.o. female who is enrolled in [facility]    Vince Singer was prescribed the VLCD / LCD. Did you have any problems adhering to the program last week? no  If yes, please explain:     Since your last visit, have you experienced any complications? Yes \"constipation\"    Have you received any other medical care this week? no  If yes, where and for what? Have you had any change in your medications since your last visit? yes  If yes what? \"singular\"    Are you taking an appetite suppressant? no   If yes:  Do you need a refill? BP Readings from Last 3 Encounters:   02/04/20 120/80   01/31/20 116/78   01/24/20 124/68        Eating Habits Over Last Week:  Did you take in 64 oz of non-caloric fluids? yes     Did you consume your prescribed meal replacement regimen each day?  yes       Physical Activity Over the Past Week:    Aerobic exercise: 180 min  Resistance exercise: 60 workouts / week Improving on Robinul, patient will continue daily dosing

## 2023-03-10 ENCOUNTER — HOSPITAL ENCOUNTER (OUTPATIENT)
Facility: HOSPITAL | Age: 43
Discharge: HOME | End: 2023-03-10
Payer: COMMERCIAL

## 2023-03-10 LAB
25(OH)D3 SERPL-MCNC: 52.8 NG/ML (ref 30–100)
ALBUMIN SERPL-MCNC: 3.5 G/DL (ref 3.4–5)
ALBUMIN/GLOB SERPL: 1.1 (ref 0.8–1.7)
ALP SERPL-CCNC: 78 U/L (ref 45–117)
ALT SERPL-CCNC: 19 U/L (ref 13–56)
ANION GAP SERPL CALC-SCNC: 2 MMOL/L (ref 3–18)
AST SERPL-CCNC: 17 U/L (ref 10–38)
BASOPHILS # BLD: 0.1 K/UL (ref 0–0.1)
BASOPHILS NFR BLD: 1 % (ref 0–2)
BILIRUB SERPL-MCNC: 0.3 MG/DL (ref 0.2–1)
BUN SERPL-MCNC: 11 MG/DL (ref 7–18)
BUN/CREAT SERPL: 14 (ref 12–20)
CALCIUM SERPL-MCNC: 9.4 MG/DL (ref 8.5–10.1)
CHLORIDE SERPL-SCNC: 107 MMOL/L (ref 100–111)
CHOLEST SERPL-MCNC: 116 MG/DL
CO2 SERPL-SCNC: 30 MMOL/L (ref 21–32)
CREAT SERPL-MCNC: 0.78 MG/DL (ref 0.6–1.3)
DIFFERENTIAL METHOD BLD: ABNORMAL
EOSINOPHIL # BLD: 0.1 K/UL (ref 0–0.4)
EOSINOPHIL NFR BLD: 2 % (ref 0–5)
ERYTHROCYTE [DISTWIDTH] IN BLOOD BY AUTOMATED COUNT: 16.2 % (ref 11.6–14.5)
FERRITIN SERPL-MCNC: 51 NG/ML (ref 8–388)
FOLATE SERPL-MCNC: >20 NG/ML (ref 3.1–17.5)
GLOBULIN SER CALC-MCNC: 3.2 G/DL (ref 2–4)
GLUCOSE SERPL-MCNC: 88 MG/DL (ref 74–99)
HCT VFR BLD AUTO: 37.8 % (ref 35–45)
HDLC SERPL-MCNC: 56 MG/DL (ref 40–60)
HDLC SERPL: 2.1 (ref 0–5)
HGB BLD-MCNC: 12 G/DL (ref 12–16)
IMM GRANULOCYTES # BLD AUTO: 0 K/UL (ref 0–0.04)
IMM GRANULOCYTES NFR BLD AUTO: 0 % (ref 0–0.5)
IRON SERPL-MCNC: 56 UG/DL (ref 50–175)
LDLC SERPL CALC-MCNC: 48.2 MG/DL (ref 0–100)
LIPID PANEL: NORMAL
LYMPHOCYTES # BLD: 1.7 K/UL (ref 0.9–3.6)
LYMPHOCYTES NFR BLD: 26 % (ref 21–52)
MCH RBC QN AUTO: 26 PG (ref 24–34)
MCHC RBC AUTO-ENTMCNC: 31.7 G/DL (ref 31–37)
MCV RBC AUTO: 82 FL (ref 78–100)
MONOCYTES # BLD: 0.5 K/UL (ref 0.05–1.2)
MONOCYTES NFR BLD: 7 % (ref 3–10)
NEUTS SEG # BLD: 4.4 K/UL (ref 1.8–8)
NEUTS SEG NFR BLD: 65 % (ref 40–73)
NRBC # BLD: 0 K/UL (ref 0–0.01)
NRBC BLD-RTO: 0 PER 100 WBC
PLATELET # BLD AUTO: 278 K/UL (ref 135–420)
PMV BLD AUTO: 10.8 FL (ref 9.2–11.8)
POTASSIUM SERPL-SCNC: 3.9 MMOL/L (ref 3.5–5.5)
PROT SERPL-MCNC: 6.7 G/DL (ref 6.4–8.2)
RBC # BLD AUTO: 4.61 M/UL (ref 4.2–5.3)
SODIUM SERPL-SCNC: 139 MMOL/L (ref 136–145)
TRIGL SERPL-MCNC: 59 MG/DL
VIT B12 SERPL-MCNC: 1357 PG/ML (ref 211–911)
VLDLC SERPL CALC-MCNC: 11.8 MG/DL
WBC # BLD AUTO: 6.7 K/UL (ref 4.6–13.2)

## 2023-03-10 PROCEDURE — 83540 ASSAY OF IRON: CPT

## 2023-03-10 PROCEDURE — 82728 ASSAY OF FERRITIN: CPT

## 2023-03-10 PROCEDURE — 80053 COMPREHEN METABOLIC PANEL: CPT

## 2023-03-10 PROCEDURE — 36415 COLL VENOUS BLD VENIPUNCTURE: CPT

## 2023-03-10 PROCEDURE — 85025 COMPLETE CBC W/AUTO DIFF WBC: CPT

## 2023-03-10 PROCEDURE — 82306 VITAMIN D 25 HYDROXY: CPT

## 2023-03-10 PROCEDURE — 84425 ASSAY OF VITAMIN B-1: CPT

## 2023-03-10 PROCEDURE — 80061 LIPID PANEL: CPT

## 2023-03-10 PROCEDURE — 82746 ASSAY OF FOLIC ACID SERUM: CPT

## 2023-03-12 LAB — VIT B1 BLD-SCNC: 120.3 NMOL/L (ref 66.5–200)

## 2023-03-30 ENCOUNTER — OFFICE VISIT (OUTPATIENT)
Age: 43
End: 2023-03-30
Payer: COMMERCIAL

## 2023-03-30 VITALS
SYSTOLIC BLOOD PRESSURE: 132 MMHG | TEMPERATURE: 98.4 F | WEIGHT: 194 LBS | OXYGEN SATURATION: 99 % | BODY MASS INDEX: 33.12 KG/M2 | HEART RATE: 70 BPM | RESPIRATION RATE: 18 BRPM | HEIGHT: 64 IN | DIASTOLIC BLOOD PRESSURE: 68 MMHG

## 2023-03-30 DIAGNOSIS — E66.9 CLASS 1 OBESITY WITHOUT SERIOUS COMORBIDITY WITH BODY MASS INDEX (BMI) OF 33.0 TO 33.9 IN ADULT, UNSPECIFIED OBESITY TYPE: ICD-10-CM

## 2023-03-30 DIAGNOSIS — Z90.3 S/P GASTRIC SLEEVE PROCEDURE: ICD-10-CM

## 2023-03-30 DIAGNOSIS — Z90.3 POSTGASTRECTOMY MALABSORPTION: Primary | ICD-10-CM

## 2023-03-30 DIAGNOSIS — Z86.79 HX OF ESSENTIAL HYPERTENSION: ICD-10-CM

## 2023-03-30 DIAGNOSIS — K91.2 POSTGASTRECTOMY MALABSORPTION: Primary | ICD-10-CM

## 2023-03-30 PROCEDURE — 99214 OFFICE O/P EST MOD 30 MIN: CPT | Performed by: REGISTERED NURSE

## 2023-03-30 RX ORDER — ACETAMINOPHEN 325 MG/1
650 TABLET ORAL EVERY 6 HOURS PRN
COMMUNITY

## 2023-03-30 ASSESSMENT — ENCOUNTER SYMPTOMS
SHORTNESS OF BREATH: 0
CHEST TIGHTNESS: 0
CONSTIPATION: 1

## 2023-03-30 NOTE — PROGRESS NOTES
ablation    ORTHOPEDIC SURGERY      WRIST FRACTURE SURGERY      Right Carpal Tunnel Release       Current Outpatient Medications   Medication Sig Dispense Refill    Multiple Vitamins-Minerals (BARIATRIC MULTIVITAMINS/IRON PO) Take by mouth daily Watsin      acetaminophen (TYLENOL) 325 MG tablet Take 650 mg by mouth every 6 hours as needed for Pain      albuterol sulfate HFA (PROVENTIL;VENTOLIN;PROAIR) 108 (90 Base) MCG/ACT inhaler Inhale 2 puffs into the lungs every 4 hours as needed      calcium citrate (CALCITRATE) 950 (200 Ca) MG tablet Take by mouth daily      montelukast (SINGULAIR) 10 MG tablet Take 10 mg by mouth daily      Ursodiol 200 MG CAPS Take 200 mg by mouth See Admin Instructions      amitriptyline (ELAVIL) 10 MG tablet Take 1 tablet by mouth nightly 30 tablet 1     No current facility-administered medications for this visit. Allergies   Allergen Reactions    Iodinated Contrast Media Shortness Of Breath     Other reaction(s): wheezing/sob    Phenazopyridine Hives    Tramadol Hives       ROS:  Review of Systems   Constitutional:  Negative for fatigue and unexpected weight change. Eyes:  Negative for visual disturbance. Respiratory:  Negative for chest tightness and shortness of breath. Cardiovascular:  Negative for chest pain and palpitations. Gastrointestinal:  Positive for constipation (Miralax and stool softener). Genitourinary: Negative. Musculoskeletal:  Positive for myalgias (BLE cramping upon waking). Negative for arthralgias and neck stiffness. Neurological:  Negative for dizziness, light-headedness and headaches. Psychiatric/Behavioral:  Negative for sleep disturbance. Physical Exam:  Vitals:    03/30/23 0832   BP: 132/68   Pulse: 70   Resp: 18   Temp: 98.4 °F (36.9 °C)   TempSrc: Temporal   SpO2: 99%   Weight: 194 lb (88 kg)   Height: 5' 4\" (1.626 m)      Body mass index is 33.3 kg/m². Physical Exam  Vitals and nursing note reviewed.

## 2023-04-21 LAB
CHOLEST SERPL-MCNC: 141 MG/DL
GLUCOSE SERPL-MCNC: 87 MG/DL (ref 74–99)
HDLC SERPL-MCNC: 66 MG/DL (ref 40–60)
LDLC SERPL CALC-MCNC: 56.8 MG/DL (ref 0–100)
TRIGL SERPL-MCNC: 91 MG/DL (ref ?–150)

## 2023-05-05 ENCOUNTER — OFFICE VISIT (OUTPATIENT)
Age: 43
End: 2023-05-05
Payer: COMMERCIAL

## 2023-05-05 ENCOUNTER — TELEPHONE (OUTPATIENT)
Age: 43
End: 2023-05-05

## 2023-05-05 VITALS
WEIGHT: 188.4 LBS | DIASTOLIC BLOOD PRESSURE: 82 MMHG | OXYGEN SATURATION: 99 % | RESPIRATION RATE: 18 BRPM | SYSTOLIC BLOOD PRESSURE: 132 MMHG | BODY MASS INDEX: 32.17 KG/M2 | HEIGHT: 64 IN | HEART RATE: 69 BPM

## 2023-05-05 DIAGNOSIS — G44.219 EPISODIC TENSION-TYPE HEADACHE, NOT INTRACTABLE: Primary | ICD-10-CM

## 2023-05-05 PROCEDURE — 99215 OFFICE O/P EST HI 40 MIN: CPT | Performed by: PSYCHIATRY & NEUROLOGY

## 2023-05-05 RX ORDER — AMITRIPTYLINE HYDROCHLORIDE 10 MG/1
25 TABLET, FILM COATED ORAL NIGHTLY
Qty: 30 TABLET | Refills: 5 | Status: SHIPPED | OUTPATIENT
Start: 2023-05-05 | End: 2023-06-04

## 2023-05-05 NOTE — PROGRESS NOTES
[address] [address]. [facility] [place], [address]  Office:  [phone]  Fax: [phone]  No chief complaint on file. HPI:   This female patient has history of right CTS surgery and asthma had right wrist pain and weakness. There was low back pain and headache. EMG of the upper extremities @ February 12, 2021 was suggestive of bilateral carpal tunnel syndrome relatively severe on the right side; bilateral increased distal latency of the median nerve at the wrist.        Today of 5/5/2023, patient has daily headache in different part of head, frontal, back or lateral. It is relieved by tylenol, ranging from minutes to hours, up to 7/10. She cannot take NSAIDs. It is throbboing pain, without photophobia, phonophobia, dizziness, blurred vision, dysarthria. There are chronic back pain and left shoulder pain. She felt tired and fatigue. She had bariatric surgery at 12/2022, and lost 40+ pounds. She takes lots of vitamins    Physical Examination:  There were no vitals taken for this visit. GENERAL APPEARANCE:  The patient's general appearance of face and habitus is normal.  EYES:  Anicteric. NECK:  Supple. There is no tenderness  CARDIOVASCULAR:  Pulses are present bilaterally. LUNGS:  No respiratory distress. EXTREMITIES: scar on right wrist.    NEUROLOGICAL EXAM:  Mental status: The patient has a normal level of alertness and awareness. Cranial nerves:      2:  Visual fields are full. Visual Acuity is good for reading and TV viewing. Pupils are equal and pupillary responses are normal.   3, 4, 6:  Extraocular muscles are intact. There is no ptosis, congenital esotropia/exotropias, or nystagmus. 5:  Sensation and jaw opening are normal.   7:  Facial strength is intact. 8:  Hearing is intact. 9:  Palate elevates symmetrically. 10:  No hoarseness of voice. 11:  Sternocleidomastoid and trapezius strength intact. 12:  Tongue protrudes on the midline.

## 2023-05-09 ENCOUNTER — TELEPHONE (OUTPATIENT)
Age: 43
End: 2023-05-09

## 2023-05-20 ENCOUNTER — HOSPITAL ENCOUNTER (OUTPATIENT)
Facility: HOSPITAL | Age: 43
End: 2023-05-20
Payer: COMMERCIAL

## 2023-05-20 DIAGNOSIS — G44.219 EPISODIC TENSION-TYPE HEADACHE, NOT INTRACTABLE: ICD-10-CM

## 2023-05-20 PROCEDURE — 70551 MRI BRAIN STEM W/O DYE: CPT

## 2023-06-09 ENCOUNTER — HOSPITAL ENCOUNTER (OUTPATIENT)
Facility: HOSPITAL | Age: 43
End: 2023-06-09
Payer: COMMERCIAL

## 2023-06-09 ENCOUNTER — HOSPITAL ENCOUNTER (OUTPATIENT)
Facility: HOSPITAL | Age: 43
Discharge: HOME | End: 2023-06-09
Payer: COMMERCIAL

## 2023-06-09 DIAGNOSIS — Z87.898 HISTORY OF ABNORMAL MAMMOGRAM: ICD-10-CM

## 2023-06-09 PROCEDURE — G0279 TOMOSYNTHESIS, MAMMO: HCPCS

## 2023-06-09 PROCEDURE — 76642 ULTRASOUND BREAST LIMITED: CPT

## 2023-06-24 DIAGNOSIS — G44.219 EPISODIC TENSION-TYPE HEADACHE, NOT INTRACTABLE: ICD-10-CM

## 2023-06-24 RX ORDER — AMITRIPTYLINE HYDROCHLORIDE 10 MG/1
25 TABLET, FILM COATED ORAL NIGHTLY
Qty: 75 TABLET | Refills: 5 | Status: SHIPPED | OUTPATIENT
Start: 2023-06-24 | End: 2024-11-07

## 2023-07-08 ENCOUNTER — HOSPITAL ENCOUNTER (OUTPATIENT)
Facility: HOSPITAL | Age: 43
Discharge: HOME | End: 2023-07-11
Payer: COMMERCIAL

## 2023-07-08 DIAGNOSIS — Z86.79 HX OF ESSENTIAL HYPERTENSION: ICD-10-CM

## 2023-07-08 DIAGNOSIS — E66.9 CLASS 1 OBESITY WITHOUT SERIOUS COMORBIDITY WITH BODY MASS INDEX (BMI) OF 33.0 TO 33.9 IN ADULT, UNSPECIFIED OBESITY TYPE: ICD-10-CM

## 2023-07-08 DIAGNOSIS — Z90.3 S/P GASTRIC SLEEVE PROCEDURE: ICD-10-CM

## 2023-07-08 DIAGNOSIS — Z90.3 POSTGASTRECTOMY MALABSORPTION: ICD-10-CM

## 2023-07-08 DIAGNOSIS — K91.2 POSTGASTRECTOMY MALABSORPTION: ICD-10-CM

## 2023-07-08 LAB
25(OH)D3 SERPL-MCNC: 42.9 NG/ML (ref 30–100)
ALBUMIN SERPL-MCNC: 3.7 G/DL (ref 3.4–5)
ALBUMIN/GLOB SERPL: 1.1 (ref 0.8–1.7)
ALP SERPL-CCNC: 78 U/L (ref 45–117)
ALT SERPL-CCNC: 21 U/L (ref 13–56)
ANION GAP SERPL CALC-SCNC: 2 MMOL/L (ref 3–18)
AST SERPL-CCNC: 22 U/L (ref 10–38)
BASOPHILS # BLD: 0 K/UL (ref 0–0.1)
BASOPHILS NFR BLD: 1 % (ref 0–2)
BILIRUB SERPL-MCNC: 0.5 MG/DL (ref 0.2–1)
BUN SERPL-MCNC: 16 MG/DL (ref 7–18)
BUN/CREAT SERPL: 18 (ref 12–20)
CALCIUM SERPL-MCNC: 9 MG/DL (ref 8.5–10.1)
CHLORIDE SERPL-SCNC: 109 MMOL/L (ref 100–111)
CO2 SERPL-SCNC: 28 MMOL/L (ref 21–32)
CREAT SERPL-MCNC: 0.89 MG/DL (ref 0.6–1.3)
DIFFERENTIAL METHOD BLD: ABNORMAL
EOSINOPHIL # BLD: 0 K/UL (ref 0–0.4)
EOSINOPHIL NFR BLD: 1 % (ref 0–5)
ERYTHROCYTE [DISTWIDTH] IN BLOOD BY AUTOMATED COUNT: 15.1 % (ref 11.6–14.5)
FERRITIN SERPL-MCNC: 20 NG/ML (ref 8–388)
GLOBULIN SER CALC-MCNC: 3.5 G/DL (ref 2–4)
GLUCOSE SERPL-MCNC: 94 MG/DL (ref 74–99)
HCT VFR BLD AUTO: 36.6 % (ref 35–45)
HGB BLD-MCNC: 11.9 G/DL (ref 12–16)
IMM GRANULOCYTES # BLD AUTO: 0 K/UL (ref 0–0.04)
IMM GRANULOCYTES NFR BLD AUTO: 0 % (ref 0–0.5)
IRON SERPL-MCNC: 64 UG/DL (ref 50–175)
LYMPHOCYTES # BLD: 1.8 K/UL (ref 0.9–3.6)
LYMPHOCYTES NFR BLD: 21 % (ref 21–52)
MCH RBC QN AUTO: 27.9 PG (ref 24–34)
MCHC RBC AUTO-ENTMCNC: 32.5 G/DL (ref 31–37)
MCV RBC AUTO: 85.9 FL (ref 78–100)
MONOCYTES # BLD: 0.3 K/UL (ref 0.05–1.2)
MONOCYTES NFR BLD: 4 % (ref 3–10)
NEUTS SEG # BLD: 6.5 K/UL (ref 1.8–8)
NEUTS SEG NFR BLD: 74 % (ref 40–73)
NRBC # BLD: 0 K/UL (ref 0–0.01)
NRBC BLD-RTO: 0 PER 100 WBC
PLATELET # BLD AUTO: 332 K/UL (ref 135–420)
PMV BLD AUTO: 9.8 FL (ref 9.2–11.8)
POTASSIUM SERPL-SCNC: 4.5 MMOL/L (ref 3.5–5.5)
PROT SERPL-MCNC: 7.2 G/DL (ref 6.4–8.2)
RBC # BLD AUTO: 4.26 M/UL (ref 4.2–5.3)
SODIUM SERPL-SCNC: 139 MMOL/L (ref 136–145)
VIT B12 SERPL-MCNC: 908 PG/ML (ref 211–911)
WBC # BLD AUTO: 8.7 K/UL (ref 4.6–13.2)

## 2023-07-08 PROCEDURE — 80053 COMPREHEN METABOLIC PANEL: CPT

## 2023-07-08 PROCEDURE — 82728 ASSAY OF FERRITIN: CPT

## 2023-07-08 PROCEDURE — 83540 ASSAY OF IRON: CPT

## 2023-07-08 PROCEDURE — 36415 COLL VENOUS BLD VENIPUNCTURE: CPT

## 2023-07-08 PROCEDURE — 82607 VITAMIN B-12: CPT

## 2023-07-08 PROCEDURE — 82306 VITAMIN D 25 HYDROXY: CPT

## 2023-07-08 PROCEDURE — 85025 COMPLETE CBC W/AUTO DIFF WBC: CPT

## 2023-07-08 PROCEDURE — 84425 ASSAY OF VITAMIN B-1: CPT

## 2023-07-12 LAB — VIT B1 BLD-SCNC: 131.2 NMOL/L (ref 66.5–200)

## 2023-07-20 ENCOUNTER — OFFICE VISIT (OUTPATIENT)
Age: 43
End: 2023-07-20
Payer: COMMERCIAL

## 2023-07-20 VITALS
DIASTOLIC BLOOD PRESSURE: 78 MMHG | WEIGHT: 178 LBS | RESPIRATION RATE: 18 BRPM | SYSTOLIC BLOOD PRESSURE: 118 MMHG | HEART RATE: 76 BPM | TEMPERATURE: 97.7 F | OXYGEN SATURATION: 98 % | HEIGHT: 64 IN | BODY MASS INDEX: 30.39 KG/M2

## 2023-07-20 DIAGNOSIS — K91.2 POSTGASTRECTOMY MALABSORPTION: Primary | ICD-10-CM

## 2023-07-20 DIAGNOSIS — Z90.3 S/P GASTRIC SLEEVE PROCEDURE: ICD-10-CM

## 2023-07-20 DIAGNOSIS — E66.9 OBESITY (BMI 30.0-34.9): ICD-10-CM

## 2023-07-20 DIAGNOSIS — Z90.3 POSTGASTRECTOMY MALABSORPTION: Primary | ICD-10-CM

## 2023-07-20 PROCEDURE — 99214 OFFICE O/P EST MOD 30 MIN: CPT | Performed by: REGISTERED NURSE

## 2023-07-20 RX ORDER — GABAPENTIN 300 MG/1
CAPSULE ORAL
COMMUNITY

## 2023-07-20 ASSESSMENT — ENCOUNTER SYMPTOMS
CHEST TIGHTNESS: 0
SHORTNESS OF BREATH: 0
GASTROINTESTINAL NEGATIVE: 1

## 2023-07-20 NOTE — PROGRESS NOTES
Bariatric Postoperative Progress Note    Chief Complaint   Patient presents with    Follow-up     Sleeve 12/21/2022     Fredi Flores is a 37 y.o. female now 7 months status post laparoscopic sleeve gastrectomy performed on 12/21/2022. Doing well overall. Diet consists of steak, salmon, eggs, nuts, cheese. Taking in 80 oz sugar free fluids, 60 g protein. 90 min of activity 6 days a week. Bowel movements are alternating constipation and regularity. She is compliant with recommended bariatric vitamin supplementation. No issues with hair thinning. Denies pain. No tobacco, NSAID, and ETOH use. Weight Loss Metrics 7/20/2023 5/5/2023 3/30/2023 12/30/2022 12/21/2022 12/1/2022 11/7/2022   Pre-op weight (manual entry) 234 lbs - 234 lbs - - - -   Height 5' 4\" 5' 4\" 5' 4\" 5' 4\" 5' 4\" 5' 4\" 5' 4\"   Weight 178 lbs 188 lbs 6 oz 194 lbs 227 lbs - 235 lbs 234 lbs   BMI (Calculated) 30.6 kg/m2 32.4 kg/m2 33.4 kg/m2 39 kg/m2 - 40.4 kg/m2 40.2 kg/m2   Ideal body weight (manual entry) 131 lbs - 131 lbs - - - -   EBW in lbs. 103 - 103 - - - -   Weight loss to date in lbs.  56 - 40 - - - -   Percent weight loss (%) 23.93 % - 17.09 % - - - -   Percent EBW loss (%) 54.4 % - 38.8 % - - - -   Some recent data might be hidden     Comorbidities:  Hypertension: resolved  Diabetes: not applicable  Obstructive Sleep Apnea: not applicable  Hyperlipidemia: not applicable  Stress Urinary Incontinence: not applicable  Gastroesophageal Reflux: not applicable  Weight related arthropathy:resolved      Past Medical History:   Diagnosis Date    Arthritis     knees hurt    Asthma     Chest pain     Chronic pain 6 months    painful menses    Dental disorder     Fibroids     GERD (gastroesophageal reflux disease)     Headache disorder     Heart palpitations     Hypertension 8/2022    Migraine     NAFLD (nonalcoholic fatty liver disease) 11/07/2022    Neurological disorder     brain vasular malformations       Past Surgical History:   Procedure

## 2023-08-13 ENCOUNTER — HOSPITAL ENCOUNTER (EMERGENCY)
Facility: HOSPITAL | Age: 43
Discharge: HOME | End: 2023-08-13
Attending: EMERGENCY MEDICINE
Payer: COMMERCIAL

## 2023-08-13 ENCOUNTER — APPOINTMENT (OUTPATIENT)
Facility: HOSPITAL | Age: 43
End: 2023-08-13
Payer: COMMERCIAL

## 2023-08-13 VITALS
WEIGHT: 175 LBS | DIASTOLIC BLOOD PRESSURE: 78 MMHG | OXYGEN SATURATION: 100 % | SYSTOLIC BLOOD PRESSURE: 118 MMHG | BODY MASS INDEX: 29.88 KG/M2 | HEIGHT: 64 IN | RESPIRATION RATE: 18 BRPM | HEART RATE: 70 BPM | TEMPERATURE: 98.5 F

## 2023-08-13 DIAGNOSIS — S62.639B OPEN FRACTURE OF TUFT OF DISTAL PHALANX OF FINGER: Primary | ICD-10-CM

## 2023-08-13 PROCEDURE — 2580000003 HC RX 258: Performed by: EMERGENCY MEDICINE

## 2023-08-13 PROCEDURE — 6360000002 HC RX W HCPCS: Performed by: EMERGENCY MEDICINE

## 2023-08-13 PROCEDURE — 96374 THER/PROPH/DIAG INJ IV PUSH: CPT

## 2023-08-13 PROCEDURE — 90471 IMMUNIZATION ADMIN: CPT | Performed by: EMERGENCY MEDICINE

## 2023-08-13 PROCEDURE — 73140 X-RAY EXAM OF FINGER(S): CPT

## 2023-08-13 PROCEDURE — 90715 TDAP VACCINE 7 YRS/> IM: CPT | Performed by: EMERGENCY MEDICINE

## 2023-08-13 PROCEDURE — 99284 EMERGENCY DEPT VISIT MOD MDM: CPT

## 2023-08-13 RX ORDER — FLUCONAZOLE 150 MG/1
150 TABLET ORAL ONCE
Qty: 1 TABLET | Refills: 0 | Status: SHIPPED | OUTPATIENT
Start: 2023-08-13 | End: 2023-08-13

## 2023-08-13 RX ORDER — CEPHALEXIN 500 MG/1
500 CAPSULE ORAL 4 TIMES DAILY
Qty: 28 CAPSULE | Refills: 0 | Status: SHIPPED | OUTPATIENT
Start: 2023-08-13 | End: 2023-08-20

## 2023-08-13 RX ADMIN — CEFAZOLIN 2000 MG: 1 INJECTION, POWDER, FOR SOLUTION INTRAMUSCULAR; INTRAVENOUS at 09:01

## 2023-08-13 RX ADMIN — TETANUS TOXOID, REDUCED DIPHTHERIA TOXOID AND ACELLULAR PERTUSSIS VACCINE, ADSORBED 0.5 ML: 5; 2.5; 8; 8; 2.5 SUSPENSION INTRAMUSCULAR at 09:03

## 2023-08-13 ASSESSMENT — PAIN SCALES - GENERAL: PAINLEVEL_OUTOF10: 8

## 2023-08-13 ASSESSMENT — PAIN - FUNCTIONAL ASSESSMENT: PAIN_FUNCTIONAL_ASSESSMENT: 0-10

## 2023-08-13 NOTE — ED PROVIDER NOTES
EMERGENCY DEPARTMENT HISTORY AND PHYSICAL EXAM      Patient Name: Roselia Georges  MRN: [id number]  YOB: 1980  Provider: Jen Hurt MD  PCP: Pinky Arana MD   Time/Date of evaluation: 8:16 AM EDT on 23    History of Presenting Illness     Chief Complaint   Patient presents with    Finger Injury       History Provided By: Patient     History Janece Files):   Roselia Georges is a 37 y.o. female with a PMHX of arthritis, asthma, GERD, headaches, palpitations, hypertension tension, migraines, and [place],  who presents to the emergency department  by POV C/O right ring finger pain. She says that she slammed her finger in a car door at 1 AM.  She has been having pain since then. Her last tetanus is unknown. She is right-handed.         Past History     Past Medical History:  Past Medical History:   Diagnosis Date    Arthritis     knees hurt    Asthma     Chest pain     Chronic pain 6 months    painful menses    Dental disorder     Fibroids     GERD (gastroesophageal reflux disease)     Headache disorder     Heart palpitations     Hypertension 2022    Migraine     NAFLD (nonalcoholic fatty liver disease) 2022    Neurological disorder     brain vasular malformations       Past Surgical History:  Past Surgical History:   Procedure Laterality Date     SECTION  8402,8632    with BTL    ENDOMETRIAL ABLATION      No period since    GASTRIC BYPASS SURGERY  2022    GYN      endometrial ablation    ORTHOPEDIC SURGERY      WRIST FRACTURE SURGERY      Right Carpal Tunnel Release       Family History:  Family History   Problem Relation Age of Onset    Migraines Mother     Mult Sclerosis Sister     Cancer Paternal Grandmother         Breast cancer    Parkinson's Disease Father     Diabetes Father     Hypertension Mother     Headache Mother     Hypertension Father        Social History:  Social History     Tobacco Use    Smoking status: Former     Types: Cigarettes     Quit date: 3/23/1998

## 2023-08-13 NOTE — ED NOTES
Finger splint in place, no patient c/o. Unable to assess cap refill.       Lindsay Oakley RN  08/13/23 7632

## 2023-08-13 NOTE — ED NOTES
Right fourth finger with abrasion/avulsion to area around nail bed. Nail completely exposed and able to move with palpation. No bleeding noted. Wet to dry temporary dressing placed.       Rod Muñoz RN  08/13/23 4383

## 2023-08-14 ENCOUNTER — CARE COORDINATION (OUTPATIENT)
Dept: OTHER | Facility: CLINIC | Age: 43
End: 2023-08-14

## 2023-08-14 ENCOUNTER — TELEPHONE (OUTPATIENT)
Age: 43
End: 2023-08-14

## 2023-08-14 NOTE — TELEPHONE ENCOUNTER
Acute comminuted fracture through the terminal tuft of the distal phalanx rt ring finger. Seen for right hand 4/2022. Please review ED notes from yesterday to determine proper placement for new injury and advise patient at [phone].

## 2023-08-14 NOTE — CARE COORDINATION
Ambulatory Care Coordination Note    LPN CC attempted to reach patient for introduction to Associate Care Management related to Open Fracture of Tuft of Right Distal Phalanx of Finger. HIPAA compliant message left requesting a return phone call at patient convenience. Plan for follow-up call in 1-2 days      Future Appointments   Date Time Provider [address]   8/16/2023  4:10 PM Josef Richter, DO [facility] AMB     cephALEXin 500 MG capsule  fluconazole 150 MG tablet      Call 911 anytime you think you may need emergency care. For example, call if:  Your finger is cool or pale or changes color. Call your doctor now or seek immediate medical care if:  Your pain gets much worse. You have tingling, weakness, or numbness in your finger. You have signs of infection, such as: Increased pain, swelling, warmth, or redness. Red streaks leading from the area. Pus draining from the area. Swollen lymph nodes in your neck, armpits, or groin. A fever.

## 2023-08-15 ENCOUNTER — CARE COORDINATION (OUTPATIENT)
Dept: OTHER | Facility: CLINIC | Age: 43
End: 2023-08-15

## 2023-08-15 SDOH — HEALTH STABILITY: PHYSICAL HEALTH: ON AVERAGE, HOW MANY DAYS PER WEEK DO YOU ENGAGE IN MODERATE TO STRENUOUS EXERCISE (LIKE A BRISK WALK)?: 6 DAYS

## 2023-08-15 SDOH — HEALTH STABILITY: PHYSICAL HEALTH: ON AVERAGE, HOW MANY MINUTES DO YOU ENGAGE IN EXERCISE AT THIS LEVEL?: 70 MIN

## 2023-08-15 NOTE — CARE COORDINATION
Ambulatory Care Coordination Note    LPN CC attempted 2nd outreach to patient for introduction to Associate Care Management related to Fx Right Ring Finger. HIPAA compliant message left requesting a return phone call at patient convenience. Unable to Reach Letter sent to patient via [employer]. Will continue to outreach.       Future Appointments   Date Time Provider [address]   8/16/2023  4:10 PM Josef Richter DO [facility] BS AMB

## 2023-08-16 ENCOUNTER — OFFICE VISIT (OUTPATIENT)
Age: 43
End: 2023-08-16

## 2023-08-16 VITALS — HEIGHT: 64 IN | WEIGHT: 175.6 LBS | BODY MASS INDEX: 29.98 KG/M2

## 2023-08-16 DIAGNOSIS — S62.664B OPEN NONDISPLACED FRACTURE OF DISTAL PHALANX OF RIGHT RING FINGER, INITIAL ENCOUNTER: ICD-10-CM

## 2023-08-16 NOTE — PROGRESS NOTES
Dorrie Hashimoto is a 37 y.o. female right handed [employer] employee. Worker's Compensation and legal considerations: none    Chief Complaint   Patient presents with    Finger Injury     right     Pain Score:   8    HPI: Patient presents today with complaints of a right ring finger injury as well as injury to her nail. 2022 HPI: Patient presents today 2 weeks status post right carpal tunnel release. She reports no numbness. She reports some tenderness around the hand though improving. Initial HPI: Patient presents today with complaints of bilateral hand numbness and tingling. She has recently had an EMG done which was positive for carpal tunnel. Date of onset: 2023  Injury: Right ring finger  Prior Treatment:  Yes: Comment: ED antibiotics and splint. Right carpal tunnel release. ROS: Review of Systems - General ROS: negative except HPI    Past Medical History:   Diagnosis Date    Arthritis     knees hurt    Asthma     Chest pain     Chronic pain 6 months    painful menses    Dental disorder     Fibroids     GERD (gastroesophageal reflux disease)     Headache disorder     Heart palpitations     Hypertension 2022    Migraine     NAFLD (nonalcoholic fatty liver disease) 2022    Neurological disorder     brain vasular malformations       Past Surgical History:   Procedure Laterality Date     SECTION  0172,6243    with BTL    ENDOMETRIAL ABLATION      No period since    GASTRIC BYPASS SURGERY  2022    GYN      endometrial ablation    ORTHOPEDIC SURGERY      WRIST FRACTURE SURGERY      Right Carpal Tunnel Release        Current Outpatient Medications   Medication Sig Dispense Refill    cephALEXin (KEFLEX) 500 MG capsule Take 1 capsule by mouth 4 times daily for 7 days 28 capsule 0    gabapentin (NEURONTIN) 300 MG capsule 1 capsule as needed by oral route.  (Patient not taking: Reported on 2023)      amitriptyline (ELAVIL) 10 MG tablet Take 2.5 tablets by mouth

## 2023-08-17 ENCOUNTER — TELEPHONE (OUTPATIENT)
Age: 43
End: 2023-08-17

## 2023-08-17 DIAGNOSIS — S62.664B OPEN NONDISPLACED FRACTURE OF DISTAL PHALANX OF RIGHT RING FINGER, INITIAL ENCOUNTER: Primary | ICD-10-CM

## 2023-08-17 NOTE — TELEPHONE ENCOUNTER
Spoke with patient and relayed message. Informed them to not take any kind of ibuprofen or anti-inflammatory with the Diclofenac. Patient stated she could not take any kind of ibuprofen or sudafed or vasodilator per another doctor of hers. Asked patient why and she stated she did not know. Requested patient call that doctor and find out what she can take as an alternative and let us know.

## 2023-08-17 NOTE — TELEPHONE ENCOUNTER
Patient called and said that she saw Mari Gallardo yesterday. Patient said that Mari Paulina was going to prescribe her some Anti-inflammatory medication , and some Pain Meidication, but nothing was sent to her pharmacy. Patient is asking that the medication be sent to [facility] patient Pharm[facility]   Tel. [phone]. Patient tel. [phone]. Note : patient was seen for the Right hand ring finger by .

## 2023-08-17 NOTE — TELEPHONE ENCOUNTER
I have cancelled the diclofenac as any anti-inflammatories she cannot take after the bariatric surgery. I started to put tramdol in as an alternative but a reaction of hives came up. Could we get more info on what this reaction is? Are there any pain meds she has taken in the past without a reaction?

## 2023-08-18 RX ORDER — HYDROCODONE BITARTRATE AND ACETAMINOPHEN 5; 325 MG/1; MG/1
1 TABLET ORAL EVERY 6 HOURS PRN
Qty: 12 TABLET | Refills: 0 | Status: SHIPPED | OUTPATIENT
Start: 2023-08-18 | End: 2023-08-21

## 2023-08-18 NOTE — TELEPHONE ENCOUNTER
Spoke w/ Pt, adv rx for 3 days of Norco sent, after that will need to switch to otc as needed.  Pt expressed understanding

## 2023-08-18 NOTE — TELEPHONE ENCOUNTER
Patient returned called stated that she is allergic to tramadol and she think she has taking hydrocodone for pain a while back.       [phone]

## 2023-08-29 ENCOUNTER — CARE COORDINATION (OUTPATIENT)
Dept: OTHER | Facility: CLINIC | Age: 43
End: 2023-08-29

## 2023-08-29 NOTE — CARE COORDINATION
Ambulatory Care Coordination Note    LPN CC attempted outreach to patient for introduction to Associate Care Management related to Fx Right Ring Finger. HIPAA compliant message left requesting a return phone call at patient convenience. Will continue to outreach. Future Appointments   Date Time Provider [address]   9/27/2023  8:10 AM Nena Cornell DO [facility] AMB     Chart Review: 8/16/23 Nena Cornell, DO - Ortho Surgery  Patient presents today with complaints of a right ring finger injury as well as injury to her nail. Prior Treatment:  Yes: Comment: ED antibiotics and splint  Pain Score:   8     Right hand: There is ecchymosis and tenderness about the ring finger distally as well as about the nail plate. The nail plate appears to be stably intact. Grossly neurovascularly intact distally. Range of motion is limited due to stiffness swelling and pain. Plan:      Discussed dressing changes range of motion exercises and wound care.      Return in about 6 weeks (around 9/27/2023) for Reevaluation  Wednesday September 27 @ 8:10 AM Nnea Cornell DO - Ortho Surgery  HYDROcodone-acetaminophen (NORCO) 5-325 MG per tablet

## 2023-09-28 ENCOUNTER — CARE COORDINATION (OUTPATIENT)
Dept: OTHER | Facility: CLINIC | Age: 43
End: 2023-09-28

## 2023-11-18 VITALS
WEIGHT: 169 LBS | DIASTOLIC BLOOD PRESSURE: 80 MMHG | RESPIRATION RATE: 16 BRPM | OXYGEN SATURATION: 100 % | SYSTOLIC BLOOD PRESSURE: 126 MMHG | HEART RATE: 66 BPM | TEMPERATURE: 97.8 F | HEIGHT: 63 IN | BODY MASS INDEX: 29.95 KG/M2

## 2023-11-18 PROCEDURE — 93005 ELECTROCARDIOGRAM TRACING: CPT | Performed by: EMERGENCY MEDICINE

## 2023-11-18 PROCEDURE — 99283 EMERGENCY DEPT VISIT LOW MDM: CPT

## 2023-11-18 ASSESSMENT — PAIN - FUNCTIONAL ASSESSMENT: PAIN_FUNCTIONAL_ASSESSMENT: 0-10

## 2023-11-18 ASSESSMENT — PAIN DESCRIPTION - ORIENTATION: ORIENTATION: RIGHT

## 2023-11-18 ASSESSMENT — PAIN SCALES - GENERAL: PAINLEVEL_OUTOF10: 8

## 2023-11-19 ENCOUNTER — HOSPITAL ENCOUNTER (EMERGENCY)
Facility: HOSPITAL | Age: 43
Discharge: HOME | End: 2023-11-19
Attending: EMERGENCY MEDICINE
Payer: COMMERCIAL

## 2023-11-19 DIAGNOSIS — L03.211 CELLULITIS, FACE: Primary | ICD-10-CM

## 2023-11-19 LAB
EKG ATRIAL RATE: 66 BPM
EKG DIAGNOSIS: NORMAL
EKG P AXIS: 73 DEGREES
EKG P-R INTERVAL: 140 MS
EKG Q-T INTERVAL: 390 MS
EKG QRS DURATION: 72 MS
EKG QTC CALCULATION (BAZETT): 408 MS
EKG R AXIS: 33 DEGREES
EKG T AXIS: 41 DEGREES
EKG VENTRICULAR RATE: 66 BPM

## 2023-11-19 PROCEDURE — 93010 ELECTROCARDIOGRAM REPORT: CPT | Performed by: INTERNAL MEDICINE

## 2023-11-19 PROCEDURE — 6370000000 HC RX 637 (ALT 250 FOR IP): Performed by: EMERGENCY MEDICINE

## 2023-11-19 RX ORDER — CLINDAMYCIN HYDROCHLORIDE 300 MG/1
300 CAPSULE ORAL 4 TIMES DAILY
Qty: 40 CAPSULE | Refills: 0 | Status: SHIPPED | OUTPATIENT
Start: 2023-11-19 | End: 2023-11-29

## 2023-11-19 RX ORDER — CLINDAMYCIN HYDROCHLORIDE 150 MG/1
CAPSULE ORAL
Status: DISCONTINUED
Start: 2023-11-19 | End: 2023-11-19 | Stop reason: HOSPADM

## 2023-11-19 RX ORDER — CLINDAMYCIN HYDROCHLORIDE 150 MG/1
300 CAPSULE ORAL
Status: COMPLETED | OUTPATIENT
Start: 2023-11-19 | End: 2023-11-19

## 2023-11-19 RX ORDER — IBUPROFEN 400 MG/1
TABLET ORAL
Status: DISCONTINUED
Start: 2023-11-19 | End: 2023-11-19 | Stop reason: WASHOUT

## 2023-11-19 RX ORDER — IBUPROFEN 600 MG/1
600 TABLET ORAL 3 TIMES DAILY PRN
Qty: 30 TABLET | Refills: 0 | Status: SHIPPED | OUTPATIENT
Start: 2023-11-19

## 2023-11-19 RX ORDER — IBUPROFEN 400 MG/1
400 TABLET ORAL
Status: DISCONTINUED | OUTPATIENT
Start: 2023-11-19 | End: 2023-11-19 | Stop reason: HOSPADM

## 2023-11-19 RX ADMIN — CLINDAMYCIN HYDROCHLORIDE 300 MG: 150 CAPSULE ORAL at 02:00

## 2023-11-19 ASSESSMENT — ENCOUNTER SYMPTOMS
GASTROINTESTINAL NEGATIVE: 1
RESPIRATORY NEGATIVE: 1

## 2023-11-19 NOTE — ED NOTES
Discharge instructions given to patient. Follow up information provided. Verbalized understanding.       Angelo Xavier RN  11/19/23 6203

## 2023-11-19 NOTE — ED TRIAGE NOTES
Pt c/o insect bite to right cheek that she first noticed yesterday morning. Pt c/o having pain to the right side of her face into her neck, throat, and chest today. Denies fevers.

## 2023-11-19 NOTE — ED NOTES
Swelling on right cheek along lateral right zygomatic arch x 2 days. Patient reports some drainage from the area. Reports pain radiating down side of face into right side of neck.      Adrienne Prado RN  11/19/23 [id number]

## 2023-11-30 NOTE — TELEPHONE ENCOUNTER
Patient called back and stated the other doctor told her she cannot take NSAID's and she had weight loss surgery December 2022. She stated she was not told what other medications she could take. Class II - visualization of the soft palate, fauces, and uvula

## 2024-04-22 LAB
CHOLEST SERPL-MCNC: 148 MG/DL
GLUCOSE SERPL-MCNC: 74 MG/DL (ref 74–99)
HDLC SERPL-MCNC: 82 MG/DL (ref 40–60)
LDLC SERPL CALC-MCNC: 45.6 MG/DL (ref 0–100)
TRIGL SERPL-MCNC: 102 MG/DL

## 2024-05-31 ENCOUNTER — TRANSCRIBE ORDERS (OUTPATIENT)
Facility: HOSPITAL | Age: 44
End: 2024-05-31

## 2024-05-31 DIAGNOSIS — Z12.31 VISIT FOR SCREENING MAMMOGRAM: Primary | ICD-10-CM

## 2024-06-21 ENCOUNTER — HOSPITAL ENCOUNTER (OUTPATIENT)
Facility: HOSPITAL | Age: 44
Discharge: HOME | End: 2024-06-21
Attending: FAMILY MEDICINE
Payer: COMMERCIAL

## 2024-06-21 VITALS — HEIGHT: 63 IN | WEIGHT: 169 LBS | BODY MASS INDEX: 29.95 KG/M2

## 2024-06-21 DIAGNOSIS — Z12.31 VISIT FOR SCREENING MAMMOGRAM: ICD-10-CM

## 2024-06-21 PROCEDURE — 77063 BREAST TOMOSYNTHESIS BI: CPT

## 2024-11-05 ENCOUNTER — CLINICAL DOCUMENTATION (OUTPATIENT)
Facility: HOSPITAL | Age: 44
End: 2024-11-05

## 2024-11-19 DIAGNOSIS — G44.219 EPISODIC TENSION-TYPE HEADACHE, NOT INTRACTABLE: ICD-10-CM

## 2024-11-19 RX ORDER — AMITRIPTYLINE HYDROCHLORIDE 10 MG/1
TABLET ORAL
Qty: 75 TABLET | Refills: 5 | OUTPATIENT
Start: 2024-11-19

## 2024-12-13 ENCOUNTER — HOSPITAL ENCOUNTER (EMERGENCY)
Facility: HOSPITAL | Age: 44
Discharge: HOME | End: 2024-12-14
Attending: EMERGENCY MEDICINE
Payer: COMMERCIAL

## 2024-12-13 ENCOUNTER — APPOINTMENT (OUTPATIENT)
Facility: HOSPITAL | Age: 44
End: 2024-12-13
Payer: COMMERCIAL

## 2024-12-13 VITALS
DIASTOLIC BLOOD PRESSURE: 71 MMHG | TEMPERATURE: 98.7 F | HEIGHT: 63 IN | HEART RATE: 62 BPM | BODY MASS INDEX: 30.12 KG/M2 | WEIGHT: 170 LBS | OXYGEN SATURATION: 100 % | RESPIRATION RATE: 12 BRPM | SYSTOLIC BLOOD PRESSURE: 116 MMHG

## 2024-12-13 DIAGNOSIS — R07.9 ACUTE NONSPECIFIC CHEST PAIN WITH LOW RISK OF CORONARY ARTERY DISEASE: Primary | ICD-10-CM

## 2024-12-13 DIAGNOSIS — Z91.89 ACUTE NONSPECIFIC CHEST PAIN WITH LOW RISK OF CORONARY ARTERY DISEASE: Primary | ICD-10-CM

## 2024-12-13 LAB
ALBUMIN SERPL-MCNC: 3.7 G/DL (ref 3.4–5)
ALBUMIN/GLOB SERPL: 1.2 (ref 0.8–1.7)
ALP SERPL-CCNC: 70 U/L (ref 45–117)
ALT SERPL-CCNC: 23 U/L (ref 13–56)
ANION GAP SERPL CALC-SCNC: 4 MMOL/L (ref 3–18)
AST SERPL-CCNC: 23 U/L (ref 10–38)
BASOPHILS # BLD: 0 K/UL (ref 0–0.1)
BASOPHILS NFR BLD: 1 % (ref 0–2)
BILIRUB SERPL-MCNC: 0.3 MG/DL (ref 0.2–1)
BUN SERPL-MCNC: 11 MG/DL (ref 7–18)
BUN/CREAT SERPL: 13 (ref 12–20)
CALCIUM SERPL-MCNC: 8.5 MG/DL (ref 8.5–10.1)
CHLORIDE SERPL-SCNC: 110 MMOL/L (ref 100–111)
CO2 SERPL-SCNC: 28 MMOL/L (ref 21–32)
CREAT SERPL-MCNC: 0.83 MG/DL (ref 0.6–1.3)
DIFFERENTIAL METHOD BLD: NORMAL
EOSINOPHIL # BLD: 0.1 K/UL (ref 0–0.4)
EOSINOPHIL NFR BLD: 2 % (ref 0–5)
ERYTHROCYTE [DISTWIDTH] IN BLOOD BY AUTOMATED COUNT: 13.1 % (ref 11.6–14.5)
GLOBULIN SER CALC-MCNC: 3.1 G/DL (ref 2–4)
GLUCOSE SERPL-MCNC: 83 MG/DL (ref 74–99)
HCT VFR BLD AUTO: 37.2 % (ref 35–45)
HGB BLD-MCNC: 12.1 G/DL (ref 12–16)
IMM GRANULOCYTES # BLD AUTO: 0 K/UL (ref 0–0.04)
IMM GRANULOCYTES NFR BLD AUTO: 0 % (ref 0–0.5)
LYMPHOCYTES # BLD: 2 K/UL (ref 0.9–3.6)
LYMPHOCYTES NFR BLD: 32 % (ref 21–52)
MCH RBC QN AUTO: 27.9 PG (ref 24–34)
MCHC RBC AUTO-ENTMCNC: 32.5 G/DL (ref 31–37)
MCV RBC AUTO: 85.7 FL (ref 78–100)
MONOCYTES # BLD: 0.4 K/UL (ref 0.05–1.2)
MONOCYTES NFR BLD: 7 % (ref 3–10)
NEUTS SEG # BLD: 3.8 K/UL (ref 1.8–8)
NEUTS SEG NFR BLD: 59 % (ref 40–73)
NRBC # BLD: 0 K/UL (ref 0–0.01)
NRBC BLD-RTO: 0 PER 100 WBC
PLATELET # BLD AUTO: 314 K/UL (ref 135–420)
PMV BLD AUTO: 9.2 FL (ref 9.2–11.8)
POTASSIUM SERPL-SCNC: 3.7 MMOL/L (ref 3.5–5.5)
PROT SERPL-MCNC: 6.8 G/DL (ref 6.4–8.2)
RBC # BLD AUTO: 4.34 M/UL (ref 4.2–5.3)
SODIUM SERPL-SCNC: 142 MMOL/L (ref 136–145)
TROPONIN I SERPL HS-MCNC: 3 NG/L (ref 0–54)
WBC # BLD AUTO: 6.5 K/UL (ref 4.6–13.2)

## 2024-12-13 PROCEDURE — 80053 COMPREHEN METABOLIC PANEL: CPT

## 2024-12-13 PROCEDURE — 85025 COMPLETE CBC W/AUTO DIFF WBC: CPT

## 2024-12-13 PROCEDURE — 99285 EMERGENCY DEPT VISIT HI MDM: CPT

## 2024-12-13 PROCEDURE — 71045 X-RAY EXAM CHEST 1 VIEW: CPT

## 2024-12-13 PROCEDURE — 6360000002 HC RX W HCPCS: Performed by: EMERGENCY MEDICINE

## 2024-12-13 PROCEDURE — 93005 ELECTROCARDIOGRAM TRACING: CPT | Performed by: EMERGENCY MEDICINE

## 2024-12-13 PROCEDURE — 84484 ASSAY OF TROPONIN QUANT: CPT

## 2024-12-13 RX ORDER — ALBUTEROL SULFATE 0.83 MG/ML
2.5 SOLUTION RESPIRATORY (INHALATION)
Status: COMPLETED | OUTPATIENT
Start: 2024-12-13 | End: 2024-12-13

## 2024-12-13 RX ADMIN — ALBUTEROL SULFATE 2.5 MG: 2.5 SOLUTION RESPIRATORY (INHALATION) at 22:45

## 2024-12-13 ASSESSMENT — PAIN DESCRIPTION - PAIN TYPE: TYPE: ACUTE PAIN

## 2024-12-13 ASSESSMENT — PAIN DESCRIPTION - ORIENTATION: ORIENTATION: LEFT

## 2024-12-13 ASSESSMENT — ENCOUNTER SYMPTOMS
GASTROINTESTINAL NEGATIVE: 1
SHORTNESS OF BREATH: 1

## 2024-12-13 ASSESSMENT — PAIN SCALES - GENERAL: PAINLEVEL_OUTOF10: 7

## 2024-12-13 ASSESSMENT — PAIN DESCRIPTION - DESCRIPTORS: DESCRIPTORS: ACHING

## 2024-12-13 ASSESSMENT — PAIN DESCRIPTION - ONSET: ONSET: AWAKENED FROM SLEEP

## 2024-12-13 ASSESSMENT — PAIN DESCRIPTION - LOCATION: LOCATION: CHEST

## 2024-12-13 ASSESSMENT — PAIN - FUNCTIONAL ASSESSMENT
PAIN_FUNCTIONAL_ASSESSMENT: ACTIVITIES ARE NOT PREVENTED
PAIN_FUNCTIONAL_ASSESSMENT: 0-10

## 2024-12-13 ASSESSMENT — LIFESTYLE VARIABLES
HOW MANY STANDARD DRINKS CONTAINING ALCOHOL DO YOU HAVE ON A TYPICAL DAY: PATIENT DOES NOT DRINK
HOW OFTEN DO YOU HAVE A DRINK CONTAINING ALCOHOL: NEVER

## 2024-12-13 ASSESSMENT — PAIN DESCRIPTION - FREQUENCY: FREQUENCY: INTERMITTENT

## 2024-12-14 NOTE — ED TRIAGE NOTES
Pt c/o SOB and chest pain since 2 days.    Pt stated \"that she started having chest pain at home that goes away then comes back, Pt said that she had when she sits on the side of the bed it helps but today has not help at all\".    Past Medical History:   Diagnosis Date    Arthritis     knees hurt    Asthma     Chest pain     Chronic pain 6 months    painful menses    Dental disorder     Fibroids     GERD (gastroesophageal reflux disease)     Headache disorder     Heart palpitations     Hypertension 8/2022    Migraine     NAFLD (nonalcoholic fatty liver disease) 11/07/2022    Neurological disorder     brain vasular malformations       Pt was wheeled into the room by this nurse.

## 2024-12-14 NOTE — ED PROVIDER NOTES
`[facility] EMERGENCY DEPT  eMERGENCY dEPARTMENT eNCOUnter      Pt Name: Cassidy Pastrana  MRN: [id number]  Birthdate 1980 of evaluation: 2024  Provider:Keith Martinez MD    CHIEF COMPLAINT       HPI    Cassidy Pastrana is a 44 y.o. female  c/o  having SOB and chest pain x 2 days.   Pt stated \"that she started having chest pain at home yesterday that comes and goes.   Pt said that she has been having intermittent chest pain and  had to sit  on the side of the bed at the beginning of having her chest pains.  Sitting on the side of her bed helped her chest pain to decrease it's intensity.    ROS    Review of Systems   Constitutional: Negative.    HENT: Negative.     Respiratory:  Positive for shortness of breath.    Cardiovascular:  Positive for chest pain.   Gastrointestinal: Negative.    Genitourinary: Negative.    Musculoskeletal: Negative.    Neurological: Negative.    Psychiatric/Behavioral: Negative.     All other systems reviewed and are negative.      Except as noted above the remainder of the review of systems was reviewed and negative.       PAST MEDICAL HISTORY     Past Medical History:   Diagnosis Date    Arthritis     knees hurt    Asthma     Chest pain     Chronic pain 6 months    painful menses    Dental disorder     Fibroids     GERD (gastroesophageal reflux disease)     Headache disorder     Heart palpitations     Hypertension 2022    Migraine     NAFLD (nonalcoholic fatty liver disease) 2022    Neurological disorder     brain vasular malformations         SURGICAL HISTORY       Past Surgical History:   Procedure Laterality Date     SECTION  ,    with BTL    ENDOMETRIAL ABLATION      No period since    GASTRIC BYPASS SURGERY  2022    GYN      endometrial ablation    ORTHOPEDIC SURGERY      WRIST FRACTURE SURGERY      Right Carpal Tunnel Release       CURRENTMEDICATIONS       Previous Medications    ACETAMINOPHEN (TYLENOL) 325 MG TABLET    Take 2 tablets by mouth

## 2024-12-15 LAB
EKG ATRIAL RATE: 65 BPM
EKG DIAGNOSIS: NORMAL
EKG P AXIS: 73 DEGREES
EKG P-R INTERVAL: 148 MS
EKG Q-T INTERVAL: 386 MS
EKG QRS DURATION: 70 MS
EKG QTC CALCULATION (BAZETT): 401 MS
EKG R AXIS: 43 DEGREES
EKG T AXIS: 58 DEGREES
EKG VENTRICULAR RATE: 65 BPM

## 2024-12-15 PROCEDURE — 93010 ELECTROCARDIOGRAM REPORT: CPT | Performed by: INTERNAL MEDICINE

## 2024-12-16 ENCOUNTER — CARE COORDINATION (OUTPATIENT)
Dept: OTHER | Facility: CLINIC | Age: 44
End: 2024-12-16

## 2024-12-16 NOTE — CARE COORDINATION
of the insurance card  TeleDoc # 1 - [phone] Kimberly available   [facility] [address] [facility] # [phone]  [facility], [place] # [phone] Monday - Sunday 8:00 AM - 8:00 PM  [facility], [place] Address: [address] [address] [place] [address] Phone: [phone]  [facility], [facility] Address: [address] [place] [address] Phone: [phone]  [facility], [place] Address: [address], [place] [address] Hours: 8 AM - 8 PM Phone: [phone]  MyChart Help - # 1- [phone]  HR Service Center at [phone] weekdays from 7 a.m. - 6 p.m. ET   [employer] - [facility].[employer] or on the [employer] mobile kimberly to sign up. For questions or support, visit [employer]/support or call [phone](press 2 for 24/7 crisis support).   [facility] - [phone] Monday-Friday 8 a.m. to 11 p.m. EST and Saturday-Sunday 9 a.m. to 5 p.m. EST.         Understands red flags post discharge.      CP, SOB, coughing, wheezing, increase in pain, temp >100                   PCP/Specialist follow up:   Future Appointments         Provider Specialty Dept Phone    12/19/2024 2:00 PM Del Steele MD Cardiology [phone]    1/8/2025 8:40 AM Jia Gordillo MD Neurology [phone]            Follow Up:   Plan for next ACM outreach in approximately 1 week to complete:  - goal progression  - follow up appointment with PCP, cardio .   Patient  is agreeable to this plan.      Bettina Pham RN, AAS Associate Care Manager  [facility]   [address]  [address]  Cell [phone] Fax [phone]

## 2024-12-19 ENCOUNTER — OFFICE VISIT (OUTPATIENT)
Age: 44
End: 2024-12-19
Payer: COMMERCIAL

## 2024-12-19 VITALS
SYSTOLIC BLOOD PRESSURE: 120 MMHG | DIASTOLIC BLOOD PRESSURE: 60 MMHG | HEIGHT: 63 IN | WEIGHT: 171 LBS | BODY MASS INDEX: 30.3 KG/M2 | HEART RATE: 68 BPM

## 2024-12-19 DIAGNOSIS — Z98.84 HISTORY OF GASTRIC BYPASS: ICD-10-CM

## 2024-12-19 DIAGNOSIS — R94.31 ABNORMAL EKG: ICD-10-CM

## 2024-12-19 DIAGNOSIS — R00.2 PALPITATIONS: ICD-10-CM

## 2024-12-19 DIAGNOSIS — R07.9 CHEST PAIN, UNSPECIFIED TYPE: Primary | ICD-10-CM

## 2024-12-19 PROCEDURE — 99204 OFFICE O/P NEW MOD 45 MIN: CPT | Performed by: INTERNAL MEDICINE

## 2024-12-19 NOTE — PROGRESS NOTES
History of Present Illness:  44 year-old female here for chest pain.  For about a week or two, she did have some URI symptoms and was treated with antibiotics.  She developed some increasing shortness of breath, lower extremity edema and chest pain for the past three or four days.  She was seen in the ER on the 16th and troponin was unremarkable.  She works at "[employer] and also checked a pro-BNP and it was normal.  Her symptoms seem to be doing a little better, but she did have some chest pain and even dyspnea this morning.  She has not had any syncope.  She had gastric bypass surgery a few years ago and prior to this did have abnormal cholesterol and high blood pressure and was treated at that time, but no longer.  She is worried that her EKG showed possible septal infarct.      Impression:   Intermittent chest pain, atypical, as well as some dyspnea and lower extremity edema.    Recent URI treated with antibiotics, gradually improved.    History of gastric bypass surgery a few years ago with hypertension and dyslipidemia prior to this, now no longer requiring medication.      Plan:  Given her previous risk factors with ongoing symptoms and EKG showing possible septal MI, I have recommended an echocardiogram and exercise nuclear stress test.  If these are unremarkable, she can follow up as needed.  If her symptoms persist, she can see Dr. Allen to discuss further noncardiac causes for chest pain, including pulmonary, GI and musculoskeletal.  All questions were answered.          Wt Readings from Last 3 Encounters:   12/13/24 77.1 kg (170 lb)   06/21/24 76.7 kg (169 lb)   11/18/23 76.7 kg (169 lb)     Past Medical History:   Diagnosis Date    Arthritis     knees hurt    Asthma     Chest pain     Chronic pain 6 months    painful menses    Dental disorder     Fibroids     GERD (gastroesophageal reflux disease)     Headache disorder     Heart palpitations     Hypertension 8/2022    Migraine     NAFLD

## 2024-12-26 ENCOUNTER — CARE COORDINATION (OUTPATIENT)
Dept: OTHER | Facility: CLINIC | Age: 44
End: 2024-12-26

## 2024-12-26 NOTE — CARE COORDINATION
[phone]   Service Center at [phone] weekdays from 7 a.m. - 6 p.m. ET   [facility].[employer] or on the [employer] mobile iesha to sign up. For questions or support, visit [employer]/support or call [phone](press 2 for 24/7 crisis support).   [facility] - [phone] Monday-Friday 8 a.m. to 11 p.m. EST and Saturday-Sunday 9 a.m. to 5 p.m. EST.         Understands red flags post discharge.      CP, SOB, coughing, wheezing, increase in pain, temp >100            Bettina Pham RN, AAS Associate Care Manager  [place] [address]  Cell [phone] Fax [phone]

## 2025-01-17 ENCOUNTER — CARE COORDINATION (OUTPATIENT)
Dept: OTHER | Facility: CLINIC | Age: 45
End: 2025-01-17

## 2025-01-17 NOTE — CARE COORDINATION
Ambulatory Care Coordination Note     1/17/2025 6:04 PM     Patient outreach attempt by this ACM today to perform care management follow up . ACM was unable to reach the patient by telephone today;   left voice message requesting a return phone call to this ACM.     ACM: Bettina Pham RN     Care Summary Note:   Pt was seen at [facility] ER 12/13/24 -- 12/14/24  Diagnosis:  Acute nonspecific chest pain with low risk of coronary artery disease    PCP/Specialist follow up:   Future Appointments         Provider Specialty Dept Phone    2/11/2025 8:40 AM Jia Gordillo MD Neurology [phone]    2/19/2025 8:30 AM [facility] Cardiology [phone]            Follow Up:   Plan for next ACM outreach in approximately 3 weeks to complete:  - goal progression  - follow-up appointment with PCP, cardio .            Goals        Establish PCP relationships and regularly scheduled appointments.      PCP - TBD  Cardio - 12/19/24  Neuro - 1/8/25 12/26/24 Call placed to patient, no answer. Voicemail left to return call.     1/17/25 Call placed to patient, no answer. Voicemail left to return call.              Knowledge and adherence to medication plan.      Taking prescribed meds       Supportive resources in place to maintain patient in the community (ie., home health, equipment, DME, refer to, etc.)      Nurse Access line 24/7 located on the back of the insurance card  TeleDoc # 1 - [phone] Kimberly available   [facility] [place] [address] # [phone]  [facility], [place] # [phone] Monday - Sunday 8:00 AM - 8:00 PM  [facility], [place] Address: [address] [address] [place] [address] Phone: [phone]  [facility], [facility] Address: [address] [place] [address] Phone: [phone]  [facility], [facility] Address: [address] [place] [address] Hours: 8 AM - 8 PM Phone: [phone])

## 2025-02-10 ENCOUNTER — CARE COORDINATION (OUTPATIENT)
Dept: OTHER | Facility: CLINIC | Age: 45
End: 2025-02-10

## 2025-02-10 NOTE — CARE COORDINATION
Ambulatory Care Coordination Note     2/10/2025 5:57 PM     Patient outreach attempt by this ACM today to perform care management follow up . ACM was unable to reach the patient by telephone;   left voice message requesting a return phone call to this ACM.  Float: [place] message sent requesting patient to contact this ACM.     ACM: Bettina Pham RN     Care Summary Note:   Pt was seen at [facility] ER 12/13/24 -- 12/14/24  Diagnosis:  Acute nonspecific chest pain with low risk of coronary artery disease    PCP/Specialist follow up:   Future Appointments         Provider Specialty Dept Phone    3/26/2025 9:40 AM Jia Gordillo MD Neurology [phone]            Follow Up:   Plan for next AC outreach in approximately 2 weeks to complete:  - goal progression  - follow-up appointment with PCP .       Goals        Establish PCP relationships and regularly scheduled appointments.      PCP - TBD  Cardio - 12/19/24  Neuro - 1/8/25 12/26/24 Call placed to patient, no answer. Voicemail left to return call.     1/17/25 Call placed to patient, no answer. Voicemail left to return call.    2/10/25 LTF letter sent via [employer]              Knowledge and adherence to medication plan.      Taking prescribed meds       Supportive resources in place to maintain patient in the community (ie., home health, equipment, DME, refer to, etc.)      Nurse Access line 24/7 located on the back of the insurance card  TeleDoc # 1 - [phone] Kimberly available   [facility] [place] [address] # [phone]  [facility], [place] # [phone] Monday - Sunday 8:00 AM - 8:00 PM  [facility] Address: [address] [address] [place] [address] Phone: [phone]  [facility], [facility] Address: [address] [place] [address] Phone: [phone]  [facility], [facility] Address: [address] [place] [address] Hours: 8 AM - 8 PM

## (undated) DEVICE — REDUCER CANN ENDOWRIST 12-8MM -- DA VINCI XI - SNGL USE

## (undated) DEVICE — NDL SPNE QUINCKE 20GA 3.5IN LF --

## (undated) DEVICE — ELECTRO LUBE IS A SINGLE PATIENT USE DEVICE THAT IS INTENDED TO BE USED ON ELECTROSURGICAL ELECTRODES TO REDUCE STICKING.: Brand: KEY SURGICAL ELECTRO LUBE

## (undated) DEVICE — SUTURE MCRYL SZ 4-0 L27IN ABSRB UD L24MM PS-1 3/8 CIR PRIM Y935H

## (undated) DEVICE — SYR 50ML LR LCK 1ML GRAD NSAF --

## (undated) DEVICE — Device

## (undated) DEVICE — SCISSORS ENDOSCP DIA5MM CRV MPLR CAUT W/ RATCH HNDL

## (undated) DEVICE — BOWL UTIL GRAD 32OZ STRL --

## (undated) DEVICE — OBTRTR BLDELSS OPT 8MM DISP -- DA VINICI XI - SNGL USE

## (undated) DEVICE — 2, DISPOSABLE SUCTION/IRRIGATOR WITH DISPOSABLE TIP: Brand: STRYKEFLOW

## (undated) DEVICE — REM POLYHESIVE ADULT PATIENT RETURN ELECTRODE: Brand: VALLEYLAB

## (undated) DEVICE — STAPLER 60: Brand: SUREFORM

## (undated) DEVICE — VESSEL SEALER XI EXTENDED DISP -- VESSEL

## (undated) DEVICE — STERILE POLYISOPRENE POWDER-FREE SURGICAL GLOVES: Brand: PROTEXIS

## (undated) DEVICE — COLUMN DRAPE

## (undated) DEVICE — APPLICATOR LAP 35 CM 2 RIGID VISTASEAL

## (undated) DEVICE — DRAPE TOWEL: Brand: CONVERTORS

## (undated) DEVICE — AIRSEAL BIFURCATED SMOKE EVAC FILTERED TUBE SET: Brand: AIRSEAL

## (undated) DEVICE — INTENDED FOR TISSUE SEPARATION, AND OTHER PROCEDURES THAT REQUIRE A SHARP SURGICAL BLADE TO PUNCTURE OR CUT.: Brand: BARD-PARKER SAFETY BLADES SIZE 15, STERILE

## (undated) DEVICE — SEAL

## (undated) DEVICE — KIT CLN UP BON SECOURS MARYV

## (undated) DEVICE — ARM DRAPE

## (undated) DEVICE — KIT,ANTI FOG,W/SPONGE & FLUID,SOFT PACK: Brand: MEDLINE

## (undated) DEVICE — GARMENT,MEDLINE,DVT,INT,CALF,MED, GEN2: Brand: MEDLINE

## (undated) DEVICE — SUTURE V-LOC 90 3-0 L9IN ABSRB VLT L26MM V-20 1/2 CIR TAPR VLOCM0644

## (undated) DEVICE — ADHESIVE SKN CLSR HI VISC 2-O --

## (undated) DEVICE — Z DUPLICATE USE 2423173 SEALANT FIBRIN 10 CC VISTASEAL

## (undated) DEVICE — SYR 10ML LUER LOK 1/5ML GRAD --

## (undated) DEVICE — SYR LR LCK 1ML GRAD NSAF 30ML --

## (undated) DEVICE — BANDAGE,GAUZE,BULKEE II,4.5"X4.1YD,STRL: Brand: MEDLINE

## (undated) DEVICE — DRAIN SURG PENROSE 0.25X12 IN CLOSED WND DRAINAGE PREM SIL

## (undated) DEVICE — SEAL UNIV 5-8MM DISP BX/10 -- DA VINCI XI - SNGL USE

## (undated) DEVICE — TRUE CONTENT TO BE POPULATED AS PART OF REBRANDING: Brand: ARGYLE

## (undated) DEVICE — 3M™ STERI-DRAPE™ INSTRUMENT POUCH 1018L: Brand: STERI-DRAPE™

## (undated) DEVICE — SYRINGE,TOOMEY,IRRIGATION,70CC,STERILE: Brand: MEDLINE

## (undated) DEVICE — SOLUTION IV 1000ML 0.9% SOD CHL

## (undated) DEVICE — VISUALIZATION SYSTEM: Brand: CLEARIFY

## (undated) DEVICE — STAPLER RELOAD SUREFORM 60 BLU -- DA VINCI XI

## (undated) DEVICE — SUTURE V-LOC 90 SZ 3-0 L6IN ABSRB VLT V-20 L26MM 1/2 CIR VLOCM0604

## (undated) DEVICE — STAPLER 60 RELOAD WHITE: Brand: SUREFORM

## (undated) DEVICE — PREP SKN CHLRAPRP APL 26ML STR --

## (undated) DEVICE — BLANKET WRM AD W50XL85.8IN PACU FULL BODY FORC AIR

## (undated) DEVICE — INSUFFLATION NEEDLE TO ESTABLISH PNEUMOPERITONEUM.: Brand: INSUFFLATION NEEDLE

## (undated) DEVICE — SUTURE ETHBND EXCEL SZ 2-0 L30IN NONABSORBABLE GRN L26MM SH X833H